# Patient Record
Sex: FEMALE | Race: WHITE | Employment: UNEMPLOYED | URBAN - METROPOLITAN AREA
[De-identification: names, ages, dates, MRNs, and addresses within clinical notes are randomized per-mention and may not be internally consistent; named-entity substitution may affect disease eponyms.]

---

## 2017-01-12 ENCOUNTER — TELEPHONE (OUTPATIENT)
Dept: SURGERY | Facility: CLINIC | Age: 52
End: 2017-01-12

## 2017-01-13 RX ORDER — FLUTICASONE PROPIONATE 50 MCG
SPRAY, SUSPENSION (ML) NASAL
Qty: 1 INHALER | Refills: 5 | Status: SHIPPED | OUTPATIENT
Start: 2017-01-13 | End: 2018-07-13

## 2017-01-13 NOTE — TELEPHONE ENCOUNTER
Contacted patient. Offered 01/16/2017 at [phone] for banding per Dr. Darlen Prader. Patient accepted. [facility] 2nd floor, yellow parking. Patient verbalized understanding, denied further complaints or concerns. All questions answered.

## 2017-01-18 ENCOUNTER — OFFICE VISIT (OUTPATIENT)
Dept: SURGERY | Facility: CLINIC | Age: 52
End: 2017-01-18

## 2017-01-18 DIAGNOSIS — K64.8 INTERNAL HEMORRHOIDS WITH COMPLICATION: Primary | ICD-10-CM

## 2017-01-18 PROCEDURE — 46221 LIGATION OF HEMORRHOID(S): CPT | Performed by: SURGERY

## 2017-01-20 NOTE — PATIENT INSTRUCTIONS
Instructed once more on the Post operative care of the [facility] 40. Return in 2 weeks. Call us as needed for any concerns rgards the recent procedure or if excessive bleeding occurs.

## 2017-01-20 NOTE — PROGRESS NOTES
Follow Up Visit Note       Active Problems   No diagnosis found. Chief Complaint: Patient presents with:  Hemorrhoids: Pt is here for 3rd hemorrhoid banding. Pt denies pain, denies bleeding. Hemorrhoids do protrude with BM's. Consent signed.        History POTENCY/VITAMIN D) 600-200 MG-UNIT Oral Tab Take  by mouth. Disp:  Rfl:       Allergies: Matthieu Candida is allergic to prednisone; soybean oil; vicodin; acetaminophen; apple; ciprofloxacin; hydrocodone; peach; peanut oil; and pear.        Review of Systems   Const

## 2017-01-20 NOTE — PROCEDURES
Consent was signed. Patient on Prone Jackknife position, the rectum and perineum were prep and draped in the usual surgical fashion. Lidocaine 5% ointment was used for topical anesthesia. Rectal Digital exam confirm no other pathology.  The anoscope was

## 2017-03-15 RX ORDER — CITALOPRAM 10 MG/1
TABLET ORAL
Qty: 90 TABLET | Refills: 1 | Status: SHIPPED | OUTPATIENT
Start: 2017-03-15 | End: 2017-09-23

## 2017-03-15 RX ORDER — CLONAZEPAM 0.5 MG/1
TABLET ORAL
Qty: 90 TABLET | Refills: 1 | OUTPATIENT
Start: 2017-03-15 | End: 2017-03-16

## 2017-03-17 RX ORDER — CLONAZEPAM 0.5 MG/1
TABLET ORAL
Qty: 90 TABLET | Refills: 0 | Status: SHIPPED | OUTPATIENT
Start: 2017-03-17 | End: 2017-07-18

## 2017-03-22 NOTE — TELEPHONE ENCOUNTER
Pharmacy was called, spoke w/pharmacist Afia Chaudhary, who stated pt already dropped off printed Rx for Clonazepam yesterday

## 2017-04-04 RX ORDER — MONTELUKAST SODIUM 10 MG/1
10 TABLET ORAL NIGHTLY
Qty: 90 TABLET | Refills: 0 | Status: SHIPPED | OUTPATIENT
Start: 2017-04-04 | End: 2017-09-29

## 2017-05-10 RX ORDER — LEVOTHYROXINE SODIUM 0.12 MG/1
TABLET ORAL
Qty: 90 TABLET | Refills: 2 | Status: SHIPPED | OUTPATIENT
Start: 2017-05-10 | End: 2018-02-02

## 2017-06-14 RX ORDER — METAXALONE 800 MG/1
TABLET ORAL
Qty: 90 TABLET | Refills: 1 | Status: SHIPPED | OUTPATIENT
Start: 2017-06-14 | End: 2017-09-29

## 2017-07-21 RX ORDER — CLONAZEPAM 0.5 MG/1
TABLET ORAL
Qty: 90 TABLET | Refills: 0 | OUTPATIENT
Start: 2017-07-21 | End: 2017-10-30

## 2017-08-14 ENCOUNTER — TELEPHONE (OUTPATIENT)
Dept: FAMILY MEDICINE CLINIC | Facility: CLINIC | Age: 52
End: 2017-08-14

## 2017-08-14 DIAGNOSIS — E78.2 MIXED HYPERLIPIDEMIA: ICD-10-CM

## 2017-08-17 ENCOUNTER — NURSE TRIAGE (OUTPATIENT)
Dept: OTHER | Age: 52
End: 2017-08-17

## 2017-08-17 RX ORDER — MONTELUKAST SODIUM 10 MG/1
10 TABLET ORAL NIGHTLY
Qty: 28 TABLET | Refills: 0 | Status: SHIPPED | OUTPATIENT
Start: 2017-08-17 | End: 2017-09-29

## 2017-08-17 RX ORDER — SIMVASTATIN 20 MG
TABLET ORAL
Qty: 90 TABLET | Refills: 0 | Status: SHIPPED | OUTPATIENT
Start: 2017-08-17 | End: 2017-11-08

## 2017-08-17 NOTE — TELEPHONE ENCOUNTER
Zyrtec–10 mg 1 tablet once daily, for 4 weeks. Flonase 1 puff each nostril 2 times daily for 4 weeks.   Singulair 10 mg 1 tablet once daily for 4 weeks

## 2017-08-17 NOTE — TELEPHONE ENCOUNTER
Cholesterol Medications  Protocol Criteria:  · Appointment scheduled in the past 12 months or in the next 3 months  · ALT & LDL on file in the past 12 months  · ALT result < 80  · LDL result <130   Recent Outpatient Visits            7 months ago Internal

## 2017-08-17 NOTE — TELEPHONE ENCOUNTER
Pt  spoke with DEDE last week about her URI symptoms and about being seen at an UC last week (unable to locate said telephone encounter documentation).  States DEDE advised she take the Z-Kev and cough medicine that was prescribed at that outside UC fac

## 2017-08-26 DIAGNOSIS — I10 BENIGN ESSENTIAL HTN: ICD-10-CM

## 2017-08-27 RX ORDER — METOPROLOL SUCCINATE 25 MG/1
25 TABLET, EXTENDED RELEASE ORAL
Qty: 90 TABLET | Refills: 1 | Status: SHIPPED | OUTPATIENT
Start: 2017-08-27 | End: 2018-02-24

## 2017-09-23 RX ORDER — CITALOPRAM 10 MG/1
TABLET ORAL
Qty: 90 TABLET | Refills: 1 | Status: SHIPPED | OUTPATIENT
Start: 2017-09-23 | End: 2018-03-27

## 2017-09-23 NOTE — TELEPHONE ENCOUNTER
Refill Protocol Appointment Criteria  · Appointment scheduled in the past 6 months or in the next 3 months  Recent Outpatient Visits            8 months ago Internal hemorrhoids with complication    [facility] Domingo Quinteros

## 2017-09-28 ENCOUNTER — NURSE TRIAGE (OUTPATIENT)
Dept: OTHER | Age: 52
End: 2017-09-28

## 2017-09-29 ENCOUNTER — OFFICE VISIT (OUTPATIENT)
Dept: INTERNAL MEDICINE CLINIC | Facility: CLINIC | Age: 52
End: 2017-09-29

## 2017-09-29 VITALS
DIASTOLIC BLOOD PRESSURE: 74 MMHG | HEART RATE: 76 BPM | WEIGHT: 186 LBS | HEIGHT: 64 IN | BODY MASS INDEX: 31.76 KG/M2 | RESPIRATION RATE: 16 BRPM | SYSTOLIC BLOOD PRESSURE: 125 MMHG

## 2017-09-29 DIAGNOSIS — L24.9 IRRITANT CONTACT DERMATITIS, UNSPECIFIED TRIGGER: Primary | ICD-10-CM

## 2017-09-29 RX ORDER — MELOXICAM 15 MG/1
15 TABLET ORAL DAILY
Qty: 30 TABLET | Refills: 3 | Status: SHIPPED | OUTPATIENT
Start: 2017-09-29 | End: 2018-01-02

## 2017-09-29 NOTE — TELEPHONE ENCOUNTER
Action Requested: Summary for Provider     []  Critical Lab, Recommendations Needed  [x] Need Additional Advice  []   FYI    []   Need Orders  [] Need Medications Sent to Pharmacy  []  Other     SUMMARY: Pt seeking Appt tomorrow before 9 AM have Dental aftab

## 2017-09-30 NOTE — ASSESSMENT & PLAN NOTE
Local application of cortisone 10 over-the-counter mixed with a moisturizing lotion and small quantities. Do not use internally. Call if any problems that are persistent. Avoid using the current tampons.

## 2017-09-30 NOTE — PROGRESS NOTES
HPI:    Patient ID: Maria Luisa Weinstein is a 47year old female. Rash   This is a new problem. The current episode started in the past 7 days. The problem has been waxing and waning (local irritation from poise tampons -no rash. no open lesions. no hx of Acetaminophen               Comment:Other reaction(s): Fainting  Apple                   Anaphylaxis  Ciprofloxacin           Swelling  Hydrocodone                 Comment:Other reaction(s): Fainting  Peach                   Anaphylaxis  Peanut Oil symptoms worsen or fail to improve. PT UNDERSTANDS AND AGREES TO FOLLOW DIRECTIONS AND ADVICE    No orders of the defined types were placed in this encounter.       Meds This Visit:  Signed Prescriptions Disp Refills    Meloxicam 15 MG Oral Tab 30 tablet

## 2017-09-30 NOTE — PATIENT INSTRUCTIONS
Problem List Items Addressed This Visit        Unprioritized    Irritant contact dermatitis - Primary     Local application of cortisone 10 over-the-counter mixed with a moisturizing lotion and small quantities. Do not use internally.   Call if any problem

## 2017-10-31 RX ORDER — CLONAZEPAM 0.5 MG/1
TABLET ORAL
Qty: 90 TABLET | Refills: 0 | Status: SHIPPED | OUTPATIENT
Start: 2017-10-31 | End: 2018-02-04

## 2017-11-08 DIAGNOSIS — E78.2 MIXED HYPERLIPIDEMIA: ICD-10-CM

## 2017-11-10 RX ORDER — SIMVASTATIN 20 MG
TABLET ORAL
Qty: 90 TABLET | Refills: 0 | Status: SHIPPED | OUTPATIENT
Start: 2017-11-10 | End: 2018-03-05

## 2017-11-10 NOTE — TELEPHONE ENCOUNTER
Cholesterol Medications  Protocol Criteria:  · Appointment scheduled in the past 12 months or in the next 3 months  · ALT & LDL on file in the past 12 months  · ALT result < 80  · LDL result <130   Recent Outpatient Visits            1 month ago Irritant c

## 2017-11-30 RX ORDER — DICLOFENAC SODIUM 75 MG/1
TABLET, DELAYED RELEASE ORAL
Qty: 90 TABLET | Refills: 0 | Status: SHIPPED | OUTPATIENT
Start: 2017-11-30 | End: 2018-01-02

## 2018-01-02 ENCOUNTER — NURSE TRIAGE (OUTPATIENT)
Dept: OTHER | Age: 53
End: 2018-01-02

## 2018-01-02 RX ORDER — AZITHROMYCIN 250 MG/1
TABLET, FILM COATED ORAL
Qty: 1 PACKAGE | Refills: 0 | Status: SHIPPED | OUTPATIENT
Start: 2018-01-02 | End: 2018-03-05

## 2018-01-02 RX ORDER — DICLOFENAC SODIUM 75 MG/1
75 TABLET, DELAYED RELEASE ORAL
Qty: 30 TABLET | Refills: 2 | Status: SHIPPED | OUTPATIENT
Start: 2018-01-02 | End: 2019-04-05 | Stop reason: ALTCHOICE

## 2018-01-02 NOTE — TELEPHONE ENCOUNTER
Spoke with patient and relayed DEDE message. Verbalized understanding and agreement. States had Diclofenac ordered previously but was not able to fill the Rx due to pharmacy stating can not take at the same time with Meloxicam.      Rx sent as directed.  Med

## 2018-01-02 NOTE — TELEPHONE ENCOUNTER
Action Requested: Summary for Provider     []  Critical Lab, Recommendations Needed  [] Need Additional Advice  []   FYI    []   Need Orders  [x] Need Medications Sent to Pharmacy  []  Other     SUMMARY: pt requesting zpack to pharmacy for sinus pressure d

## 2018-01-02 NOTE — TELEPHONE ENCOUNTER
Patient was asking for a replacement for Meloxicam - she feels it is making her throat very dry. Once starting the Meloxicam two months she noticed her mouth always being very dry and also says that she has been snoring more since starting.      Please advi

## 2018-01-12 ENCOUNTER — NURSE TRIAGE (OUTPATIENT)
Dept: OTHER | Age: 53
End: 2018-01-12

## 2018-01-12 ENCOUNTER — HOSPITAL ENCOUNTER (EMERGENCY)
Facility: HOSPITAL | Age: 53
Discharge: HOME | End: 2018-01-12
Attending: EMERGENCY MEDICINE
Payer: COMMERCIAL

## 2018-01-12 VITALS
TEMPERATURE: 98 F | RESPIRATION RATE: 20 BRPM | SYSTOLIC BLOOD PRESSURE: 122 MMHG | WEIGHT: 170 LBS | HEIGHT: 64 IN | DIASTOLIC BLOOD PRESSURE: 69 MMHG | HEART RATE: 95 BPM | BODY MASS INDEX: 29.02 KG/M2 | OXYGEN SATURATION: 98 %

## 2018-01-12 DIAGNOSIS — R19.7 DIARRHEA, UNSPECIFIED TYPE: Primary | ICD-10-CM

## 2018-01-12 LAB
ADENOVIRUS F 40/41 PCR: NEGATIVE
ALBUMIN SERPL BCP-MCNC: 4.3 G/DL (ref 3.5–4.8)
ALP SERPL-CCNC: 65 U/L (ref 32–100)
ALT SERPL-CCNC: 15 U/L (ref 14–54)
ANION GAP SERPL CALC-SCNC: 12 MMOL/L (ref 0–18)
AST SERPL-CCNC: 25 U/L (ref 15–41)
ASTROVIRUS PCR: NEGATIVE
B-HCG UR QL: NEGATIVE
BASOPHILS # BLD: 0.1 K/UL (ref 0–0.2)
BASOPHILS NFR BLD: 1 %
BILIRUB DIRECT SERPL-MCNC: 0.1 MG/DL (ref 0–0.2)
BILIRUB SERPL-MCNC: 0.8 MG/DL (ref 0.3–1.2)
BILIRUB UR QL: NEGATIVE
BUN SERPL-MCNC: 7 MG/DL (ref 8–20)
BUN/CREAT SERPL: 7.7 (ref 10–20)
C CAYETANENSIS DNA SPEC QL NAA+PROBE: NEGATIVE
C. DIFFICILE TOXIN A/B PCR: NEGATIVE
CALCIUM SERPL-MCNC: 9 MG/DL (ref 8.5–10.5)
CAMPY SP DNA.DIARRHEA STL QL NAA+PROBE: NEGATIVE
CHLORIDE SERPL-SCNC: 105 MMOL/L (ref 95–110)
CLARITY UR: CLEAR
CO2 SERPL-SCNC: 20 MMOL/L (ref 22–32)
COLOR UR: YELLOW
CREAT SERPL-MCNC: 0.91 MG/DL (ref 0.5–1.5)
CRYPTOSP DNA SPEC QL NAA+PROBE: NEGATIVE
EAEC PAA PLAS AGGR+AATA ST NAA+NON-PRB: NEGATIVE
EC STX1+STX2 + H7 FLIC SPEC NAA+PROBE: NEGATIVE
ENTAMOEBA HISTOLYTICA PCR: NEGATIVE
EOSINOPHIL # BLD: 0.2 K/UL (ref 0–0.7)
EOSINOPHIL NFR BLD: 1 %
EPEC EAE GENE STL QL NAA+NON-PROBE: NEGATIVE
ERYTHROCYTE [DISTWIDTH] IN BLOOD BY AUTOMATED COUNT: 16.3 % (ref 11–15)
ETEC LTA+ST1A+ST1B TOX ST NAA+NON-PROBE: NEGATIVE
GIARDIA LAMBLIA PCR: NEGATIVE
GLUCOSE SERPL-MCNC: 117 MG/DL (ref 70–99)
GLUCOSE UR-MCNC: NEGATIVE MG/DL
HCT VFR BLD AUTO: 36.8 % (ref 35–48)
HGB BLD-MCNC: 11.7 G/DL (ref 12–16)
HGB UR QL STRIP.AUTO: NEGATIVE
KETONES UR-MCNC: 20 MG/DL
LEUKOCYTE ESTERASE UR QL STRIP.AUTO: NEGATIVE
LIPASE SERPL-CCNC: 22 U/L (ref 22–51)
LYMPHOCYTES # BLD: 1.9 K/UL (ref 1–4)
LYMPHOCYTES NFR BLD: 14 %
MCH RBC QN AUTO: 22.8 PG (ref 27–32)
MCHC RBC AUTO-ENTMCNC: 31.7 G/DL (ref 32–37)
MCV RBC AUTO: 71.9 FL (ref 80–100)
MONOCYTES # BLD: 0.6 K/UL (ref 0–1)
MONOCYTES NFR BLD: 4 %
NEUTROPHILS # BLD AUTO: 11.4 K/UL (ref 1.8–7.7)
NEUTROPHILS NFR BLD: 80 %
NITRITE UR QL STRIP.AUTO: NEGATIVE
NOROVIRUS GI/GII PCR: NEGATIVE
OSMOLALITY UR CALC.SUM OF ELEC: 283 MOSM/KG (ref 275–295)
P SHIGELLOIDES DNA STL QL NAA+PROBE: NEGATIVE
PH UR: 5 [PH] (ref 5–8)
PLATELET # BLD AUTO: 390 K/UL (ref 140–400)
PMV BLD AUTO: 8.7 FL (ref 7.4–10.3)
POTASSIUM SERPL-SCNC: 3.9 MMOL/L (ref 3.3–5.1)
PROT SERPL-MCNC: 7.4 G/DL (ref 5.9–8.4)
PROT UR-MCNC: NEGATIVE MG/DL
RBC # BLD AUTO: 5.11 M/UL (ref 3.7–5.4)
ROTAVIRUS A PCR: NEGATIVE
SALMONELLA DNA SPEC QL NAA+PROBE: NEGATIVE
SAPOVIRUS PCR: NEGATIVE
SHIGELLA SP+EIEC IPAH ST NAA+NON-PROBE: NEGATIVE
SODIUM SERPL-SCNC: 137 MMOL/L (ref 136–144)
SP GR UR STRIP: 1.02 (ref 1–1.03)
UROBILINOGEN UR STRIP-ACNC: <2
V CHOLERAE DNA SPEC QL NAA+PROBE: NEGATIVE
VIBRIO DNA SPEC NAA+PROBE: NEGATIVE
VIT C UR-MCNC: NEGATIVE MG/DL
WBC # BLD AUTO: 14.2 K/UL (ref 4–11)
YERSINIA DNA SPEC NAA+PROBE: NEGATIVE

## 2018-01-12 PROCEDURE — 83690 ASSAY OF LIPASE: CPT | Performed by: EMERGENCY MEDICINE

## 2018-01-12 PROCEDURE — 87507 IADNA-DNA/RNA PROBE TQ 12-25: CPT | Performed by: EMERGENCY MEDICINE

## 2018-01-12 PROCEDURE — 99284 EMERGENCY DEPT VISIT MOD MDM: CPT

## 2018-01-12 PROCEDURE — 80076 HEPATIC FUNCTION PANEL: CPT | Performed by: EMERGENCY MEDICINE

## 2018-01-12 PROCEDURE — 80076 HEPATIC FUNCTION PANEL: CPT

## 2018-01-12 PROCEDURE — 96361 HYDRATE IV INFUSION ADD-ON: CPT

## 2018-01-12 PROCEDURE — 80048 BASIC METABOLIC PNL TOTAL CA: CPT

## 2018-01-12 PROCEDURE — 81025 URINE PREGNANCY TEST: CPT

## 2018-01-12 PROCEDURE — 85025 COMPLETE CBC W/AUTO DIFF WBC: CPT

## 2018-01-12 PROCEDURE — 80048 BASIC METABOLIC PNL TOTAL CA: CPT | Performed by: EMERGENCY MEDICINE

## 2018-01-12 PROCEDURE — 85025 COMPLETE CBC W/AUTO DIFF WBC: CPT | Performed by: EMERGENCY MEDICINE

## 2018-01-12 PROCEDURE — 81003 URINALYSIS AUTO W/O SCOPE: CPT | Performed by: EMERGENCY MEDICINE

## 2018-01-12 PROCEDURE — 96374 THER/PROPH/DIAG INJ IV PUSH: CPT

## 2018-01-12 RX ORDER — ONDANSETRON 2 MG/ML
4 INJECTION INTRAMUSCULAR; INTRAVENOUS ONCE
Status: COMPLETED | OUTPATIENT
Start: 2018-01-12 | End: 2018-01-12

## 2018-01-12 RX ORDER — ONDANSETRON 2 MG/ML
INJECTION INTRAMUSCULAR; INTRAVENOUS
Status: COMPLETED
Start: 2018-01-12 | End: 2018-01-12

## 2018-01-12 NOTE — TELEPHONE ENCOUNTER
Action Requested: Summary for Provider     []  Critical Lab, Recommendations Needed  [x] Need Additional Advice  []   FYI    []   Need Orders  [] Need Medications Sent to Pharmacy  []  Other     SUMMARY: advised pt to go to IC for frequent diarrhea, pt sta

## 2018-01-13 NOTE — ED PROVIDER NOTES
Patient Seen in: [facility] AND [facility] Emergency Department     History   Patient presents with:  Nausea/Vomiting/Diarrhea (gastrointestinal)    Stated Complaint: n/v/d    HPI    47year old female complains of profuse watery diarrhea for the past day.   No blo NIGHTLY. CLONAZEPAM 0.5 MG Oral Tab,  TAKE 1 TABLET BY MOUTH ONCE DAILY   CITALOPRAM HYDROBROMIDE 10 MG Oral Tab,  TAKE 1 TABLET BY MOUTH DAILY. METOPROLOL SUCCINATE ER 25 MG Oral Tablet 24 Hr,  TAKE 1 TABLET (25 MG TOTAL) BY MOUTH ONCE DAILY.    Silvestre Lowe except as noted above. PSFH elements reviewed from today and agreed except as otherwise stated in HPI.     Physical Exam   ED Triage Vitals [01/12/18 1720]  BP: 147/95  Pulse: 114  Resp: 20  Temp: 98.2 °F (36.8 °C)  Temp src: Temporal  SpO2: 100 %  O2 Earmon Mildred reviewed  Labs Reviewed   URINALYSIS WITH CULTURE REFLEX - Abnormal; Notable for the following:        Result Value    Ketones Urine 20  (*)     All other components within normal limits   BASIC METABOLIC PANEL (8) - Abnormal; Notable for the following: vitals. MDM     Limitations of history:   able to obtain history from patient  Factors limiting our ability to obtain a history:  None    Complicating Factors:  The patient already has has Bicipital tenosynovitis; Osteoarthrosis, unspecified whether gene pending, follow-up with primary care. Further Outpatient evaluation and treatment will be required.  I personally discussed the results of the above ED workup and a number of associated acute management issues with the patient, and I explained the need for

## 2018-01-13 NOTE — ED NOTES
Pt here for diarrhea since this morning and vomiting. No recent travel, no fevers. Pt states she did have z-pack two weeks ago and does have history of c.diff. Pt states she had URI which was why she was prescribed z-pack.  Pt states BMs are non-stop, no bl

## 2018-01-31 RX ORDER — MONTELUKAST SODIUM 10 MG/1
10 TABLET ORAL NIGHTLY
Qty: 28 TABLET | Refills: 0 | OUTPATIENT
Start: 2018-01-31

## 2018-02-03 RX ORDER — LEVOTHYROXINE SODIUM 0.12 MG/1
TABLET ORAL
Qty: 90 TABLET | Refills: 1 | Status: SHIPPED | OUTPATIENT
Start: 2018-02-03 | End: 2018-07-03

## 2018-02-04 ENCOUNTER — TELEPHONE (OUTPATIENT)
Dept: INTERNAL MEDICINE CLINIC | Facility: CLINIC | Age: 53
End: 2018-02-04

## 2018-02-06 RX ORDER — CLONAZEPAM 0.5 MG/1
TABLET ORAL
Qty: 90 TABLET | Refills: 0 | OUTPATIENT
Start: 2018-02-06 | End: 2018-05-25

## 2018-02-06 NOTE — TELEPHONE ENCOUNTER
LOV: 9-29-17 Last Rx: 10-31-17    Please advise in regards to refill request. Thank You    Please respond to pool: [facility] LPN/CMA

## 2018-02-24 DIAGNOSIS — I10 BENIGN ESSENTIAL HTN: ICD-10-CM

## 2018-02-24 RX ORDER — METOPROLOL SUCCINATE 25 MG/1
25 TABLET, EXTENDED RELEASE ORAL
Qty: 90 TABLET | Refills: 0 | Status: SHIPPED | OUTPATIENT
Start: 2018-02-24 | End: 2018-05-26

## 2018-02-28 DIAGNOSIS — E78.2 MIXED HYPERLIPIDEMIA: ICD-10-CM

## 2018-03-01 RX ORDER — SIMVASTATIN 20 MG
TABLET ORAL
Qty: 90 TABLET | Refills: 0 | OUTPATIENT
Start: 2018-03-01

## 2018-03-02 ENCOUNTER — TELEPHONE (OUTPATIENT)
Dept: OTHER | Age: 53
End: 2018-03-02

## 2018-03-02 NOTE — TELEPHONE ENCOUNTER
Please advise on Appt for Monday after 5 , Pt stated  Just a few minutes ago after showering she felt a lump(gumball size) below her right armpit but toward the front,as she stands in front of the mirror she can see it,also tender to touch,stated  you were

## 2018-03-03 NOTE — TELEPHONE ENCOUNTER
Yes I am aware of her history of thyroid cancer.   Okay to double at the 7:30 PM appointment on Monday next week

## 2018-03-05 ENCOUNTER — OFFICE VISIT (OUTPATIENT)
Dept: INTERNAL MEDICINE CLINIC | Facility: CLINIC | Age: 53
End: 2018-03-05

## 2018-03-05 VITALS
RESPIRATION RATE: 16 BRPM | SYSTOLIC BLOOD PRESSURE: 107 MMHG | HEIGHT: 64 IN | BODY MASS INDEX: 31.76 KG/M2 | HEART RATE: 74 BPM | WEIGHT: 186 LBS | DIASTOLIC BLOOD PRESSURE: 73 MMHG

## 2018-03-05 DIAGNOSIS — E78.2 MIXED HYPERLIPIDEMIA: ICD-10-CM

## 2018-03-05 DIAGNOSIS — L73.2 RIGHT AXILLARY HIDRADENITIS: Primary | ICD-10-CM

## 2018-03-05 PROCEDURE — 99212 OFFICE O/P EST SF 10 MIN: CPT | Performed by: INTERNAL MEDICINE

## 2018-03-05 PROCEDURE — 99213 OFFICE O/P EST LOW 20 MIN: CPT | Performed by: INTERNAL MEDICINE

## 2018-03-05 RX ORDER — METRONIDAZOLE 500 MG/1
TABLET ORAL
Qty: 14 TABLET | Refills: 0 | Status: SHIPPED | OUTPATIENT
Start: 2018-03-05 | End: 2018-05-03

## 2018-03-05 RX ORDER — AMOXICILLIN AND CLAVULANATE POTASSIUM 875; 125 MG/1; MG/1
1 TABLET, FILM COATED ORAL 2 TIMES DAILY
Qty: 14 TABLET | Refills: 0 | Status: SHIPPED | OUTPATIENT
Start: 2018-03-05 | End: 2018-03-15

## 2018-03-05 RX ORDER — SIMVASTATIN 20 MG
TABLET ORAL
Qty: 90 TABLET | Refills: 2 | Status: SHIPPED | OUTPATIENT
Start: 2018-03-05 | End: 2018-08-27

## 2018-03-06 NOTE — PATIENT INSTRUCTIONS
Problem List Items Addressed This Visit        Unprioritized    Hyperlipidemia    Relevant Medications    simvastatin 20 MG Oral Tab    Right axillary hidradenitis - Primary     Painful tender swelling in the right axilla–noted since the past 3-4 days.   No

## 2018-03-06 NOTE — PROGRESS NOTES
HPI:    Patient ID: Reyes Cade is a 46year old female. Mass   This is a new problem. The current episode started in the past 7 days (painful lump in the right armpit since last friday,has been shaving the area,no hx of this in the past.).  The 600-200 MG-UNIT Oral Tab Take  by mouth.  Disp:  Rfl:      Allergies:  Prednisone                Soybean Oil             Swelling    Comment:Mouth and tongue  Vicodin [Hydrocodon*      Acetaminophen               Comment:Other reaction(s): Fainting  Apple C. difficile diarrhea next line advised to take an over-the-counter probiotic at least once daily. Return in about 2 weeks (around 3/19/2018), or if symptoms worsen or fail to improve.     PT UNDERSTANDS AND AGREES TO FOLLOW DIRECTIONS AND ADV

## 2018-03-06 NOTE — ASSESSMENT & PLAN NOTE
Painful tender swelling in the right axilla–noted since the past 3-4 days. No fevers or chills. There is induration to the swelling.   Start on Augmentin 875 mg 1 tablet 2 times daily after meal.  Eat some yogurt to keep risks for diarrhea and abdominal d

## 2018-03-21 ENCOUNTER — OFFICE VISIT (OUTPATIENT)
Dept: DERMATOLOGY CLINIC | Facility: CLINIC | Age: 53
End: 2018-03-21

## 2018-03-21 ENCOUNTER — TELEPHONE (OUTPATIENT)
Dept: DERMATOLOGY CLINIC | Facility: CLINIC | Age: 53
End: 2018-03-21

## 2018-03-21 DIAGNOSIS — D23.70 BENIGN NEOPLASM OF SKIN OF LOWER LIMB, INCLUDING HIP, UNSPECIFIED LATERALITY: ICD-10-CM

## 2018-03-21 DIAGNOSIS — D23.5 BENIGN NEOPLASM OF SKIN OF TRUNK, EXCEPT SCROTUM: ICD-10-CM

## 2018-03-21 DIAGNOSIS — D23.60 BENIGN NEOPLASM OF SKIN OF UPPER LIMB, INCLUDING SHOULDER, UNSPECIFIED LATERALITY: ICD-10-CM

## 2018-03-21 DIAGNOSIS — D23.30 BENIGN NEOPLASM OF SKIN OF FACE: ICD-10-CM

## 2018-03-21 DIAGNOSIS — L72.0 EPIDERMAL CYST: Primary | ICD-10-CM

## 2018-03-21 DIAGNOSIS — D23.4 BENIGN NEOPLASM OF SCALP AND SKIN OF NECK: ICD-10-CM

## 2018-03-21 DIAGNOSIS — D22.9 MULTIPLE NEVI: ICD-10-CM

## 2018-03-21 PROCEDURE — 99212 OFFICE O/P EST SF 10 MIN: CPT | Performed by: DERMATOLOGY

## 2018-03-21 PROCEDURE — 99214 OFFICE O/P EST MOD 30 MIN: CPT | Performed by: DERMATOLOGY

## 2018-03-21 RX ORDER — TOBRAMYCIN AND DEXAMETHASONE 3; 1 MG/ML; MG/ML
SUSPENSION/ DROPS OPHTHALMIC
Qty: 15 ML | Refills: 0 | Status: ON HOLD | OUTPATIENT
Start: 2018-03-21 | End: 2018-05-10

## 2018-03-28 RX ORDER — CITALOPRAM 10 MG/1
TABLET ORAL
Qty: 90 TABLET | Refills: 1 | Status: SHIPPED | OUTPATIENT
Start: 2018-03-28 | End: 2018-09-20

## 2018-04-01 NOTE — PROGRESS NOTES
Ofe Graves is a 46year old female. HPI:     CC:  Patient presents with:  Moles: LOV 7/22/2015. Pt presenting with mole to L buttock. c/o peeling Pt denies personal and family hx of skin cancer.   Derm Problem: Pt concerned with hyperpigmentation • Cancer Brother      liver   • Lipids Other      family h/o hyperlipidemia    • Cancer Maternal Uncle      leukemia   • Cancer Paternal Aunt 46     ovarian      Smoking status: Never Smoker                                                              Sm Acetaminophen               Comment:Other reaction(s): Fainting  Apple                   Anaphylaxis  Ciprofloxacin           Swelling  Hydrocodone                 Comment:Other reaction(s): Fainting  Peach                   Anaphylaxis  Peanut Oil CVA   • Hypertension Father    • Diabetes Mother    • Neurological Disorder Paternal Grandfather      Alzheimer's   • Cancer Brother      liver   • Lipids Other      family h/o hyperlipidemia    • Cancer Maternal Uncle      leukemia   • Cancer Paternal Aun map.  See details of examination  See Assessment /Plan for additional history and physical exam also:    Assessment / plan:    No orders of the defined types were placed in this encounter.       Meds & Refills for this Visit:  Signed Prescriptions Disp Refi with patient. Therapeutic options reviewed. See  Medications in grid. Instructions reviewed at length. Benign nevi, seborrheic  keratoses, cherry angiomas:  Reassurance regarding other benign skin lesions. Signs and symptoms of skin cancer, ABCDE's of

## 2018-04-04 ENCOUNTER — TELEPHONE (OUTPATIENT)
Dept: DERMATOLOGY CLINIC | Facility: CLINIC | Age: 53
End: 2018-04-04

## 2018-04-04 ENCOUNTER — OFFICE VISIT (OUTPATIENT)
Dept: SURGERY | Facility: CLINIC | Age: 53
End: 2018-04-04

## 2018-04-04 VITALS — WEIGHT: 180 LBS | HEIGHT: 64 IN | BODY MASS INDEX: 30.73 KG/M2

## 2018-04-04 DIAGNOSIS — L72.0 CYST OF SKIN AND SUBCUTANEOUS TISSUE: ICD-10-CM

## 2018-04-04 DIAGNOSIS — R22.31 MASS OF RIGHT AXILLA: Primary | ICD-10-CM

## 2018-04-04 PROCEDURE — 99212 OFFICE O/P EST SF 10 MIN: CPT | Performed by: SURGERY

## 2018-04-04 PROCEDURE — 99214 OFFICE O/P EST MOD 30 MIN: CPT | Performed by: SURGERY

## 2018-04-04 NOTE — TELEPHONE ENCOUNTER
Pt had a mole frozen a couple of weeks ago and the area is not looking good to her, she is not sure if she should be seen.   She also has some styes near her eyes and wanted some recommendations pls advise call and patient

## 2018-04-04 NOTE — PROGRESS NOTES
Visit Note    Active Problems   Mass of right axilla  (primary encounter diagnosis)  Cyst of skin and subcutaneous tissue, left thigh   Chief Complaint: Patient presents with:  Mass: Pt was referred by Dr. Toan Bell for left hip cyst, she has had this for abo Inhaler Rfl: 5   AZELASTINE HCL 0.1 % Nasal Solution SPRAY 2 SPRAYS INTO BOTH NOSTRILS TWICE A DAY Disp: 1 Bottle Rfl: 3   Omeprazole 40 MG Oral Capsule Delayed Release Take 1 capsule (40 mg total) by mouth once daily.  Disp: 90 capsule Rfl: 2   Biotin FernandaPA Maternal Uncle      leukemia   • Cancer Paternal Aunt 46     ovarian   • Diabetes Sister      Social History    Social History  Social History   Marital status:   Spouse name: N/A    Years of education: N/A  Number of children: N/A     Occupational normal. Right breast exhibits no inverted nipple, no nipple discharge, no skin change and no tenderness. Left breast exhibits no inverted nipple, no nipple discharge, no skin change and no tenderness. Abdominal: Soft.  Bowel sounds are normal.   Muscu

## 2018-04-04 NOTE — TELEPHONE ENCOUNTER
LOV 3/21/18. Pt had a lesion frozen to intragluteal cleft. States it became darker and bigger. Informed that's the normal reaction. She will monitor. She also states she has styes to cira. Upper eyelids and to left outer canthus.  Do you think she should see

## 2018-04-05 NOTE — TELEPHONE ENCOUNTER
Opthalmologist- or oculoplastics can take care of these -Dr. Ailyn Viveros, Dr Yoli Mcwilliams or Dr. Emanuel Chase

## 2018-04-05 NOTE — TELEPHONE ENCOUNTER
Pt informed of [address]. Provided pt doctors' info over the phone and sent Oculoplastics list to pt in the mail today.

## 2018-04-06 ENCOUNTER — HOSPITAL ENCOUNTER (OUTPATIENT)
Dept: ULTRASOUND IMAGING | Facility: HOSPITAL | Age: 53
Discharge: HOME | End: 2018-04-06
Attending: SURGERY
Payer: COMMERCIAL

## 2018-04-06 ENCOUNTER — HOSPITAL ENCOUNTER (OUTPATIENT)
Dept: MAMMOGRAPHY | Facility: HOSPITAL | Age: 53
Discharge: HOME | End: 2018-04-06
Attending: SURGERY
Payer: COMMERCIAL

## 2018-04-06 DIAGNOSIS — R22.31 MASS OF RIGHT AXILLA: ICD-10-CM

## 2018-04-06 PROCEDURE — 76642 ULTRASOUND BREAST LIMITED: CPT | Performed by: SURGERY

## 2018-04-06 PROCEDURE — 77066 DX MAMMO INCL CAD BI: CPT | Performed by: SURGERY

## 2018-04-06 NOTE — PATIENT INSTRUCTIONS
Assessment   Mass of right axilla  (primary encounter diagnosis)  Cyst of skin and subcutaneous tissue, Left thigh     Plan            Patient will get Mammogram/US of the Right Axillary Mass.  Further recommendations regards the Axillary Mass pending repor

## 2018-04-13 ENCOUNTER — TELEPHONE (OUTPATIENT)
Dept: SURGERY | Facility: CLINIC | Age: 53
End: 2018-04-13

## 2018-04-13 DIAGNOSIS — L72.9 SKIN CYST: Primary | ICD-10-CM

## 2018-04-13 RX ORDER — DICLOFENAC SODIUM 75 MG/1
TABLET, DELAYED RELEASE ORAL
Qty: 90 TABLET | Refills: 1 | Status: SHIPPED | OUTPATIENT
Start: 2018-04-13 | End: 2018-07-13

## 2018-04-13 NOTE — TELEPHONE ENCOUNTER
Pt calling with questions regarding what are the next steps to having cyst removed. Please advise. Thank you.

## 2018-04-16 NOTE — TELEPHONE ENCOUNTER
Mailbox is full. Will attempt call back later. If patient calls back:    Per Dr. Dee Tuttle: Patient's swelling, mass of right axilla is likely a reactive lymph node, benign. Recommend follow up US in 3 months of right axilla. Patient may schedule excision of subcutaneous skin cyst of left thigh at hip area, 45 mins, local, [facility].

## 2018-04-16 NOTE — TELEPHONE ENCOUNTER
Dr. Sharron Barker will review results of imaging today 04/16/2018 and we will call patient with further recommendations.

## 2018-04-17 NOTE — TELEPHONE ENCOUNTER
Contacted patient. Informed her of Dr. Vidhya Hamlin message below. She was area of the 3 month US recommendation. Scheduled Excision of subcutaneous skin cyst of left thigh at hip area at [address] [place] [address] on 05/10/2018. Local anesthesia. No ASA/NSAIDs for 7 days prior to surgery. Patient may drive herself. Patient will receive a call with time and details on 05/09/2018. Confirmed patient's phone number. Written instructions mailed to patient's home address. Patient verbalized understanding. All questions answered. Consent form updated, faxed to [address] [place] [address]. Documents placed in green folder above RN station.

## 2018-05-10 ENCOUNTER — HOSPITAL ENCOUNTER (OUTPATIENT)
Facility: HOSPITAL | Age: 53
Setting detail: HOSPITAL OUTPATIENT SURGERY
Discharge: HOME | End: 2018-05-10
Attending: SURGERY | Admitting: SURGERY
Payer: COMMERCIAL

## 2018-05-10 ENCOUNTER — SURGERY (OUTPATIENT)
Age: 53
End: 2018-05-10

## 2018-05-10 VITALS
TEMPERATURE: 98 F | WEIGHT: 175 LBS | DIASTOLIC BLOOD PRESSURE: 54 MMHG | HEIGHT: 64 IN | OXYGEN SATURATION: 99 % | SYSTOLIC BLOOD PRESSURE: 104 MMHG | RESPIRATION RATE: 16 BRPM | HEART RATE: 74 BPM | BODY MASS INDEX: 29.88 KG/M2

## 2018-05-10 PROCEDURE — 88304 TISSUE EXAM BY PATHOLOGIST: CPT | Performed by: SURGERY

## 2018-05-10 PROCEDURE — 88305 TISSUE EXAM BY PATHOLOGIST: CPT | Performed by: SURGERY

## 2018-05-10 PROCEDURE — 0JQM0ZZ REPAIR LEFT UPPER LEG SUBCUTANEOUS TISSUE AND FASCIA, OPEN APPROACH: ICD-10-PCS | Performed by: SURGERY

## 2018-05-10 PROCEDURE — 0JBM0ZZ EXCISION OF LEFT UPPER LEG SUBCUTANEOUS TISSUE AND FASCIA, OPEN APPROACH: ICD-10-PCS | Performed by: SURGERY

## 2018-05-10 NOTE — OPERATIVE REPORT
[facility]    PATIENT'S NAME: Abdifatah Saldaña   ATTENDING PHYSICIAN: Jose De Jesus Pena MD   OPERATING PHYSICIAN: Jose De Jesus Pena MD   PATIENT ACCOUNT#:   [id number]    LOCATION:  [address]  MEDICAL RECORD #:   [id number] were applied. The patient was discharged from the operating room in satisfactory condition. Estimated blood loss was less than 1 mL.     Dictated By Tanya Melissa MD  d: 05/10/2018 08:09:58  t: 05/10/2018 08:24:45  Job [id number]  VSY/

## 2018-05-10 NOTE — INTERVAL H&P NOTE
Pre-op Diagnosis: Subcutaneous skin cyst left thigh     The above referenced H&P was reviewed by Marvin West MD on 5/10/2018, the patient was examined and no significant changes have occurred in the patient's condition since the H&P was performed.   I

## 2018-05-10 NOTE — H&P
Active Problems   Mass of right axilla  (primary encounter diagnosis)  Cyst of skin and subcutaneous tissue, left thigh   Chief Complaint: Patient presents with:  Mass: Pt was referred by Dr. Librado Nicolas for left hip cyst, she has had this for about 7 years, ha FLUTICASONE PROPIONATE 50 MCG/ACT Nasal Suspension 1 PUFF EACH NOSTRIL 2 TIMES DAILY FOR 4 WEEKS Disp: 1 Inhaler Rfl: 5   AZELASTINE HCL 0.1 % Nasal Solution SPRAY 2 SPRAYS INTO BOTH NOSTRILS TWICE A DAY Disp: 1 Bottle Rfl: 3   Omeprazole 40 MG Oral Capsul • Neurological Disorder Paternal Grandfather         Alzheimer's   • Cancer Brother         liver   • Lipids Other         family h/o hyperlipidemia    • Cancer Maternal Uncle         leukemia   • Cancer Paternal Aunt 46       ovarian   • Diabetes Sister Eyes: Conjunctivae and EOM are normal. Pupils are equal, round, and reactive to light. Neck: Normal range of motion. Neck supple. Cardiovascular: Normal rate, regular rhythm, normal heart sounds and intact distal pulses.     Pulmonary/Chest: Effort norm

## 2018-05-14 ENCOUNTER — TELEPHONE (OUTPATIENT)
Dept: SURGERY | Facility: CLINIC | Age: 53
End: 2018-05-14

## 2018-05-14 ENCOUNTER — OFFICE VISIT (OUTPATIENT)
Dept: SURGERY | Facility: CLINIC | Age: 53
End: 2018-05-14

## 2018-05-14 VITALS — BODY MASS INDEX: 30 KG/M2 | WEIGHT: 175 LBS

## 2018-05-14 DIAGNOSIS — Z09 POSTOPERATIVE FOLLOW-UP: ICD-10-CM

## 2018-05-14 DIAGNOSIS — L72.9 SKIN CYST: Primary | ICD-10-CM

## 2018-05-14 PROCEDURE — 99212 OFFICE O/P EST SF 10 MIN: CPT | Performed by: SURGERY

## 2018-05-14 PROCEDURE — 99024 POSTOP FOLLOW-UP VISIT: CPT | Performed by: SURGERY

## 2018-05-14 RX ORDER — CEFADROXIL 500 MG/1
500 CAPSULE ORAL 2 TIMES DAILY
Qty: 14 CAPSULE | Refills: 0 | Status: SHIPPED | OUTPATIENT
Start: 2018-05-14 | End: 2018-05-21

## 2018-05-14 NOTE — TELEPHONE ENCOUNTER
Surgery 5-10-18. Pt is having a reaction to the waterproof tape. Burning and itchy where the tape touches the skin.   Per rn will call pt back to advise

## 2018-05-14 NOTE — TELEPHONE ENCOUNTER
Pt. States when she removed outer dressing gauze was blood soaked and steri strips came off. Pt. States wound is burning, itching and pink. Pt. Denies drainage, fever. Pt. Given appt. At 5:00pm today.

## 2018-05-14 NOTE — TELEPHONE ENCOUNTER
pt called. (Please refer to earlier TE). pt states the steri strips have completely come off . The incision is all pink  hurting badly. Please advise.

## 2018-05-14 NOTE — TELEPHONE ENCOUNTER
Pt. With itching and burning around edges of plastic dressing. Instructed pt. To remove plastic dressing and underlying gauze. Keep steri strips in place. Keep area clean and dry.

## 2018-05-15 ENCOUNTER — TELEPHONE (OUTPATIENT)
Dept: SURGERY | Facility: CLINIC | Age: 53
End: 2018-05-15

## 2018-05-15 NOTE — TELEPHONE ENCOUNTER
Drainage is expected & this is why she was started on antibiotics. per Dr. Axel Sheets. Pt verbalized understanding and will call back if further concerns.

## 2018-05-15 NOTE — TELEPHONE ENCOUNTER
Pt states her wound is leaking a yellow liquid, requesting to speak to RN, pt is concerned. Pls advise thank you.

## 2018-05-15 NOTE — PROGRESS NOTES
Follow Up Visit Note       Active Problems   Skin cyst  (primary encounter diagnosis)  Postoperative follow-up  Chief Complaint: Patient presents with:  Post-Op: Excision of skin subcutaneous cyst of left thigh at hip area 5/10/2018.   Dressings came off at Release Take 1 capsule (40 mg total) by mouth once daily. Disp: 90 capsule Rfl: 2   Biotin (PA BIOTIN) 5 MG Oral Cap Take  by mouth. Disp:  Rfl:    Calcium Carbonate-Vitamin D (CALCIUM HIGH POTENCY/VITAMIN D) 600-200 MG-UNIT Oral Tab Take  by mouth.  Disp:

## 2018-05-17 ENCOUNTER — TELEPHONE (OUTPATIENT)
Dept: SURGERY | Facility: CLINIC | Age: 53
End: 2018-05-17

## 2018-05-17 NOTE — TELEPHONE ENCOUNTER
Spoke with patient, who stated the drainage is subsiding now, advised her that it will lessen as time goes on and to apply cold to the site to ease the discomfort. Patient stated she would. Advised her to call if any further problems.

## 2018-05-17 NOTE — TELEPHONE ENCOUNTER
Spoke with patient (name and  verified), reviewed information, patient verbalized understanding and agrees with plan.   Patient of Dr Lise Waters, who had cyst removed from L Hip 5/10/18, called to report large amounts of yellow to orange fluid soaking throu

## 2018-05-21 ENCOUNTER — OFFICE VISIT (OUTPATIENT)
Dept: SURGERY | Facility: CLINIC | Age: 53
End: 2018-05-21

## 2018-05-21 DIAGNOSIS — Z09 POSTOPERATIVE FOLLOW-UP: Primary | ICD-10-CM

## 2018-05-21 PROCEDURE — 99212 OFFICE O/P EST SF 10 MIN: CPT | Performed by: SURGERY

## 2018-05-21 PROCEDURE — 99024 POSTOP FOLLOW-UP VISIT: CPT | Performed by: SURGERY

## 2018-05-21 NOTE — PROGRESS NOTES
Follow Up Visit Note       Active Problems   Postoperative follow-up  (primary encounter diagnosis)  Chief Complaint: Patient presents with:  Post-Op: Pt here for check up s/p exc. of skin subcutaneous cyst of left thigh at hip area 5/10/18.   Pt states the Rfl:       Allergies: Matthieu Tirado is allergic to adhesive tape; apple; aspartame; peach; prednisone; soybean oil; vicodin [hydrocodone-acetaminophen]; ciprofloxacin; peanut oil; and pear. Review of Systems   Constitutional: Negative.     Respiratory: Neg

## 2018-05-24 ENCOUNTER — HOSPITAL ENCOUNTER (EMERGENCY)
Facility: HOSPITAL | Age: 53
Discharge: HOME | End: 2018-05-24
Attending: EMERGENCY MEDICINE
Payer: COMMERCIAL

## 2018-05-24 ENCOUNTER — TELEPHONE (OUTPATIENT)
Dept: SURGERY | Facility: CLINIC | Age: 53
End: 2018-05-24

## 2018-05-24 ENCOUNTER — TELEPHONE (OUTPATIENT)
Dept: OTHER | Age: 53
End: 2018-05-24

## 2018-05-24 VITALS
OXYGEN SATURATION: 98 % | SYSTOLIC BLOOD PRESSURE: 124 MMHG | BODY MASS INDEX: 30 KG/M2 | RESPIRATION RATE: 18 BRPM | DIASTOLIC BLOOD PRESSURE: 97 MMHG | WEIGHT: 175.06 LBS | TEMPERATURE: 98 F | HEART RATE: 84 BPM

## 2018-05-24 DIAGNOSIS — T50.905A ADVERSE EFFECT OF DRUG, INITIAL ENCOUNTER: Primary | ICD-10-CM

## 2018-05-24 DIAGNOSIS — L55.9 BURN FROM THE SUN: ICD-10-CM

## 2018-05-24 PROCEDURE — 99282 EMERGENCY DEPT VISIT SF MDM: CPT

## 2018-05-24 RX ORDER — FAMOTIDINE 20 MG/1
20 TABLET ORAL 2 TIMES DAILY PRN
Qty: 30 TABLET | Refills: 0 | Status: SHIPPED | OUTPATIENT
Start: 2018-05-24 | End: 2018-05-26

## 2018-05-24 RX ORDER — FAMOTIDINE 20 MG/1
20 TABLET ORAL ONCE
Status: COMPLETED | OUTPATIENT
Start: 2018-05-24 | End: 2018-05-24

## 2018-05-24 RX ORDER — DIPHENHYDRAMINE HCL 25 MG
25 CAPSULE ORAL EVERY 6 HOURS PRN
Qty: 20 CAPSULE | Refills: 0 | Status: SHIPPED | OUTPATIENT
Start: 2018-05-24 | End: 2018-05-26

## 2018-05-24 NOTE — TELEPHONE ENCOUNTER
Patient states her symptoms are resolving. Complained of hives on neck, left eye swollen and running, and upper lip swollen, beginning Tuesday night after finishing her Diclofenac. Also states she has been sneezing.   States the swelling has subsided, aft

## 2018-05-24 NOTE — TELEPHONE ENCOUNTER
Pt called stating pt had surgery with dr Brittany Eevrett 2 weeks ago. Pt had a reaction to the tape. appt 5-21-18 and last day pt took the rx. durucef.  5-22-18 pt noticed bumps on the neck,  swollen lip, swollen left eye and eye tearing.    Pt took benadryl 5-23

## 2018-05-24 NOTE — TELEPHONE ENCOUNTER
Pt called in with questions regarding allergic reaction to meds ordered by Dr Noy Chaves and abx given.   Advised to call Dr Janki Gee for advise and different medication since she is fresh post op under his care still  Pt verbalized understanding and compliance

## 2018-05-25 DIAGNOSIS — I10 BENIGN ESSENTIAL HTN: ICD-10-CM

## 2018-05-25 NOTE — ED INITIAL ASSESSMENT (HPI)
Pt told by her surgeon and primary to come in for concerns of possible reaction to Duracef. S/p cyst removal. Pt states had hives earlier, face burning and facial swelling.  Pt last took one dose of benadryl OTC at 2:30 today
none

## 2018-05-25 NOTE — ED NOTES
Pt having allergic reaction to duracef. Pt now complaining of itchiness to face and hives extending down arms. No airway compromise. No conversational dyspnea. Pt took OTC benadryl 25mg at 1430 today.

## 2018-05-25 NOTE — ED PROVIDER NOTES
Patient Seen in: [facility] AND [facility] Emergency Department    History   Patient presents with:   Allergic Rxn Allergies (immune)    Stated Complaint: Allergice Reaction    HPI    79-year-old female with history of GERD, hyperlipidemia, thyroid cancer, here w THYROIDECTOMY  1974: TONSILLECTOMY        Smoking status: Never Smoker                                                              Smokeless tobacco: Never Used                      Alcohol use: Yes           0.0 oz/week      Review of Systems   Constitut Pulmonary/Chest: Effort normal and breath sounds normal. No stridor. No respiratory distress. She has no wheezes. She has no rales. No conversational dyspnea, no accessory muscle usage   Abdominal: Soft. She exhibits no distension.  There is no tenderne [facility]); Unilateral partial paralysis of vocal cords or larynx; Paresthesia; Trochanteric bursitis; Arthralgia; Anxiety; Diarrhea, infectious, adult; Benign essential HTN; C. difficile colitis; Otitis media, acute; Dry mouth;  Internal hemorrhoids with compli

## 2018-05-26 ENCOUNTER — TELEPHONE (OUTPATIENT)
Dept: OTHER | Age: 53
End: 2018-05-26

## 2018-05-26 DIAGNOSIS — I10 BENIGN ESSENTIAL HTN: ICD-10-CM

## 2018-05-26 RX ORDER — METOPROLOL SUCCINATE 25 MG/1
25 TABLET, EXTENDED RELEASE ORAL
Qty: 90 TABLET | Refills: 0 | Status: SHIPPED | OUTPATIENT
Start: 2018-05-26 | End: 2019-02-22

## 2018-05-26 RX ORDER — METOPROLOL SUCCINATE 25 MG/1
25 TABLET, EXTENDED RELEASE ORAL
Qty: 90 TABLET | Refills: 1 | Status: SHIPPED | OUTPATIENT
Start: 2018-05-26 | End: 2018-07-13

## 2018-05-26 RX ORDER — CLONAZEPAM 0.5 MG/1
TABLET ORAL
Qty: 90 TABLET | Refills: 0 | OUTPATIENT
Start: 2018-05-26 | End: 2018-08-27

## 2018-05-26 RX ORDER — PREDNISONE 1 MG/1
5 TABLET ORAL 2 TIMES DAILY
Qty: 15 TABLET | Refills: 0 | Status: SHIPPED | OUTPATIENT
Start: 2018-05-26 | End: 2018-05-26

## 2018-05-26 RX ORDER — HYDROXYZINE HYDROCHLORIDE 25 MG/1
25 TABLET, FILM COATED ORAL 3 TIMES DAILY PRN
Qty: 21 TABLET | Refills: 0 | Status: SHIPPED | OUTPATIENT
Start: 2018-05-26 | End: 2018-07-13

## 2018-05-26 NOTE — TELEPHONE ENCOUNTER
patient calling and states  That she went to ER last 5/24 due to allergic rxn from [facility], was rx with pepcid and benadry from ER, before her upper lip is the one that is swollen, now both upper and lower lips are swollen and mild numbness, no sob, no cp

## 2018-05-26 NOTE — TELEPHONE ENCOUNTER
Dr Mario Alberto Fry called back and phone order for the following:    Stop benadryl,pepcid and prednisone. Atarax 25 mg TID x 7 days  Zyrtec 10 mg OTC at HS PRN  Phone consent given for approval of the medication to esent to the pharmacy.       Called patient  and

## 2018-05-26 NOTE — TELEPHONE ENCOUNTER
Patient calling and FU the medications, states out of the medication for 2 days now for CLONAZEPAM and only 4 tabs left for the metoprolol,, asking for the refill ASAP, advised that will send the message to Dr FOREMAN

## 2018-06-19 NOTE — TELEPHONE ENCOUNTER
Patient calling and reports that she needs her clonazepam to order to [facility], states that [place] [facility] is out of stock for over a month now, advised that will call both pharmacy if they can just transfer the script and patient reque

## 2018-07-03 RX ORDER — LEVOTHYROXINE SODIUM 0.12 MG/1
TABLET ORAL
Qty: 90 TABLET | Refills: 1 | Status: SHIPPED | OUTPATIENT
Start: 2018-07-03 | End: 2019-01-19

## 2018-07-13 ENCOUNTER — OFFICE VISIT (OUTPATIENT)
Dept: INTERNAL MEDICINE CLINIC | Facility: CLINIC | Age: 53
End: 2018-07-13

## 2018-07-13 VITALS
WEIGHT: 190 LBS | SYSTOLIC BLOOD PRESSURE: 109 MMHG | BODY MASS INDEX: 32.44 KG/M2 | HEART RATE: 78 BPM | DIASTOLIC BLOOD PRESSURE: 71 MMHG | HEIGHT: 64 IN | RESPIRATION RATE: 16 BRPM

## 2018-07-13 DIAGNOSIS — L98.9 BENIGN SKIN LESION OF NOSE: Primary | ICD-10-CM

## 2018-07-13 DIAGNOSIS — R63.5 WEIGHT GAIN: ICD-10-CM

## 2018-07-13 DIAGNOSIS — E78.2 MIXED HYPERLIPIDEMIA: ICD-10-CM

## 2018-07-13 DIAGNOSIS — M79.621 AXILLARY PAIN, RIGHT: ICD-10-CM

## 2018-07-13 DIAGNOSIS — E03.9 HYPOTHYROIDISM, UNSPECIFIED TYPE: ICD-10-CM

## 2018-07-13 PROCEDURE — 99212 OFFICE O/P EST SF 10 MIN: CPT | Performed by: INTERNAL MEDICINE

## 2018-07-13 PROCEDURE — 99214 OFFICE O/P EST MOD 30 MIN: CPT | Performed by: INTERNAL MEDICINE

## 2018-07-13 NOTE — ASSESSMENT & PLAN NOTE
Wt Readings from Last 6 Encounters:  07/13/18 : 190 lb (86.2 kg)  05/24/18 : 175 lb 0.7 oz (79.4 kg)  05/14/18 : 175 lb (79.4 kg)  05/10/18 : 175 lb (79.4 kg)  04/04/18 : 180 lb (81.6 kg)  03/05/18 : 186 lb (84.4 kg)  Patient has been gaining weight [place]

## 2018-07-13 NOTE — PROGRESS NOTES
HPI:    Patient ID: Deneen De is a 46year old female.    =====  CONCLUSION:    The probable reactive lymph corresponding to the patient's palpable and tender area of concern likely is related to recent infection that resolved with antibiotics is mouth once daily. Disp: 90 tablet Rfl: 0   CITALOPRAM HYDROBROMIDE 10 MG Oral Tab TAKE 1 TABLET BY MOUTH DAILY. Disp: 90 tablet Rfl: 1   simvastatin 20 MG Oral Tab TAKE 1 TABLET BY MOUTH NIGHTLY.  Disp: 90 tablet Rfl: 2   Diclofenac Sodium 75 MG Oral Tab EC eye exhibits no discharge. Neck: Normal range of motion. Neck supple. No JVD present. No thyromegaly present. Cardiovascular: Normal rate, regular rhythm and normal heart sounds.     Pulmonary/Chest: Effort normal and breath sounds normal. She has no wh years now patient has been referred to Dr. Tomi Vieira for an evaluation. Relevant Orders    US BREAST RIGHT COMPLETE W AXILLA(CPT=76641)    OP REFERRAL TO SURGICAL ONCOLOGY    Weight gain     Wt Readings from Last 6 Encounters:  07/13/18 : 190 lb (86.

## 2018-07-13 NOTE — PATIENT INSTRUCTIONS
Problem List Items Addressed This Visit        Unprioritized    Axillary pain, right     History of hidradenitis which was completed a few months back.   Since then she has had persistent axillary pain, was seen by Dr. Catia Louis, had ultrasound breast and a

## 2018-07-13 NOTE — ASSESSMENT & PLAN NOTE
Patient has been status post total thyroidectomy for thyroid cancer. She has been on thyroid supplements–levothyroxine 125 mcg daily. She is overdue for recheck labs which have been ordered today.

## 2018-07-13 NOTE — ASSESSMENT & PLAN NOTE
History of hidradenitis which was completed a few months back. Since then she has had persistent axillary pain, was seen by Dr. Severino Charles, had ultrasound breast and axilla completed. She did have a reactive appearing lymph node at that time.   Recheck has

## 2018-07-13 NOTE — ASSESSMENT & PLAN NOTE
Patient has been on simvastatin which she is tolerated well but will be due for labs which has been ordered today.

## 2018-08-18 ENCOUNTER — LAB ENCOUNTER (OUTPATIENT)
Dept: LAB | Facility: HOSPITAL | Age: 53
End: 2018-08-18
Attending: INTERNAL MEDICINE

## 2018-08-18 DIAGNOSIS — E78.2 MIXED HYPERLIPIDEMIA: ICD-10-CM

## 2018-08-18 DIAGNOSIS — E03.9 HYPOTHYROIDISM, UNSPECIFIED TYPE: ICD-10-CM

## 2018-08-18 LAB
ALBUMIN SERPL BCP-MCNC: 3.8 G/DL (ref 3.5–4.8)
ALBUMIN/GLOB SERPL: 1.3 {RATIO} (ref 1–2)
ALP SERPL-CCNC: 55 U/L (ref 32–100)
ALT SERPL-CCNC: 15 U/L (ref 14–54)
ANION GAP SERPL CALC-SCNC: 4 MMOL/L (ref 0–18)
AST SERPL-CCNC: 22 U/L (ref 15–41)
BASOPHILS # BLD: 0.1 K/UL (ref 0–0.2)
BASOPHILS NFR BLD: 1 %
BILIRUB SERPL-MCNC: 0.5 MG/DL (ref 0.3–1.2)
BUN SERPL-MCNC: 4 MG/DL (ref 8–20)
BUN/CREAT SERPL: 5.2 (ref 10–20)
CALCIUM SERPL-MCNC: 8.7 MG/DL (ref 8.5–10.5)
CHLORIDE SERPL-SCNC: 107 MMOL/L (ref 95–110)
CHOLEST SERPL-MCNC: 195 MG/DL (ref 110–200)
CO2 SERPL-SCNC: 26 MMOL/L (ref 22–32)
CREAT SERPL-MCNC: 0.77 MG/DL (ref 0.5–1.5)
EOSINOPHIL # BLD: 0.2 K/UL (ref 0–0.7)
EOSINOPHIL NFR BLD: 4 %
ERYTHROCYTE [DISTWIDTH] IN BLOOD BY AUTOMATED COUNT: 16.4 % (ref 11–15)
GLOBULIN PLAS-MCNC: 3 G/DL (ref 2.5–3.7)
GLUCOSE SERPL-MCNC: 107 MG/DL (ref 70–99)
HCT VFR BLD AUTO: 32.2 % (ref 35–48)
HDLC SERPL-MCNC: 48 MG/DL
HGB BLD-MCNC: 10.1 G/DL (ref 12–16)
LDLC SERPL CALC-MCNC: 119 MG/DL (ref 0–99)
LYMPHOCYTES # BLD: 1.7 K/UL (ref 1–4)
LYMPHOCYTES NFR BLD: 29 %
MCH RBC QN AUTO: 22.2 PG (ref 27–32)
MCHC RBC AUTO-ENTMCNC: 31.4 G/DL (ref 32–37)
MCV RBC AUTO: 70.8 FL (ref 80–100)
MONOCYTES # BLD: 0.5 K/UL (ref 0–1)
MONOCYTES NFR BLD: 8 %
NEUTROPHILS # BLD AUTO: 3.3 K/UL (ref 1.8–7.7)
NEUTROPHILS NFR BLD: 57 %
NONHDLC SERPL-MCNC: 147 MG/DL
OSMOLALITY UR CALC.SUM OF ELEC: 281 MOSM/KG (ref 275–295)
PATIENT FASTING: YES
PLATELET # BLD AUTO: 276 K/UL (ref 140–400)
PMV BLD AUTO: 8.7 FL (ref 7.4–10.3)
POTASSIUM SERPL-SCNC: 3.9 MMOL/L (ref 3.3–5.1)
PROT SERPL-MCNC: 6.8 G/DL (ref 5.9–8.4)
RBC # BLD AUTO: 4.55 M/UL (ref 3.7–5.4)
SODIUM SERPL-SCNC: 137 MMOL/L (ref 136–144)
T3FREE SERPL-MCNC: 3.6 PG/ML (ref 2.53–4.29)
T4 FREE SERPL-MCNC: 1.37 NG/DL (ref 0.58–1.64)
TRIGL SERPL-MCNC: 141 MG/DL (ref 1–149)
TSH SERPL-ACNC: 0.01 UIU/ML (ref 0.45–5.33)
WBC # BLD AUTO: 5.7 K/UL (ref 4–11)

## 2018-08-18 PROCEDURE — 84443 ASSAY THYROID STIM HORMONE: CPT

## 2018-08-18 PROCEDURE — 85025 COMPLETE CBC W/AUTO DIFF WBC: CPT

## 2018-08-18 PROCEDURE — 80061 LIPID PANEL: CPT

## 2018-08-18 PROCEDURE — 84481 FREE ASSAY (FT-3): CPT

## 2018-08-18 PROCEDURE — 36415 COLL VENOUS BLD VENIPUNCTURE: CPT

## 2018-08-18 PROCEDURE — 80053 COMPREHEN METABOLIC PANEL: CPT

## 2018-08-18 PROCEDURE — 84439 ASSAY OF FREE THYROXINE: CPT

## 2018-08-21 RX ORDER — CLONAZEPAM 0.5 MG/1
TABLET ORAL
Qty: 90 TABLET | Refills: 0 | Status: CANCELLED
Start: 2018-08-21

## 2018-08-27 ENCOUNTER — OFFICE VISIT (OUTPATIENT)
Dept: INTERNAL MEDICINE CLINIC | Facility: CLINIC | Age: 53
End: 2018-08-27
Payer: COMMERCIAL

## 2018-08-27 VITALS
SYSTOLIC BLOOD PRESSURE: 114 MMHG | WEIGHT: 186 LBS | RESPIRATION RATE: 16 BRPM | HEIGHT: 64 IN | DIASTOLIC BLOOD PRESSURE: 66 MMHG | HEART RATE: 72 BPM | BODY MASS INDEX: 31.76 KG/M2

## 2018-08-27 DIAGNOSIS — E78.2 MIXED HYPERLIPIDEMIA: ICD-10-CM

## 2018-08-27 DIAGNOSIS — D64.9 ANEMIA, UNSPECIFIED TYPE: ICD-10-CM

## 2018-08-27 DIAGNOSIS — R22.31 AXILLARY MASS, RIGHT: Primary | ICD-10-CM

## 2018-08-27 PROCEDURE — 99214 OFFICE O/P EST MOD 30 MIN: CPT | Performed by: INTERNAL MEDICINE

## 2018-08-27 PROCEDURE — 99212 OFFICE O/P EST SF 10 MIN: CPT | Performed by: INTERNAL MEDICINE

## 2018-08-27 RX ORDER — CLONAZEPAM 0.5 MG/1
0.5 TABLET ORAL
Qty: 90 TABLET | Refills: 2 | Status: SHIPPED | OUTPATIENT
Start: 2018-08-27 | End: 2019-02-01

## 2018-08-27 RX ORDER — SIMVASTATIN 20 MG
TABLET ORAL
Qty: 90 TABLET | Refills: 2 | Status: SHIPPED | OUTPATIENT
Start: 2018-08-27 | End: 2019-10-05

## 2018-08-27 RX ORDER — AMOXICILLIN AND CLAVULANATE POTASSIUM 875; 125 MG/1; MG/1
1 TABLET, FILM COATED ORAL 2 TIMES DAILY
Qty: 20 TABLET | Refills: 0 | Status: SHIPPED | OUTPATIENT
Start: 2018-08-27 | End: 2018-09-06

## 2018-08-27 NOTE — ASSESSMENT & PLAN NOTE
Lipid panel and liver function tests have been stable and simvastatin. She is tolerated medications well and hence we will continue on the same dose of medication .

## 2018-08-27 NOTE — PATIENT INSTRUCTIONS
Problem List Items Addressed This Visit        Unprioritized    Anemia     Patient has had on and off anemia usually related to heavy menstrual bleeding from cycles. Last ultrasound and GYN evaluation was completed in 2016.   Her last period was about 6 mo amoxicillin. Will follow-up if she develops a rash. Relevant Orders    OP REFERRAL TO SURGICAL ONCOLOGY    Hyperlipidemia     Lipid panel and liver function tests have been stable and simvastatin.   She is tolerated medications well and hence we wi

## 2018-08-27 NOTE — ASSESSMENT & PLAN NOTE
Patient has had on and off anemia usually related to heavy menstrual bleeding from cycles. Last ultrasound and GYN evaluation was completed in 2016. Her last period was about 6 months back. Her hemoglobin has dropped quite a bit at this time.     Patient

## 2018-08-27 NOTE — PROGRESS NOTES
HPI:    Patient ID: Elfego Mayfield is a 46year old female. All labs look better-except anemia  Hb is at 10.1    No menstrual cycles since jan 1 st.  No hx of heavy cycles in the past          Mass   This is a recurrent problem.  The current episode Take 1 tablet (0.5 mg total) by mouth once daily. Disp: 90 tablet Rfl: 2   Amoxicillin-Pot Clavulanate 875-125 MG Oral Tab Take 1 tablet by mouth 2 (two) times daily.  Disp: 20 tablet Rfl: 0   LEVOTHYROXINE SODIUM 125 MCG Oral Tab TAKE 1 TABLET (125 MCG TOT are equal, round, and reactive to light. Right eye exhibits no discharge. Left eye exhibits no discharge. Neck: Normal range of motion. Neck supple. No JVD present. No thyromegaly present.    Cardiovascular: Normal rate, regular rhythm, normal heart sound tests have been stable and simvastatin. She is tolerated medications well and hence we will continue on the same dose of medication .          Relevant Medications    simvastatin 20 MG Oral Tab    Axillary mass, right - Primary     Distant right axillary t alcohol. Recheck labs in about 4 weeks and follow-up at that time. She does have a history of thickened endometrium and ovarian cysts and she is advised to follow-up with gynecology for completion of workup as she has not been seen recently.

## 2018-08-27 NOTE — ASSESSMENT & PLAN NOTE
Distant right axillary tenderness and mild swelling. She has been seen by Dr. Isra Sesay after being treated with antibiotics. Pain had resolved at that time but swelling was present.   Mammogram with ultrasound of the right side completed–no significant a

## 2018-09-06 NOTE — TELEPHONE ENCOUNTER
Please advise on refill request.    Refill Protocol Appointment Criteria  · Appointment scheduled in the past 6 months or in the next 3 months  Recent Outpatient Visits            1 week ago Axillary mass, right    [facility]

## 2018-09-06 NOTE — TELEPHONE ENCOUNTER
Pharmacy is requesting a refill for OMPERAZOLE DR 40 MG CAPSULE for a quantity of 90. Please advise.

## 2018-09-13 ENCOUNTER — TELEPHONE (OUTPATIENT)
Dept: OTHER | Age: 53
End: 2018-09-13

## 2018-09-13 ENCOUNTER — TELEPHONE (OUTPATIENT)
Dept: INTERNAL MEDICINE CLINIC | Facility: CLINIC | Age: 53
End: 2018-09-13

## 2018-09-13 NOTE — TELEPHONE ENCOUNTER
Patients profile shows that she has [employer]. Dr. Dwayne Coffey does accept patient's insurance as doctor is an [place] provider. This health plan does not  Require referrals.        Thank you, Joselyn Livingston Small-Referral Specialist.

## 2018-09-13 NOTE — TELEPHONE ENCOUNTER
Pt requesting lab orders to be done prior to 10/9 appt with Dr James Cantu is to have same labs as done in Aug.

## 2018-09-13 NOTE — TELEPHONE ENCOUNTER
Spoke with patient who reports she has new insurance now and Dr. Kin Pisano, surgical oncology, does not accept her new insurance. Call was transferred to the phone room so that her insurance information will be updated.  Message routed to [facility] to soni

## 2018-09-14 ENCOUNTER — OFFICE VISIT (OUTPATIENT)
Dept: OBGYN CLINIC | Facility: CLINIC | Age: 53
End: 2018-09-14

## 2018-09-14 VITALS
SYSTOLIC BLOOD PRESSURE: 115 MMHG | HEIGHT: 64 IN | WEIGHT: 182.38 LBS | BODY MASS INDEX: 31.14 KG/M2 | DIASTOLIC BLOOD PRESSURE: 66 MMHG | HEART RATE: 73 BPM

## 2018-09-14 DIAGNOSIS — D64.9 ANEMIA, UNSPECIFIED TYPE: ICD-10-CM

## 2018-09-14 DIAGNOSIS — N92.6 IRREGULAR PERIODS: Primary | ICD-10-CM

## 2018-09-14 DIAGNOSIS — Z98.890 HISTORY OF ENDOMETRIAL ABLATION: ICD-10-CM

## 2018-09-14 PROCEDURE — 99213 OFFICE O/P EST LOW 20 MIN: CPT | Performed by: OBSTETRICS & GYNECOLOGY

## 2018-09-14 RX ORDER — OMEPRAZOLE 40 MG/1
40 CAPSULE, DELAYED RELEASE ORAL
Qty: 90 CAPSULE | Refills: 2 | Status: SHIPPED | OUTPATIENT
Start: 2018-09-14 | End: 2018-09-20

## 2018-09-14 NOTE — TELEPHONE ENCOUNTER
DEDE - please advise if you would like pt to have any labs done prior to f/u appt on 10/09.    LOV - 08/27/18  Last labs - 08/18/18

## 2018-09-15 NOTE — PROGRESS NOTES
Alexandrea Cristobal is a 46year old female [id number] No LMP recorded. Patient is not currently having periods (Reason: Menopause). Patient presents with:  Consult: on and off anemia -sent by Dr. Karen Day to [facility] due to anemia.  Prior period 9 months ago -- las THYROIDECTOMY  1974: TONSILLECTOMY  Obstetric History     T4    L4    SAB0  TAB0  Ectopic0  Multiple0  Live Births4        SOCIAL HISTORY:  Social History    Socioeconomic History      Marital status:       Spouse name: Not on file TAKE 1 TABLET BY MOUTH NIGHTLY., Disp: 90 tablet, Rfl: 2  •  ClonazePAM 0.5 MG Oral Tab, Take 1 tablet (0.5 mg total) by mouth once daily. , Disp: 90 tablet, Rfl: 2  •  LEVOTHYROXINE SODIUM 125 MCG Oral Tab, TAKE 1 TABLET (125 MCG TOTAL) BY MOUTH ONCE DAILY nourished  Head/Face: normocephalic  Psychiatric:   oriented to time, place, person and situation. Appropriate mood and affect    Assessment & Plan:    Manjinder Dupree was seen today for consult and other.     Diagnoses and all orders for this visit:    Irregular p

## 2018-09-17 ENCOUNTER — HOSPITAL ENCOUNTER (OUTPATIENT)
Dept: ULTRASOUND IMAGING | Facility: HOSPITAL | Age: 53
Discharge: HOME | End: 2018-09-17
Attending: INTERNAL MEDICINE
Payer: COMMERCIAL

## 2018-09-17 ENCOUNTER — TELEPHONE (OUTPATIENT)
Dept: MAMMOGRAPHY | Facility: HOSPITAL | Age: 53
End: 2018-09-17

## 2018-09-17 DIAGNOSIS — M79.621 AXILLARY PAIN, RIGHT: ICD-10-CM

## 2018-09-17 PROCEDURE — 76642 ULTRASOUND BREAST LIMITED: CPT | Performed by: INTERNAL MEDICINE

## 2018-09-17 NOTE — TELEPHONE ENCOUNTER
Called Ms  [address] hx taken. Hx as follows :   [address]   with palpable area right axilla. She was treated with course of antibiotics had repeat axilla scan now biopsy recommended by Dr Sagrario Johnson . Pt denies fevers or nightsweats .  Pt imaging. The Radiologist will then inject a local numbing medication into the area and then use a needle to collect cells from the site. A marker, or clip, will then be placed in the biopsied area.   This marker is placed so this biopsy site is able to be recommendation by Dr. Shi Doll at 21  hr on 9/17/2018. BI-RADS CATEGORY:     DIAGNOSTIC CATEGORY 4 -SUSPICIOUS. RECOMMENDATIONS:   ULTRASOUND-GUIDED BIOPSY: RIGHT AXILLA                     Pt history discussed as below:  Pt history of biopsy:  Marbella Valencia

## 2018-09-17 NOTE — TELEPHONE ENCOUNTER
Gave pt. Message and while on phone she asked for a Omeprazole 40mg. RX from last visit and needs to go to [facility] in [place].  Also wanted you to know that Dr. Biswas Cerrato put her on iron pills because she has anemia. She started these already.

## 2018-09-18 ENCOUNTER — TELEPHONE (OUTPATIENT)
Dept: HEMATOLOGY/ONCOLOGY | Facility: HOSPITAL | Age: 53
End: 2018-09-18

## 2018-09-18 NOTE — TELEPHONE ENCOUNTER
Phoned patient and introduced myself as Breast RN Navigator. Patient presented for imaging of her right axilla for a palpable mass. Patient was recommended for a right axillary US biopsy by Dr. Andrea Ballard.     Call received from Dr. Antonio Mak office that pat

## 2018-09-20 ENCOUNTER — TELEPHONE (OUTPATIENT)
Dept: INTERNAL MEDICINE CLINIC | Facility: CLINIC | Age: 53
End: 2018-09-20

## 2018-09-20 RX ORDER — OMEPRAZOLE 40 MG/1
40 CAPSULE, DELAYED RELEASE ORAL
Qty: 30 CAPSULE | Refills: 0 | Status: SHIPPED | OUTPATIENT
Start: 2018-09-20 | End: 2020-01-09

## 2018-09-20 NOTE — TELEPHONE ENCOUNTER
Patient called to give Dr AGUAYO a follow up message, She is getting insurance coverage back as of 10/1/18. She has rescheduled her biopsy for 10/4 and has a follow up scheduled for 10/9/18. Any questions please call her.        Patient also wanted to confirm

## 2018-09-21 NOTE — TELEPHONE ENCOUNTER
Refill Protocol Appointment Criteria  · Appointment scheduled in the past 6 months or in the next 3 months  Recent Outpatient Visits            6 days ago Irregular periods    [facility], [address], Megan Feliciano MD    Off

## 2018-09-22 RX ORDER — CITALOPRAM 10 MG/1
TABLET ORAL
Qty: 90 TABLET | Refills: 1 | Status: SHIPPED | OUTPATIENT
Start: 2018-09-22 | End: 2019-03-26

## 2018-10-04 ENCOUNTER — HOSPITAL ENCOUNTER (OUTPATIENT)
Dept: ULTRASOUND IMAGING | Facility: HOSPITAL | Age: 53
Discharge: HOME | End: 2018-10-04
Attending: INTERNAL MEDICINE
Payer: COMMERCIAL

## 2018-10-04 ENCOUNTER — HOSPITAL ENCOUNTER (OUTPATIENT)
Dept: MAMMOGRAPHY | Facility: HOSPITAL | Age: 53
Discharge: HOME | End: 2018-10-04
Attending: INTERNAL MEDICINE
Payer: COMMERCIAL

## 2018-10-04 VITALS
DIASTOLIC BLOOD PRESSURE: 63 MMHG | BODY MASS INDEX: 30.73 KG/M2 | SYSTOLIC BLOOD PRESSURE: 107 MMHG | RESPIRATION RATE: 16 BRPM | HEIGHT: 64 IN | HEART RATE: 71 BPM | WEIGHT: 180 LBS | OXYGEN SATURATION: 99 %

## 2018-10-04 DIAGNOSIS — R59.0 AXILLARY ADENOPATHY: ICD-10-CM

## 2018-10-04 PROCEDURE — 38505 NEEDLE BIOPSY LYMPH NODES: CPT | Performed by: INTERNAL MEDICINE

## 2018-10-04 PROCEDURE — 88173 CYTOPATH EVAL FNA REPORT: CPT | Performed by: INTERNAL MEDICINE

## 2018-10-04 PROCEDURE — 88172 CYTP DX EVAL FNA 1ST EA SITE: CPT | Performed by: INTERNAL MEDICINE

## 2018-10-04 PROCEDURE — 76942 ECHO GUIDE FOR BIOPSY: CPT | Performed by: INTERNAL MEDICINE

## 2018-10-04 PROCEDURE — 88177 CYTP FNA EVAL EA ADDL: CPT | Performed by: INTERNAL MEDICINE

## 2018-10-04 NOTE — PROCEDURES
[facility]  Procedure Note    [address] Patient Status:  Outpatient    1965 MRN [id number]   Location [facility] Attending Geeta Carr MD   Hosp Day # 0 PCP Haresh Durant MD     Procedure: Tracy Gonzalez

## 2018-10-04 NOTE — IMAGING NOTE
Pt arrived to room #4 .    scans taken by CECILIA LARIOS  ultrasound technologist     Hx taken and is as follows: ENLARGE RIGHT AXILLARY  LYMPH NODE- PALABLE/TENDER, DR [address]    2762 Consent verified and obtained     12N  Imaging Completed by

## 2018-10-08 ENCOUNTER — TELEPHONE (OUTPATIENT)
Dept: OTHER | Age: 53
End: 2018-10-08

## 2018-10-08 NOTE — TELEPHONE ENCOUNTER
Dr. Aguayo Keep, patient also requesting appt with you for next week after 5:30pm? None available. Do you want to add her to schedule? Please advise.

## 2018-10-08 NOTE — TELEPHONE ENCOUNTER
Spoke with patient and relayed DEDE message below--patient verbalizes understanding and agreement-was told at time of biopsy. Patient asking DEDE to order CT, as she is still having pain.  Tomorrow's appt with LR cancelled per patient request.    Crissy Owens Pager   Michelle Palacio 10/05/2018 09:50 [phone]    Result Notes for US BIOPSY LYMPH NODE PERCUTANEOUS EM (CPT=76942/33934     Notes recorded by Ximena Munoz RN on 10/8/2018 at 4:07 PM CDT  Stas transfer to triage  ------    Notes recorded by [facility]

## 2018-10-08 NOTE — TELEPHONE ENCOUNTER
Janessa Rocha will place an order for the ct but please adv to set up an appt -this will need a prior authorization and so will need documentation.

## 2018-10-10 ENCOUNTER — TELEPHONE (OUTPATIENT)
Dept: INTERNAL MEDICINE CLINIC | Facility: CLINIC | Age: 53
End: 2018-10-10

## 2018-10-10 ENCOUNTER — OFFICE VISIT (OUTPATIENT)
Dept: OBGYN CLINIC | Facility: CLINIC | Age: 53
End: 2018-10-10
Payer: COMMERCIAL

## 2018-10-10 VITALS
DIASTOLIC BLOOD PRESSURE: 65 MMHG | WEIGHT: 179 LBS | HEART RATE: 74 BPM | BODY MASS INDEX: 31 KG/M2 | SYSTOLIC BLOOD PRESSURE: 101 MMHG

## 2018-10-10 DIAGNOSIS — Z01.419 ENCOUNTER FOR GYNECOLOGICAL EXAMINATION WITHOUT ABNORMAL FINDING: Primary | ICD-10-CM

## 2018-10-10 DIAGNOSIS — Z80.41 FAMILY HISTORY OF OVARIAN CANCER: Primary | ICD-10-CM

## 2018-10-10 PROCEDURE — 99396 PREV VISIT EST AGE 40-64: CPT | Performed by: OBSTETRICS & GYNECOLOGY

## 2018-10-10 RX ORDER — DICLOFENAC SODIUM 75 MG/1
TABLET, DELAYED RELEASE ORAL
Qty: 90 TABLET | Refills: 0 | Status: SHIPPED | OUTPATIENT
Start: 2018-10-10 | End: 2019-09-27

## 2018-10-10 NOTE — TELEPHONE ENCOUNTER
Patricio Joe,     Oncology Nurse Navigator called stating patient needs a referral for Genetic Testing with Dr. Carlos Solano, please sign referral for processing.      Thank you,  Adilson Ewing

## 2018-10-10 NOTE — TELEPHONE ENCOUNTER
RN please call pt and discuss allergy. Allergies to diclofenac was entered 5/2018.   Reaction lip and eye swelling    Refill Protocol Appointment Criteria  · Appointment scheduled in the past 6 months or in the next 3 months  Recent Outpatient Visits

## 2018-10-10 NOTE — PROGRESS NOTES
Mary Wilson is a 46year old female [id number] No LMP recorded. Patient is not currently having periods (Reason: Menopause). Patient presents with:  Gyn Exam: ANNUAL. Had LN bx done -- benign. Axillary lump still bothersome.  Last period only spotting THYROIDECTOMY  2007   • TONSILLECTOMY  1974     Obstetric History     T4    L4    SAB0  TAB0  Ectopic0  Multiple0  Live Births4        SOCIAL HISTORY:  Social History    Socioeconomic History      Marital status:       Spouse name: Not Alzheimer's   • Cancer Brother         liver   • Lipids Other         family h/o hyperlipidemia    • Cancer Maternal Uncle         leukemia   • Cancer Paternal Aunt 46        ovarian   • Diabetes Sister        MEDICATIONS:    Current Outpatient Medications vision  Cardiovascular:  denies chest pain or palpitations  Respiratory:    denies shortness of breath  Gastrointestinal:  denies heartburn, abdominal pain, diarrhea or constipation  Genitourinary:    denies dysuria, incontinence, abnormal vaginal discharg April.  See general surgery re: axillary lump.   Call if abn VB

## 2018-10-10 NOTE — TELEPHONE ENCOUNTER
Reviewed Diclofenac request with pt. Pt states she is not allergic to Diclofenac but Dr. Seb Georges told her to stop taking the med at her 8/27/18 OV, wasn't sure why doctor advised her to stop med. Please advise.

## 2018-10-15 NOTE — H&P
[facility] [address] Gastroenterology                                                                                                  Clinic History and Physical     Pa appetite are stable. No significant chest pain or shortness of breath. Long-standing history of GERD which is well managed with omeprazole 40 mg daily.          Pertinent Surgical Hx:  Appendectomy  Hemorrhoidectomy  - See additional surgical hx below CVA   • Hypertension Father    • Diabetes Mother    • Neurological Disorder Paternal Grandfather         Alzheimer's   • Cancer Brother 46        asbestos ,asphalt lung cancer   • Lipids Other         family h/o hyperlipidemia    • Cancer Maternal Uncle 25 HIVES, SWELLING    Comment:Patient states swelling of lip, and left eye.   Peach                   ANAPHYLAXIS    Comment:RAW PEACH  Peanut Oil              TONGUE SWELLING  Prednisone                Soybean Oil             SWELLING    Comment:Mouth year-old female pt of Dr. Lluvia Bird with history of C Diff, hemorrhoids, GERD, hypothyroid, malignant neoplasm of thyroid gland, hyperlipidemia, hypertension, OA, anxiety, who presents for evaluation of anemia.       1. Anemia/history of colon polyps?:  Physic and perforation all leading to prolonged hospitalization or surgical intervention. I also specifically mentioned the miss rate of colonoscopy of 5-10% in the best of all circumstances. All questions were answered to the patient’s satisfaction.  The patient

## 2018-10-22 ENCOUNTER — OFFICE VISIT (OUTPATIENT)
Dept: GASTROENTEROLOGY | Facility: CLINIC | Age: 53
End: 2018-10-22
Payer: COMMERCIAL

## 2018-10-22 ENCOUNTER — TELEPHONE (OUTPATIENT)
Dept: GASTROENTEROLOGY | Facility: CLINIC | Age: 53
End: 2018-10-22

## 2018-10-22 ENCOUNTER — LAB ENCOUNTER (OUTPATIENT)
Dept: LAB | Facility: HOSPITAL | Age: 53
End: 2018-10-22
Attending: NURSE PRACTITIONER
Payer: COMMERCIAL

## 2018-10-22 VITALS
WEIGHT: 179.81 LBS | HEIGHT: 64 IN | SYSTOLIC BLOOD PRESSURE: 95 MMHG | DIASTOLIC BLOOD PRESSURE: 61 MMHG | BODY MASS INDEX: 30.7 KG/M2 | HEART RATE: 76 BPM

## 2018-10-22 DIAGNOSIS — D50.9 IRON DEFICIENCY ANEMIA, UNSPECIFIED IRON DEFICIENCY ANEMIA TYPE: Primary | ICD-10-CM

## 2018-10-22 DIAGNOSIS — D64.9 ANEMIA, UNSPECIFIED TYPE: Primary | ICD-10-CM

## 2018-10-22 DIAGNOSIS — D64.9 ANEMIA, UNSPECIFIED TYPE: ICD-10-CM

## 2018-10-22 PROCEDURE — 99212 OFFICE O/P EST SF 10 MIN: CPT | Performed by: NURSE PRACTITIONER

## 2018-10-22 PROCEDURE — 82728 ASSAY OF FERRITIN: CPT

## 2018-10-22 PROCEDURE — 83540 ASSAY OF IRON: CPT

## 2018-10-22 PROCEDURE — 85025 COMPLETE CBC W/AUTO DIFF WBC: CPT

## 2018-10-22 PROCEDURE — 36415 COLL VENOUS BLD VENIPUNCTURE: CPT

## 2018-10-22 PROCEDURE — 84466 ASSAY OF TRANSFERRIN: CPT

## 2018-10-22 PROCEDURE — 99244 OFF/OP CNSLTJ NEW/EST MOD 40: CPT | Performed by: NURSE PRACTITIONER

## 2018-10-22 NOTE — PATIENT INSTRUCTIONS
1. Schedule colonoscopy/EGD with Dr. Claudell Public w/ [facility]    2.  bowel prep from pharmacy - split dose Colyte    3. Continue all medications for procedure except hold iron 5 days prior to procedure     4.  Read all bowel prep instructions carefully

## 2018-10-22 NOTE — TELEPHONE ENCOUNTER
Scheduled for:  Colonoscopy 32587  Provider Name:   Date:  12/17/18   Location:  [facility]  Sedation: Mac    Time: 0800 / Arrival 0700   Prep: Split dose Colyte and EGD- NPO  Meds/Allergies Reconciled?:  Physician reviewed  Diagnosis with codes: Anemia unsp

## 2018-11-14 ENCOUNTER — OFFICE VISIT (OUTPATIENT)
Dept: SURGERY | Facility: CLINIC | Age: 53
End: 2018-11-14
Payer: COMMERCIAL

## 2018-11-14 ENCOUNTER — GENETICS ENCOUNTER (OUTPATIENT)
Dept: GENETICS | Facility: HOSPITAL | Age: 53
End: 2018-11-14
Attending: GENETIC COUNSELOR, MS
Payer: COMMERCIAL

## 2018-11-14 ENCOUNTER — APPOINTMENT (OUTPATIENT)
Dept: HEMATOLOGY/ONCOLOGY | Facility: HOSPITAL | Age: 53
End: 2018-11-14
Attending: GENETIC COUNSELOR, MS
Payer: COMMERCIAL

## 2018-11-14 VITALS
DIASTOLIC BLOOD PRESSURE: 60 MMHG | HEIGHT: 64 IN | OXYGEN SATURATION: 98 % | SYSTOLIC BLOOD PRESSURE: 121 MMHG | RESPIRATION RATE: 18 BRPM | HEART RATE: 88 BPM | BODY MASS INDEX: 30.73 KG/M2 | WEIGHT: 180 LBS

## 2018-11-14 DIAGNOSIS — Z80.41 FAMILY HISTORY OF MALIGNANT NEOPLASM OF OVARY: Primary | ICD-10-CM

## 2018-11-14 DIAGNOSIS — R59.0 AXILLARY ADENOPATHY: Primary | ICD-10-CM

## 2018-11-14 DIAGNOSIS — R92.8 ABNORMAL MAMMOGRAM OF RIGHT BREAST: ICD-10-CM

## 2018-11-14 DIAGNOSIS — C73 MALIGNANT NEOPLASM OF THYROID GLAND (HCC): ICD-10-CM

## 2018-11-14 PROCEDURE — 96040 MEDICAL GENETICS COUNSELING EA 30 MIN: CPT | Performed by: GENETIC COUNSELOR, MS

## 2018-11-14 PROCEDURE — 36415 COLL VENOUS BLD VENIPUNCTURE: CPT

## 2018-11-14 PROCEDURE — 99244 OFF/OP CNSLTJ NEW/EST MOD 40: CPT | Performed by: SURGERY

## 2018-11-14 NOTE — PROGRESS NOTES
Reason for visit: Ms. Calderon Carmichael is a 50-year-old woman who was referred to high risk breast clinic due to her family history of ovarian cancer.   She was seen today for genetic counseling and to discuss the option of genetic testing given her family history that she has been getting annual screening mammography regularly and has a history of one biopsy, which yielded benign results. Her most recent mammogram was in September of this year. She was 12 at menarche and was 21 at the birth of her first child.  Ms gene mutation testing were reviewed with Ms. Eddy Grant.   Genetic test results can have significant impact on medical management, planning, screening, surgical decision-making, cancer risk assessment for the patient and relatives, and psychological/emotiona identified in an affected relative. Unfortunately, as her paternal aunt has passed away, this is not an option for her. Another option is testing Ms. Berger rather than an affected relative, and we reviewed the limitations of this testing, including c

## 2018-11-15 NOTE — PROGRESS NOTES
High Risk Breast Cancer Screening and Prevention Clinic    History of Present Illness:   This is the first visit for this 46year old woman, referred by Dr. Sandra Reardon, who presents for breast cancer risk evaluation related to her family history, which is detai • BIOPSY OF UTERUS LINING  2009    neg endometrial biopsy   • CYST/MASS EXCISION Left 5/10/2018    Performed by Herminio Mueller MD at [facility] OR   • D & C  2001   • FOOT SURGERY  2007    R foot neuroma excision   • HAND/FINGER SURGERY UNLISTED  2004 Tab Take  by mouth.  Disp:  Rfl:        Allergies:      Adhesive Tape           HIVES    Comment:Weeping hives and pain - SILK TAPE  Apple                   ANAPHYLAXIS    Comment:RAW APPLE  Aspartame               ANAPHYLAXIS  Ciprofloxacin           SWELL difficulty in breathing with exertion, emphysema, chronic bronchitis, shortness of breath or abnormal sound when breathing. Cardiovascular:   There is no history of chest pain, chest pressure/discomfort, palpitations, irregular heartbeat, fainting or ne anaphylaxis.     /60 (BP Location: Left arm, Patient Position: Sitting, Cuff Size: adult)   Pulse 88   Resp 18   Ht 1.626 m (5' 4\")   Wt 81.6 kg (180 lb)   SpO2 98%   BMI 30.90 kg/m²     Physical Exam:  The patient is an alert, oriented, well-nourish on family history of ovarian cancer as well as recent right axillary infection and adenopathy which now is resolved with benign-appearing lymph node tissue with recent biopsy.      Discussion and Plan:  The patient has no clinical or radiographic evidence o breast MRI, as Jasmin Benoit does not have a lifetime risk of breast cancer >20% as per ACS recommendations.   She was, however, informed that if she does indeed test positive for a mutation, that we would recommend annual MRIs to be staggered six month

## 2018-11-27 ENCOUNTER — TELEPHONE (OUTPATIENT)
Dept: GENETICS | Facility: HOSPITAL | Age: 53
End: 2018-11-27

## 2018-11-27 NOTE — TELEPHONE ENCOUNTER
Attempted to reach [place] but mailbox full. I will send her a copy of her genetic testing results, which are negative for all 83 genes tested for ([facility] Multi-Cancer Panel, [facility].)  I will also release these to her electronically.   She should still be

## 2018-12-13 ENCOUNTER — TELEPHONE (OUTPATIENT)
Dept: GASTROENTEROLOGY | Facility: CLINIC | Age: 53
End: 2018-12-13

## 2018-12-13 NOTE — TELEPHONE ENCOUNTER
Contacted patient she wanted to know what was her arrival time I notified her, that her arrival time is 12/17/18 was 7 AM at [facility]. Patient fully understood all that was stated and had no further questions/concerns.    Patient also stated she had instruct

## 2018-12-13 NOTE — TELEPHONE ENCOUNTER
The colonoscopy and EGD scheduled for 12/17/2018 require authorization through [facility] when they are performed anywhere but an ambulatory surgical center. [facility] can be reached at [phone]. Please let me know if you have any questions.      Thank

## 2018-12-14 NOTE — TELEPHONE ENCOUNTER
Called [facility] to find out status on PA for 12/17 procedure spoke to supervisor Marcial she states they have 72 hours to complete PA.  I stated to her that pt does have procedure this Monday and we need a decision as soon as possible, she states she will

## 2018-12-14 NOTE — TELEPHONE ENCOUNTER
Jed Perez again and was told by Li Paz that PA was approvedSherin Shine # [id number]    Patient notified that PA was approved for 12/17/18 I also gave patient auth # and [place]'s name.

## 2018-12-17 ENCOUNTER — HOSPITAL ENCOUNTER (OUTPATIENT)
Facility: HOSPITAL | Age: 53
Setting detail: HOSPITAL OUTPATIENT SURGERY
Discharge: HOME | End: 2018-12-17
Attending: INTERNAL MEDICINE | Admitting: INTERNAL MEDICINE
Payer: COMMERCIAL

## 2018-12-17 ENCOUNTER — ANESTHESIA (OUTPATIENT)
Dept: ENDOSCOPY | Facility: HOSPITAL | Age: 53
End: 2018-12-17
Payer: COMMERCIAL

## 2018-12-17 ENCOUNTER — ANESTHESIA EVENT (OUTPATIENT)
Dept: ENDOSCOPY | Facility: HOSPITAL | Age: 53
End: 2018-12-17
Payer: COMMERCIAL

## 2018-12-17 DIAGNOSIS — D64.9 ANEMIA, UNSPECIFIED TYPE: ICD-10-CM

## 2018-12-17 PROCEDURE — 0DB98ZX EXCISION OF DUODENUM, VIA NATURAL OR ARTIFICIAL OPENING ENDOSCOPIC, DIAGNOSTIC: ICD-10-PCS | Performed by: INTERNAL MEDICINE

## 2018-12-17 PROCEDURE — 0DB68ZX EXCISION OF STOMACH, VIA NATURAL OR ARTIFICIAL OPENING ENDOSCOPIC, DIAGNOSTIC: ICD-10-PCS | Performed by: INTERNAL MEDICINE

## 2018-12-17 PROCEDURE — 0DBN8ZX EXCISION OF SIGMOID COLON, VIA NATURAL OR ARTIFICIAL OPENING ENDOSCOPIC, DIAGNOSTIC: ICD-10-PCS | Performed by: INTERNAL MEDICINE

## 2018-12-17 RX ORDER — NALOXONE HYDROCHLORIDE 0.4 MG/ML
80 INJECTION, SOLUTION INTRAMUSCULAR; INTRAVENOUS; SUBCUTANEOUS AS NEEDED
Status: DISCONTINUED | OUTPATIENT
Start: 2018-12-17 | End: 2018-12-17

## 2018-12-17 RX ORDER — SODIUM CHLORIDE, SODIUM LACTATE, POTASSIUM CHLORIDE, CALCIUM CHLORIDE 600; 310; 30; 20 MG/100ML; MG/100ML; MG/100ML; MG/100ML
INJECTION, SOLUTION INTRAVENOUS CONTINUOUS
Status: DISCONTINUED | OUTPATIENT
Start: 2018-12-17 | End: 2018-12-17

## 2018-12-17 RX ADMIN — SODIUM CHLORIDE, SODIUM LACTATE, POTASSIUM CHLORIDE, CALCIUM CHLORIDE: 600; 310; 30; 20 INJECTION, SOLUTION INTRAVENOUS at 09:03:00

## 2018-12-17 RX ADMIN — SODIUM CHLORIDE, SODIUM LACTATE, POTASSIUM CHLORIDE, CALCIUM CHLORIDE: 600; 310; 30; 20 INJECTION, SOLUTION INTRAVENOUS at 08:10:00

## 2018-12-17 NOTE — OPERATIVE REPORT
[facility] Endoscopy Report      Date of Procedure:  12/17/18      Preoperative Diagnosis:  1. Transient iron deficiency anemia  2. Colorectal cancer screening  3. Chronic gastroesophageal reflux      Postoperative Diagnosis:  1.   Small preparation of the colon was good. The terminal ileum was examined for 5 cm and visually normal.  The ileocecal valve was well preserved. The visualized colonic mucosa from the cecum to the anal verge was normal with an intact vascular pattern.   There wer

## 2018-12-17 NOTE — ANESTHESIA PREPROCEDURE EVALUATION
Anesthesia PreOp Note    HPI:     Ava Best is a 46year old female who presents for preoperative consultation requested by: Wilbur Goldberg, MD    Date of Surgery: 12/17/2018    Procedure(s):  COLONOSCOPY  ESOPHAGOGASTRODUODENOSCOPY (EGD) paralysis of vocal cords or larynx         Date Noted: 11/20/2012      Hyperlipidemia         Date Noted: 01/26/2011      Hypothyroidism         Date Noted: 07/19/2007      Malignant neoplasm of thyroid gland [facility])         Date Noted: 07/18/2007        Pas TAKE 1 TABLET BY MOUTH NIGHTLY. Disp: 90 tablet Rfl: 2 Taking   ClonazePAM 0.5 MG Oral Tab Take 1 tablet (0.5 mg total) by mouth once daily.  Disp: 90 tablet Rfl: 2 Taking   LEVOTHYROXINE SODIUM 125 MCG Oral Tab TAKE 1 TABLET (125 MCG TOTAL) BY MOUTH ONCE D Female         tumor in neck; d.32; substance abuse     Social History    Socioeconomic History      Marital status:       Spouse name: Not on file      Number of children: Not on file      Years of education: Not on file      Highest education lev 10/22/2018             Vital Signs: Body mass index is 30.04 kg/m². height is 5' 4\" and weight is 175 lb. Her blood pressure is 116/77 and her pulse is 81. Her respiration is 12 and oxygen saturation is 97%.     12/17/18  0738   BP: 116/77   BP Location

## 2018-12-17 NOTE — ANESTHESIA POSTPROCEDURE EVALUATION
Patient: Alex Villatoro    Procedure Summary     Date:  12/17/18 Room / Location:  [facility] ENDOSCOPY 01 / [facility] ENDOSCOPY    Anesthesia Start:  2352 Anesthesia Stop:  2980    Procedures:       COLONOSCOPY (N/A )      ESOPHAGOGASTRODUODENOSCOPY (EGD) (N/A )

## 2018-12-17 NOTE — H&P
History & Physical Examination    Patient Name: Keith Arzola  MRN: [id number]  [facility]: [id number]  YOB: 1965    Diagnosis: Self-limited iron deficiency anemia, colorectal cancer screening, chronic gastroesophageal reflux        Medication ANAPHYLAXIS  Ciprofloxacin           SWELLING  Diclofenac              HIVES, SWELLING    Comment:Patient states swelling of lip, and left eye.   Peach                   ANAPHYLAXIS    Comment:RAW PEACH  Peanut Oil              TONGUE SWELLING Aunt 46        d.51   • Diabetes Sister    • Cancer Paternal Cousin Female         tumor in neck; d.32; substance abuse     Social History    Tobacco Use      Smoking status: Never Smoker      Smokeless tobacco: Never Used    Alcohol use:  Yes      Alcohol/

## 2018-12-18 VITALS
DIASTOLIC BLOOD PRESSURE: 77 MMHG | RESPIRATION RATE: 15 BRPM | BODY MASS INDEX: 29.88 KG/M2 | HEART RATE: 68 BPM | SYSTOLIC BLOOD PRESSURE: 111 MMHG | WEIGHT: 175 LBS | HEIGHT: 64 IN | OXYGEN SATURATION: 99 %

## 2018-12-19 ENCOUNTER — TELEPHONE (OUTPATIENT)
Dept: GASTROENTEROLOGY | Facility: CLINIC | Age: 53
End: 2018-12-19

## 2018-12-19 NOTE — TELEPHONE ENCOUNTER
----- Message from Amos Sales MD sent at 12/18/2018  7:51 PM CST -----  I spoke to the patient. She is feeling well. Her colonic polyps were hyperplastic and of no consequence. Uncomplicated sigmoid colon diverticulosis was present.   The gastr

## 2018-12-21 ENCOUNTER — OFFICE VISIT (OUTPATIENT)
Dept: DERMATOLOGY CLINIC | Facility: CLINIC | Age: 53
End: 2018-12-21
Payer: COMMERCIAL

## 2018-12-21 DIAGNOSIS — D23.70 BENIGN NEOPLASM OF SKIN OF LOWER LIMB, INCLUDING HIP, UNSPECIFIED LATERALITY: ICD-10-CM

## 2018-12-21 DIAGNOSIS — D23.30 BENIGN NEOPLASM OF SKIN OF FACE: ICD-10-CM

## 2018-12-21 DIAGNOSIS — L81.4 LENTIGO: ICD-10-CM

## 2018-12-21 DIAGNOSIS — L82.1 SEBORRHEIC KERATOSES: Primary | ICD-10-CM

## 2018-12-21 DIAGNOSIS — D22.9 MULTIPLE NEVI: ICD-10-CM

## 2018-12-21 DIAGNOSIS — L30.9 DERMATITIS: ICD-10-CM

## 2018-12-21 DIAGNOSIS — D23.60 BENIGN NEOPLASM OF SKIN OF UPPER LIMB, INCLUDING SHOULDER, UNSPECIFIED LATERALITY: ICD-10-CM

## 2018-12-21 DIAGNOSIS — D23.5 BENIGN NEOPLASM OF SKIN OF TRUNK, EXCEPT SCROTUM: ICD-10-CM

## 2018-12-21 PROCEDURE — 99212 OFFICE O/P EST SF 10 MIN: CPT | Performed by: DERMATOLOGY

## 2018-12-21 PROCEDURE — 99213 OFFICE O/P EST LOW 20 MIN: CPT | Performed by: DERMATOLOGY

## 2018-12-21 RX ORDER — TRIAMCINOLONE ACETONIDE 5 MG/G
CREAM TOPICAL
Qty: 15 G | Refills: 1 | Status: SHIPPED | OUTPATIENT
Start: 2018-12-21

## 2018-12-31 NOTE — PROGRESS NOTES
Rufus Burgos is a 46year old female. HPI:     CC:  Patient presents with:  Derm Problem: LOV 3-21-18. Pt c/o lesion L hip that came back after cryo.   She also c/o dry rough skin under R eye and itchy skin at upper body, lower legs \"with change i Neurological Disorder Paternal Grandfather         Alzheimer's   • Cancer Brother 46        asbestos ,asphalt lung cancer   • Lipids Other         family h/o hyperlipidemia    • Cancer Maternal Uncle 18        spinal cancer   • Ovarian Cancer Paternal Aunt D) 600-200 MG-UNIT Oral Tab Take  by mouth.  Disp:  Rfl:      Allergies:     Adhesive Tape           HIVES    Comment:Weeping hives and pain - SILK TAPE  Apple                   ANAPHYLAXIS    Comment:RAW APPLE  Aspartame               ANAPHYLAXIS  Ciproflo THYROIDECTOMY  2007   • TONSILLECTOMY  1974     Social History    Socioeconomic History      Marital status:       Spouse name: Not on file      Number of children: Not on file      Years of education: Not on file      Highest education level: Not Maternal Uncle 18        spinal cancer   • Ovarian Cancer Paternal Aunt 46        d.51   • Diabetes Sister    • Cancer Paternal Cousin Female         tumor in neck; d.32; substance abuse       There were no vitals filed for this visit.     HPI:    Patient p 0. 3-0.1 % Ophthalmic Ointment 3.5 g 3     Sig: Use to corner of eye prn eczema         Seborrheic keratoses  (primary encounter diagnosis)  Lentigo  Dermatitis  Benign neoplasm of skin of face  Benign neoplasm of skin of lower limb, including hip, unspecif Followup as noted RTC routine checkup 6 mos - one year or p.r.n.

## 2019-01-02 ENCOUNTER — TELEPHONE (OUTPATIENT)
Dept: GASTROENTEROLOGY | Facility: CLINIC | Age: 54
End: 2019-01-02

## 2019-01-19 NOTE — TELEPHONE ENCOUNTER
Please review; protocol failed.      Hypothyroid Medications  Protocol Criteria:  Appointment scheduled in the past 12 months or the next 3 months  TSH resulted in the past 12 months that is normal  Recent Outpatient Visits            4 weeks ago Seborrheic

## 2019-01-21 RX ORDER — LEVOTHYROXINE SODIUM 0.12 MG/1
TABLET ORAL
Qty: 90 TABLET | Refills: 1 | Status: SHIPPED | OUTPATIENT
Start: 2019-01-21 | End: 2019-07-16

## 2019-02-01 ENCOUNTER — NURSE TRIAGE (OUTPATIENT)
Dept: OTHER | Age: 54
End: 2019-02-01

## 2019-02-01 RX ORDER — CLONAZEPAM 0.5 MG/1
0.5 TABLET ORAL
Qty: 90 TABLET | Refills: 0 | OUTPATIENT
Start: 2019-02-01 | End: 2019-08-06

## 2019-02-01 RX ORDER — AZITHROMYCIN 250 MG/1
TABLET, FILM COATED ORAL
Qty: 1 PACKAGE | Refills: 0 | Status: SHIPPED | OUTPATIENT
Start: 2019-02-01 | End: 2019-04-05 | Stop reason: ALTCHOICE

## 2019-02-21 ENCOUNTER — TELEPHONE (OUTPATIENT)
Dept: OTHER | Age: 54
End: 2019-02-21

## 2019-02-21 NOTE — TELEPHONE ENCOUNTER
Dr. Michelle Toure: patient is asking for early refill. (she has one order at [facility])     Patient states she missed 14 pills from her last order on clonazepam.     Last filled on 11/26/18  Takes one nightly #90. Not due for next fill until Feb 23rd.      Patient state

## 2019-02-22 DIAGNOSIS — I10 BENIGN ESSENTIAL HTN: ICD-10-CM

## 2019-02-23 RX ORDER — METOPROLOL SUCCINATE 25 MG/1
TABLET, EXTENDED RELEASE ORAL
Qty: 90 TABLET | Refills: 1 | Status: SHIPPED | OUTPATIENT
Start: 2019-02-23 | End: 2019-09-10

## 2019-02-23 NOTE — TELEPHONE ENCOUNTER
Review pended refill request as it does not fall under a protocol.     Last Rx: 5-26-18  LOV: 8-27-18

## 2019-03-27 NOTE — TELEPHONE ENCOUNTER
Please review; protocol failed.     Refill Protocol Appointment Criteria  · Appointment scheduled in the past 6 months or in the next 3 months  Recent Outpatient Visits            3 months ago Seborrheic keratoses    [address] [place]

## 2019-03-28 RX ORDER — CITALOPRAM 10 MG/1
TABLET ORAL
Qty: 90 TABLET | Refills: 1 | Status: SHIPPED | OUTPATIENT
Start: 2019-03-28 | End: 2019-09-26

## 2019-04-05 ENCOUNTER — OFFICE VISIT (OUTPATIENT)
Dept: DERMATOLOGY CLINIC | Facility: CLINIC | Age: 54
End: 2019-04-05
Payer: COMMERCIAL

## 2019-04-05 DIAGNOSIS — L72.0 EPIDERMAL CYST: Primary | ICD-10-CM

## 2019-04-05 DIAGNOSIS — L82.1 SEBORRHEIC KERATOSES: ICD-10-CM

## 2019-04-05 DIAGNOSIS — L72.0 MILIA: ICD-10-CM

## 2019-04-05 DIAGNOSIS — L30.9 DERMATITIS: ICD-10-CM

## 2019-04-05 DIAGNOSIS — D23.30 BENIGN NEOPLASM OF SKIN OF FACE: ICD-10-CM

## 2019-04-05 PROCEDURE — 99212 OFFICE O/P EST SF 10 MIN: CPT | Performed by: DERMATOLOGY

## 2019-04-05 PROCEDURE — 99213 OFFICE O/P EST LOW 20 MIN: CPT | Performed by: DERMATOLOGY

## 2019-04-15 NOTE — PROGRESS NOTES
Maria Luisa Weinstein is a 53year old female. HPI:     CC:  Patient presents with:  Milia: LOV 12/21/2018. Pt presenting with milia to bilateral upper and lower eye lids. c/o dryness. Pt currently using TobraDex ointment.          Allergies:  Adhesive Tape; Alzheimer's   • Cancer Brother 46        asbestos ,asphalt lung cancer   • Lipids Other         family h/o hyperlipidemia    • Cancer Maternal Uncle 18        spinal cancer   • Ovarian Cancer Paternal Aunt 46        d.51   • Diabetes Sister    • Cancer Harshad Jostin ANAPHYLAXIS    Comment:RAW APPLE  Aspartame               ANAPHYLAXIS  Ciprofloxacin           SWELLING  Diclofenac              HIVES, SWELLING    Comment:Patient states swelling of lip, and left eye.   Peach                   ANAPHYLAXIS    Comment:RAW PE file      Years of education: Not on file      Highest education level: Not on file    Occupational History      Not on file    Social Needs      Financial resource strain: Not on file      Food insecurity:        Worry: Not on file        Inability: Not o defibrillator: Not Asked        Breast feeding: Not Asked        Reaction to local anesthetic: Yes          Novacaine ok to use    Social History Narrative      Not on file    Family History   Problem Relation Age of Onset   • Stroke Father         CVA   • remarkable for lesions as noted on map. See details of examination  See Assessment /Plan for additional history and physical exam also:    Assessment / plan:    No orders of the defined types were placed in this encounter.       Meds & Refills for this [facility] reviewed. See  Medications in grid. Instructions reviewed at length. Benign nevi, seborrheic  keratoses, cherry angiomas:  Reassurance regarding other benign skin lesions. Signs and symptoms of skin cancer, ABCDE's of melanoma discussed with patient.  Jerson Chappell

## 2019-06-03 NOTE — TELEPHONE ENCOUNTER
Spoke with pt in regards to refill request.  Pt states \"Dr Lincoln Muñoz told me I have a skin condition, I think she said it was eczema. \"  Pt states she uses   Hydroxyzine as it helps her itching. Please advise.       Recent Visits  Date Type Provider Dept   0

## 2019-06-04 RX ORDER — HYDROXYZINE HYDROCHLORIDE 25 MG/1
25 TABLET, FILM COATED ORAL 3 TIMES DAILY PRN
Qty: 21 TABLET | Refills: 0 | Status: SHIPPED | OUTPATIENT
Start: 2019-06-04 | End: 2019-07-23

## 2019-07-16 RX ORDER — LEVOTHYROXINE SODIUM 0.12 MG/1
TABLET ORAL
Qty: 90 TABLET | Refills: 1 | Status: SHIPPED | OUTPATIENT
Start: 2019-07-16 | End: 2020-01-13

## 2019-07-16 RX ORDER — OMEPRAZOLE 40 MG/1
CAPSULE, DELAYED RELEASE ORAL
Qty: 90 CAPSULE | Refills: 1 | Status: SHIPPED | OUTPATIENT
Start: 2019-07-16 | End: 2019-07-23

## 2019-07-16 NOTE — TELEPHONE ENCOUNTER
Refill passed per [facility], [facility] protocol.           Refill Protocol Appointment Criteria  · Appointment scheduled in the past 12 months or in the next 3 months  Recent Outpatient Visits            3 months ago Epidermal cyst    [facility]

## 2019-07-16 NOTE — TELEPHONE ENCOUNTER
Hypothyroid Medications  Protocol Criteria:  Appointment scheduled in the past 12 months or the next 3 months  TSH resulted in the past 12 months that is normal  Recent Outpatient Visits            3 months ago Epidermal cyst    [facility]

## 2019-07-23 ENCOUNTER — OFFICE VISIT (OUTPATIENT)
Dept: INTERNAL MEDICINE CLINIC | Facility: CLINIC | Age: 54
End: 2019-07-23
Payer: COMMERCIAL

## 2019-07-23 ENCOUNTER — LAB ENCOUNTER (OUTPATIENT)
Dept: LAB | Facility: HOSPITAL | Age: 54
End: 2019-07-23
Attending: INTERNAL MEDICINE
Payer: COMMERCIAL

## 2019-07-23 VITALS
SYSTOLIC BLOOD PRESSURE: 105 MMHG | RESPIRATION RATE: 16 BRPM | DIASTOLIC BLOOD PRESSURE: 67 MMHG | HEART RATE: 65 BPM | BODY MASS INDEX: 30.56 KG/M2 | WEIGHT: 179 LBS | HEIGHT: 64 IN

## 2019-07-23 DIAGNOSIS — L29.9 PRURITUS: ICD-10-CM

## 2019-07-23 DIAGNOSIS — E03.9 HYPOTHYROIDISM, UNSPECIFIED TYPE: ICD-10-CM

## 2019-07-23 DIAGNOSIS — E53.8 VITAMIN B 12 DEFICIENCY: ICD-10-CM

## 2019-07-23 DIAGNOSIS — I10 BENIGN ESSENTIAL HTN: ICD-10-CM

## 2019-07-23 DIAGNOSIS — E03.9 HYPOTHYROIDISM, UNSPECIFIED TYPE: Primary | ICD-10-CM

## 2019-07-23 DIAGNOSIS — E78.2 MIXED HYPERLIPIDEMIA: ICD-10-CM

## 2019-07-23 DIAGNOSIS — N92.6 IRREGULAR PERIODS/MENSTRUAL CYCLES: ICD-10-CM

## 2019-07-23 LAB
ALBUMIN SERPL-MCNC: 3.8 G/DL (ref 3.4–5)
ALBUMIN/GLOB SERPL: 1.2 {RATIO} (ref 1–2)
ALP LIVER SERPL-CCNC: 68 U/L (ref 41–108)
ALT SERPL-CCNC: 22 U/L (ref 13–56)
ANION GAP SERPL CALC-SCNC: 5 MMOL/L (ref 0–18)
AST SERPL-CCNC: 15 U/L (ref 15–37)
BASOPHILS # BLD AUTO: 0.05 X10(3) UL (ref 0–0.2)
BASOPHILS NFR BLD AUTO: 1 %
BILIRUB SERPL-MCNC: 0.4 MG/DL (ref 0.1–2)
BILIRUB UR QL: NEGATIVE
BUN BLD-MCNC: 11 MG/DL (ref 7–18)
BUN/CREAT SERPL: 12.6 (ref 10–20)
CALCIUM BLD-MCNC: 9.1 MG/DL (ref 8.5–10.1)
CHLORIDE SERPL-SCNC: 108 MMOL/L (ref 98–112)
CHOLEST SMN-MCNC: 198 MG/DL (ref ?–200)
CLARITY UR: CLEAR
CO2 SERPL-SCNC: 30 MMOL/L (ref 21–32)
COLOR UR: YELLOW
CREAT BLD-MCNC: 0.87 MG/DL (ref 0.55–1.02)
DEPRECATED RDW RBC AUTO: 39.1 FL (ref 35.1–46.3)
EOSINOPHIL # BLD AUTO: 0.21 X10(3) UL (ref 0–0.7)
EOSINOPHIL NFR BLD AUTO: 4.1 %
ERYTHROCYTE [DISTWIDTH] IN BLOOD BY AUTOMATED COUNT: 12.2 % (ref 11–15)
ERYTHROCYTE [SEDIMENTATION RATE] IN BLOOD: 6 MM/HR (ref 0–30)
GLOBULIN PLAS-MCNC: 3.2 G/DL (ref 2.8–4.4)
GLUCOSE BLD-MCNC: 101 MG/DL (ref 70–99)
GLUCOSE UR-MCNC: NEGATIVE MG/DL
HCT VFR BLD AUTO: 43 % (ref 35–48)
HDLC SERPL-MCNC: 58 MG/DL (ref 40–59)
HGB BLD-MCNC: 14.2 G/DL (ref 12–16)
HGB UR QL STRIP.AUTO: NEGATIVE
IMM GRANULOCYTES # BLD AUTO: 0.02 X10(3) UL (ref 0–1)
IMM GRANULOCYTES NFR BLD: 0.4 %
KETONES UR-MCNC: NEGATIVE MG/DL
LDLC SERPL CALC-MCNC: 108 MG/DL (ref ?–100)
LEUKOCYTE ESTERASE UR QL STRIP.AUTO: NEGATIVE
LYMPHOCYTES # BLD AUTO: 1.8 X10(3) UL (ref 1–4)
LYMPHOCYTES NFR BLD AUTO: 34.9 %
M PROTEIN MFR SERPL ELPH: 7 G/DL (ref 6.4–8.2)
MCH RBC QN AUTO: 28.9 PG (ref 26–34)
MCHC RBC AUTO-ENTMCNC: 33 G/DL (ref 31–37)
MCV RBC AUTO: 87.4 FL (ref 80–100)
MONOCYTES # BLD AUTO: 0.47 X10(3) UL (ref 0.1–1)
MONOCYTES NFR BLD AUTO: 9.1 %
NEUTROPHILS # BLD AUTO: 2.61 X10 (3) UL (ref 1.5–7.7)
NEUTROPHILS # BLD AUTO: 2.61 X10(3) UL (ref 1.5–7.7)
NEUTROPHILS NFR BLD AUTO: 50.5 %
NITRITE UR QL STRIP.AUTO: NEGATIVE
NONHDLC SERPL-MCNC: 140 MG/DL (ref ?–130)
OSMOLALITY SERPL CALC.SUM OF ELEC: 296 MOSM/KG (ref 275–295)
PATIENT FASTING: YES
PATIENT FASTING: YES
PH UR: 6 [PH] (ref 5–8)
PLATELET # BLD AUTO: 227 10(3)UL (ref 150–450)
POTASSIUM SERPL-SCNC: 4.5 MMOL/L (ref 3.5–5.1)
PROT UR-MCNC: NEGATIVE MG/DL
RBC # BLD AUTO: 4.92 X10(6)UL (ref 3.8–5.3)
SODIUM SERPL-SCNC: 143 MMOL/L (ref 136–145)
SP GR UR STRIP: 1.01 (ref 1–1.03)
T3FREE SERPL-MCNC: 2.95 PG/ML (ref 2.4–4.2)
T4 FREE SERPL-MCNC: 1.2 NG/DL (ref 0.8–1.7)
TRIGL SERPL-MCNC: 159 MG/DL (ref 30–149)
TSI SER-ACNC: 0.01 MIU/ML (ref 0.36–3.74)
UROBILINOGEN UR STRIP-ACNC: <2
VIT B12 SERPL-MCNC: 757 PG/ML (ref 193–986)
VIT C UR-MCNC: NEGATIVE MG/DL
VLDLC SERPL CALC-MCNC: 32 MG/DL (ref 0–30)
WBC # BLD AUTO: 5.2 X10(3) UL (ref 4–11)

## 2019-07-23 PROCEDURE — 81003 URINALYSIS AUTO W/O SCOPE: CPT

## 2019-07-23 PROCEDURE — 36415 COLL VENOUS BLD VENIPUNCTURE: CPT

## 2019-07-23 PROCEDURE — 86800 THYROGLOBULIN ANTIBODY: CPT | Performed by: INTERNAL MEDICINE

## 2019-07-23 PROCEDURE — 84443 ASSAY THYROID STIM HORMONE: CPT

## 2019-07-23 PROCEDURE — 99214 OFFICE O/P EST MOD 30 MIN: CPT | Performed by: INTERNAL MEDICINE

## 2019-07-23 PROCEDURE — 86038 ANTINUCLEAR ANTIBODIES: CPT

## 2019-07-23 PROCEDURE — 84432 ASSAY OF THYROGLOBULIN: CPT | Performed by: INTERNAL MEDICINE

## 2019-07-23 PROCEDURE — 85652 RBC SED RATE AUTOMATED: CPT

## 2019-07-23 PROCEDURE — 84481 FREE ASSAY (FT-3): CPT

## 2019-07-23 PROCEDURE — 82306 VITAMIN D 25 HYDROXY: CPT

## 2019-07-23 PROCEDURE — 85025 COMPLETE CBC W/AUTO DIFF WBC: CPT

## 2019-07-23 PROCEDURE — 82607 VITAMIN B-12: CPT

## 2019-07-23 PROCEDURE — 80053 COMPREHEN METABOLIC PANEL: CPT

## 2019-07-23 PROCEDURE — 84439 ASSAY OF FREE THYROXINE: CPT

## 2019-07-23 PROCEDURE — 80061 LIPID PANEL: CPT

## 2019-07-23 RX ORDER — HYDROXYZINE HYDROCHLORIDE 25 MG/1
TABLET, FILM COATED ORAL
Qty: 30 TABLET | Refills: 5 | Status: SHIPPED | OUTPATIENT
Start: 2019-07-23 | End: 2020-01-09

## 2019-07-23 NOTE — PROGRESS NOTES
HPI:    Patient ID: Uvaldo Connor is a 53year old female. Date of Procedure:  12/17/18        Preoperative Diagnosis:  1. Transient iron deficiency anemia  2. Colorectal cancer screening  3.   Chronic gastroesophageal reflux        Postoperative Systems   Constitutional: Positive for fatigue. HENT: Negative. Eyes: Negative. Respiratory: Negative. Cardiovascular: Negative. Gastrointestinal: Negative. Endocrine: Negative. Genitourinary: Negative. Musculoskeletal: Negative. eye.  Peach                   ANAPHYLAXIS    Comment:RAW PEACH  Peanut Oil              TONGUE SWELLING  Prednisone                Soybean Oil             SWELLING    Comment:Mouth and tongue  Vicodin [Hydrocodon*      Pear                    UNKNOWN has tolerated well. Continue same dose of medication         Hypothyroidism - Primary     Patient status post total thyroidectomy for thyroid cancer. She has been on thyroid supplements–levothyroxine at 125 mcg daily which she has tolerated well.   Rechec Relevant Orders    SED RATE, ENEREN (AUTOMATED) (Completed)    HUNTER, DIRECT SCREEN (Completed)    Irregular periods/menstrual cycles     Intermittent spotting and bleeding about every 6 to 9 months.   Most likely perimenopausal.  Last ultrasound of the u

## 2019-07-23 NOTE — ASSESSMENT & PLAN NOTE
Blood pressure 105/67, pulse 65, resp. rate 16, height 5' 4\" (1.626 m), weight 179 lb (81.2 kg), not currently breastfeeding. Stable blood pressure, well controlled at this time on metoprolol at 25 mg daily. Heart rate is stable at this point.   Continue

## 2019-07-23 NOTE — ASSESSMENT & PLAN NOTE
Intermittent spotting and bleeding about every 6 to 9 months. Most likely perimenopausal.  Last ultrasound of the uterus completed per gynecology showed resolution of abnormality noted earlier on any ER visit.   She has been seen by gynecology recently how

## 2019-07-23 NOTE — PATIENT INSTRUCTIONS
Problem List Items Addressed This Visit        Unprioritized    Benign essential HTN     Blood pressure 105/67, pulse 65, resp. rate 16, height 5' 4\" (1.626 m), weight 179 lb (81.2 kg), not currently breastfeeding.   Stable blood pressure, well controlled Patient is advised to watch for any aggravation of symptoms with alcohol.   Refills on hydroxyzine 25 mg 1 tablet at bedtime provided  She was given iron supplementation per gastroenterology for mild iron deficiency, she has been advised to discontinue mary

## 2019-07-23 NOTE — ASSESSMENT & PLAN NOTE
Generalized itching–random episodes without a rash or hives. This seems to improve significantly with hydroxyzine even taken as a single dose per day. No specific changes in detergents, clothing.   No specific association with alcohol use though alcohol i

## 2019-07-23 NOTE — ASSESSMENT & PLAN NOTE
Lipid panel and liver function test have been stable on simvastatin at 20 mg daily which she has tolerated well.   Continue same dose of medication

## 2019-07-23 NOTE — ASSESSMENT & PLAN NOTE
Patient status post total thyroidectomy for thyroid cancer. She has been on thyroid supplements–levothyroxine at 125 mcg daily which she has tolerated well. Recheck labs currently due have been ordered.

## 2019-07-24 LAB
25(OH)D3 SERPL-MCNC: 44.9 NG/ML (ref 30–100)
THYROGLOBULIN ANTIBODY: <1.8 IU/ML
THYROGLOBULIN TUMOR MARKER: <0.1 NG/ML

## 2019-07-25 LAB — NUCLEAR IGG TITR SER IF: NEGATIVE {TITER}

## 2019-08-06 NOTE — TELEPHONE ENCOUNTER
Controlled medication pending for review. If approved needs to be called in or faxed by on-site staff.   Requested Prescriptions     Pending Prescriptions Disp Refills   • CLONAZEPAM 0.5 MG Oral Tab [Pharmacy Med Name: CLONAZEPAM 0.5 MG TABLET] 90 tablet

## 2019-08-07 RX ORDER — CLONAZEPAM 0.5 MG/1
TABLET ORAL
Qty: 90 TABLET | Refills: 0 | Status: SHIPPED
Start: 2019-08-07 | End: 2019-12-04

## 2019-09-10 ENCOUNTER — OFFICE VISIT (OUTPATIENT)
Dept: INTERNAL MEDICINE CLINIC | Facility: CLINIC | Age: 54
End: 2019-09-10
Payer: COMMERCIAL

## 2019-09-10 VITALS
SYSTOLIC BLOOD PRESSURE: 107 MMHG | HEART RATE: 90 BPM | WEIGHT: 175 LBS | HEIGHT: 64 IN | RESPIRATION RATE: 16 BRPM | BODY MASS INDEX: 29.88 KG/M2 | DIASTOLIC BLOOD PRESSURE: 68 MMHG

## 2019-09-10 DIAGNOSIS — R20.2 PARESTHESIA: Primary | ICD-10-CM

## 2019-09-10 DIAGNOSIS — L29.9 PRURITUS: ICD-10-CM

## 2019-09-10 DIAGNOSIS — Z78.0 MENOPAUSE: ICD-10-CM

## 2019-09-10 DIAGNOSIS — E03.9 HYPOTHYROIDISM, UNSPECIFIED TYPE: ICD-10-CM

## 2019-09-10 DIAGNOSIS — E78.2 MIXED HYPERLIPIDEMIA: ICD-10-CM

## 2019-09-10 DIAGNOSIS — Z00.00 ROUTINE PHYSICAL EXAMINATION: ICD-10-CM

## 2019-09-10 DIAGNOSIS — E55.9 VITAMIN D DEFICIENCY: ICD-10-CM

## 2019-09-10 DIAGNOSIS — I10 BENIGN ESSENTIAL HTN: ICD-10-CM

## 2019-09-10 PROCEDURE — 99214 OFFICE O/P EST MOD 30 MIN: CPT | Performed by: INTERNAL MEDICINE

## 2019-09-10 NOTE — ASSESSMENT & PLAN NOTE
History of carpal tunnel syndrome and is status post surgery in the right hand. She has noticed some numbness and tingling as well as lack of sensation in her fingers usually in the morning.   Advised to restart on a carpal tunnel brace especially at night

## 2019-09-10 NOTE — ASSESSMENT & PLAN NOTE
Lipid panel and liver function test have been stable on simvastatin at 20 mill grams daily. She has tolerated her medications well.   Recheck labs with the next blood draw

## 2019-09-10 NOTE — PATIENT INSTRUCTIONS
Problem List Items Addressed This Visit        Unprioritized    Hyperlipidemia     Lipid panel and liver function test have been stable on simvastatin at 20 mill grams daily. She has tolerated her medications well.   Recheck labs with the next blood draw

## 2019-09-10 NOTE — ASSESSMENT & PLAN NOTE
Patient is status post total thyroidectomy for thyroid cancer. She has been adequately suppressed on levothyroxine at 125 mcg daily. Continue the same dose of medication, recheck labs have been ordered.

## 2019-09-10 NOTE — ASSESSMENT & PLAN NOTE
Itching has completely resolved after starting on hydroxyzine. No recurrent episodes at this time. Labs looked normal.  She does not drink on any significant amounts. No palpable lymphadenopathy at this time.   Continue on hydroxyzine and will Monitor fo

## 2019-09-10 NOTE — PROGRESS NOTES
HPI:    Patient ID: Erica Silva is a 53year old female. Notes recorded by Sergo Humphries MD on 7/24/2019 at 9:25 PM CDT  Blood counts look normal-no anemia.   The kidney functions, liver functions, electrolytes and sugars look normal.  The stone Negative. Genitourinary: Negative. Musculoskeletal: Negative. Negative for arthralgias. Skin: Positive for itching. Negative for rash. Allergic/Immunologic: Negative. Neurological: Positive for tingling.  Negative for weakness, numbness and he SWELLING  Prednisone                Soybean Oil             SWELLING    Comment:Mouth and tongue  Vicodin [Hydrocodon*      Pear                    UNKNOWN      09/10/19  1104   BP: 107/68   Pulse: 90   Resp: 16     Body mass index is 30.04 kg/m².     PHYSI adequately suppressed on levothyroxine at 125 mcg daily. Continue the same dose of medication, recheck labs have been ordered. Paresthesia - Primary     History of carpal tunnel syndrome and is status post surgery in the right hand.   She has not

## 2019-09-11 NOTE — TELEPHONE ENCOUNTER
Review pended refill request as it does not fall under a protocol.   Requested Prescriptions     Pending Prescriptions Disp Refills   • METOPROLOL SUCCINATE ER 25 MG Oral Tablet 24 Hr [Pharmacy Med Name: METOPROLOL SUCC ER 25 MG TAB] 90 tablet 1     Sig: TA

## 2019-09-12 RX ORDER — METOPROLOL SUCCINATE 25 MG/1
TABLET, EXTENDED RELEASE ORAL
Qty: 90 TABLET | Refills: 1 | Status: SHIPPED | OUTPATIENT
Start: 2019-09-12 | End: 2020-03-11

## 2019-09-27 RX ORDER — CITALOPRAM 10 MG/1
TABLET ORAL
Qty: 90 TABLET | Refills: 1 | Status: SHIPPED | OUTPATIENT
Start: 2019-09-27 | End: 2020-04-06

## 2019-09-27 NOTE — TELEPHONE ENCOUNTER
Medication passed protocol , has high interaction, cannot proceed from here        Refill passed per [facility], [facility] protocol.   Refill Protocol Appointment Criteria  · Appointment scheduled in the past 6 months or in the next 3 months  Recent Outpatient Vi

## 2019-09-27 NOTE — TELEPHONE ENCOUNTER
Refill passed per [facility], [facility] protocol.   Refill Protocol Appointment Criteria  · Appointment scheduled in the past 6 months or in the next 3 months  Recent Outpatient Visits            2 weeks ago Juan Jose Pinto, [facility]

## 2019-10-05 DIAGNOSIS — E78.2 MIXED HYPERLIPIDEMIA: ICD-10-CM

## 2019-10-07 RX ORDER — SIMVASTATIN 20 MG
TABLET ORAL
Qty: 90 TABLET | Refills: 1 | Status: SHIPPED | OUTPATIENT
Start: 2019-10-07 | End: 2020-04-02

## 2019-10-08 NOTE — TELEPHONE ENCOUNTER
Refill passed per [facility], [facility] protocol.   Cholesterol Medications  Protocol Criteria:  · Appointment scheduled in the past 12 months or in the next 3 months  · ALT & LDL on file in the past 12 months  · ALT result < 80  · LDL result <130   Recent Outpat

## 2019-10-12 ENCOUNTER — TELEPHONE (OUTPATIENT)
Dept: INTERNAL MEDICINE CLINIC | Facility: CLINIC | Age: 54
End: 2019-10-12

## 2019-10-12 RX ORDER — AMOXICILLIN 875 MG/1
875 TABLET, COATED ORAL 2 TIMES DAILY
Qty: 14 TABLET | Refills: 0 | Status: SHIPPED | OUTPATIENT
Start: 2019-10-12 | End: 2019-10-19

## 2019-10-12 NOTE — TELEPHONE ENCOUNTER
Pt called with tooth pain and likely infection. She has had these in the past. She is trying to work through her insurance to see adentist. We will rx with amoxicillin for now.  Pt is confident that she is not allergic to penicillin

## 2019-10-25 ENCOUNTER — OFFICE VISIT (OUTPATIENT)
Dept: OBGYN CLINIC | Facility: CLINIC | Age: 54
End: 2019-10-25
Payer: COMMERCIAL

## 2019-10-25 VITALS
DIASTOLIC BLOOD PRESSURE: 64 MMHG | SYSTOLIC BLOOD PRESSURE: 101 MMHG | HEART RATE: 67 BPM | WEIGHT: 177.38 LBS | BODY MASS INDEX: 30 KG/M2

## 2019-10-25 DIAGNOSIS — R92.8 ABNORMAL MAMMOGRAM: ICD-10-CM

## 2019-10-25 DIAGNOSIS — Z01.419 ENCOUNTER FOR GYNECOLOGICAL EXAMINATION WITHOUT ABNORMAL FINDING: Primary | ICD-10-CM

## 2019-10-25 DIAGNOSIS — Z12.4 SCREENING FOR MALIGNANT NEOPLASM OF CERVIX: ICD-10-CM

## 2019-10-25 PROCEDURE — 99396 PREV VISIT EST AGE 40-64: CPT | Performed by: OBSTETRICS & GYNECOLOGY
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
## 2019-10-29 ENCOUNTER — HOSPITAL ENCOUNTER (OUTPATIENT)
Dept: MAMMOGRAPHY | Facility: HOSPITAL | Age: 54
Discharge: HOME | End: 2019-10-29
Attending: OBSTETRICS & GYNECOLOGY
Payer: COMMERCIAL

## 2019-10-29 DIAGNOSIS — R92.8 ABNORMAL MAMMOGRAM: ICD-10-CM

## 2019-10-29 PROCEDURE — 77062 BREAST TOMOSYNTHESIS BI: CPT | Performed by: OBSTETRICS & GYNECOLOGY

## 2019-10-29 PROCEDURE — 77066 DX MAMMO INCL CAD BI: CPT | Performed by: OBSTETRICS & GYNECOLOGY

## 2019-10-30 ENCOUNTER — TELEPHONE (OUTPATIENT)
Dept: SURGERY | Facility: CLINIC | Age: 54
End: 2019-10-30

## 2019-10-30 NOTE — TELEPHONE ENCOUNTER
LVM for patient to let her know Dr Riya Knight reviewed her mammogram looks good. She can resume her annual screening with her PCP or GYN unless she has any new symptoms. Asked her to call back with any questions.

## 2019-11-09 ENCOUNTER — TELEPHONE (OUTPATIENT)
Dept: OBGYN CLINIC | Facility: CLINIC | Age: 54
End: 2019-11-09

## 2019-11-09 NOTE — TELEPHONE ENCOUNTER
PT NOTIFIED OF RESULTS AND RECS. HAD 1 ABNORMAL PAP WHEN SHE WAS 20. APPT SCHEDULED FOR 12-3-19 BUT PT IS VERY NERVOUS. SHE HAD THYROID CANCER 5 YEARS AGO AND WOULD LIKE TO BE SEEN SOONER. WOULD YOU SEE PT ON FRI, 11-22 AT 8AM (NPN SLOT)?

## 2019-11-09 NOTE — TELEPHONE ENCOUNTER
----- Message from Loulou Bishop MD sent at 11/8/2019 12:52 PM CST -----  Based on pap (see result), needs colpo.  HPV 16 (+)

## 2019-11-13 NOTE — LETTER
No referring provider defined for this encounter. 12/15/22        Patient: Dipak Wesley   YOB: 1965   Date of Visit: 12/14/2022       Dear  JULIA Upton,      Thank you for referring Dipak Wesley to my practice. Please find my assessment and plan below. Anal skin tag  (primary encounter diagnosis)    61year old female with chronic perianal disease over many years. Her concerns today seem to involve mildly inflamed perianal skin tags. Discussed in detail with pt and options reviewed - literature provided. For short term treatment of pain and swelling, I recommend sitz baths and topical hydrocortisone cream and tucks pads. Intermittent stool softener therapy and laxatives may be of benefit. Avoid NSAIDs and blood thinners as able. The patient was also instructed to limit time sitting on the commode. We discussed the long term benefits of a high fiber diet or supplements with additional fluid intake. We briefly discussed more invasive therapies for hemorrhoid disease, in the unlikely scenario of failed medical management. They will continue regular LGI surveillance/colonscopy as instructed. She will focus on skin care. Consider pelvic floor tx if urgency worsens, regular medical and GI f/u.              Sincerely,   Glenys Mcnally MD     Document electronically generated by:  Glenys Mcnally MD                  Sincerely,   Glenys Mcnally MD   [facility] [address]  [address] [facility] [address]    Document electronically generated by:  Glenys Mcnally MD
<--- Click to Launch ICDx for PreOp, PostOp and Procedure

## 2019-11-22 ENCOUNTER — OFFICE VISIT (OUTPATIENT)
Dept: OBGYN CLINIC | Facility: CLINIC | Age: 54
End: 2019-11-22
Payer: COMMERCIAL

## 2019-11-22 VITALS
HEART RATE: 88 BPM | SYSTOLIC BLOOD PRESSURE: 101 MMHG | DIASTOLIC BLOOD PRESSURE: 67 MMHG | BODY MASS INDEX: 29 KG/M2 | WEIGHT: 168.38 LBS

## 2019-11-22 DIAGNOSIS — R87.810 CERVICAL HIGH RISK HUMAN PAPILLOMAVIRUS (HPV) DNA TEST POSITIVE: Primary | ICD-10-CM

## 2019-11-22 PROCEDURE — 57454 BX/CURETT OF CERVIX W/SCOPE: CPT | Performed by: OBSTETRICS & GYNECOLOGY

## 2019-11-22 NOTE — PROCEDURES
Colpo w/Cx Biopsy and ECC      Birth control method(s) used:     Consent signed. Procedure discussed with patient in detail including indication, risk, benefits, alternatives and complications.     Indication: HPV 16 (+)    Procedure:  Under satisfact

## 2019-11-25 ENCOUNTER — TELEPHONE (OUTPATIENT)
Dept: OBGYN CLINIC | Facility: CLINIC | Age: 54
End: 2019-11-25

## 2019-11-25 NOTE — TELEPHONE ENCOUNTER
Pt requesting colpo results. Informed pt colpo results are still in process. Pt verbalized understanding.

## 2019-11-25 NOTE — TELEPHONE ENCOUNTER
COLPO RESULT SHOWS MILD DYSPLASIA ON CX BIOPSIES AND NEG ECC.  LIKELY PT WILL NEED TO RETURN IN 1 YEAR FOR PAP BUT NEED TO WAIT FOR [facility] TO REVIEW AND GIVE RECS. PT THANKFUL THERE IS NOTHING REALLY BAD SO SHE CAN ENJOY HER HOLIDAY.

## 2019-11-27 NOTE — TELEPHONE ENCOUNTER
Pt had multiple questions regarding HPV. All questions answered. Pt very appreciative and verbalized understanding.

## 2019-12-02 NOTE — PROGRESS NOTES
My Chart message sent to patient re: colpo LEV 1. Please follow -up & make sure pt saw their letter within next week. Enter patient info into Pap log.

## 2019-12-04 NOTE — TELEPHONE ENCOUNTER
Pt requesting refill for Clonazepam, states has 2 pills left. Controlled medication pending for review. Please change to phone in, fax, or print script if not being sent electronically.     Last Rx: 8/7/19  LOV: 9/10/19

## 2019-12-05 RX ORDER — CLONAZEPAM 0.5 MG/1
0.5 TABLET ORAL
Qty: 90 TABLET | Refills: 0 | Status: SHIPPED | OUTPATIENT
Start: 2019-12-05 | End: 2020-03-21

## 2019-12-18 ENCOUNTER — TELEPHONE (OUTPATIENT)
Dept: OTOLARYNGOLOGY | Facility: CLINIC | Age: 54
End: 2019-12-18

## 2019-12-18 NOTE — TELEPHONE ENCOUNTER
Per pt wanting to know if can see Dr. Melvi Irvin tomorrow 12/18/19, tried offering next available or different doctor. Prefers to see Dr. Melvi Irvin. States left ear is swollen and is experiencing some pain. Feels like it's echoing.  Hurt to touch, it is the ear th

## 2019-12-19 ENCOUNTER — OFFICE VISIT (OUTPATIENT)
Dept: OTOLARYNGOLOGY | Facility: CLINIC | Age: 54
End: 2019-12-19
Payer: COMMERCIAL

## 2019-12-19 ENCOUNTER — TELEPHONE (OUTPATIENT)
Dept: OTOLARYNGOLOGY | Facility: CLINIC | Age: 54
End: 2019-12-19

## 2019-12-19 VITALS
HEIGHT: 64 IN | TEMPERATURE: 97 F | SYSTOLIC BLOOD PRESSURE: 92 MMHG | DIASTOLIC BLOOD PRESSURE: 61 MMHG | WEIGHT: 168.44 LBS | BODY MASS INDEX: 28.76 KG/M2

## 2019-12-19 DIAGNOSIS — H92.02 LEFT EAR PAIN: Primary | ICD-10-CM

## 2019-12-19 PROCEDURE — 99213 OFFICE O/P EST LOW 20 MIN: CPT | Performed by: OTOLARYNGOLOGY

## 2019-12-19 RX ORDER — OFLOXACIN 3 MG/ML
3 SOLUTION AURICULAR (OTIC) DAILY
Qty: 1 BOTTLE | Refills: 0 | Status: SHIPPED | OUTPATIENT
Start: 2019-12-19 | End: 2020-07-16 | Stop reason: ALTCHOICE

## 2019-12-19 RX ORDER — BETAMETHASONE DIPROPIONATE 0.5 MG/G
OINTMENT TOPICAL
Qty: 1 TUBE | Refills: 0 | Status: SHIPPED | OUTPATIENT
Start: 2019-12-19 | End: 2021-04-02

## 2019-12-19 RX ORDER — TRIAMCINOLONE ACETONIDE 5 MG/G
CREAM TOPICAL
Qty: 15 G | Refills: 1 | OUTPATIENT
Start: 2019-12-19

## 2019-12-19 RX ORDER — AMOXICILLIN 875 MG/1
875 TABLET, COATED ORAL 2 TIMES DAILY
Qty: 14 TABLET | Refills: 0 | Status: SHIPPED | OUTPATIENT
Start: 2019-12-19 | End: 2020-01-07

## 2019-12-19 NOTE — TELEPHONE ENCOUNTER
Rn spoke to patient and verified allergies ,pt stated with prednisone pt had palpitations and for ciprofloxacin pt stated it blocks her salivary glands,feeling off skin inside her mouth, Dr Carol Michelle made aware and advised pt should be fine to apply the Betam

## 2019-12-19 NOTE — TELEPHONE ENCOUNTER
Pharmacy contacted. Disregard message stating that triamcinolone is too expensive. Pt requesting refill of triamcinolone. Please advise. LOV 4/2019.

## 2019-12-19 NOTE — TELEPHONE ENCOUNTER
Per pharmacist pt is allergic to ofloxacin drops, asking for alternative medication. Please call thank you.

## 2019-12-23 NOTE — PROGRESS NOTES
Ofe Graves is a 53year old female. Patient presents with:  Ear Problem: c/o left ear pain for 2 days      HISTORY OF PRESENT ILLNESS  She presents with complaints of decreased hearing as well as chronic nasal congestion and drainage.  She underwe drinks        Comment: socially      Drug use: No      Sexual activity: Yes        Birth control/protection: Vasectomy    Other Topics      Concerns:        Caffeine Concern: Yes          coffee, 1 cup        Pt has a pacemaker: No        Reaction to local HEMORRHOID(S)  11/28/2016    Rubberband Ligation of Hemorrhoid   • NASAL SURG PROC UNLISTED  2007    septoplasty   • NEEDLE BIOPSY RIGHT      benign   • OTHER SURGICAL HISTORY  2006    sebaceous cyst on face excised   • THYROIDECTOMY  2007   • TONSILLECTOM Submental. Submandibular. Anterior cervical. Posterior cervical. Supraclavicular.         Nose/Mouth/Throat Normal External nose - Normal. Lips/teeth/gums - Normal. Tonsils - Normal. Oropharynx - Normal.   Nose/Mouth/Throat Normal Nares - Right: Normal Left Cream, Apply topically 2 (two) times daily. , Disp: 30 g, Rfl: 1  ASSESSMENT AND PLAN    1. Left ear pain  I suspect a very mild otitis externa with some swelling of the ear canal.  Start amoxicillin and ofloxacin eardrops. Strict water precautions.   Retur

## 2020-01-07 ENCOUNTER — TELEPHONE (OUTPATIENT)
Dept: OPHTHALMOLOGY | Facility: CLINIC | Age: 55
End: 2020-01-07

## 2020-01-07 ENCOUNTER — OFFICE VISIT (OUTPATIENT)
Dept: INTERNAL MEDICINE CLINIC | Facility: CLINIC | Age: 55
End: 2020-01-07
Payer: COMMERCIAL

## 2020-01-07 VITALS
SYSTOLIC BLOOD PRESSURE: 113 MMHG | HEIGHT: 64 IN | HEART RATE: 72 BPM | WEIGHT: 172 LBS | DIASTOLIC BLOOD PRESSURE: 65 MMHG | BODY MASS INDEX: 29.37 KG/M2 | RESPIRATION RATE: 16 BRPM

## 2020-01-07 DIAGNOSIS — H04.129 DRY EYE: ICD-10-CM

## 2020-01-07 DIAGNOSIS — D22.9 ATYPICAL NEVI: ICD-10-CM

## 2020-01-07 DIAGNOSIS — C73 MALIGNANT NEOPLASM OF THYROID GLAND (HCC): Primary | ICD-10-CM

## 2020-01-07 DIAGNOSIS — F41.9 ANXIETY: ICD-10-CM

## 2020-01-07 DIAGNOSIS — Z00.00 ROUTINE PHYSICAL EXAMINATION: ICD-10-CM

## 2020-01-07 DIAGNOSIS — E03.9 HYPOTHYROIDISM, UNSPECIFIED TYPE: ICD-10-CM

## 2020-01-07 DIAGNOSIS — E78.2 MIXED HYPERLIPIDEMIA: ICD-10-CM

## 2020-01-07 PROBLEM — M79.621 AXILLARY PAIN, RIGHT: Status: RESOLVED | Noted: 2018-07-13 | Resolved: 2020-01-07

## 2020-01-07 PROBLEM — L73.2 RIGHT AXILLARY HIDRADENITIS: Status: RESOLVED | Noted: 2018-03-05 | Resolved: 2020-01-07

## 2020-01-07 PROBLEM — R22.31 AXILLARY MASS, RIGHT: Status: RESOLVED | Noted: 2018-08-27 | Resolved: 2020-01-07

## 2020-01-07 PROBLEM — L24.9 IRRITANT CONTACT DERMATITIS: Status: RESOLVED | Noted: 2017-09-29 | Resolved: 2020-01-07

## 2020-01-07 PROBLEM — L98.9 BENIGN SKIN LESION OF NOSE: Status: RESOLVED | Noted: 2018-07-13 | Resolved: 2020-01-07

## 2020-01-07 PROCEDURE — 99396 PREV VISIT EST AGE 40-64: CPT | Performed by: INTERNAL MEDICINE

## 2020-01-07 NOTE — ASSESSMENT & PLAN NOTE
History of dry crusty eyes usually painful on the medial canthus with development of the crusting. She is advised to follow-up with Dr. Charisse Boyer for an initial evaluation. Drink plenty of fluids to keep hydrated.   May use over-the-counter Systane to keep t

## 2020-01-07 NOTE — TELEPHONE ENCOUNTER
Pt called stating pt was referred to Dr. Kylee De. corner of both eyes is hard dry skin. On and off. Pt has used ointment in the past.  Name of rx. In the chart. Pt requesting an appointment.    Please call pt to advise

## 2020-01-07 NOTE — ASSESSMENT & PLAN NOTE
Hypothyroidism status post total thyroidectomy for thyroid cancer. Well suppressed at this time on levothyroxine 125 mcg daily. Recheck labs have been ordered.

## 2020-01-07 NOTE — TELEPHONE ENCOUNTER
Spoke with patient and she states that she has had dry skin in the inner corners of her eyes on and off for the past 5 years.  Pt states that for the past 6 weeks she has had constant dry, hard and irritated skin in inner corners OU for the past 6 weeks and

## 2020-01-07 NOTE — ASSESSMENT & PLAN NOTE
Multiple scattered atypical nevi. Yearly follow-up with dermatology requested.   Referral generated–Dr. Cummings Resides, [phone]

## 2020-01-07 NOTE — ASSESSMENT & PLAN NOTE
Lipid panel liver function tests have been stable on simvastatin at 20 mg daily. She has tolerated her medications well. Continue the same dose of medication, recheck labs are overdue, encouraged to have this completed ASAP. Labs in the system already.

## 2020-01-07 NOTE — PATIENT INSTRUCTIONS
Problem List Items Addressed This Visit        Unprioritized    Anxiety     Chronic anxiety disorder with palpitations. Has been on citalopram, clonazepam and metoprolol.   She has been doing much better with coping at this time         Shahbaz CALRK nevi.  Breast exam completed–no palpable abnormalities, discharge from the nipples or axillary adenopathy. Mammogram completed in October 2019, normal.  Bone density scan due–orders in the system, advised to contact [phone] for an appointment.   No cerv

## 2020-01-07 NOTE — PROGRESS NOTES
REASON FOR VISIT:    Greta Corona is a 53year old female who presents for an [address]. There is no immunization history on file for this patient.   OB History     T4    L4    SAB0  TAB0  Ectopic0  Multiple0  Live [address] 10 years Colonoscopy due on 12/17/2028    Flex Sigmoidoscopy Screen  Every 5 years No results found for this or any previous visit.     Fecal Occult Blood  Annually No results found for: FOB, OCCULTSTOOL    Obesity Screening Screen all adults annually Body flowsheet data found. Dilated Eye exam  Annually No flowsheet data found. No flowsheet data found. Asthma  (Annually between Nov. 1 & Dec. 31)    Date of last AAP/ACT and counseling given on importance of controller meds.                  ALLERGIES: week. (Patient not taking: Reported on 1/7/2020 ) 1 Tube 0   • triamcinolone acetonide 0.1 % External Cream Apply topically 2 (two) times daily.  30 g 1   • triamcinolone acetonide 0.5 % External Cream Use bid as directed 15 g 1   • tobramycin-dexamethasone Paternal Grandfather         Alzheimer's   • Cancer Brother 46        asbestos ,asphalt lung cancer   • Lipids Other         family h/o hyperlipidemia    • Cancer Maternal Uncle 18        spinal cancer   • Ovarian Cancer Paternal Aunt 46        d.51   • Sergo Martinez normal.  Perineum is unremarkable.  No perianal abnormalities.    Urethra is normal in appearance, without erythema.  Urethra meatus is normal.    Internal Gyn:     Vaginal mucosa appears normal. Cervix normal to inspection and palpation.  Uterus normal in suppressed at this time on levothyroxine 125 mcg daily. Recheck labs have been ordered.          Relevant Orders    ENDOCRINOLOGY - INTERNAL    Malignant neoplasm of thyroid gland ([facility] [address] - Primary     History of papillary carcinoma of the thyroid, status po INTERNAL    Dry eye  -     OPHTHALMOLOGY - INTERNAL       The patient indicates understanding of these issues and agrees to the plan. Return in about 6 months (around 7/7/2020).     Diet counseling perfomed  Exercise counseling perfomed    SUGGESTED VACCIN

## 2020-01-07 NOTE — ASSESSMENT & PLAN NOTE
Normal exam.  Labs as ordered. Skin check normal.  No significant abnormal nevi. Breast exam completed–no palpable abnormalities, discharge from the nipples or axillary adenopathy.   Mammogram completed in October 2019, normal.  Bone density scan due–orde

## 2020-01-07 NOTE — ASSESSMENT & PLAN NOTE
Chronic anxiety disorder with palpitations. Has been on citalopram, clonazepam and metoprolol.   She has been doing much better with coping at this time

## 2020-01-09 DIAGNOSIS — L29.9 PRURITUS: ICD-10-CM

## 2020-01-09 RX ORDER — OMEPRAZOLE 40 MG/1
CAPSULE, DELAYED RELEASE ORAL
Qty: 90 CAPSULE | Refills: 1 | Status: SHIPPED | OUTPATIENT
Start: 2020-01-09 | End: 2020-07-14

## 2020-01-09 RX ORDER — HYDROXYZINE HYDROCHLORIDE 25 MG/1
TABLET, FILM COATED ORAL
Qty: 90 TABLET | Refills: 1 | Status: SHIPPED | OUTPATIENT
Start: 2020-01-09 | End: 2020-08-03

## 2020-01-09 NOTE — TELEPHONE ENCOUNTER
Review pended refill request as it does not fall under a protocol.     Requested Prescriptions     Pending Prescriptions Disp Refills   • hydrOXYzine HCl 25 MG Oral Tab [Pharmacy Med Name: HYDROXYZINE HCL 25 MG TABLET] 90 tablet 1     Sig: TAKE 1 TABLET BY

## 2020-01-09 NOTE — TELEPHONE ENCOUNTER
Refill passed per [facility] protocol.     Refill Protocol Appointment Criteria  · Appointment scheduled in the past 12 months or in the next 3 months  Recent Outpatient Visits            2 days ago [facility]    Luisa

## 2020-01-13 RX ORDER — LEVOTHYROXINE SODIUM 0.12 MG/1
TABLET ORAL
Qty: 90 TABLET | Refills: 1 | Status: SHIPPED | OUTPATIENT
Start: 2020-01-13 | End: 2020-10-16

## 2020-01-20 ENCOUNTER — HOSPITAL ENCOUNTER (OUTPATIENT)
Dept: BONE DENSITY | Facility: HOSPITAL | Age: 55
Discharge: HOME | End: 2020-01-20
Attending: INTERNAL MEDICINE
Payer: COMMERCIAL

## 2020-01-20 DIAGNOSIS — Z78.0 MENOPAUSE: ICD-10-CM

## 2020-01-20 PROCEDURE — 77080 DXA BONE DENSITY AXIAL: CPT | Performed by: INTERNAL MEDICINE

## 2020-01-23 ENCOUNTER — OFFICE VISIT (OUTPATIENT)
Dept: OPHTHALMOLOGY | Facility: CLINIC | Age: 55
End: 2020-01-23
Payer: COMMERCIAL

## 2020-01-23 ENCOUNTER — HOSPITAL ENCOUNTER (EMERGENCY)
Facility: HOSPITAL | Age: 55
Discharge: HOME | End: 2020-01-23
Attending: PHYSICIAN ASSISTANT
Payer: COMMERCIAL

## 2020-01-23 ENCOUNTER — TELEPHONE (OUTPATIENT)
Dept: OPHTHALMOLOGY | Facility: CLINIC | Age: 55
End: 2020-01-23

## 2020-01-23 ENCOUNTER — NURSE TRIAGE (OUTPATIENT)
Dept: INTERNAL MEDICINE CLINIC | Facility: CLINIC | Age: 55
End: 2020-01-23

## 2020-01-23 VITALS
DIASTOLIC BLOOD PRESSURE: 72 MMHG | RESPIRATION RATE: 11 BRPM | TEMPERATURE: 98 F | SYSTOLIC BLOOD PRESSURE: 129 MMHG | HEART RATE: 72 BPM | BODY MASS INDEX: 29 KG/M2 | WEIGHT: 168 LBS | OXYGEN SATURATION: 97 %

## 2020-01-23 DIAGNOSIS — T78.40XA ALLERGIC REACTION, INITIAL ENCOUNTER: Primary | ICD-10-CM

## 2020-01-23 DIAGNOSIS — L25.9 CONTACT DERMATITIS, UNSPECIFIED CONTACT DERMATITIS TYPE, UNSPECIFIED TRIGGER: Primary | ICD-10-CM

## 2020-01-23 PROCEDURE — 99283 EMERGENCY DEPT VISIT LOW MDM: CPT

## 2020-01-23 PROCEDURE — 93010 ELECTROCARDIOGRAM REPORT: CPT | Performed by: PHYSICIAN ASSISTANT

## 2020-01-23 PROCEDURE — 99242 OFF/OP CONSLTJ NEW/EST SF 20: CPT | Performed by: OPHTHALMOLOGY

## 2020-01-23 PROCEDURE — 93005 ELECTROCARDIOGRAM TRACING: CPT

## 2020-01-23 RX ORDER — DIPHENHYDRAMINE HCL 25 MG
25 CAPSULE ORAL ONCE
Status: COMPLETED | OUTPATIENT
Start: 2020-01-23 | End: 2020-01-23

## 2020-01-23 RX ORDER — DIPHENHYDRAMINE HCL 25 MG
CAPSULE ORAL
Qty: 40 CAPSULE | Refills: 0 | Status: SHIPPED | OUTPATIENT
Start: 2020-01-23 | End: 2020-01-28

## 2020-01-23 RX ORDER — FAMOTIDINE 20 MG/1
20 TABLET ORAL ONCE
Status: COMPLETED | OUTPATIENT
Start: 2020-01-23 | End: 2020-01-23

## 2020-01-23 RX ORDER — FAMOTIDINE 20 MG/1
20 TABLET ORAL 2 TIMES DAILY
Qty: 10 TABLET | Refills: 0 | Status: SHIPPED | OUTPATIENT
Start: 2020-01-23 | End: 2020-01-28

## 2020-01-23 NOTE — PATIENT INSTRUCTIONS
Contact dermatitis  Discussed diagnosis in detail with patient. Stop Vaseline and Neosporin. Use 1% Hydrocortisone cream or ointment  (over the counter) 1-2 times per day on affected area for 7-10 days, being careful not to get in eye.   Patient should c

## 2020-01-23 NOTE — TELEPHONE ENCOUNTER
Action Requested: Summary for Provider     []  Critical Lab, Recommendations Needed  [] Need Additional Advice  []   FYI    []   Need Orders  [] Need Medications Sent to Pharmacy  []  Other     SUMMARY: Please note, patient is on her way to ER.      Reason

## 2020-01-23 NOTE — PROGRESS NOTES
Dione Boggs is a 53year old female. HPI:     HPI     Consult      Additional comments: Per Dr. Hager Bones              Comments     Patient states that she has had dry skin in the inner corners of her eyes on and off for the past 5 years.  Pt states colonoscopy (N/A, 12/17/2018) (Procedure: COLONOSCOPY;  Surgeon: Tracy Jolly MD;  Location: [facility] ENDOSCOPY); and needle biopsy right (benign).     Family History   Problem Relation Age of Onset   • Stroke Father         CVA   • Hypertension Father TAKE 1 TABLET BY MOUTH EVERY DAY 90 tablet 1   • triamcinolone acetonide 0.5 % External Cream Use bid as directed 15 g 1   • tobramycin-dexamethasone 0.3-0.1 % Ophthalmic Ointment Use to corner of eye prn eczema 3.5 g 3   • Biotin (PA BIOTIN) 5 MG Oral Cap Type:  OTC reading only                 ASSESSMENT/PLAN:     Diagnoses and Plan:     Contact dermatitis  Discussed diagnosis in detail with patient. Stop Vaseline and Neosporin.    Use 1% Hydrocortisone cream or ointment  (over the counter) 1-2 times per d

## 2020-01-23 NOTE — ED INITIAL ASSESSMENT (HPI)
Patient aox3 to ed by self patient states applied hydrocortisone cream to face x 1200 today states started feeling pressure, patient then took shower and now states feels sob. Airway patent, no drooling noted. No rash noted.  Patient speaking in clear full

## 2020-01-23 NOTE — TELEPHONE ENCOUNTER
Pt is on her way to the ER because she is having a hard breathing. Per GEORGE she will just use cool compresses and STOP the Hydrocortisone cream over the counter.

## 2020-01-23 NOTE — ASSESSMENT & PLAN NOTE
Discussed diagnosis in detail with patient. Stop Vaseline and Neosporin. Use 1% Hydrocortisone cream or ointment  (over the counter) 1-2 times per day on affected area for 7-10 days, being careful not to get in eye.   Patient should call office and make

## 2020-01-24 ENCOUNTER — LAB ENCOUNTER (OUTPATIENT)
Dept: LAB | Facility: HOSPITAL | Age: 55
End: 2020-01-24
Attending: INTERNAL MEDICINE
Payer: COMMERCIAL

## 2020-01-24 ENCOUNTER — HOSPITAL ENCOUNTER (OUTPATIENT)
Dept: CV DIAGNOSTICS | Facility: HOSPITAL | Age: 55
Discharge: HOME | End: 2020-01-24
Attending: INTERNAL MEDICINE
Payer: COMMERCIAL

## 2020-01-24 DIAGNOSIS — Z00.00 ROUTINE PHYSICAL EXAMINATION: ICD-10-CM

## 2020-01-24 DIAGNOSIS — E55.9 VITAMIN D DEFICIENCY: ICD-10-CM

## 2020-01-24 DIAGNOSIS — E78.2 MIXED HYPERLIPIDEMIA: ICD-10-CM

## 2020-01-24 LAB
ALBUMIN SERPL-MCNC: 3.7 G/DL (ref 3.4–5)
ALBUMIN/GLOB SERPL: 1.1 {RATIO} (ref 1–2)
ALP LIVER SERPL-CCNC: 66 U/L (ref 41–108)
ALT SERPL-CCNC: 21 U/L (ref 13–56)
ANION GAP SERPL CALC-SCNC: 7 MMOL/L (ref 0–18)
AST SERPL-CCNC: 18 U/L (ref 15–37)
BASOPHILS # BLD AUTO: 0.07 X10(3) UL (ref 0–0.2)
BASOPHILS NFR BLD AUTO: 1.2 %
BILIRUB SERPL-MCNC: 0.4 MG/DL (ref 0.1–2)
BILIRUB UR QL: NEGATIVE
BUN BLD-MCNC: 15 MG/DL (ref 7–18)
BUN/CREAT SERPL: 15.6 (ref 10–20)
CALCIUM BLD-MCNC: 9.2 MG/DL (ref 8.5–10.1)
CHLORIDE SERPL-SCNC: 106 MMOL/L (ref 98–112)
CHOLEST SMN-MCNC: 231 MG/DL (ref ?–200)
CLARITY UR: CLEAR
CO2 SERPL-SCNC: 26 MMOL/L (ref 21–32)
COLOR UR: YELLOW
CREAT BLD-MCNC: 0.96 MG/DL (ref 0.55–1.02)
DEPRECATED RDW RBC AUTO: 38.8 FL (ref 35.1–46.3)
EOSINOPHIL # BLD AUTO: 0.25 X10(3) UL (ref 0–0.7)
EOSINOPHIL NFR BLD AUTO: 4.3 %
ERYTHROCYTE [DISTWIDTH] IN BLOOD BY AUTOMATED COUNT: 12.2 % (ref 11–15)
GLOBULIN PLAS-MCNC: 3.5 G/DL (ref 2.8–4.4)
GLUCOSE BLD-MCNC: 106 MG/DL (ref 70–99)
GLUCOSE UR-MCNC: NEGATIVE MG/DL
HCT VFR BLD AUTO: 42.6 % (ref 35–48)
HDLC SERPL-MCNC: 57 MG/DL (ref 40–59)
HGB BLD-MCNC: 14.2 G/DL (ref 12–16)
HGB UR QL STRIP.AUTO: NEGATIVE
IMM GRANULOCYTES # BLD AUTO: 0.01 X10(3) UL (ref 0–1)
IMM GRANULOCYTES NFR BLD: 0.2 %
KETONES UR-MCNC: NEGATIVE MG/DL
LDLC SERPL CALC-MCNC: 146 MG/DL (ref ?–100)
LEUKOCYTE ESTERASE UR QL STRIP.AUTO: NEGATIVE
LYMPHOCYTES # BLD AUTO: 1.84 X10(3) UL (ref 1–4)
LYMPHOCYTES NFR BLD AUTO: 31.7 %
M PROTEIN MFR SERPL ELPH: 7.2 G/DL (ref 6.4–8.2)
MCH RBC QN AUTO: 28.7 PG (ref 26–34)
MCHC RBC AUTO-ENTMCNC: 33.3 G/DL (ref 31–37)
MCV RBC AUTO: 86.2 FL (ref 80–100)
MONOCYTES # BLD AUTO: 0.44 X10(3) UL (ref 0.1–1)
MONOCYTES NFR BLD AUTO: 7.6 %
NEUTROPHILS # BLD AUTO: 3.19 X10 (3) UL (ref 1.5–7.7)
NEUTROPHILS # BLD AUTO: 3.19 X10(3) UL (ref 1.5–7.7)
NEUTROPHILS NFR BLD AUTO: 55 %
NITRITE UR QL STRIP.AUTO: NEGATIVE
NONHDLC SERPL-MCNC: 174 MG/DL (ref ?–130)
OSMOLALITY SERPL CALC.SUM OF ELEC: 289 MOSM/KG (ref 275–295)
PATIENT FASTING Y/N/NP: YES
PATIENT FASTING Y/N/NP: YES
PH UR: 7 [PH] (ref 5–8)
PLATELET # BLD AUTO: 245 10(3)UL (ref 150–450)
POTASSIUM SERPL-SCNC: 4.4 MMOL/L (ref 3.5–5.1)
PROT UR-MCNC: NEGATIVE MG/DL
RBC # BLD AUTO: 4.94 X10(6)UL (ref 3.8–5.3)
SODIUM SERPL-SCNC: 139 MMOL/L (ref 136–145)
SP GR UR STRIP: 1.02 (ref 1–1.03)
TRIGL SERPL-MCNC: 139 MG/DL (ref 30–149)
TSI SER-ACNC: 0.01 MIU/ML (ref 0.36–3.74)
UROBILINOGEN UR STRIP-ACNC: <2
VIT B12 SERPL-MCNC: 705 PG/ML (ref 193–986)
VLDLC SERPL CALC-MCNC: 28 MG/DL (ref 0–30)
WBC # BLD AUTO: 5.8 X10(3) UL (ref 4–11)

## 2020-01-24 PROCEDURE — 93018 CV STRESS TEST I&R ONLY: CPT | Performed by: INTERNAL MEDICINE

## 2020-01-24 PROCEDURE — 93016 CV STRESS TEST SUPVJ ONLY: CPT | Performed by: INTERNAL MEDICINE

## 2020-01-24 PROCEDURE — 36415 COLL VENOUS BLD VENIPUNCTURE: CPT

## 2020-01-24 PROCEDURE — 84443 ASSAY THYROID STIM HORMONE: CPT

## 2020-01-24 PROCEDURE — 85025 COMPLETE CBC W/AUTO DIFF WBC: CPT

## 2020-01-24 PROCEDURE — 82306 VITAMIN D 25 HYDROXY: CPT

## 2020-01-24 PROCEDURE — 82607 VITAMIN B-12: CPT

## 2020-01-24 PROCEDURE — 81003 URINALYSIS AUTO W/O SCOPE: CPT

## 2020-01-24 PROCEDURE — 80053 COMPREHEN METABOLIC PANEL: CPT

## 2020-01-24 PROCEDURE — 80061 LIPID PANEL: CPT

## 2020-01-24 PROCEDURE — 93017 CV STRESS TEST TRACING ONLY: CPT | Performed by: INTERNAL MEDICINE

## 2020-01-24 NOTE — TELEPHONE ENCOUNTER
Disposition and Plan      Clinical Impression:  Allergic reaction, initial encounter  (primary encounter diagnosis)     Disposition:  Discharge     Follow-up:  Mabel Azul MD  [address] [place] [id number] [phone]     Schedule an appointm

## 2020-01-24 NOTE — ED PROVIDER NOTES
Patient Seen in: [facility] AND [facility] Emergency Department    History   Patient presents with: Allergic Rxn Allergies    Stated Complaint:     HPI    Deneen De is a 53year old female who presents with chief complaint of allergic reaction.    Onse UNLISTED  2004    hand surgery   • HEMORRHOIDECTOMY  1991   • LIGATION OF HEMORRHOID(S)  11/28/2016    Rubberband Ligation of Hemorrhoid   • NASAL SURG PROC UNLISTED  2007    septoplasty   • NEEDLE BIOPSY RIGHT      benign   • OTHER SURGICAL HISTORY  2006 Hypertension Father    • Diabetes Mother    • Neurological Disorder Paternal Grandfather         Alzheimer's   • Cancer Brother 46        asbestos ,asphalt lung cancer   • Lipids Other         family h/o hyperlipidemia    • Cancer Maternal Uncle 25 rate and rhythm, no murmurs, gallops, rubs. Capillary refill is brisk. No extremity edema. Gastrointestinal: Abdomen soft, nontender, nondistended. There is no rebound tenderness or guarding. No organomegaly is noted. No guarding or peritoneal signs. capsules (50 mg total) by mouth every 6 (six) hours for 3 days, THEN 1-2 capsules (25-50 mg total) every 6 (six) hours as needed.   Qty: 40 capsule Refills: 0    famoTIDine (PEPCID) 20 MG Oral Tab  Take 1 tablet (20 mg total) by mouth 2 (two) times daily fo

## 2020-01-24 NOTE — TELEPHONE ENCOUNTER
Pt states has [facility] insurance as well as [employer].  [facility] should run through [facility] through [address] [place] [address]. Please call     [facility] id# [id number]  Pa# [phone] or [phone]

## 2020-01-25 ENCOUNTER — TELEPHONE (OUTPATIENT)
Dept: OTHER | Age: 55
End: 2020-01-25

## 2020-01-25 NOTE — TELEPHONE ENCOUNTER
Patient saw her Dexa Scan results on my chart but didn't understand it. She called to ask about the results. Advised no osteoporosis noted with normal results.  No further questions/concerns

## 2020-01-28 ENCOUNTER — TELEPHONE (OUTPATIENT)
Dept: OTHER | Age: 55
End: 2020-01-28

## 2020-01-28 NOTE — TELEPHONE ENCOUNTER
Patient called to cancel 10 am appointment with Yogesh Bray. Patient states she ate bad food last night and couldn't make appointment.

## 2020-01-28 NOTE — TELEPHONE ENCOUNTER
Patient requesting refill for hydroxyzine HCL 25 MG oral tablets. Patient advised refills are available at her pharmacy.   Patient verbalized understanding

## 2020-01-28 NOTE — TELEPHONE ENCOUNTER
Patient called to cancel 10 am appointment with Yogesh Cartagena. Patient states she ate bad food last night and couldn't make appointment. Offered new appointment, patient refused.   States she was in the ER  1/23/20 with allergic reaction from [facility]

## 2020-01-28 NOTE — TELEPHONE ENCOUNTER
Patient called asking about lab results for blood work and [employer]. Patient advised of notes below and verbalized understanding. Patient transferred to phone agent to make follow up appointment for 6-8 weeks with Dr. Latosha Wynne.           Result Notes for COM

## 2020-01-29 LAB — 25(OH)D3 SERPL-MCNC: 29.6 NG/ML (ref 30–100)

## 2020-03-06 ENCOUNTER — OFFICE VISIT (OUTPATIENT)
Dept: DERMATOLOGY CLINIC | Facility: CLINIC | Age: 55
End: 2020-03-06
Payer: COMMERCIAL

## 2020-03-06 DIAGNOSIS — D23.30 BENIGN NEOPLASM OF SKIN OF FACE: ICD-10-CM

## 2020-03-06 DIAGNOSIS — D23.70 BENIGN NEOPLASM OF SKIN OF LOWER LIMB, INCLUDING HIP, UNSPECIFIED LATERALITY: ICD-10-CM

## 2020-03-06 DIAGNOSIS — D23.5 BENIGN NEOPLASM OF SKIN OF TRUNK, EXCEPT SCROTUM: ICD-10-CM

## 2020-03-06 DIAGNOSIS — D23.4 BENIGN NEOPLASM OF SCALP AND SKIN OF NECK: ICD-10-CM

## 2020-03-06 DIAGNOSIS — L72.0 MILIA: ICD-10-CM

## 2020-03-06 DIAGNOSIS — D23.60 BENIGN NEOPLASM OF SKIN OF UPPER LIMB, INCLUDING SHOULDER, UNSPECIFIED LATERALITY: ICD-10-CM

## 2020-03-06 DIAGNOSIS — D22.9 MULTIPLE NEVI: Primary | ICD-10-CM

## 2020-03-06 DIAGNOSIS — L82.1 SEBORRHEIC KERATOSES: ICD-10-CM

## 2020-03-06 PROCEDURE — 99213 OFFICE O/P EST LOW 20 MIN: CPT | Performed by: DERMATOLOGY

## 2020-03-10 DIAGNOSIS — I10 BENIGN ESSENTIAL HTN: ICD-10-CM

## 2020-03-11 RX ORDER — METOPROLOL SUCCINATE 25 MG/1
TABLET, EXTENDED RELEASE ORAL
Qty: 90 TABLET | Refills: 1 | Status: SHIPPED | OUTPATIENT
Start: 2020-03-11 | End: 2020-09-12

## 2020-03-16 NOTE — PROGRESS NOTES
Greta Corona is a 63year old female.   HPI:     CC:  Patient presents with:  Moles: Pt states PCP referred for evaluation of mole - pt unsure of mole location or reason for referral.  PCP note states \"Multiple scattered atypical nevi\"  Derm Proble History   Problem Relation Age of Onset   • Stroke Father         CVA   • Hypertension Father    • Diabetes Mother    • Neurological Disorder Paternal Grandfather         Alzheimer's   • Cancer Brother 46        asbestos ,asphalt lung cancer   • Lipids Oth drops into the left ear daily. (Patient not taking: Reported on 3/6/2020 ) 1 Bottle 0   • triamcinolone acetonide 0.1 % External Cream Apply topically 2 (two) times daily.  (Patient not taking: Reported on 3/6/2020 ) 30 g 1   • triamcinolone acetonide 0.5 % 2007    R foot neuroma excision   • HAND/FINGER SURGERY UNLISTED  2004    hand surgery   • HEMORRHOIDECTOMY  1991   • LIGATION OF HEMORRHOID(S)  11/28/2016    Rubberband Ligation of Hemorrhoid   • NASAL SURG PROC UNLISTED  2007    septoplasty   • NEEDLE BI Concern: Yes          coffee, 1 cup        Occupational Exposure: Not Asked        Hobby Hazards: Not Asked        Sleep Concern: Not Asked        Stress Concern: Not Asked        Weight Concern: Not Asked        Special Diet: Not Asked        Back Care: N History, medications, allergies reviewed as noted. Physical Examination:     Well-developed well-nourished patient alert oriented in no acute distress.   Exam total-body performed, including scalp, head, neck, face,nails, hair, external eyes, includin may cause peeling irritation. Consider prescription Retin-A if worsening. Waxy tan papule at left mid brow right lateral brow trial of cryo  Please refer to map for specific lesions. See additional diagnoses.   Pros cons of various therapies, risks rylan

## 2020-03-21 RX ORDER — CLONAZEPAM 0.5 MG/1
0.5 TABLET ORAL
Qty: 30 TABLET | Refills: 2 | Status: SHIPPED | OUTPATIENT
Start: 2020-03-21 | End: 2020-06-19

## 2020-04-02 ENCOUNTER — TELEPHONE (OUTPATIENT)
Dept: INTERNAL MEDICINE CLINIC | Facility: CLINIC | Age: 55
End: 2020-04-02

## 2020-04-02 DIAGNOSIS — E78.2 MIXED HYPERLIPIDEMIA: ICD-10-CM

## 2020-04-02 DIAGNOSIS — E55.9 VITAMIN D DEFICIENCY: ICD-10-CM

## 2020-04-02 DIAGNOSIS — H01.111 ALLERGIC DERMATITIS OF UPPER AND LOWER LIDS OF BOTH EYES: ICD-10-CM

## 2020-04-02 DIAGNOSIS — H01.114 ALLERGIC DERMATITIS OF UPPER AND LOWER LIDS OF BOTH EYES: ICD-10-CM

## 2020-04-02 DIAGNOSIS — C73 MALIGNANT NEOPLASM OF THYROID GLAND (HCC): ICD-10-CM

## 2020-04-02 DIAGNOSIS — H01.115 ALLERGIC DERMATITIS OF UPPER AND LOWER LIDS OF BOTH EYES: ICD-10-CM

## 2020-04-02 DIAGNOSIS — H01.112 ALLERGIC DERMATITIS OF UPPER AND LOWER LIDS OF BOTH EYES: ICD-10-CM

## 2020-04-02 DIAGNOSIS — E03.9 HYPOTHYROIDISM, UNSPECIFIED TYPE: ICD-10-CM

## 2020-04-02 DIAGNOSIS — R20.2 PARESTHESIA: Primary | ICD-10-CM

## 2020-04-02 DIAGNOSIS — M62.40 CONTRACTURE OF TENDON SHEATH: ICD-10-CM

## 2020-04-02 PROCEDURE — 99214 OFFICE O/P EST MOD 30 MIN: CPT | Performed by: INTERNAL MEDICINE

## 2020-04-02 RX ORDER — ATORVASTATIN CALCIUM 20 MG/1
20 TABLET, FILM COATED ORAL NIGHTLY
Qty: 90 TABLET | Refills: 1 | Status: SHIPPED | OUTPATIENT
Start: 2020-04-02 | End: 2020-09-24

## 2020-04-02 NOTE — ASSESSMENT & PLAN NOTE
History of hypothyroidism status post total thyroidectomy for thyroid cancer. She is currently on levothyroxine at 125 mcg daily and the TSH levels have been adequately suppressed. She does not have any signs of hyper thyroidism at this time.   Explained

## 2020-04-02 NOTE — ASSESSMENT & PLAN NOTE
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 She received radioactive iodine ablation for the thyroid remnant in May 2007. She was followed up per Dr. Kevin Crum on a regular basis. But last visit was a while back.

## 2020-04-02 NOTE — ASSESSMENT & PLAN NOTE
Eyelid dermatitis flares intermittently continue TobraDex as needed. Usually seen and treated per Dr. Xavier Herrmann.   Recently evaluated per ophthalmology but did not like the treatment alternative with hydrocortisone as she developed a severe allergic reactio

## 2020-04-02 NOTE — TELEPHONE ENCOUNTER
Virtual/Telephone Check-In    [address] verbally consents to a Virtual/Telephone Check-In service on 04/02/20. Patient understands and accepts financial responsibility for any deductible, co-insurance and/or co-pays associated with this service. Dr. Smith Cargo on a regular basis. Recheck lab order has been provided. She was under the impression that she may need higher doses of levothyroxine for management of a low TSH–explained lab tests.         Problem List Items Addressed This Visit        Cricket Barbosa significantly elevated. She is advised to discontinue the simvastatin, changed to atorvastatin at 20 mg daily. New prescription has been provided. Will recheck labs in about 8 to 12 weeks after starting on medication .          Relevant Medications    at to increase her vitamin D supplements to 2000 units daily. Will recheck labs with the next blood draw. Relevant Orders    VITAMIN D, 25-HYDROXY    VITAMIN B12           Patient reassured. Continue with current treatment plan.   Patient to call if

## 2020-04-02 NOTE — ASSESSMENT & PLAN NOTE
Patient has been advised to increase her vitamin D supplements to 2000 units daily. Will recheck labs with the next blood draw.

## 2020-04-02 NOTE — ASSESSMENT & PLAN NOTE
Chronic paresthesia in bilateral hands. She has had carpal tunnel surgery on the right hand. She continues to have some numbness and tingling.   Referral to orthopedics has been provided, will follow-up after evaluation is completed

## 2020-04-02 NOTE — ASSESSMENT & PLAN NOTE
History of trigger fingers in the past.  Recently has noticed worsening trigger finger of the right middle finger with clawing as well as thickening of the tendon of the palm. Some pain with palpation. Increasing difficulty with gripping.   Works as a bar

## 2020-04-02 NOTE — ASSESSMENT & PLAN NOTE
Lipid panel and liver function tests discussed. Patient has been on simvastatin at 20 mg daily. LDL levels remain significantly elevated. She is advised to discontinue the simvastatin, changed to atorvastatin at 20 mg daily.   New prescription has been p

## 2020-04-06 RX ORDER — CITALOPRAM 10 MG/1
TABLET ORAL
Qty: 90 TABLET | Refills: 1 | Status: SHIPPED | OUTPATIENT
Start: 2020-04-06 | End: 2020-09-27

## 2020-04-09 ENCOUNTER — NURSE TRIAGE (OUTPATIENT)
Dept: OTHER | Age: 55
End: 2020-04-09

## 2020-04-09 RX ORDER — MONTELUKAST SODIUM 10 MG/1
10 TABLET ORAL DAILY
Qty: 30 TABLET | Refills: 3 | Status: SHIPPED | OUTPATIENT
Start: 2020-04-09 | End: 2020-04-15

## 2020-04-09 NOTE — TELEPHONE ENCOUNTER
Spoke with patient ( verified) and relayed Dr. Wylie Pump message below--patient verbalizes understanding and agreement. No further questions/concerns at this time.

## 2020-04-09 NOTE — TELEPHONE ENCOUNTER
Zyrtec 10 mgs po q daily x 4 weeks-otc  singulair 10 mgs 1 tab po q d x 4 weeks. script sent in    Tylenol 500-650 mg 3 times daily for 5 days. Advised quarantine for suspicious symptoms– COVID-19 related. May not go to work or go shopping.   Will need t

## 2020-04-09 NOTE — TELEPHONE ENCOUNTER
Action Requested: Summary for Provider     []  Critical Lab, Recommendations Needed  [] Need Additional Advice  []   FYI    []   Need Orders  [] Need Medications Sent to Pharmacy  []  Other     SUMMARY:Pt stated Hx of Allergies, s/s 1 1/2 week, runny nos

## 2020-04-15 NOTE — TELEPHONE ENCOUNTER
Patient calling and states that she has allergic reaction from Montelukast-heart palpitations. States that she took it for 3 consecutive days and developed heart palpitation, stopped taking it Monday and started yesterday no more episode of palpitations. In

## 2020-04-22 ENCOUNTER — NURSE TRIAGE (OUTPATIENT)
Dept: OTHER | Age: 55
End: 2020-04-22

## 2020-04-22 RX ORDER — AMOXICILLIN AND CLAVULANATE POTASSIUM 875; 125 MG/1; MG/1
1 TABLET, FILM COATED ORAL 2 TIMES DAILY
Qty: 20 TABLET | Refills: 0 | Status: SHIPPED | OUTPATIENT
Start: 2020-04-22 | End: 2020-05-02

## 2020-04-22 NOTE — TELEPHONE ENCOUNTER
Augmentin 875 mg p.o. twice daily for 10 days. Z-Kev does not work and plain amoxicillin does not work. Augmentin is amoxicillin with clavulanic acid.

## 2020-04-22 NOTE — TELEPHONE ENCOUNTER
Patient informed of providers response below and pt states she is \"almost sure\" she was placed on Augmenting for a toe infection 13-15 years ago and states \"it made me feel like I was pregnant;\" states vaginal area was very swollen.      Asking if somet

## 2020-04-27 NOTE — TELEPHONE ENCOUNTER
Spoke with pt and she states the pharmacy keeps sending refill requests even though she has asked them to stop. Pt states she has plenty and does not need a refill. Pt states the dry skin behind her ear is doing better.

## 2020-04-28 ENCOUNTER — NURSE TRIAGE (OUTPATIENT)
Dept: INTERNAL MEDICINE CLINIC | Facility: CLINIC | Age: 55
End: 2020-04-28

## 2020-04-28 NOTE — TELEPHONE ENCOUNTER
Action Requested: Summary for Provider     []  Critical Lab, Recommendations Needed  [x] Need Additional Advice  []   FYI    []   Need Orders  [] Need Medications Sent to Pharmacy  []  Other     SUMMARY: Patient reports having dizziness and nausea.

## 2020-04-29 NOTE — TELEPHONE ENCOUNTER
Pt states that she cannot take zyrtec as it gives her heart palpitations. She is taking OTC benadryl. Knows to take antivert if dizziness returns.

## 2020-04-29 NOTE — TELEPHONE ENCOUNTER
She was never allergic to Augmentin. She just had stomach discomfort and diarrhea with it. That is why she was given amoxicillin after extensive discussion with her about the medication and the suspected side effects she has had. .   I had called her myse

## 2020-05-05 ENCOUNTER — TELEPHONE (OUTPATIENT)
Dept: OTOLARYNGOLOGY | Facility: CLINIC | Age: 55
End: 2020-05-05

## 2020-05-05 NOTE — TELEPHONE ENCOUNTER
Per pt is experiencing sore throat, runny nose and also states has vertigo. Pt has had symptoms for weeks now.  Please advise

## 2020-05-07 NOTE — TELEPHONE ENCOUNTER
Patient c/o runny nose, post nasal drip, sore throat, sneezing. Ongoing for several weeks. Open to virtual appt is suggested. Post nasal drip, sore throat (reports chronic condition), sneezing. She is anxious due to ongoing symptoms.    Dr. Raissa Yoo

## 2020-05-07 NOTE — TELEPHONE ENCOUNTER
Called patient and booked telephone visit. Declined video visit. Understands insurance will be billed for a doctors visit.

## 2020-05-11 ENCOUNTER — TELEPHONE (OUTPATIENT)
Dept: OTOLARYNGOLOGY | Facility: CLINIC | Age: 55
End: 2020-05-11

## 2020-05-11 DIAGNOSIS — R09.81 NASAL CONGESTION: ICD-10-CM

## 2020-05-11 DIAGNOSIS — H93.13 TINNITUS OF BOTH EARS: Primary | ICD-10-CM

## 2020-05-11 PROCEDURE — 99443 PHONE E/M BY PHYS 21-30 MIN: CPT | Performed by: OTOLARYNGOLOGY

## 2020-05-11 RX ORDER — AZELASTINE 1 MG/ML
2 SPRAY, METERED NASAL 2 TIMES DAILY
Qty: 1 BOTTLE | Refills: 3 | Status: SHIPPED | OUTPATIENT
Start: 2020-05-11 | End: 2020-09-30

## 2020-05-11 NOTE — TELEPHONE ENCOUNTER
Virtual Telephone Check-In    [address] verbally consents to a Virtual/Telephone Check-In visit on 05/11/20. Patient understands and accepts financial responsibility for any deductible, co-insurance and/or co-pays associated with this service. has had in the past.  She has responded well to Z-Paks but this time was started on generic version of Augmentin which often causes her vertigo and dizziness.   Unfortunately after a few doses she did develop vertigo stopped antibiotic and states that the v socially      Drug use: No      Sexual activity: Yes        Birth control/protection: Vasectomy    Other Topics      Concerns:        Caffeine Concern: Yes          coffee, 1 cup        Pt has a pacemaker: No        Reaction to local anesthetic: Yes • HEMORRHOIDECTOMY   1991   • LIGATION OF HEMORRHOID(S)   11/28/2016     Rubberband Ligation of Hemorrhoid   • NASAL SURG PROC UNLISTED   2007     septoplasty   • NEEDLE BIOPSY RIGHT         benign   • OTHER SURGICAL HISTORY   2006     sebaceous cyst on Disp: 90 tablet, Rfl: 1  •  hydrOXYzine HCl 25 MG Oral Tab, 1 tablet p.o. daily for itching, Disp: 30 tablet, Rfl: 5  •  LEVOTHYROXINE SODIUM 125 MCG Oral Tab, TAKE 1 TABLET (125 MCG TOTAL) BY MOUTH ONCE DAILY. , Disp: 90 tablet, Rfl: 1  •  triamcinolone ac with patient. Moderate complexity.          Arsalan Tao [place], MD Araceli Arora.  Dinh [place], MD

## 2020-06-01 ENCOUNTER — NURSE TRIAGE (OUTPATIENT)
Dept: INTERNAL MEDICINE CLINIC | Facility: CLINIC | Age: 55
End: 2020-06-01

## 2020-06-01 ENCOUNTER — VIRTUAL PHONE E/M (OUTPATIENT)
Dept: INTERNAL MEDICINE CLINIC | Facility: CLINIC | Age: 55
End: 2020-06-01

## 2020-06-01 DIAGNOSIS — R68.2 DRY MOUTH: Primary | ICD-10-CM

## 2020-06-01 DIAGNOSIS — K08.89 TOOTH ACHE: ICD-10-CM

## 2020-06-01 PROCEDURE — 99442 PHONE E/M BY PHYS 11-20 MIN: CPT | Performed by: NURSE PRACTITIONER

## 2020-06-01 RX ORDER — AMOXICILLIN 500 MG/1
500 CAPSULE ORAL 2 TIMES DAILY
Qty: 20 CAPSULE | Refills: 0 | Status: SHIPPED | OUTPATIENT
Start: 2020-06-01 | End: 2020-06-11

## 2020-06-01 NOTE — ASSESSMENT & PLAN NOTE
A/P-63year-old female with a pain to take in her left jaw area. She also has a cracked tooth on the right side. At the present time she does not have the money to pay the dental premium due to the COVID crisis.   Until she can get into see a dentist she

## 2020-06-01 NOTE — TELEPHONE ENCOUNTER
Action Requested: Summary for Provider     []  Critical Lab, Recommendations Needed  [] Need Additional Advice  [x]   FYI    []   Need Orders  [] Need Medications Sent to Pharmacy  []  Other     SUMMARY: Patient calling with complaint of tooth pain on left

## 2020-06-01 NOTE — PROGRESS NOTES
HPI:    Patient ID: Amanuel Taveras is a 63year old female. HPI Tooth pain is on the left, lower jaw. She states her  jaw is swollen and in painful. The pain is intermittent and yesterday morning it has increased.   Pain level- 8/10  Patient states tablet (20 mg total) by mouth nightly. 90 tablet 1   • CLONAZEPAM 0.5 MG Oral Tab TAKE 1 TABLET (0.5 MG TOTAL) BY MOUTH ONCE DAILY.  30 tablet 2   • METOPROLOL SUCCINATE ER 25 MG Oral Tablet 24 Hr TAKE 1 TABLET BY MOUTH EVERY DAY 90 tablet 1   • tobramycin- sounds normal.   Neurological: She is alert and oriented to person, place, and time. Psychiatric: She has a normal mood and affect. Her behavior is normal. Judgment and thought content normal.     There were no vitals taken for this visit.   Wt Readings f

## 2020-06-02 ENCOUNTER — OFFICE VISIT (OUTPATIENT)
Dept: ENDOCRINOLOGY CLINIC | Facility: CLINIC | Age: 55
End: 2020-06-02
Payer: COMMERCIAL

## 2020-06-02 VITALS
BODY MASS INDEX: 30 KG/M2 | WEIGHT: 175.63 LBS | DIASTOLIC BLOOD PRESSURE: 68 MMHG | HEART RATE: 71 BPM | SYSTOLIC BLOOD PRESSURE: 101 MMHG

## 2020-06-02 DIAGNOSIS — C73 THYROID CANCER (HCC): Primary | ICD-10-CM

## 2020-06-02 DIAGNOSIS — E03.9 HYPOTHYROIDISM, UNSPECIFIED TYPE: ICD-10-CM

## 2020-06-02 PROCEDURE — 99243 OFF/OP CNSLTJ NEW/EST LOW 30: CPT | Performed by: INTERNAL MEDICINE

## 2020-06-02 RX ORDER — LEVOTHYROXINE SODIUM 112 UG/1
112 TABLET ORAL EVERY OTHER DAY
Qty: 45 TABLET | Refills: 0 | Status: SHIPPED | OUTPATIENT
Start: 2020-06-02 | End: 2020-08-20

## 2020-06-02 NOTE — H&P
New Patient Evaluation - History and Physical    CONSULT - Reason for Visit:  Thyroid cancer, Post surgical Hypothyroidism   Requesting Physician: Dr. Thelma Merritt:  Patient presents with:  Consult: PCP advised pt to f/u with Endocrinologist d HEMORRHOID(S)  11/28/2016    Rubberband Ligation of Hemorrhoid   • NASAL SURG PROC UNLISTED  2007    septoplasty   • NEEDLE BIOPSY RIGHT      benign   • OTHER SURGICAL HISTORY  2006    sebaceous cyst on face excised   • THYROIDECTOMY  2007   • TONSILLECTOM Tape           HIVES    Comment:Weeping hives and pain - SILK TAPE  Apple                   ANAPHYLAXIS    Comment:RAW APPLE  Aspartame               ANAPHYLAXIS  Ciprofloxacin           SWELLING  Diclofenac              HIVES, SWELLING    Comment:Patient REVIEW OF SYSTEMS:  Constitutional: Negative for: weight change, fever, fatigue, + heat intolerance  Eyes: Negative for:  Visual changes, proptosis, blurring  ENT: Negative for:  dysphagia, neck swelling, dysphonia  Respiratory: Negative for:  dyspnea, intolerance  Patient is hesitant to go down too much given that she has been on this dose for a long time  She is agreeable to a slow taper.   Will decrease LT 4 to 112 /125 alterante day dosing   She will repeat TSH and Free T4 in 6 weeks after starting me

## 2020-06-19 RX ORDER — CLONAZEPAM 0.5 MG/1
0.5 TABLET ORAL
Qty: 30 TABLET | Refills: 0 | Status: SHIPPED | OUTPATIENT
Start: 2020-06-19 | End: 2020-07-28

## 2020-06-28 ENCOUNTER — APPOINTMENT (OUTPATIENT)
Dept: GENERAL RADIOLOGY | Facility: HOSPITAL | Age: 55
End: 2020-06-28
Attending: EMERGENCY MEDICINE
Payer: COMMERCIAL

## 2020-06-28 ENCOUNTER — HOSPITAL ENCOUNTER (EMERGENCY)
Facility: HOSPITAL | Age: 55
Discharge: HOME | End: 2020-06-28
Attending: EMERGENCY MEDICINE
Payer: COMMERCIAL

## 2020-06-28 VITALS
HEART RATE: 82 BPM | TEMPERATURE: 98 F | RESPIRATION RATE: 18 BRPM | BODY MASS INDEX: 29.02 KG/M2 | DIASTOLIC BLOOD PRESSURE: 74 MMHG | SYSTOLIC BLOOD PRESSURE: 124 MMHG | WEIGHT: 170 LBS | OXYGEN SATURATION: 97 % | HEIGHT: 64 IN

## 2020-06-28 DIAGNOSIS — S93.602A FOOT SPRAIN, LEFT, INITIAL ENCOUNTER: ICD-10-CM

## 2020-06-28 DIAGNOSIS — S90.32XA CONTUSION OF LEFT FOOT, INITIAL ENCOUNTER: Primary | ICD-10-CM

## 2020-06-28 PROCEDURE — 99283 EMERGENCY DEPT VISIT LOW MDM: CPT

## 2020-06-28 PROCEDURE — 73630 X-RAY EXAM OF FOOT: CPT | Performed by: EMERGENCY MEDICINE

## 2020-06-29 NOTE — ED PROVIDER NOTES
Patient Seen in: [facility] AND [facility] Emergency Department      History   No chief complaint on file.     Stated Complaint: L foot pain    HPI    Relative healthy 59-year-old states about a month ago someone stepped on the dorsum of her left foot wearing ten Social History    Tobacco Use      Smoking status: Never Smoker      Smokeless tobacco: Never Used    Alcohol use: Yes      Alcohol/week: 0.0 standard drinks      Comment: socially    Drug use:  No             Review of Systems    Positive for sta results were faxed/finalized only at 11:37 PM ET. If you would like to discuss this case directly please call [phone] (extension [phone]). If you can't reach me at this number, do not leave a voicemail.   Please call [phone] ext [phone] and ask for the ne

## 2020-07-02 ENCOUNTER — TELEPHONE (OUTPATIENT)
Dept: INTERNAL MEDICINE CLINIC | Facility: CLINIC | Age: 55
End: 2020-07-02

## 2020-07-02 ENCOUNTER — OFFICE VISIT (OUTPATIENT)
Dept: PODIATRY CLINIC | Facility: CLINIC | Age: 55
End: 2020-07-02
Payer: COMMERCIAL

## 2020-07-02 DIAGNOSIS — M84.362A STRESS FRACTURE OF LEFT TIBIA, INITIAL ENCOUNTER: ICD-10-CM

## 2020-07-02 DIAGNOSIS — M84.375A STRESS FRACTURE OF METATARSAL BONE OF LEFT FOOT, INITIAL ENCOUNTER: Primary | ICD-10-CM

## 2020-07-02 PROCEDURE — 99203 OFFICE O/P NEW LOW 30 MIN: CPT | Performed by: PODIATRIST

## 2020-07-02 NOTE — TELEPHONE ENCOUNTER
Per patient she needs to go to an Orthopedic doctor due to she was in [place] [address] for her left foot/ankle. Dr Jose Alberto Huff is not in her insurance.

## 2020-07-02 NOTE — PROGRESS NOTES
Jeanmarie Rodriguez is a 63year old female. Patient presents with:   Foot Pain: per patient for the past month has been having left foot pain        HPI:   Patient was seen at today's visit complaining of a painful swollen left foot after she went to the  MG-UNIT Oral Tab Take  by mouth. • Azelastine HCl 0.1 % Nasal Solution 2 sprays by Nasal route 2 (two) times daily.  (Patient not taking: Reported on 7/2/2020 ) 1 Bottle 3   • TRIAMCINOLONE ACETONIDE 0.1 % External Cream APPLY TO AFFECTED AREA TWICE A D 11/28/2016    Rubberband Ligation of Hemorrhoid   • NASAL SURG PROC UNLISTED  2007    septoplasty   • NEEDLE BIOPSY RIGHT      benign   • OTHER SURGICAL HISTORY  2006    sebaceous cyst on face excised   • THYROIDECTOMY  2007   • TONSILLECTOMY  1974      Fa Integument: The skin on the left foot was examined it is warm and dry turgor seems to be relatively normal.  There is some mild swelling noted over the left forefoot   2. Vascular: Patient has palpable pulses both dorsalis pedis and posterior tibial   3.  Epimenio Fran

## 2020-07-02 NOTE — TELEPHONE ENCOUNTER
MANAGED CARE - please advise if Dr Venkatesh Ram or Zion Baig are in network with pt's [facility] plan

## 2020-07-02 NOTE — TELEPHONE ENCOUNTER
Per patient she was in [place] [address] for her left foot and she needs an MRI of her left foot/ankle. Patient went and saw Dr Abdulkadir Zavaleta but , he was not in her insurance so doctor can not issue MRI order. Please advise.

## 2020-07-02 NOTE — TELEPHONE ENCOUNTER
Patient was informed of MRI already in the system but will need prior authorization. Manage care's number provided.

## 2020-07-14 RX ORDER — OMEPRAZOLE 40 MG/1
CAPSULE, DELAYED RELEASE ORAL
Qty: 90 CAPSULE | Refills: 1 | Status: SHIPPED | OUTPATIENT
Start: 2020-07-14 | End: 2020-07-19

## 2020-07-15 ENCOUNTER — TELEPHONE (OUTPATIENT)
Dept: PODIATRY CLINIC | Facility: CLINIC | Age: 55
End: 2020-07-15

## 2020-07-15 NOTE — TELEPHONE ENCOUNTER
Re PA for L ankle and L foot MRIs - offering a peer to peer - pls call [phone] option 3     Tracking # [id number]

## 2020-07-16 ENCOUNTER — HOSPITAL ENCOUNTER (OUTPATIENT)
Dept: GENERAL RADIOLOGY | Facility: HOSPITAL | Age: 55
Discharge: HOME | End: 2020-07-16
Attending: PODIATRIST
Payer: MEDICAID

## 2020-07-16 ENCOUNTER — TELEPHONE (OUTPATIENT)
Dept: INTERNAL MEDICINE CLINIC | Facility: CLINIC | Age: 55
End: 2020-07-16

## 2020-07-16 ENCOUNTER — OFFICE VISIT (OUTPATIENT)
Dept: PODIATRY CLINIC | Facility: CLINIC | Age: 55
End: 2020-07-16
Payer: COMMERCIAL

## 2020-07-16 VITALS — BODY MASS INDEX: 29.02 KG/M2 | WEIGHT: 170 LBS | HEIGHT: 64 IN

## 2020-07-16 DIAGNOSIS — M25.572 ACUTE LEFT ANKLE PAIN: ICD-10-CM

## 2020-07-16 DIAGNOSIS — M79.672 FOOT PAIN, LEFT: ICD-10-CM

## 2020-07-16 DIAGNOSIS — S93.402D SPRAIN OF LEFT ANKLE, UNSPECIFIED LIGAMENT, SUBSEQUENT ENCOUNTER: ICD-10-CM

## 2020-07-16 DIAGNOSIS — S93.602D SPRAIN OF LEFT FOOT, SUBSEQUENT ENCOUNTER: Primary | ICD-10-CM

## 2020-07-16 PROCEDURE — 73630 X-RAY EXAM OF FOOT: CPT | Performed by: PODIATRIST

## 2020-07-16 PROCEDURE — 3008F BODY MASS INDEX DOCD: CPT | Performed by: PODIATRIST

## 2020-07-16 PROCEDURE — 99213 OFFICE O/P EST LOW 20 MIN: CPT | Performed by: PODIATRIST

## 2020-07-16 PROCEDURE — 73610 X-RAY EXAM OF ANKLE: CPT | Performed by: PODIATRIST

## 2020-07-16 NOTE — PROGRESS NOTES
Adriana De Jesus is a 63year old female. Patient presents with: Follow - Up: left foot X ,patient states her pain is much less ,however pain is traveling up to calf . HPI:   Patient returns to the clinic she is doing much better.   She has been Biotin (PA BIOTIN) 5 MG Oral Cap Take  by mouth. • Calcium Carbonate-Vitamin D (CALCIUM HIGH POTENCY/VITAMIN D) 600-200 MG-UNIT Oral Tab Take  by mouth.      • TRIAMCINOLONE ACETONIDE 0.1 % External Cream APPLY TO AFFECTED AREA TWICE A DAY (Patient not Neurological Disorder Paternal Grandfather         Alzheimer's   • Cancer Brother 46        asbestos ,asphalt lung cancer   • Lipids Other         family h/o hyperlipidemia    • Cancer Maternal Uncle 18        spinal cancer   • Ovarian Cancer Paternal Aunt the ankle as well as on the dorsal forefoot.   X-rays were repeated 3 views of the ankle no evidence of stress fracture or crack now 2 weeks status post initial x-ray exam.  The same is true of the foot x-rays that were taken 3 views no evidence of stress f

## 2020-07-19 RX ORDER — OMEPRAZOLE 40 MG/1
40 CAPSULE, DELAYED RELEASE ORAL DAILY
Qty: 90 CAPSULE | Refills: 1 | Status: SHIPPED | OUTPATIENT
Start: 2020-07-19 | End: 2020-08-05

## 2020-07-27 NOTE — TELEPHONE ENCOUNTER
Patient states she is out of medication Clonazepam and is requesting refill. Medication pended. Please advise.

## 2020-07-28 RX ORDER — CLONAZEPAM 0.5 MG/1
TABLET ORAL
Qty: 30 TABLET | Refills: 2 | Status: SHIPPED | OUTPATIENT
Start: 2020-07-28 | End: 2020-10-30

## 2020-08-03 DIAGNOSIS — L29.9 PRURITUS: ICD-10-CM

## 2020-08-03 RX ORDER — HYDROXYZINE HYDROCHLORIDE 25 MG/1
TABLET, FILM COATED ORAL
Qty: 90 TABLET | Refills: 1 | Status: SHIPPED | OUTPATIENT
Start: 2020-08-03 | End: 2021-02-03

## 2020-08-03 NOTE — TELEPHONE ENCOUNTER
Patient requesting refill, states she is out of medication.  no refills left at pharmacy     hydrOXYzine HCl 25 MG Oral Tab

## 2020-08-05 ENCOUNTER — TELEPHONE (OUTPATIENT)
Dept: INTERNAL MEDICINE CLINIC | Facility: CLINIC | Age: 55
End: 2020-08-05

## 2020-08-05 RX ORDER — OMEPRAZOLE 40 MG/1
40 CAPSULE, DELAYED RELEASE ORAL DAILY
Qty: 90 CAPSULE | Refills: 1 | Status: SHIPPED | OUTPATIENT
Start: 2020-08-05 | End: 2021-03-24

## 2020-08-05 NOTE — TELEPHONE ENCOUNTER
Patient is requesting omeprazole to be transferred to [facility] pharmacy. Patient is out of medication. Please advise.      Omeprazole 40 MG Oral Capsule Delayed Release 90 capsule

## 2020-08-10 RX ORDER — MONTELUKAST SODIUM 10 MG/1
TABLET ORAL
Qty: 90 TABLET | Refills: 1 | Status: SHIPPED | OUTPATIENT
Start: 2020-08-10 | End: 2021-02-09

## 2020-08-20 RX ORDER — LEVOTHYROXINE SODIUM 112 UG/1
TABLET ORAL
Qty: 45 TABLET | Refills: 0 | Status: SHIPPED | OUTPATIENT
Start: 2020-08-20 | End: 2020-08-24

## 2020-08-24 RX ORDER — LEVOTHYROXINE SODIUM 112 UG/1
TABLET ORAL
Qty: 45 TABLET | Refills: 0 | Status: SHIPPED | OUTPATIENT
Start: 2020-08-24 | End: 2020-09-30

## 2020-09-12 DIAGNOSIS — I10 BENIGN ESSENTIAL HTN: ICD-10-CM

## 2020-09-12 RX ORDER — METOPROLOL SUCCINATE 25 MG/1
TABLET, EXTENDED RELEASE ORAL
Qty: 90 TABLET | Refills: 1 | Status: SHIPPED | OUTPATIENT
Start: 2020-09-12 | End: 2021-04-06

## 2020-09-23 DIAGNOSIS — E78.2 MIXED HYPERLIPIDEMIA: ICD-10-CM

## 2020-09-24 RX ORDER — ATORVASTATIN CALCIUM 20 MG/1
TABLET, FILM COATED ORAL
Qty: 90 TABLET | Refills: 1 | Status: SHIPPED | OUTPATIENT
Start: 2020-09-24 | End: 2021-03-23

## 2020-09-27 RX ORDER — CITALOPRAM 10 MG/1
TABLET ORAL
Qty: 90 TABLET | Refills: 1 | Status: SHIPPED | OUTPATIENT
Start: 2020-09-27 | End: 2021-03-23

## 2020-09-30 ENCOUNTER — PATIENT MESSAGE (OUTPATIENT)
Dept: ENDOCRINOLOGY CLINIC | Facility: CLINIC | Age: 55
End: 2020-09-30

## 2020-09-30 RX ORDER — AZELASTINE 1 MG/ML
SPRAY, METERED NASAL
Qty: 1 BOTTLE | Refills: 1 | Status: SHIPPED | OUTPATIENT
Start: 2020-09-30 | End: 2021-02-24

## 2020-09-30 RX ORDER — LEVOTHYROXINE SODIUM 112 UG/1
TABLET ORAL
Qty: 45 TABLET | Refills: 0 | Status: SHIPPED | OUTPATIENT
Start: 2020-09-30 | End: 2021-01-29

## 2020-10-16 NOTE — TELEPHONE ENCOUNTER
Patient states due to her not having any insurance for two months, she was unable to schedule an appointment with Dr. Bisi Butcher. States she has one week left of Levothyroxine 125 mcg and requesting Dr. Latosha Wynne to refill.   States she does now have an a

## 2020-10-17 RX ORDER — LEVOTHYROXINE SODIUM 0.12 MG/1
125 TABLET ORAL DAILY
Qty: 90 TABLET | Refills: 1 | Status: SHIPPED | OUTPATIENT
Start: 2020-10-17 | End: 2021-03-25

## 2020-10-26 ENCOUNTER — TELEPHONE (OUTPATIENT)
Dept: INTERNAL MEDICINE CLINIC | Facility: CLINIC | Age: 55
End: 2020-10-26

## 2020-10-26 NOTE — TELEPHONE ENCOUNTER
Prior authorization for omeprazole has been initiated through [employer] using keycode: [id number] It takes about 1-5 business days for a decision to come back.

## 2020-10-27 NOTE — TELEPHONE ENCOUNTER
Prior authorization for omeprazole has been denied. For patients over 21years of age, our guideline named PPI, which includes omperazole 40 mg capsules, requires that you meet one of the following requirements for approval.     A. You are under the care of a GI  B. You have one of the following conditions:  1. Gastrointestinal bleed  2. Erosive esophagitis  3. Pathological hypersecretory syndrome  4. unhealed gastric, duodenal or peptic ulcer  5.bishop's esophagus  6.  Hartley Sprinkles- vasquez syndrome

## 2020-10-30 RX ORDER — CLONAZEPAM 0.5 MG/1
TABLET ORAL
Qty: 30 TABLET | Refills: 2 | Status: SHIPPED | OUTPATIENT
Start: 2020-10-30 | End: 2021-02-02

## 2021-01-06 ENCOUNTER — LAB ENCOUNTER (OUTPATIENT)
Dept: LAB | Facility: HOSPITAL | Age: 56
End: 2021-01-06
Attending: INTERNAL MEDICINE
Payer: MEDICAID

## 2021-01-06 ENCOUNTER — NURSE TRIAGE (OUTPATIENT)
Dept: INTERNAL MEDICINE CLINIC | Facility: CLINIC | Age: 56
End: 2021-01-06

## 2021-01-06 ENCOUNTER — TELEMEDICINE (OUTPATIENT)
Dept: INTERNAL MEDICINE CLINIC | Facility: CLINIC | Age: 56
End: 2021-01-06
Payer: MEDICAID

## 2021-01-06 DIAGNOSIS — J02.9 SORE THROAT: ICD-10-CM

## 2021-01-06 DIAGNOSIS — R51.9 ACUTE NONINTRACTABLE HEADACHE, UNSPECIFIED HEADACHE TYPE: ICD-10-CM

## 2021-01-06 DIAGNOSIS — R51.9 ACUTE NONINTRACTABLE HEADACHE, UNSPECIFIED HEADACHE TYPE: Primary | ICD-10-CM

## 2021-01-06 PROCEDURE — 99213 OFFICE O/P EST LOW 20 MIN: CPT | Performed by: INTERNAL MEDICINE

## 2021-01-06 NOTE — PROGRESS NOTES
HPI:    Patient ID: Mehul Castillo is a 63year old female. HPI  Called today with complaint of sore throat headache fatigue is been going on for a day or so no fever no chills she does not think she is been exposed to anybody with Covid.   But conc Dipropionate Aug 0.05 % External Ointment Apply by topical route every day to the affected area(s); do not exceed 45 grams per week.  1 Tube 0   • triamcinolone acetonide 0.5 % External Cream Use bid as directed (Patient not taking: Reported on 7/2/2020 ) 1

## 2021-01-08 ENCOUNTER — TELEPHONE (OUTPATIENT)
Dept: INTERNAL MEDICINE CLINIC | Facility: CLINIC | Age: 56
End: 2021-01-08

## 2021-01-08 LAB — SARS-COV-2 RNA RESP QL NAA+PROBE: DETECTED

## 2021-01-08 NOTE — TELEPHONE ENCOUNTER
----- Message from Vicki De Leon MD sent at 1/8/2021 12:48 PM CST -----  Put her  On monitoring program pls , and call her with covid instruction, take vitamin =c/zn, monitor o2 sat , monitor temp    Micha Davila, [address]   1/8/2021 10:08 AM      Patient

## 2021-01-11 NOTE — TELEPHONE ENCOUNTER
What was your temp today? - 99.4    How did you take your temp?     with an oral thermometer    Are you feeling short of breath today?    Yes, after doing activity    Is the shortness of breath better, the same, or worse than yesterday?   about the same

## 2021-01-14 ENCOUNTER — TELEPHONE (OUTPATIENT)
Dept: INTERNAL MEDICINE CLINIC | Facility: CLINIC | Age: 56
End: 2021-01-14

## 2021-01-14 NOTE — TELEPHONE ENCOUNTER
pt. states that she is supposed to be seen sooner then 1st avail. virtually for f/up for covid19. First avail. , appt. Is 2/10/2021.

## 2021-01-15 ENCOUNTER — TELEMEDICINE (OUTPATIENT)
Dept: INTERNAL MEDICINE CLINIC | Facility: CLINIC | Age: 56
End: 2021-01-15
Payer: MEDICAID

## 2021-01-15 DIAGNOSIS — U07.1 COVID-19: Primary | ICD-10-CM

## 2021-01-15 DIAGNOSIS — J32.9 SINUSITIS, UNSPECIFIED CHRONICITY, UNSPECIFIED LOCATION: ICD-10-CM

## 2021-01-15 PROCEDURE — 99213 OFFICE O/P EST LOW 20 MIN: CPT | Performed by: PHYSICIAN ASSISTANT

## 2021-01-15 NOTE — PROGRESS NOTES
This is a telemedicine visit with live, interactive video and audio. Patient understands and accepts financial responsibility for any deductible, co-insurance and/or co-pays associated with this service.     SUBJECTIVE  Patient presents for a follow up benign   • OTHER SURGICAL HISTORY  2006    sebaceous cyst on face excised   • THYROIDECTOMY  2007   • TONSILLECTOMY  1974      Family History   Problem Relation Age of Onset   • Stroke Father         CVA   • Hypertension Father    • Diabetes Mother    • Ne OTHER DAY 45 tablet 0   • CITALOPRAM HYDROBROMIDE 10 MG Oral Tab TAKE 1 TABLET BY MOUTH EVERY DAY 90 tablet 1   • ATORVASTATIN 20 MG Oral Tab TAKE 1 TABLET BY MOUTH EVERY DAY AT NIGHT 90 tablet 1   • METOPROLOL SUCCINATE ER 25 MG Oral Tablet 24 Hr TAKE 1 T

## 2021-01-29 ENCOUNTER — OFFICE VISIT (OUTPATIENT)
Dept: ENDOCRINOLOGY CLINIC | Facility: CLINIC | Age: 56
End: 2021-01-29
Payer: MEDICAID

## 2021-01-29 VITALS
BODY MASS INDEX: 30 KG/M2 | DIASTOLIC BLOOD PRESSURE: 76 MMHG | SYSTOLIC BLOOD PRESSURE: 117 MMHG | HEART RATE: 80 BPM | WEIGHT: 172 LBS

## 2021-01-29 DIAGNOSIS — E03.9 HYPOTHYROIDISM, UNSPECIFIED TYPE: Primary | ICD-10-CM

## 2021-01-29 DIAGNOSIS — C73 THYROID CANCER (HCC): ICD-10-CM

## 2021-01-29 PROCEDURE — 3074F SYST BP LT 130 MM HG: CPT | Performed by: INTERNAL MEDICINE

## 2021-01-29 PROCEDURE — 3078F DIAST BP <80 MM HG: CPT | Performed by: INTERNAL MEDICINE

## 2021-01-29 PROCEDURE — 99213 OFFICE O/P EST LOW 20 MIN: CPT | Performed by: INTERNAL MEDICINE

## 2021-01-29 RX ORDER — LEVOTHYROXINE SODIUM 112 UG/1
112 TABLET ORAL EVERY OTHER DAY
Qty: 45 TABLET | Refills: 0 | Status: SHIPPED | OUTPATIENT
Start: 2021-01-29 | End: 2021-04-07

## 2021-01-29 NOTE — PROGRESS NOTES
Return Office Visit     CHIEF COMPLAINT:    Thyroid cancer  Hypothyroidism     HISTORY OF PRESENT ILLNESS:  Rufus Burgos is a 61year old female who presents for follow up for for h/o thyroid cancer and hypothyroidism.      She is s/p total thyroide topical route every day to the affected area(s); do not exceed 45 grams per week. 1 Tube 0   • triamcinolone acetonide 0.5 % External Cream Use bid as directed 15 g 1   • Biotin (PA BIOTIN) 5 MG Oral Cap Take  by mouth.      • Calcium Carbonate-Vitamin D (C Negative for: rash, blister, cellulitis,       PHYSICAL EXAM:   Vitals reviewed    General Appearance:  alert, well developed, in no acute distress  Nutritional:  no extreme weight gain or loss  Head: Atraumatic  Eyes:  normal conjunctivae, sclera. , normal Thyroid Stim Hormone      Free T4, (Free Thyroxine)      Thyroglobulin AB & Tumor Marker        1/29/2021  Mitchell Estrada MD                        .

## 2021-02-01 DIAGNOSIS — L29.9 PRURITUS: ICD-10-CM

## 2021-02-01 NOTE — TELEPHONE ENCOUNTER
•  hydrOXYzine HCl 25 MG Oral Tab, TAKE 1 TABLET BY MOUTH EVERY DAY FOR ITCHING, Disp: 90 tablet, Rfl: 1

## 2021-02-02 ENCOUNTER — HOSPITAL ENCOUNTER (OUTPATIENT)
Dept: ULTRASOUND IMAGING | Facility: HOSPITAL | Age: 56
Discharge: HOME | End: 2021-02-02
Attending: INTERNAL MEDICINE
Payer: MEDICAID

## 2021-02-02 ENCOUNTER — HOSPITAL ENCOUNTER (OUTPATIENT)
Age: 56
Discharge: STILL ADMITTED OR ADMINISTRATIVE | End: 2021-02-02
Payer: MEDICAID

## 2021-02-02 DIAGNOSIS — C73 THYROID CANCER (HCC): ICD-10-CM

## 2021-02-02 PROCEDURE — 76536 US EXAM OF HEAD AND NECK: CPT | Performed by: INTERNAL MEDICINE

## 2021-02-02 NOTE — TELEPHONE ENCOUNTER
Dr Lisa Horn: patient called for refill on Clonazepam to [facility]. Requested Prescriptions     Pending Prescriptions Disp Refills   • clonazePAM 0.5 MG Oral Tab 30 tablet 2     Sig: Take 1 tablet (0.5 mg total) by mouth daily.          Recent Visits  Date

## 2021-02-03 ENCOUNTER — APPOINTMENT (OUTPATIENT)
Dept: GENERAL RADIOLOGY | Age: 56
End: 2021-02-03
Attending: EMERGENCY MEDICINE
Payer: MEDICAID

## 2021-02-03 ENCOUNTER — HOSPITAL ENCOUNTER (OUTPATIENT)
Age: 56
Discharge: HOME | End: 2021-02-03
Attending: EMERGENCY MEDICINE
Payer: MEDICAID

## 2021-02-03 ENCOUNTER — TELEPHONE (OUTPATIENT)
Dept: ENDOCRINOLOGY CLINIC | Facility: CLINIC | Age: 56
End: 2021-02-03

## 2021-02-03 ENCOUNTER — APPOINTMENT (OUTPATIENT)
Dept: CT IMAGING | Age: 56
End: 2021-02-03
Attending: EMERGENCY MEDICINE
Payer: MEDICAID

## 2021-02-03 ENCOUNTER — LAB ENCOUNTER (OUTPATIENT)
Dept: LAB | Facility: HOSPITAL | Age: 56
End: 2021-02-03
Attending: INTERNAL MEDICINE
Payer: MEDICAID

## 2021-02-03 VITALS
HEART RATE: 78 BPM | SYSTOLIC BLOOD PRESSURE: 112 MMHG | OXYGEN SATURATION: 100 % | TEMPERATURE: 98 F | RESPIRATION RATE: 18 BRPM | DIASTOLIC BLOOD PRESSURE: 73 MMHG

## 2021-02-03 DIAGNOSIS — E78.2 MIXED HYPERLIPIDEMIA: ICD-10-CM

## 2021-02-03 DIAGNOSIS — R20.2 PARESTHESIA: ICD-10-CM

## 2021-02-03 DIAGNOSIS — R42 VERTIGO: Primary | ICD-10-CM

## 2021-02-03 DIAGNOSIS — E55.9 VITAMIN D DEFICIENCY: ICD-10-CM

## 2021-02-03 DIAGNOSIS — S63.637A SPRAIN OF INTERPHALANGEAL JOINT OF LEFT LITTLE FINGER, INITIAL ENCOUNTER: ICD-10-CM

## 2021-02-03 DIAGNOSIS — C73 THYROID CANCER (HCC): ICD-10-CM

## 2021-02-03 DIAGNOSIS — E03.9 HYPOTHYROIDISM, UNSPECIFIED TYPE: ICD-10-CM

## 2021-02-03 LAB
ALBUMIN SERPL-MCNC: 3.7 G/DL (ref 3.4–5)
ALBUMIN/GLOB SERPL: 1 {RATIO} (ref 1–2)
ALP LIVER SERPL-CCNC: 83 U/L
ALT SERPL-CCNC: 26 U/L
ANION GAP SERPL CALC-SCNC: 4 MMOL/L (ref 0–18)
AST SERPL-CCNC: 16 U/L (ref 15–37)
BASOPHILS # BLD AUTO: 0.04 X10(3) UL (ref 0–0.2)
BASOPHILS NFR BLD AUTO: 0.8 %
BILIRUB SERPL-MCNC: 0.5 MG/DL (ref 0.1–2)
BUN BLD-MCNC: 13 MG/DL (ref 7–18)
BUN/CREAT SERPL: 14.8 (ref 10–20)
CALCIUM BLD-MCNC: 9.1 MG/DL (ref 8.5–10.1)
CHLORIDE SERPL-SCNC: 109 MMOL/L (ref 98–112)
CHOLEST SMN-MCNC: 173 MG/DL (ref ?–200)
CO2 SERPL-SCNC: 28 MMOL/L (ref 21–32)
CREAT BLD-MCNC: 0.88 MG/DL
DEPRECATED RDW RBC AUTO: 39.2 FL (ref 35.1–46.3)
EOSINOPHIL # BLD AUTO: 0.15 X10(3) UL (ref 0–0.7)
EOSINOPHIL NFR BLD AUTO: 2.9 %
ERYTHROCYTE [DISTWIDTH] IN BLOOD BY AUTOMATED COUNT: 12.7 % (ref 11–15)
GLOBULIN PLAS-MCNC: 3.6 G/DL (ref 2.8–4.4)
GLUCOSE BLD-MCNC: 102 MG/DL (ref 70–99)
HCT VFR BLD AUTO: 42.3 %
HDLC SERPL-MCNC: 53 MG/DL (ref 40–59)
HGB BLD-MCNC: 13.8 G/DL
IMM GRANULOCYTES # BLD AUTO: 0.01 X10(3) UL (ref 0–1)
IMM GRANULOCYTES NFR BLD: 0.2 %
LDLC SERPL CALC-MCNC: 92 MG/DL (ref ?–100)
LYMPHOCYTES # BLD AUTO: 1.87 X10(3) UL (ref 1–4)
LYMPHOCYTES NFR BLD AUTO: 36.5 %
M PROTEIN MFR SERPL ELPH: 7.3 G/DL (ref 6.4–8.2)
MCH RBC QN AUTO: 28.1 PG (ref 26–34)
MCHC RBC AUTO-ENTMCNC: 32.6 G/DL (ref 31–37)
MCV RBC AUTO: 86.2 FL
MONOCYTES # BLD AUTO: 0.44 X10(3) UL (ref 0.1–1)
MONOCYTES NFR BLD AUTO: 8.6 %
NEUTROPHILS # BLD AUTO: 2.61 X10 (3) UL (ref 1.5–7.7)
NEUTROPHILS # BLD AUTO: 2.61 X10(3) UL (ref 1.5–7.7)
NEUTROPHILS NFR BLD AUTO: 51 %
NONHDLC SERPL-MCNC: 120 MG/DL (ref ?–130)
OSMOLALITY SERPL CALC.SUM OF ELEC: 292 MOSM/KG (ref 275–295)
PATIENT FASTING Y/N/NP: YES
PATIENT FASTING Y/N/NP: YES
PLATELET # BLD AUTO: 228 10(3)UL (ref 150–450)
POTASSIUM SERPL-SCNC: 4.1 MMOL/L (ref 3.5–5.1)
RBC # BLD AUTO: 4.91 X10(6)UL
SODIUM SERPL-SCNC: 141 MMOL/L (ref 136–145)
T3FREE SERPL-MCNC: 3.53 PG/ML (ref 2.4–4.2)
T4 FREE SERPL-MCNC: 1.4 NG/DL (ref 0.8–1.7)
TRIGL SERPL-MCNC: 140 MG/DL (ref 30–149)
TSI SER-ACNC: <0.005 MIU/ML (ref 0.36–3.74)
VIT B12 SERPL-MCNC: 558 PG/ML (ref 193–986)
VLDLC SERPL CALC-MCNC: 28 MG/DL (ref 0–30)
WBC # BLD AUTO: 5.1 X10(3) UL (ref 4–11)

## 2021-02-03 PROCEDURE — 36415 COLL VENOUS BLD VENIPUNCTURE: CPT

## 2021-02-03 PROCEDURE — 84481 FREE ASSAY (FT-3): CPT

## 2021-02-03 PROCEDURE — 82607 VITAMIN B-12: CPT

## 2021-02-03 PROCEDURE — 80053 COMPREHEN METABOLIC PANEL: CPT

## 2021-02-03 PROCEDURE — 82306 VITAMIN D 25 HYDROXY: CPT

## 2021-02-03 PROCEDURE — 99214 OFFICE O/P EST MOD 30 MIN: CPT

## 2021-02-03 PROCEDURE — 73130 X-RAY EXAM OF HAND: CPT | Performed by: EMERGENCY MEDICINE

## 2021-02-03 PROCEDURE — 85025 COMPLETE CBC W/AUTO DIFF WBC: CPT

## 2021-02-03 PROCEDURE — 84432 ASSAY OF THYROGLOBULIN: CPT

## 2021-02-03 PROCEDURE — 80061 LIPID PANEL: CPT

## 2021-02-03 PROCEDURE — 70450 CT HEAD/BRAIN W/O DYE: CPT | Performed by: EMERGENCY MEDICINE

## 2021-02-03 PROCEDURE — 84443 ASSAY THYROID STIM HORMONE: CPT

## 2021-02-03 PROCEDURE — 86800 THYROGLOBULIN ANTIBODY: CPT

## 2021-02-03 PROCEDURE — 84439 ASSAY OF FREE THYROXINE: CPT

## 2021-02-03 RX ORDER — HYDROXYZINE HYDROCHLORIDE 25 MG/1
TABLET, FILM COATED ORAL
Qty: 90 TABLET | Refills: 1 | Status: SHIPPED | OUTPATIENT
Start: 2021-02-03 | End: 2021-07-23

## 2021-02-03 RX ORDER — CLONAZEPAM 0.5 MG/1
0.5 TABLET ORAL DAILY
Qty: 30 TABLET | Refills: 2 | Status: SHIPPED | OUTPATIENT
Start: 2021-02-03 | End: 2021-05-07

## 2021-02-03 NOTE — TELEPHONE ENCOUNTER
TSH is very low , not readable, this indicates need for dose decrease   She is on LT 4 125/112 mcg alternate day dosing  Suggest going down to 100 mcg daily  If she is hesitant to go down all the way to 100 , we can do 100/112 mcg alternate day dosing  Eit

## 2021-02-03 NOTE — TELEPHONE ENCOUNTER
I sent her a message but [address] looks good, no residual thyroid tissue  Pain is not typical, can take PTC tylenol / motrin  Call if gets worse welch snot improve in a few days  Thanks

## 2021-02-03 NOTE — ED INITIAL ASSESSMENT (HPI)
Left hand pain s/p slipped while getting out of her car last Monday, denies loc, no neck or back pain, cms intact

## 2021-02-03 NOTE — ED PROVIDER NOTES
Patient Seen in: [facility]      History   Patient presents with:  Arm or Hand Injury    Stated Complaint: felt left hand pain and vertigo    HPI/Subjective:   HPI    The patient is a 58-year-old female with a past history of thyroid cancer, SURGERY  2007    R foot neuroma excision   • HAND/FINGER SURGERY UNLISTED  2004    hand surgery   • HEMORRHOIDECTOMY  1991   • LIGATION OF HEMORRHOID(S)  11/28/2016    Rubberband Ligation of Hemorrhoid   • NASAL SURG PROC UNLISTED  2007    septoplasty   • Course   Labs Reviewed - No data to display       Pulse ox is 100% on room air, normal.  Vital signs are stable. The patient has a history of previous vertigo.   However, she states his vertigo is more pronounced than typical and is concerned about havin

## 2021-02-03 NOTE — TELEPHONE ENCOUNTER
Patient called back gave her message by dr Jamar Guerrier . Patient agreed with 112/100 mcg dose alternating. rx sent to [facility]   Labs ordered  Patient also inquiring about US done results are in chart. Also co terrible pain while getting us done yesterday.

## 2021-02-04 LAB
THYROGLOBULIN ANTIBODY: <1.8 IU/ML
THYROGLOBULIN TUMOR MARKER: <0.1 NG/ML

## 2021-02-05 LAB — 25(OH)D3 SERPL-MCNC: 62.3 NG/ML (ref 30–100)

## 2021-02-09 RX ORDER — MONTELUKAST SODIUM 10 MG/1
TABLET ORAL
Qty: 90 TABLET | Refills: 1 | Status: SHIPPED | OUTPATIENT
Start: 2021-02-09 | End: 2021-02-24

## 2021-02-12 NOTE — TELEPHONE ENCOUNTER
Abx rx sent, pharmacy advised to override drug to drug interaction per  message   rx clonazepam phoned in to Pharmacist Ellen -please sign off Carly Rodriguez encounter
Action Requested: Summary for Provider     []  Critical Lab, Recommendations Needed  [] Need Additional Advice  []   FYI    []   Need Orders  [x] Need Medications Sent to Pharmacy  []  Other     SUMMARY: Dr. Julio César Weaver, patient requesting prescription for Sadaf Eddie
As discussed with nursing
Okay to call in a refill on the clonazepam for 3 months. Okay to send in a prescription for Z-Kev as directed.
Please review possible drug to drug interaction. OK to proceed with prescriptions? Drug-Drug: Citalopram Hydrobromide and azithromycin   Additive QT interval prolongation may occur during coadministration of Citalopram and QT Prolonging Agents 2.  Coadmi
No

## 2021-02-24 ENCOUNTER — OFFICE VISIT (OUTPATIENT)
Dept: INTERNAL MEDICINE CLINIC | Facility: CLINIC | Age: 56
End: 2021-02-24
Payer: MEDICAID

## 2021-02-24 VITALS
HEART RATE: 77 BPM | HEIGHT: 64 IN | SYSTOLIC BLOOD PRESSURE: 95 MMHG | TEMPERATURE: 99 F | WEIGHT: 174 LBS | DIASTOLIC BLOOD PRESSURE: 65 MMHG | BODY MASS INDEX: 29.71 KG/M2 | RESPIRATION RATE: 18 BRPM

## 2021-02-24 DIAGNOSIS — Z12.31 VISIT FOR SCREENING MAMMOGRAM: ICD-10-CM

## 2021-02-24 DIAGNOSIS — E03.9 HYPOTHYROIDISM, UNSPECIFIED TYPE: ICD-10-CM

## 2021-02-24 DIAGNOSIS — C73 MALIGNANT NEOPLASM OF THYROID GLAND (HCC): ICD-10-CM

## 2021-02-24 DIAGNOSIS — E55.9 VITAMIN D DEFICIENCY: ICD-10-CM

## 2021-02-24 DIAGNOSIS — M72.0 DUPUYTREN'S CONTRACTURE OF RIGHT HAND: ICD-10-CM

## 2021-02-24 DIAGNOSIS — Z00.00 WELLNESS EXAMINATION: ICD-10-CM

## 2021-02-24 DIAGNOSIS — E78.2 MIXED HYPERLIPIDEMIA: ICD-10-CM

## 2021-02-24 DIAGNOSIS — Z00.00 ROUTINE PHYSICAL EXAMINATION: Primary | ICD-10-CM

## 2021-02-24 DIAGNOSIS — R20.2 PARESTHESIA: ICD-10-CM

## 2021-02-24 PROCEDURE — 99396 PREV VISIT EST AGE 40-64: CPT | Performed by: INTERNAL MEDICINE

## 2021-02-24 PROCEDURE — 3008F BODY MASS INDEX DOCD: CPT | Performed by: INTERNAL MEDICINE

## 2021-02-24 PROCEDURE — 3074F SYST BP LT 130 MM HG: CPT | Performed by: INTERNAL MEDICINE

## 2021-02-24 PROCEDURE — 3078F DIAST BP <80 MM HG: CPT | Performed by: INTERNAL MEDICINE

## 2021-02-24 NOTE — ASSESSMENT & PLAN NOTE
Dupuytren's contracture of the right hand–middle finger.   Follow-up with orthopedics–Dr. Elise Urias as directed

## 2021-02-24 NOTE — ASSESSMENT & PLAN NOTE
Patient is status post thyroidectomy for papillary carcinoma of the thyroid in 2007. She has been treated with radioactive iodine ablation for her thyroid remnant in May 2007. She has been monitored per Dr. Johnna Rose.   She has also been seen by endocrinolog

## 2021-02-24 NOTE — ASSESSMENT & PLAN NOTE
Hypothyroidism status post total thyroidectomy. Currently on levothyroxine 125 and 112 mcg on alternate days. Being monitored per endocrinology. Follow-up as recommended.

## 2021-02-24 NOTE — PROGRESS NOTES
HPI:   Alexandrea Cristobal is a 61year old female who presents for an Annual Health Visit.      Allergies:     Adhesive Tape           HIVES    Comment:Weeping hives and pain - SILK TAPE  Apple                   ANAPHYLAXIS    Comment:RAW APPLE  Aspartam route every day to the affected area(s); do not exceed 45 grams per week. 1 Tube 0   • Biotin (PA BIOTIN) 5 MG Oral Cap Take  by mouth. • Calcium Carbonate-Vitamin D (CALCIUM HIGH POTENCY/VITAMIN D) 600-200 MG-UNIT Oral Tab Take  by mouth.      • [facility] Diabetes Mother    • Neurological Disorder Paternal Grandfather         Alzheimer's   • Cancer Brother 46        asbestos ,asphalt lung cancer   • Lipids Other         family h/o hyperlipidemia    • Cancer Maternal Uncle 18        spinal cancer   • Ovarian normal. Right eye exhibits no discharge. Left eye exhibits no discharge. Neck: Normal range of motion. Neck supple. No JVD present. No thyromegaly present. Cardiovascular: Normal rate, regular rhythm, normal heart sounds and intact distal pulses.    No and oriented to person, place, and time. She has normal reflexes. No cranial nerve deficit. She exhibits normal muscle tone. Coordination normal.   Skin: No rash noted. No erythema. Psychiatric: She has a normal mood and affect.  Her behavior is normal. T numbness left hand–middle fingers. Advised to follow-up with orthopedics–Dr. Meredith Diehl for evaluation. Relevant Orders    ORTHOPEDIC - INTERNAL    Routine physical examination - Primary     Normal exam.  Labs as ordered.   Skin check normal.  No s lids of both eyes     Vitamin D deficiency     Dupuytren's contracture of right hand      Julita Seymour MD  2/24/2021  11:21 AM

## 2021-02-24 NOTE — ASSESSMENT & PLAN NOTE
Normal exam.  Labs as ordered. Skin check normal.  No significant abnormal nevi. Breast exam completed–no palpable abnormalities, discharge from the nipples or axillary adenopathy. Mammogram is currently overdue–orders provided.   DEXA scan due next year

## 2021-02-24 NOTE — ASSESSMENT & PLAN NOTE
Tingling and numbness left hand–middle fingers. Advised to follow-up with orthopedics–Dr. Ken Owen for evaluation.

## 2021-02-24 NOTE — ASSESSMENT & PLAN NOTE
Lipid panel and liver function tests have been stable on atorvastatin 20 mg daily . She has tolerated her medications well  Continue on the same dose of medication.

## 2021-02-24 NOTE — PATIENT INSTRUCTIONS
Problem List Items Addressed This Visit        Unprioritized    Dupuytren's contracture of right hand     Dupuytren's contracture of the right hand–middle finger.   Follow-up with orthopedics–Dr. Francie Morton as directed         Relevant Orders    ORTHOPEDIC - shot.  Declines all vaccines as multiple allergies.            Vitamin D deficiency      Other Visit Diagnoses     Wellness examination        Visit for screening mammogram        Relevant Orders    [facility] LORELEI 2D+3D SCREENING BILAT (CPT=77067/64750)

## 2021-03-04 ENCOUNTER — OFFICE VISIT (OUTPATIENT)
Dept: ORTHOPEDICS CLINIC | Facility: CLINIC | Age: 56
End: 2021-03-04
Payer: MEDICAID

## 2021-03-04 ENCOUNTER — HOSPITAL ENCOUNTER (OUTPATIENT)
Dept: GENERAL RADIOLOGY | Facility: HOSPITAL | Age: 56
Discharge: HOME | End: 2021-03-04
Attending: ORTHOPAEDIC SURGERY
Payer: MEDICAID

## 2021-03-04 VITALS — BODY MASS INDEX: 29.37 KG/M2 | HEIGHT: 64 IN | WEIGHT: 172 LBS

## 2021-03-04 DIAGNOSIS — M65.331 TRIGGER MIDDLE FINGER OF RIGHT HAND: Primary | ICD-10-CM

## 2021-03-04 DIAGNOSIS — M79.641 RIGHT HAND PAIN: ICD-10-CM

## 2021-03-04 DIAGNOSIS — G56.02 CARPAL TUNNEL SYNDROME OF LEFT WRIST: ICD-10-CM

## 2021-03-04 PROCEDURE — 99244 OFF/OP CNSLTJ NEW/EST MOD 40: CPT | Performed by: ORTHOPAEDIC SURGERY

## 2021-03-04 PROCEDURE — 3008F BODY MASS INDEX DOCD: CPT | Performed by: ORTHOPAEDIC SURGERY

## 2021-03-04 PROCEDURE — L3908 WHO COCK-UP NONMOLDE PRE OTS: HCPCS | Performed by: ORTHOPAEDIC SURGERY

## 2021-03-04 PROCEDURE — 73130 X-RAY EXAM OF HAND: CPT | Performed by: ORTHOPAEDIC SURGERY

## 2021-03-04 NOTE — PROGRESS NOTES
NURSING INTAKE COMMENTS: Patient presents with:  Consult: right middle finger locking and left ring, middle and thumb have numbness -- Hx of right CT release about 15 years ago. States she has been dropping objects.  Rates pain 0/10 but right hand is bother ESOPHAGOGASTRODUODENOSCOPY (EGD) N/A 12/17/2018    Performed by Rosalee Valenzuela MD at [facility] ENDOSCOPY   • FOOT SURGERY  2007    R foot neuroma excision   • HAND/FINGER SURGERY UNLISTED  2004    hand surgery   • HEMORRHOIDECTOMY  1991   • LIGATION OF HE MG Oral Cap Take  by mouth. • Calcium Carbonate-Vitamin D (CALCIUM HIGH POTENCY/VITAMIN D) 600-200 MG-UNIT Oral Tab Take  by mouth.          Adhesive Tape           HIVES    Comment:Weeping hives and pain - SILK TAPE  Apple                   ANAPHYLAXIS throat, headaches  RESPIRATORY: denies new shortness of breath, cough, asthma, wheezing  CARDIOVASCULAR: denies chest pain, leg cramps with exertion, palpitations, leg swelling  GI: denies abdominal pain, nausea, vomiting, diarrhea, constipation, hematoche BONES: No acute fracture dislocation. Mild radiocarpal degeneration. Mild degeneration 1st, 2nd and 3rd MCP joints. SOFT TISSUES: Negative. No visible soft tissue swelling. EFFUSION: None visible. OTHER: Negative. CONCLUSION:  1.  No acute fractur Labs:  Lab Results   Component Value Date    WBC 5.1 02/03/2021    HGB 13.8 02/03/2021    .0 02/03/2021      Lab Results   Component Value Date     (H) 02/03/2021    BUN 13 02/03/2021    CREATSERUM 0.88 02/03/2021    GFRNAA 73 02/0

## 2021-03-10 ENCOUNTER — TELEPHONE (OUTPATIENT)
Dept: ORTHOPEDICS CLINIC | Facility: CLINIC | Age: 56
End: 2021-03-10

## 2021-03-10 NOTE — TELEPHONE ENCOUNTER
Per pt wanting to know if it would be appropriate to change appt on 3/18/21 to virtual phone visit.  Please advise

## 2021-03-10 NOTE — TELEPHONE ENCOUNTER
Called pt up and states her pain in her hand is a 10/10 and is requesting surgery.   Patient wants to know if she can do a virtual visits for her prop visit or does she need to come in?

## 2021-03-10 NOTE — TELEPHONE ENCOUNTER
Pt was just evaluated in person last week. If no new injury or symptoms, ok to schedule a phone visit. Pt needs electrodiagnostic testing of both hands. Will order.  Now.

## 2021-03-11 NOTE — TELEPHONE ENCOUNTER
Called patient back she states she does feel that her right hand is worse now. She will keep her appointment for 03/18 in person. She was given phone number to central scheduling to have EMG done. Pt. Verbalized understanding.

## 2021-03-18 ENCOUNTER — TELEPHONE (OUTPATIENT)
Dept: INTERNAL MEDICINE CLINIC | Facility: CLINIC | Age: 56
End: 2021-03-18

## 2021-03-18 ENCOUNTER — OFFICE VISIT (OUTPATIENT)
Dept: ORTHOPEDICS CLINIC | Facility: CLINIC | Age: 56
End: 2021-03-18
Payer: MEDICAID

## 2021-03-18 VITALS — HEIGHT: 64 IN | WEIGHT: 174 LBS | BODY MASS INDEX: 29.71 KG/M2

## 2021-03-18 DIAGNOSIS — M65.331 TRIGGER MIDDLE FINGER OF RIGHT HAND: Primary | ICD-10-CM

## 2021-03-18 PROCEDURE — 99212 OFFICE O/P EST SF 10 MIN: CPT | Performed by: ORTHOPAEDIC SURGERY

## 2021-03-18 PROCEDURE — 3008F BODY MASS INDEX DOCD: CPT | Performed by: ORTHOPAEDIC SURGERY

## 2021-03-18 NOTE — TELEPHONE ENCOUNTER
Patient has surgery with Dr. Karlos Pacheco on 3/24, patient states she was told to stop all supplements, including Vitamin D. Patient states \"I rely on this medication and end up in the ER if I don't take it. \" She wants to know if she should stop the vitamin a day before surgery. Please advise.

## 2021-03-19 ENCOUNTER — TELEPHONE (OUTPATIENT)
Dept: ORTHOPEDICS CLINIC | Facility: CLINIC | Age: 56
End: 2021-03-19

## 2021-03-19 DIAGNOSIS — M65.331 TRIGGER MIDDLE FINGER OF RIGHT HAND: Primary | ICD-10-CM

## 2021-03-19 NOTE — PROGRESS NOTES
NURSING INTAKE COMMENTS: Patient presents with:  Pre-Op: Right middle finger trigger. Constant stinging pain radiates to elbow. Constant tingling.  Pt is scheduled for an EMG on 3/24/21      HPI: This 61year old female presents today with complaints of rig Refill   • hydrOXYzine HCl 25 MG Oral Tab TAKE 1 TABLET BY MOUTH EVERY DAY FOR ITCHING 90 tablet 1   • clonazePAM 0.5 MG Oral Tab Take 1 tablet (0.5 mg total) by mouth daily.  30 tablet 2   • Levothyroxine Sodium 112 MCG Oral Tab Take 1 tablet (112 mcg tota PEACH  Peanut Oil              TONGUE SWELLING  Prednisone                Soybean Oil             SWELLING    Comment:Mouth and tongue  Vicodin [Hydrocodon*      Augmentin [Amoxicil*    DIZZINESS  Pear                    UNKNOWN  Family History   Problem R dizziness, memory loss  PSYCHIATRIC: denies Hx of depression, anxiety, other psychiatric disorders  HEMATOLOGIC: denies blood clots, anemia, blood clotting disorders, blood transfusion  ENDOCRINE: denies autoimmune disease, thyroid issues, or diabetes  ALL 02/03/2021    GFRNAA 73 02/03/2021    GFRAA 84 02/03/2021        Assessment and Plan:  Diagnoses and all orders for this visit:    Trigger middle finger of right hand        Assessment: Right hand middle finger trigger finger    Plan: I again discussed her

## 2021-03-19 NOTE — TELEPHONE ENCOUNTER
Type of surgery:  Right middle finger, trigger release  Date:  3/24/21  Location: [facility]  Medical Clearance:  No     *Medical:      *Dental:      *Other:  Prior Authorization Status: Pending - STAT  Workers Comp:  Medacta/John:  Checklist:  Other:

## 2021-03-22 ENCOUNTER — TELEPHONE (OUTPATIENT)
Dept: ORTHOPEDICS CLINIC | Facility: CLINIC | Age: 56
End: 2021-03-22

## 2021-03-22 DIAGNOSIS — M65.331 TRIGGER MIDDLE FINGER OF RIGHT HAND: Primary | ICD-10-CM

## 2021-03-22 NOTE — TELEPHONE ENCOUNTER
Type of surgery:  Trigger finger release, right middle finger  Date: 4/5/21   Location: [facility]  Medical Clearance:      *Medical: No      *Dental:      *Other:  Prior Authorization Status: Pending - STAT  Workers Comp:  Medjoe/John:  Checklist:  Other:

## 2021-03-22 NOTE — TELEPHONE ENCOUNTER
Patient contacted. Advised to stop Citracal D. May take over-the-counter Tums for a few days. May take vitamin D 2000 units daily.

## 2021-03-23 ENCOUNTER — TELEPHONE (OUTPATIENT)
Dept: INTERNAL MEDICINE CLINIC | Facility: CLINIC | Age: 56
End: 2021-03-23

## 2021-03-23 DIAGNOSIS — E78.2 MIXED HYPERLIPIDEMIA: ICD-10-CM

## 2021-03-23 NOTE — TELEPHONE ENCOUNTER
Patient had Covid 1-8-2021  Patient is having surgery on her hand 4-5-2021  She will be tested for Covid before surgery. Will return home from vacation 4-2-2021.     Patient advised to contact surgeons office to check with time frame of being tested for co

## 2021-03-23 NOTE — TELEPHONE ENCOUNTER
Current Outpatient Medications   Medication Sig Dispense Refill   • CITALOPRAM HYDROBROMIDE 10 MG Oral Tab TAKE 1 TABLET BY MOUTH EVERY DAY 90 tablet 1   • ATORVASTATIN 20 MG Oral Tab TAKE 1 TABLET BY MOUTH EVERY DAY AT NIGHT 90 tablet 1   • Omeprazole 40

## 2021-03-24 ENCOUNTER — PROCEDURE VISIT (OUTPATIENT)
Dept: NEUROLOGY | Facility: CLINIC | Age: 56
End: 2021-03-24
Payer: MEDICAID

## 2021-03-24 VITALS — BODY MASS INDEX: 28.66 KG/M2 | WEIGHT: 172 LBS | HEIGHT: 65 IN

## 2021-03-24 DIAGNOSIS — M79.641 RIGHT HAND PAIN: Primary | ICD-10-CM

## 2021-03-24 PROCEDURE — 95908 NRV CNDJ TST 3-4 STUDIES: CPT | Performed by: PHYSICAL MEDICINE & REHABILITATION

## 2021-03-24 PROCEDURE — 95885 MUSC TST DONE W/NERV TST LIM: CPT | Performed by: PHYSICAL MEDICINE & REHABILITATION

## 2021-03-24 PROCEDURE — 3008F BODY MASS INDEX DOCD: CPT | Performed by: PHYSICAL MEDICINE & REHABILITATION

## 2021-03-24 RX ORDER — CITALOPRAM 10 MG/1
10 TABLET ORAL DAILY
Qty: 90 TABLET | Refills: 1 | Status: SHIPPED | OUTPATIENT
Start: 2021-03-24 | End: 2021-11-04

## 2021-03-24 RX ORDER — OMEPRAZOLE 40 MG/1
40 CAPSULE, DELAYED RELEASE ORAL DAILY
Qty: 90 CAPSULE | Refills: 1 | Status: SHIPPED | OUTPATIENT
Start: 2021-03-24 | End: 2021-06-09

## 2021-03-24 RX ORDER — ATORVASTATIN CALCIUM 20 MG/1
20 TABLET, FILM COATED ORAL NIGHTLY
Qty: 90 TABLET | Refills: 1 | Status: SHIPPED | OUTPATIENT
Start: 2021-03-24 | End: 2021-10-08

## 2021-03-24 NOTE — PROCEDURES
[address]  [place]  Phone: [phone]  Fax: [phone]    ELECTRODIAGNOSTIC REPORT          Patient: THE [facility] Hand Dominance:  right   Patient ID: [id number] Referring Dr:   ________________________________    Motor NCS      Nerve / Sites Muscle Latency Amplitude Amp % Duration Segments Distance Lat Diff Velocity     ms mV % ms  cm ms m/s   R Median - APB      Wrist APB 3.02 16.7 100 7.97 Wrist - APB 8        Elbow APB 7.34 15

## 2021-03-25 ENCOUNTER — OFFICE VISIT (OUTPATIENT)
Dept: DERMATOLOGY CLINIC | Facility: CLINIC | Age: 56
End: 2021-03-25
Payer: MEDICAID

## 2021-03-25 DIAGNOSIS — D23.9 BENIGN NEOPLASM OF SKIN, UNSPECIFIED LOCATION: ICD-10-CM

## 2021-03-25 DIAGNOSIS — D22.9 MULTIPLE NEVI: ICD-10-CM

## 2021-03-25 DIAGNOSIS — D48.5 NEOPLASM OF UNCERTAIN BEHAVIOR OF SKIN: Primary | ICD-10-CM

## 2021-03-25 DIAGNOSIS — L82.1 SEBORRHEIC KERATOSES: ICD-10-CM

## 2021-03-25 PROCEDURE — 11102 TANGNTL BX SKIN SINGLE LES: CPT | Performed by: DERMATOLOGY

## 2021-03-25 PROCEDURE — 99213 OFFICE O/P EST LOW 20 MIN: CPT | Performed by: DERMATOLOGY

## 2021-03-25 PROCEDURE — 88305 TISSUE EXAM BY PATHOLOGIST: CPT | Performed by: DERMATOLOGY

## 2021-03-25 RX ORDER — LEVOTHYROXINE SODIUM 0.12 MG/1
125 TABLET ORAL DAILY
Qty: 90 TABLET | Refills: 1 | Status: SHIPPED | OUTPATIENT
Start: 2021-03-25 | End: 2021-08-23

## 2021-03-25 NOTE — TELEPHONE ENCOUNTER
Current Outpatient Medications   Medication Sig Dispense Refill   • Levothyroxine Sodium 125 MCG Oral Tab Take 1 tablet (125 mcg total) by mouth daily. (Patient taking differently: Take 125 mcg by mouth daily.  Patient takes 3 days a week, alternates with L

## 2021-03-29 NOTE — PROGRESS NOTES
Elfego Mayfield is a 61year old female.   HPI:     CC:  Patient presents with:  Derm Problem: LOV 3/6/20 patient presents for a small growth upper back left arm-, light brown in color , not itchy or painful, has had for the past 10 years, also has poss RIGHT      benign   • OTHER SURGICAL HISTORY  2006    sebaceous cyst on face excised   • THYROIDECTOMY  2007   • TONSILLECTOMY  1974      Family History   Problem Relation Age of Onset   • Stroke Father         CVA   • Hypertension Father    • Diabetes Mot TRIAMCINOLONE ACETONIDE 0.1 % External Cream APPLY TO AFFECTED AREA TWICE A DAY (Patient not taking: Reported on 3/25/2021) 30 g 1   • tobramycin-dexamethasone (TOBRADEX) 0.3-0.1 % Ophthalmic Ointment USE TO CORNER OF EYE AS NEEDED ECZEMA (Patient not taki RELEASE Right     Right hand   • COLONOSCOPY N/A 12/17/2018    Performed by Lakshmi Villafana MD at [address] [place] [address] ENDOSCOPY   • CYST/MASS EXCISION Left 5/10/2018    Performed by Micah Pimentel MD at [address] [place] [address] MAIN OR   • D & C  2001   • ESOPHAGOGASTRODUODENOSCOPY Asked        Pt has a pacemaker: No        Pt has a defibrillator: Not Asked        Breast feeding: Not Asked        Reaction to local anesthetic: No          Novacaine ok to use    Social History Narrative      Not on file    Social Determinants of Health lids- has had for the past year, personal history of SK, denies family history of skin cancer, no problems with local anesthetics, no pacemaker    Patient concerned with bump on left posterior arm irritated increasingly bothersome.   Skin tags on eyelids, n for:  enlarging lesion irritated  Spec 1 Description >>>>>: left posterior upper arm  Spec 1 Comment: 7mm pink papule exophytic  Shave/ tangential biopsy performed, operative note and consent in chart further plans pending pathology    Small tags on eyelid

## 2021-03-29 NOTE — PROGRESS NOTES
Operative Report                     Shave/  Tangential biopsy     Clinical diagnosis:    Size of lesion:  enlarging lesion irritated  Spec 1 Description >>>>>: left posterior upper arm  Spec 1 Comment: 7mm pink papule exophytic    Location:    [facility]

## 2021-03-30 NOTE — PROGRESS NOTES
The pathology report from last visit showed left posterior upper arm  Neurofibroma . Please log in test results, send biopsy results letter. Pt to rtc 6 mos or prn.

## 2021-04-03 NOTE — H&P
ORTHO SURGERY H&P  Amanuel Taveras is a 61year old female. MRN is [id number]. CC: Right middle finger pain and locking     HPI: 54-year-old female complains of persistent right middle finger pain and locking.   Pain over the palmar aspect base of t sebaceous cyst on face excised   • THYROIDECTOMY  2007   • TONSILLECTOMY  1974       Social History: Social History    Socioeconomic History      Marital status:       Spouse name: Not on file      Number of children: Not on file      Years of e Feeling of Stress :   Social Connections:       Frequency of Communication with Friends and Family:       Frequency of Social Gatherings with Friends and Family:       Attends [employer] Services:       Active Member of Clubs or Organizations:       Attends tablet 1   • Citalopram Hydrobromide 10 MG Oral Tab Take 1 tablet (10 mg total) by mouth daily. 90 tablet 1   • atorvastatin 20 MG Oral Tab Take 1 tablet (20 mg total) by mouth nightly.  90 tablet 1   • Omeprazole 40 MG Oral Capsule Delayed Release Take 1 c RBC 4.91 02/03/2021    HGB 13.8 02/03/2021    HCT 42.3 02/03/2021    MCV 86.2 02/03/2021    MCH 28.1 02/03/2021    MCHC 32.6 02/03/2021    RDW 12.7 02/03/2021    .0 02/03/2021    MPV 8.6 10/22/2018      Lab Results   Component Value Date     examination. Dictated by (CST): Vadim Livingston MD on 3/04/2021 at 3:33 PM     Finalized by (CST): Vadim Livingston MD on 3/04/2021 at 3:34 PM               Assessment/Plan: Right third trigger finger    The patient was seen and examined by Dr. Livier Bautista.

## 2021-04-05 ENCOUNTER — HOSPITAL ENCOUNTER (OUTPATIENT)
Facility: HOSPITAL | Age: 56
Setting detail: HOSPITAL OUTPATIENT SURGERY
Discharge: HOME | End: 2021-04-05
Attending: ORTHOPAEDIC SURGERY | Admitting: ORTHOPAEDIC SURGERY
Payer: MEDICAID

## 2021-04-05 ENCOUNTER — ANESTHESIA (OUTPATIENT)
Dept: SURGERY | Facility: HOSPITAL | Age: 56
End: 2021-04-05
Payer: MEDICAID

## 2021-04-05 ENCOUNTER — ANESTHESIA EVENT (OUTPATIENT)
Dept: SURGERY | Facility: HOSPITAL | Age: 56
End: 2021-04-05
Payer: MEDICAID

## 2021-04-05 VITALS
HEART RATE: 68 BPM | TEMPERATURE: 98 F | SYSTOLIC BLOOD PRESSURE: 110 MMHG | HEIGHT: 64 IN | BODY MASS INDEX: 29.88 KG/M2 | WEIGHT: 175 LBS | OXYGEN SATURATION: 97 % | RESPIRATION RATE: 18 BRPM | DIASTOLIC BLOOD PRESSURE: 67 MMHG

## 2021-04-05 DIAGNOSIS — M65.331 TRIGGER MIDDLE FINGER OF RIGHT HAND: ICD-10-CM

## 2021-04-05 PROCEDURE — 0LN70ZZ RELEASE RIGHT HAND TENDON, OPEN APPROACH: ICD-10-PCS | Performed by: ORTHOPAEDIC SURGERY

## 2021-04-05 RX ORDER — MORPHINE SULFATE 4 MG/ML
2 INJECTION, SOLUTION INTRAMUSCULAR; INTRAVENOUS EVERY 10 MIN PRN
Status: DISCONTINUED | OUTPATIENT
Start: 2021-04-05 | End: 2021-04-05

## 2021-04-05 RX ORDER — CLINDAMYCIN PHOSPHATE 900 MG/50ML
900 INJECTION INTRAVENOUS ONCE
Status: DISCONTINUED | OUTPATIENT
Start: 2021-04-05 | End: 2021-04-05 | Stop reason: HOSPADM

## 2021-04-05 RX ORDER — MIDAZOLAM HYDROCHLORIDE 1 MG/ML
INJECTION INTRAMUSCULAR; INTRAVENOUS AS NEEDED
Status: DISCONTINUED | OUTPATIENT
Start: 2021-04-05 | End: 2021-04-05 | Stop reason: SURG

## 2021-04-05 RX ORDER — NALOXONE HYDROCHLORIDE 0.4 MG/ML
80 INJECTION, SOLUTION INTRAMUSCULAR; INTRAVENOUS; SUBCUTANEOUS AS NEEDED
Status: DISCONTINUED | OUTPATIENT
Start: 2021-04-05 | End: 2021-04-05

## 2021-04-05 RX ORDER — HYDROCODONE BITARTRATE AND ACETAMINOPHEN 5; 325 MG/1; MG/1
2 TABLET ORAL AS NEEDED
Status: DISCONTINUED | OUTPATIENT
Start: 2021-04-05 | End: 2021-04-05

## 2021-04-05 RX ORDER — LIDOCAINE HYDROCHLORIDE 20 MG/ML
INJECTION, SOLUTION EPIDURAL; INFILTRATION; INTRACAUDAL; PERINEURAL AS NEEDED
Status: DISCONTINUED | OUTPATIENT
Start: 2021-04-05 | End: 2021-04-05 | Stop reason: HOSPADM

## 2021-04-05 RX ORDER — HALOPERIDOL 5 MG/ML
0.25 INJECTION INTRAMUSCULAR ONCE AS NEEDED
Status: DISCONTINUED | OUTPATIENT
Start: 2021-04-05 | End: 2021-04-05

## 2021-04-05 RX ORDER — ONDANSETRON 2 MG/ML
4 INJECTION INTRAMUSCULAR; INTRAVENOUS ONCE AS NEEDED
Status: DISCONTINUED | OUTPATIENT
Start: 2021-04-05 | End: 2021-04-05

## 2021-04-05 RX ORDER — SODIUM CHLORIDE, SODIUM LACTATE, POTASSIUM CHLORIDE, CALCIUM CHLORIDE 600; 310; 30; 20 MG/100ML; MG/100ML; MG/100ML; MG/100ML
INJECTION, SOLUTION INTRAVENOUS CONTINUOUS
Status: DISCONTINUED | OUTPATIENT
Start: 2021-04-05 | End: 2021-04-05

## 2021-04-05 RX ORDER — DEXAMETHASONE SODIUM PHOSPHATE 4 MG/ML
VIAL (ML) INJECTION AS NEEDED
Status: DISCONTINUED | OUTPATIENT
Start: 2021-04-05 | End: 2021-04-05 | Stop reason: SURG

## 2021-04-05 RX ORDER — CALCIUM CARBONATE 200(500)MG
1 TABLET,CHEWABLE ORAL DAILY
COMMUNITY
End: 2021-05-14 | Stop reason: ALTCHOICE

## 2021-04-05 RX ORDER — ONDANSETRON 2 MG/ML
INJECTION INTRAMUSCULAR; INTRAVENOUS AS NEEDED
Status: DISCONTINUED | OUTPATIENT
Start: 2021-04-05 | End: 2021-04-05 | Stop reason: SURG

## 2021-04-05 RX ORDER — CLINDAMYCIN PHOSPHATE 150 MG/ML
INJECTION, SOLUTION, CONCENTRATE INTRAVENOUS AS NEEDED
Status: DISCONTINUED | OUTPATIENT
Start: 2021-04-05 | End: 2021-04-05 | Stop reason: SURG

## 2021-04-05 RX ORDER — HYDROCODONE BITARTRATE AND ACETAMINOPHEN 5; 325 MG/1; MG/1
1 TABLET ORAL AS NEEDED
Status: DISCONTINUED | OUTPATIENT
Start: 2021-04-05 | End: 2021-04-05

## 2021-04-05 RX ORDER — METOPROLOL TARTRATE 5 MG/5ML
2.5 INJECTION INTRAVENOUS ONCE
Status: DISCONTINUED | OUTPATIENT
Start: 2021-04-05 | End: 2021-04-05

## 2021-04-05 RX ORDER — MORPHINE SULFATE 10 MG/ML
6 INJECTION, SOLUTION INTRAMUSCULAR; INTRAVENOUS EVERY 10 MIN PRN
Status: DISCONTINUED | OUTPATIENT
Start: 2021-04-05 | End: 2021-04-05

## 2021-04-05 RX ORDER — MORPHINE SULFATE 4 MG/ML
4 INJECTION, SOLUTION INTRAMUSCULAR; INTRAVENOUS EVERY 10 MIN PRN
Status: DISCONTINUED | OUTPATIENT
Start: 2021-04-05 | End: 2021-04-05

## 2021-04-05 RX ORDER — HYDROMORPHONE HYDROCHLORIDE 1 MG/ML
0.6 INJECTION, SOLUTION INTRAMUSCULAR; INTRAVENOUS; SUBCUTANEOUS EVERY 5 MIN PRN
Status: DISCONTINUED | OUTPATIENT
Start: 2021-04-05 | End: 2021-04-05

## 2021-04-05 RX ORDER — HYDROMORPHONE HYDROCHLORIDE 1 MG/ML
0.4 INJECTION, SOLUTION INTRAMUSCULAR; INTRAVENOUS; SUBCUTANEOUS EVERY 5 MIN PRN
Status: DISCONTINUED | OUTPATIENT
Start: 2021-04-05 | End: 2021-04-05

## 2021-04-05 RX ORDER — ACETAMINOPHEN 500 MG
1000 TABLET ORAL ONCE
Status: COMPLETED | OUTPATIENT
Start: 2021-04-05 | End: 2021-04-05

## 2021-04-05 RX ORDER — LIDOCAINE HYDROCHLORIDE 10 MG/ML
INJECTION, SOLUTION EPIDURAL; INFILTRATION; INTRACAUDAL; PERINEURAL AS NEEDED
Status: DISCONTINUED | OUTPATIENT
Start: 2021-04-05 | End: 2021-04-05 | Stop reason: SURG

## 2021-04-05 RX ORDER — PROCHLORPERAZINE EDISYLATE 5 MG/ML
5 INJECTION INTRAMUSCULAR; INTRAVENOUS ONCE AS NEEDED
Status: DISCONTINUED | OUTPATIENT
Start: 2021-04-05 | End: 2021-04-05

## 2021-04-05 RX ORDER — HYDROMORPHONE HYDROCHLORIDE 1 MG/ML
0.2 INJECTION, SOLUTION INTRAMUSCULAR; INTRAVENOUS; SUBCUTANEOUS EVERY 5 MIN PRN
Status: DISCONTINUED | OUTPATIENT
Start: 2021-04-05 | End: 2021-04-05

## 2021-04-05 RX ADMIN — SODIUM CHLORIDE, SODIUM LACTATE, POTASSIUM CHLORIDE, CALCIUM CHLORIDE: 600; 310; 30; 20 INJECTION, SOLUTION INTRAVENOUS at 07:35:00

## 2021-04-05 RX ADMIN — MIDAZOLAM HYDROCHLORIDE 2 MG: 1 INJECTION INTRAMUSCULAR; INTRAVENOUS at 07:30:00

## 2021-04-05 RX ADMIN — DEXAMETHASONE SODIUM PHOSPHATE 4 MG: 4 MG/ML VIAL (ML) INJECTION at 07:35:00

## 2021-04-05 RX ADMIN — CLINDAMYCIN PHOSPHATE 900 MG: 150 INJECTION, SOLUTION, CONCENTRATE INTRAVENOUS at 07:41:00

## 2021-04-05 RX ADMIN — SODIUM CHLORIDE, SODIUM LACTATE, POTASSIUM CHLORIDE, CALCIUM CHLORIDE: 600; 310; 30; 20 INJECTION, SOLUTION INTRAVENOUS at 08:12:00

## 2021-04-05 RX ADMIN — LIDOCAINE HYDROCHLORIDE 50 MG: 10 INJECTION, SOLUTION EPIDURAL; INFILTRATION; INTRACAUDAL; PERINEURAL at 07:35:00

## 2021-04-05 RX ADMIN — ONDANSETRON 4 MG: 2 INJECTION INTRAMUSCULAR; INTRAVENOUS at 07:58:00

## 2021-04-05 NOTE — OPERATIVE REPORT
Operative Note    Patient Name: Abimbola Smith    Preoperative Diagnosis: Trigger middle finger of right hand [M65.331]    Postoperative Diagnosis: Trigger middle finger of right hand [M65.331]    Primary Surgeon: Elli Burgess MD     Assistant: Emily Gibosn

## 2021-04-05 NOTE — ANESTHESIA PROCEDURE NOTES
Airway  Urgency: elective      General Information and Staff    Patient location during procedure: OR  Anesthesiologist: Tanya Suarez MD  Resident/CRNA: [place] Anaya CRNA  Performed: CRNA     Indications and Patient Condition  Indications fo

## 2021-04-05 NOTE — ANESTHESIA POSTPROCEDURE EVALUATION
Patient: Dione Boggs    Procedure Summary     Date: 04/05/21 Room / Location: [address] [place] [address] MAIN OR 06 / [facility] [place] [facility] [place]    Anesthesia Start: 0730 Anesthesia Stop: 4113    Procedure: trigger finger release right middle finger (Right Finger) Diagnosis:       Trig

## 2021-04-05 NOTE — OPERATIVE REPORT
[facility]    PATIENT'S NAME: PIERRE, [address] [place] [address]   ATTENDING PHYSICIAN: Buddy Gamino MD   OPERATING PHYSICIAN: Buddy Gamino MD   PATIENT ACCOUNT#:   [id number]    LOCATION:  [facility] [address] PACU 2 [place] [address]  MEDICAL RECORD #:   [id number] Blunt dissection was carried down through subcutaneous tissue and palmar fascia to the flexor tendon sheath. The A1 pulley was identified and transected.   There was some fluid noted just distal to the A1 pulley consistent with a tendon sheath ganglion cys

## 2021-04-05 NOTE — ANESTHESIA PREPROCEDURE EVALUATION
Anesthesia PreOp Note    HPI:     Naomi Councilman is a 61year old female who presents for preoperative consultation requested by: Mike Salinas MD    Date of Surgery: 4/5/2021    Procedure(s):  trigger finger release right middle finger  Indicati cords or larynx         Date Noted: 11/20/2012      Hyperlipidemia         Date Noted: 01/26/2011      Hypothyroidism         Date Noted: 07/19/2007      Malignant neoplasm of thyroid gland [facility])         Date Noted: 07/18/2007        Past Medical History: Chew Tab, Chew 1 tablet by mouth daily. , Disp: , Rfl: , 4/4/2021 at 2300  Levothyroxine Sodium 125 MCG Oral Tab, Take 1 tablet (125 mcg total) by mouth daily. (Patient taking differently: Take 125 mcg by mouth daily.  Sun through Domitila Ha ), Disp: 90 tablet, Rf medications on file.         Adhesive Tape           HIVES    Comment:Weeping hives and pain - SILK TAPE  Apple                   ANAPHYLAXIS    Comment:RAW APPLE  Aspartame               ANAPHYLAXIS  Ciprofloxacin           SWELLING  Diclofenac Concern: Yes          coffee, 1 cup        Occupational Exposure: Not Asked        Hobby Hazards: Not Asked        Sleep Concern: Not Asked        Stress Concern: Not Asked        Weight Concern: Not Asked        Special Diet: Not Asked        Back Care: N 02/03/2021     02/03/2021    CO2 28.0 02/03/2021    BUN 13 02/03/2021    CREATSERUM 0.88 02/03/2021     (H) 02/03/2021    CA 9.1 02/03/2021          Vital Signs: Body mass index is 30.04 kg/m².    height is 1.626 m (5' 4\") and weight is 79.4

## 2021-04-06 ENCOUNTER — TELEPHONE (OUTPATIENT)
Dept: INTERNAL MEDICINE CLINIC | Facility: CLINIC | Age: 56
End: 2021-04-06

## 2021-04-06 ENCOUNTER — TELEPHONE (OUTPATIENT)
Dept: ORTHOPEDICS CLINIC | Facility: CLINIC | Age: 56
End: 2021-04-06

## 2021-04-06 DIAGNOSIS — L29.9 PRURITUS: ICD-10-CM

## 2021-04-06 DIAGNOSIS — I10 BENIGN ESSENTIAL HTN: ICD-10-CM

## 2021-04-06 RX ORDER — METOPROLOL SUCCINATE 25 MG/1
25 TABLET, EXTENDED RELEASE ORAL DAILY
Qty: 90 TABLET | Refills: 1 | Status: CANCELLED | OUTPATIENT
Start: 2021-04-06

## 2021-04-06 NOTE — TELEPHONE ENCOUNTER
Per pt has surgery 4/5 and is scheduled for 1 wk post op 4/15.  Per pt was instructed to f/u on Monday 4/12 for stitch removal. Please advise

## 2021-04-06 NOTE — TELEPHONE ENCOUNTER
She is asking if she still needs to take? The patient calling stating she is on Metoprolol and has not taken for 2 weeks. She called to ask what the medication is for and why is she taking.     I looked at the chart and on 11/13/12  Palpitations (7

## 2021-04-06 NOTE — TELEPHONE ENCOUNTER
Pharmacy refill request for;    •  Levothyroxine Sodium 112 MCG Oral Tab, Take 1 tablet (112 mcg total) by mouth every other day. (Patient taking differently: Take 112 mcg by mouth every other day.  Wed through Sat ), Disp: 45 tablet, Rfl: 0    Please follo

## 2021-04-07 RX ORDER — LEVOTHYROXINE SODIUM 112 UG/1
112 TABLET ORAL EVERY OTHER DAY
Qty: 45 TABLET | Refills: 0 | Status: SHIPPED | OUTPATIENT
Start: 2021-04-07 | End: 2021-06-08

## 2021-04-07 NOTE — TELEPHONE ENCOUNTER
Spoke with pt today. This med was started around 2012 due to palpitations. She had off and on high blood pressures with anxiety and palpitations which caused this to continue.   She then developed menopausal symptoms with hot flashes and palpitations enoch

## 2021-04-14 ENCOUNTER — TELEPHONE (OUTPATIENT)
Dept: INTERNAL MEDICINE CLINIC | Facility: CLINIC | Age: 56
End: 2021-04-14

## 2021-04-14 NOTE — TELEPHONE ENCOUNTER
Pt stated she had Covid in January , but concern about receiving the  vaccine since she has so many Allergies , state she's very scared stated if you agree with her she will need a letter to present for traveling

## 2021-04-15 ENCOUNTER — OFFICE VISIT (OUTPATIENT)
Dept: ORTHOPEDICS CLINIC | Facility: CLINIC | Age: 56
End: 2021-04-15
Payer: MEDICAID

## 2021-04-15 VITALS — BODY MASS INDEX: 29.01 KG/M2 | HEIGHT: 64.5 IN | WEIGHT: 172 LBS

## 2021-04-15 DIAGNOSIS — M65.331 TRIGGER MIDDLE FINGER OF RIGHT HAND: Primary | ICD-10-CM

## 2021-04-15 DIAGNOSIS — Z47.89 ORTHOPEDIC AFTERCARE: ICD-10-CM

## 2021-04-15 PROCEDURE — 99024 POSTOP FOLLOW-UP VISIT: CPT | Performed by: ORTHOPAEDIC SURGERY

## 2021-04-15 PROCEDURE — 3008F BODY MASS INDEX DOCD: CPT | Performed by: ORTHOPAEDIC SURGERY

## 2021-04-15 NOTE — TELEPHONE ENCOUNTER
Spoke with patient (identified name and ) regarding note for travel. Advised Dr. Madan Vasquez cannot sign this note, recommends appointment with allergist to obtain this. Patient requesting allergist number be put in her my chart account. My Chart sent.

## 2021-04-15 NOTE — PROGRESS NOTES
NURSING INTAKE COMMENTS: Patient presents with:  Post-Op: s/p trigger finger release right middle finger -- Sx on 04/05/21. Rates pain 3/10 today. Denies any fever, numbness, or tingling. Right handed.        HPI: This 61year old female presents today with septoplasty   • NEEDLE BIOPSY RIGHT      benign   • OTHER SURGICAL HISTORY  2006    sebaceous cyst on face excised   • THYROIDECTOMY  2007   • TONSILLECTOMY  1974   • trigger finger release right middle finger Right 4/5/2021    Performed by Rubi Dior SWELLING  Montelukast             PALPITATIONS  Peach                   ANAPHYLAXIS    Comment:RAW PEACH  Peanut Oil              TONGUE SWELLING  Prednisone                Soybean Oil             SWELLING    Comment:Mouth and tongue  Vicodin [Hydrocodo musculoskeletal complaints other than in HPI  NEURO: denies numbness, tingling, weakness, balance issues, dizziness, memory loss  PSYCHIATRIC: denies Hx of depression, anxiety, other psychiatric disorders  HEMATOLOGIC: denies blood clots, anemia, blood rashmi

## 2021-04-23 ENCOUNTER — OFFICE VISIT (OUTPATIENT)
Dept: ORTHOPEDICS CLINIC | Facility: CLINIC | Age: 56
End: 2021-04-23
Payer: MEDICAID

## 2021-04-23 DIAGNOSIS — M65.331 TRIGGER MIDDLE FINGER OF RIGHT HAND: Primary | ICD-10-CM

## 2021-04-23 DIAGNOSIS — Z47.89 ORTHOPEDIC AFTERCARE: ICD-10-CM

## 2021-04-23 PROCEDURE — 99024 POSTOP FOLLOW-UP VISIT: CPT | Performed by: ORTHOPAEDIC SURGERY

## 2021-04-23 NOTE — PROGRESS NOTES
NURSING INTAKE COMMENTS: Patient presents with:  Post-Op: s/p R middle finger trigger finger f/u - had sx on 4/5/21 - states she is ok - has some pain and stifness in the morning - rates pain as 3/10 with making a fist       HPI: This 61year old female pr 112 MCG Oral Tab Take 1 tablet (112 mcg total) by mouth every other day. 45 tablet 0   • Metoprolol Succinate ER 25 MG Oral Tablet 24 Hr Take 1 tablet (25 mg total) by mouth daily.  90 tablet 1   • Cholecalciferol 125 MCG (5000 UT) Oral Tab Take 1 tablet by Problem Relation Age of Onset   • Stroke Father         CVA   • Hypertension Father    • Diabetes Mother    • Neurological Disorder Paternal Grandfather         Alzheimer's   • Cancer Brother 46        asbestos ,asphalt lung cancer   • Lipids Other denies asthma, seasonal allergies    Physical Examination:    There were no vitals taken for this visit. Constitutional: appears well hydrated, alert and responsive, no acute distress noted  Extremities: Skin is intact. Incision healing nicely.   No drain

## 2021-05-07 RX ORDER — CLONAZEPAM 0.5 MG/1
0.5 TABLET ORAL DAILY
Qty: 30 TABLET | Refills: 0 | Status: SHIPPED | OUTPATIENT
Start: 2021-05-07 | End: 2021-06-08

## 2021-05-11 ENCOUNTER — OFFICE VISIT (OUTPATIENT)
Dept: ALLERGY | Facility: CLINIC | Age: 56
End: 2021-05-11
Payer: MEDICAID

## 2021-05-11 ENCOUNTER — LAB ENCOUNTER (OUTPATIENT)
Dept: LAB | Age: 56
End: 2021-05-11
Attending: ALLERGY & IMMUNOLOGY
Payer: MEDICAID

## 2021-05-11 VITALS
DIASTOLIC BLOOD PRESSURE: 79 MMHG | WEIGHT: 172 LBS | HEART RATE: 82 BPM | SYSTOLIC BLOOD PRESSURE: 129 MMHG | OXYGEN SATURATION: 97 % | BODY MASS INDEX: 29.01 KG/M2 | HEIGHT: 64.5 IN

## 2021-05-11 DIAGNOSIS — Z71.85 VACCINE COUNSELING: Primary | ICD-10-CM

## 2021-05-11 DIAGNOSIS — Z01.84 COVID-19 VIRUS IGG ANTIBODY TEST RESULT UNKNOWN: ICD-10-CM

## 2021-05-11 DIAGNOSIS — T50.905A ADVERSE EFFECT OF DRUG, INITIAL ENCOUNTER: ICD-10-CM

## 2021-05-11 DIAGNOSIS — T78.1XXA POLLEN-FOOD ALLERGY, INITIAL ENCOUNTER: ICD-10-CM

## 2021-05-11 DIAGNOSIS — J30.1 SEASONAL ALLERGIC RHINITIS DUE TO POLLEN: ICD-10-CM

## 2021-05-11 PROCEDURE — 99204 OFFICE O/P NEW MOD 45 MIN: CPT | Performed by: ALLERGY & IMMUNOLOGY

## 2021-05-11 PROCEDURE — 3078F DIAST BP <80 MM HG: CPT | Performed by: ALLERGY & IMMUNOLOGY

## 2021-05-11 PROCEDURE — 86769 SARS-COV-2 COVID-19 ANTIBODY: CPT

## 2021-05-11 PROCEDURE — 3074F SYST BP LT 130 MM HG: CPT | Performed by: ALLERGY & IMMUNOLOGY

## 2021-05-11 PROCEDURE — 36415 COLL VENOUS BLD VENIPUNCTURE: CPT

## 2021-05-11 PROCEDURE — 3008F BODY MASS INDEX DOCD: CPT | Performed by: ALLERGY & IMMUNOLOGY

## 2021-05-11 RX ORDER — EPINEPHRINE 0.3 MG/.3ML
INJECTION SUBCUTANEOUS
Qty: 1 EACH | Refills: 0 | Status: SHIPPED | OUTPATIENT
Start: 2021-05-11

## 2021-05-11 NOTE — PATIENT INSTRUCTIONS
1. Vaccine counseling  Unless patient has a known history of reactions to polyethylene glycol or  a prior adverse reaction to a prior Covid vaccine, the current national recommendations is that the benefits of Covid vaccination outweighs the potential risk antihistamine if having runny nose sneezing itchy watery eyes  Consider Flonase or Nasacort 2 sprays per nostril once a day as a intranasal steroid spray or potentially Astelin as an antihistamine nasal spray

## 2021-05-11 NOTE — PROGRESS NOTES
Keith Arzola is a 61year old female. HPI:   Patient presents with:   Allergies: patient presents for questions regarding allergies and the COVID vaccine    Patient is a 78-year-old female who presents for allergy evaluation  Referred by per pcp d Past Surgical History:   Procedure Laterality Date   • ABLATION  2007    per NG: l131 ablation   • APPENDECTOMY  1988   • BIOPSY OF UTERUS LINING  2009    neg endometrial biopsy   • CARPAL TUNNEL RELEASE Right     Right hand   • COLONOSCOPY N/A 12/17/201 Take 1 tablet (112 mcg total) by mouth every other day. 45 tablet 0   • Metoprolol Succinate ER 25 MG Oral Tablet 24 Hr Take 1 tablet (25 mg total) by mouth daily. 90 tablet 1   • Cholecalciferol 125 MCG (5000 UT) Oral Tab Take 1 tablet by mouth daily. irregular heartbeat/palpitations, chest pain, edema  Constitutional:  Negative night sweats,weight loss, irritability and lethargy  Endocrine:  Negative for cold intolerance, polydipsia and polyphagia  ENMT:  Negative for ear drainage, hearing loss and falguni polyethylene glycol or  a prior adverse reaction to a prior Covid vaccine, the current national recommendations is that the benefits of Covid vaccination outweighs the potential risk.  Therefore in my clinical opinion I would recommend Covid vaccination chana Flonase or Nasacort 2 sprays per nostril once a day as a intranasal steroid spray or potentially Astelin as an antihistamine nasal spray       Orders This Visit:  Orders Placed This Encounter      SARS-CoV-2 Total Antibody      Meds This Visit:  Requested

## 2021-05-12 ENCOUNTER — TELEPHONE (OUTPATIENT)
Dept: ALLERGY | Facility: CLINIC | Age: 56
End: 2021-05-12

## 2021-05-14 ENCOUNTER — OFFICE VISIT (OUTPATIENT)
Dept: ORTHOPEDICS CLINIC | Facility: CLINIC | Age: 56
End: 2021-05-14
Payer: MEDICAID

## 2021-05-14 DIAGNOSIS — M65.331 TRIGGER MIDDLE FINGER OF RIGHT HAND: Primary | ICD-10-CM

## 2021-05-14 DIAGNOSIS — Z47.89 ORTHOPEDIC AFTERCARE: ICD-10-CM

## 2021-05-14 PROCEDURE — 99024 POSTOP FOLLOW-UP VISIT: CPT | Performed by: ORTHOPAEDIC SURGERY

## 2021-05-14 NOTE — PROGRESS NOTES
NURSING INTAKE COMMENTS: Patient presents with:  Post-Op: S/p R middle finger trigger finger f/u - had sx on 4/5/21. Per pt still has pain and can't fully close hand. Some stiffness on hand. Pain scale 2/10 with the activity or with stretching.        HPI: TONSILLECTOMY  1974     Current Outpatient Medications   Medication Sig Dispense Refill   • clonazePAM 0.5 MG Oral Tab Take 1 tablet (0.5 mg total) by mouth daily.  30 tablet 0   • Levothyroxine Sodium 112 MCG Oral Tab Take 1 tablet (112 mcg total) by mouth Soybean Oil             SWELLING    Comment:Mouth and tongue  Vicodin [Hydrocodon*      Augmentin [Amoxicil*    DIZZINESS  Pear                    UNKNOWN    Comment:raw  Family History   Problem Relation Age of Onset   • Stroke Father         CVA other psychiatric disorders  HEMATOLOGIC: denies blood clots, anemia, blood clotting disorders, blood transfusion  ENDOCRINE: denies autoimmune disease, thyroid issues, or diabetes  ALLERGY: denies asthma, seasonal allergies    Physical Examination:     The

## 2021-06-08 RX ORDER — CLONAZEPAM 0.5 MG/1
0.5 TABLET ORAL DAILY
Qty: 30 TABLET | Refills: 2 | Status: SHIPPED | OUTPATIENT
Start: 2021-06-08 | End: 2021-09-08

## 2021-06-08 RX ORDER — LEVOTHYROXINE SODIUM 112 UG/1
TABLET ORAL
Qty: 45 TABLET | Refills: 0 | Status: SHIPPED | OUTPATIENT
Start: 2021-06-08 | End: 2021-08-23

## 2021-06-09 ENCOUNTER — TELEPHONE (OUTPATIENT)
Dept: INTERNAL MEDICINE CLINIC | Facility: CLINIC | Age: 56
End: 2021-06-09

## 2021-06-09 ENCOUNTER — NURSE TRIAGE (OUTPATIENT)
Dept: INTERNAL MEDICINE CLINIC | Facility: CLINIC | Age: 56
End: 2021-06-09

## 2021-06-09 ENCOUNTER — HOSPITAL ENCOUNTER (OUTPATIENT)
Age: 56
Discharge: HOME | End: 2021-06-09
Payer: MEDICAID

## 2021-06-09 VITALS
SYSTOLIC BLOOD PRESSURE: 117 MMHG | DIASTOLIC BLOOD PRESSURE: 63 MMHG | TEMPERATURE: 98 F | OXYGEN SATURATION: 100 % | RESPIRATION RATE: 20 BRPM | HEART RATE: 83 BPM

## 2021-06-09 DIAGNOSIS — J02.0 STREP PHARYNGITIS: ICD-10-CM

## 2021-06-09 DIAGNOSIS — Z91.89 AT INCREASED RISK OF EXPOSURE TO COVID-19 VIRUS: Primary | ICD-10-CM

## 2021-06-09 PROCEDURE — 87880 STREP A ASSAY W/OPTIC: CPT | Performed by: PHYSICIAN ASSISTANT

## 2021-06-09 PROCEDURE — U0002 COVID-19 LAB TEST NON-CDC: HCPCS | Performed by: PHYSICIAN ASSISTANT

## 2021-06-09 PROCEDURE — 99203 OFFICE O/P NEW LOW 30 MIN: CPT | Performed by: PHYSICIAN ASSISTANT

## 2021-06-09 RX ORDER — AZITHROMYCIN 500 MG/1
500 TABLET, FILM COATED ORAL DAILY
Qty: 5 TABLET | Refills: 0 | Status: SHIPPED | OUTPATIENT
Start: 2021-06-09 | End: 2021-06-14

## 2021-06-09 RX ORDER — OMEPRAZOLE 40 MG/1
40 CAPSULE, DELAYED RELEASE ORAL DAILY
Qty: 90 CAPSULE | Refills: 0 | Status: SHIPPED | OUTPATIENT
Start: 2021-06-09 | End: 2021-09-03

## 2021-06-09 RX ORDER — OMEPRAZOLE 40 MG/1
40 CAPSULE, DELAYED RELEASE ORAL DAILY
Qty: 90 CAPSULE | Refills: 0 | Status: SHIPPED | OUTPATIENT
Start: 2021-06-09 | End: 2021-06-09

## 2021-06-09 NOTE — TELEPHONE ENCOUNTER
Pt called back , stated she was Positive for Strep, stated rx Omeprazole , has only 1 pill left , has 1 refill left from 3/24/21 90/1 -rx sent

## 2021-06-09 NOTE — TELEPHONE ENCOUNTER
Action Requested: Summary for Provider     []  Critical Lab, Recommendations Needed  [] Need Additional Advice  []   FYI    []   Need Orders  [] Need Medications Sent to Pharmacy  []  Other     SUMMARY: Per protocol advised :  Will go  to  in [facility]

## 2021-06-09 NOTE — TELEPHONE ENCOUNTER
Patient informed of PPI quantity limitation. Advised to use a Goodrx coupon. Coupon sent to patient, medication sent to [facility] as requested by patient.

## 2021-06-09 NOTE — ED PROVIDER NOTES
Patient Seen in: [facility]      History   Patient presents with:  Cough    Stated Complaint: sore throat    HPI/Subjective:   HPI    62 yo female with PMH as listed below here for evaluation of cough, sore throat.   Pt endorses post nasal dr Use      Vaping Use: Never used    Alcohol use: Not Currently      Alcohol/week: 0.0 standard drinks    Drug use: No             Review of Systems    Positive for stated complaint: sore throat  Other systems are as noted in HPI.   Constitutional and vital s since yesterday evening. Pt afebrile, well appearing. ddx  Viral vs strep pharyngitis  Allergic rhinitis  covid    Rapid strep +    Home with azithromycin.  Supportive care, pmd follow up         MDM                                   Disposition and P

## 2021-06-24 ENCOUNTER — TELEPHONE (OUTPATIENT)
Dept: ALLERGY | Facility: CLINIC | Age: 56
End: 2021-06-24

## 2021-06-25 NOTE — TELEPHONE ENCOUNTER
Pt already had a + covid antibody test in 5/2021  and does not need another one at this time in my opinion

## 2021-06-25 NOTE — TELEPHONE ENCOUNTER
Per message, pt requesting a COVID antibody test.    Pt last seen on 5/11/2021. RN called pt per her request.   Left voicemail and office hours. It appears that she has a COVID 19 history on 1/8/2021 in [address] [place] [address] Rd.

## 2021-06-28 ENCOUNTER — HOSPITAL ENCOUNTER (OUTPATIENT)
Dept: MAMMOGRAPHY | Facility: HOSPITAL | Age: 56
Discharge: HOME | End: 2021-06-28
Attending: INTERNAL MEDICINE
Payer: MEDICAID

## 2021-06-28 DIAGNOSIS — Z12.31 VISIT FOR SCREENING MAMMOGRAM: ICD-10-CM

## 2021-06-28 PROCEDURE — 77063 BREAST TOMOSYNTHESIS BI: CPT | Performed by: INTERNAL MEDICINE

## 2021-06-28 PROCEDURE — 77067 SCR MAMMO BI INCL CAD: CPT | Performed by: INTERNAL MEDICINE

## 2021-07-13 NOTE — TELEPHONE ENCOUNTER
Patient is seeing Dr. Angelic Saunders this week to follow up with a prior strep infection and possible laryngitis. Patient still has post nasal drip. Patient is still very nervous about getting the COVID vaccination due to her allergies.    PT advised if she do

## 2021-07-15 ENCOUNTER — OFFICE VISIT (OUTPATIENT)
Dept: OTOLARYNGOLOGY | Facility: CLINIC | Age: 56
End: 2021-07-15
Payer: MEDICAID

## 2021-07-15 ENCOUNTER — OFFICE VISIT (OUTPATIENT)
Dept: AUDIOLOGY | Facility: CLINIC | Age: 56
End: 2021-07-15
Payer: MEDICAID

## 2021-07-15 VITALS
HEIGHT: 64.5 IN | TEMPERATURE: 98 F | BODY MASS INDEX: 29.01 KG/M2 | WEIGHT: 172 LBS | SYSTOLIC BLOOD PRESSURE: 122 MMHG | DIASTOLIC BLOOD PRESSURE: 72 MMHG

## 2021-07-15 DIAGNOSIS — R09.81 NASAL CONGESTION: Primary | ICD-10-CM

## 2021-07-15 DIAGNOSIS — H90.3 SENSORINEURAL HEARING LOSS, BILATERAL: Primary | ICD-10-CM

## 2021-07-15 PROCEDURE — 3078F DIAST BP <80 MM HG: CPT | Performed by: OTOLARYNGOLOGY

## 2021-07-15 PROCEDURE — 92557 COMPREHENSIVE HEARING TEST: CPT | Performed by: AUDIOLOGIST

## 2021-07-15 PROCEDURE — 92567 TYMPANOMETRY: CPT | Performed by: AUDIOLOGIST

## 2021-07-15 PROCEDURE — 3074F SYST BP LT 130 MM HG: CPT | Performed by: OTOLARYNGOLOGY

## 2021-07-15 PROCEDURE — 3008F BODY MASS INDEX DOCD: CPT | Performed by: OTOLARYNGOLOGY

## 2021-07-15 PROCEDURE — 99213 OFFICE O/P EST LOW 20 MIN: CPT | Performed by: OTOLARYNGOLOGY

## 2021-07-16 NOTE — PROGRESS NOTES
AUDIOLOGY REPORT      [address] is a 61year old female     Referring Provider: Trent Paul   YOB: 1965  Medical Record: [id number]      Patient Hearing History:  Patient reported tinnitus which sounds like white noise for severa

## 2021-07-23 DIAGNOSIS — L29.9 PRURITUS: ICD-10-CM

## 2021-07-23 NOTE — TELEPHONE ENCOUNTER
Please review. Protocol failed or has no protocol.   Requested Prescriptions   Pending Prescriptions Disp Refills    hydrOXYzine HCl 25 MG Oral Tab 90 tablet 1     Sig: TAKE 1 TABLET BY MOUTH EVERY DAY FOR ITCHING        There is no refill protocol informat

## 2021-07-23 NOTE — TELEPHONE ENCOUNTER
Current Outpatient Medications   Medication Sig Dispense Refill   • hydrOXYzine HCl 25 MG Oral Tab TAKE 1 TABLET BY MOUTH EVERY DAY FOR ITCHING 90 tablet 1

## 2021-07-26 ENCOUNTER — LAB ENCOUNTER (OUTPATIENT)
Dept: LAB | Facility: HOSPITAL | Age: 56
End: 2021-07-26
Attending: INTERNAL MEDICINE
Payer: MEDICAID

## 2021-07-26 ENCOUNTER — PATIENT MESSAGE (OUTPATIENT)
Dept: ENDOCRINOLOGY CLINIC | Facility: CLINIC | Age: 56
End: 2021-07-26

## 2021-07-26 ENCOUNTER — TELEPHONE (OUTPATIENT)
Dept: ENDOCRINOLOGY CLINIC | Facility: CLINIC | Age: 56
End: 2021-07-26

## 2021-07-26 ENCOUNTER — OFFICE VISIT (OUTPATIENT)
Dept: ENDOCRINOLOGY CLINIC | Facility: CLINIC | Age: 56
End: 2021-07-26
Payer: MEDICAID

## 2021-07-26 VITALS
BODY MASS INDEX: 30 KG/M2 | HEART RATE: 72 BPM | DIASTOLIC BLOOD PRESSURE: 64 MMHG | SYSTOLIC BLOOD PRESSURE: 101 MMHG | WEIGHT: 176 LBS

## 2021-07-26 DIAGNOSIS — E03.9 HYPOTHYROIDISM, UNSPECIFIED TYPE: Primary | ICD-10-CM

## 2021-07-26 DIAGNOSIS — E03.9 HYPOTHYROIDISM, UNSPECIFIED TYPE: ICD-10-CM

## 2021-07-26 DIAGNOSIS — C73 THYROID CANCER (HCC): ICD-10-CM

## 2021-07-26 LAB
T4 FREE SERPL-MCNC: 1.3 NG/DL (ref 0.8–1.7)
TSI SER-ACNC: <0.005 MIU/ML (ref 0.36–3.74)

## 2021-07-26 PROCEDURE — 99213 OFFICE O/P EST LOW 20 MIN: CPT | Performed by: INTERNAL MEDICINE

## 2021-07-26 PROCEDURE — 3074F SYST BP LT 130 MM HG: CPT | Performed by: INTERNAL MEDICINE

## 2021-07-26 PROCEDURE — 84443 ASSAY THYROID STIM HORMONE: CPT

## 2021-07-26 PROCEDURE — 36415 COLL VENOUS BLD VENIPUNCTURE: CPT

## 2021-07-26 PROCEDURE — 84439 ASSAY OF FREE THYROXINE: CPT

## 2021-07-26 PROCEDURE — 3078F DIAST BP <80 MM HG: CPT | Performed by: INTERNAL MEDICINE

## 2021-07-26 RX ORDER — HYDROXYZINE HYDROCHLORIDE 25 MG/1
TABLET, FILM COATED ORAL
Qty: 90 TABLET | Refills: 1 | Status: SHIPPED | OUTPATIENT
Start: 2021-07-26

## 2021-07-26 NOTE — TELEPHONE ENCOUNTER
Pt received a my chart message today from  but it will not let her reply, she wants to let  know that she does not want to go to that 7 days a week she would rather mix it with the other dose. Bradley Joshi Please call  Refer to the my chart from today. Bradley Joshi

## 2021-07-26 NOTE — PROGRESS NOTES
Return Office Visit     CHIEF COMPLAINT:    Thyroid cancer  Hypothyroidism     HISTORY OF PRESENT ILLNESS:  Daniela Bustillos is a 61year old female who presents for follow up for for h/o thyroid cancer and hypothyroidism.      She is s/p total thyroide ALLERGY:    Adhesive Tape           HIVES    Comment:Weeping hives and pain - SILK TAPE  Apple                   ANAPHYLAXIS    Comment:RAW APPLE  Aspartame               ANAPHYLAXIS  Ciprofloxacin           SWELLING  Diclofenac              HIVES, SW Normal hearing, normal speech, no residual thyroid tissue palpated  Back: no kyphosis  Respiratory:  Speaking in full sentences, non-labored.  no increased work of breathing, no audible wheezing    Skin:  normal moisture and skin texture, no visible lesions

## 2021-07-27 NOTE — TELEPHONE ENCOUNTER
Since TSH is low, a lower dose os recommended  We can go lower slowly  She is on :   LT 4 125 mcg daily x three days a week and 112 mcg daily x 4 days a week    Recommend:   LT4 100 mcg daily x 3 days a week and 112 mcg daily x 4 days a week  TSH and Free

## 2021-07-27 NOTE — PROGRESS NOTES
Aleyda Jamil is a 61year old female. Patient presents with:  Ear Problem: pt presents today for both ears ringing for the past week.        HISTORY OF PRESENT ILLNESS  She presents with complaints of decreased hearing as well as chronic nasal conge sensorineural hearing loss which is symmetric. Normal tympanograms and speech discrimination scores. No other signs, symptoms or complaints. Nasal congestion relatively well controlled.     Social History    Socioeconomic History      Marital status: Div (postoperative nausea and vomiting)    • Thyroid cancer ([facility] [address]     2007   • Tonsillitis    • Walking pneumonia        Past Surgical History:   Procedure Laterality Date   • ABLATION  2007    per NG: l131 ablation   • APPENDECTOMY  1988   • BIOPSY OF UTERUS Normal. Thyroid gland - Normal.   Eyes Normal Conjunctiva - Right: Normal, Left: Normal. Pupil - Right: Normal, Left: Normal. Fundus - Right: Normal, Left: Normal.   Neurological Normal Memory - Normal. Cranial nerves - Cranial nerves II through XII grossl atorvastatin 20 MG Oral Tab, Take 1 tablet (20 mg total) by mouth nightly., Disp: 90 tablet, Rfl: 1  •  triamcinolone acetonide 0.5 % External Cream, Use bid as directed, Disp: 15 g, Rfl: 1  •  Biotin (PA BIOTIN) 5 MG Oral Cap, Take  by mouth., Disp: , Rfl

## 2021-07-27 NOTE — TELEPHONE ENCOUNTER
Dr. Brooklynn Cordero     Please advise.     You suggest to patient to take 100 mcg of Levothyroxine daily, however patient would like to continue current regimen of   LT 4 125 mcg daily x three days a week and 112 mcg daily x 4 days a week

## 2021-07-30 RX ORDER — LEVOTHYROXINE SODIUM 0.1 MG/1
TABLET ORAL
Qty: 90 TABLET | Refills: 0 | Status: SHIPPED | OUTPATIENT
Start: 2021-07-30 | End: 2022-01-19

## 2021-07-30 RX ORDER — LEVOTHYROXINE SODIUM 112 UG/1
CAPSULE ORAL
Qty: 90 CAPSULE | Refills: 0 | Status: SHIPPED | OUTPATIENT
Start: 2021-07-30 | End: 2022-01-19

## 2021-07-30 NOTE — TELEPHONE ENCOUNTER
Medication PA Requested: Levothyroxine Sodium 112 MCG Oral Cap Quantity: 90 capsules Rfls: 0                                                        CoverMyMeds Used:  Key:  Sig: Take 1 tablet (112 mcg) 4 times a week, 30 minutes before breakfast.  DX Code:

## 2021-07-30 NOTE — TELEPHONE ENCOUNTER
Spoke to patient in regard to Dr. Titi Roque result note below. Patient verbalized understanding and will start new combination dosage regimen. Prescriptions have been sent for each, and labs have been ordered to be completed in 4-6 weeks.

## 2021-08-02 NOTE — TELEPHONE ENCOUNTER
PA dept:  Please disregard PA request  - it is too soon for patient to fill RX for 112mcg levothyroxine  thanks

## 2021-08-17 ENCOUNTER — NURSE TRIAGE (OUTPATIENT)
Dept: INTERNAL MEDICINE CLINIC | Facility: CLINIC | Age: 56
End: 2021-08-17

## 2021-08-17 NOTE — TELEPHONE ENCOUNTER
Action Requested: Summary for Provider     []  Critical Lab, Recommendations Needed  [] Need Additional Advice  []   FYI    []   Need Orders  [] Need Medications Sent to Pharmacy  []  Other   SUMMARY:.  Per protocol advised :  OV OV within 3 days    Only w

## 2021-08-23 ENCOUNTER — OFFICE VISIT (OUTPATIENT)
Dept: INTERNAL MEDICINE CLINIC | Facility: CLINIC | Age: 56
End: 2021-08-23
Payer: MEDICAID

## 2021-08-23 VITALS
WEIGHT: 177 LBS | HEIGHT: 64.5 IN | RESPIRATION RATE: 16 BRPM | TEMPERATURE: 98 F | HEART RATE: 64 BPM | DIASTOLIC BLOOD PRESSURE: 63 MMHG | SYSTOLIC BLOOD PRESSURE: 102 MMHG | BODY MASS INDEX: 29.85 KG/M2

## 2021-08-23 DIAGNOSIS — M79.602 ARM PAIN, ANTERIOR, LEFT: ICD-10-CM

## 2021-08-23 DIAGNOSIS — M20.41 HAMMERTOE OF SECOND TOE OF RIGHT FOOT: Primary | ICD-10-CM

## 2021-08-23 PROCEDURE — 3008F BODY MASS INDEX DOCD: CPT | Performed by: INTERNAL MEDICINE

## 2021-08-23 PROCEDURE — 3074F SYST BP LT 130 MM HG: CPT | Performed by: INTERNAL MEDICINE

## 2021-08-23 PROCEDURE — 3078F DIAST BP <80 MM HG: CPT | Performed by: INTERNAL MEDICINE

## 2021-08-23 PROCEDURE — 99214 OFFICE O/P EST MOD 30 MIN: CPT | Performed by: INTERNAL MEDICINE

## 2021-08-23 NOTE — PATIENT INSTRUCTIONS
Problem List Items Addressed This Visit        Unprioritized    Arm pain, anterior, left     Soreness, pain and discomfort in the left arm in certain positions especially from the mid arm into the mid forearm. Symptoms have been off and on since 2012.   Sh

## 2021-08-23 NOTE — ASSESSMENT & PLAN NOTE
Worsening hammertoe deformity of the second toe of the right foot following neuroma surgery in 2011. With increasing contracture there has been development of a callus on top of the joint. She is having increasing difficulty wearing shoes that fit.   Vinod

## 2021-08-23 NOTE — PROGRESS NOTES
HPI:    Patient ID: Erica Silva is a 61year old female. Patient had a neuroma surgery on the right second toe in 2011 and since then has had gradual development of contracture and deformity in the second toe.   Has started developing a callus an ITCHING 90 tablet 1   • Omeprazole 40 MG Oral Capsule Delayed Release Take 1 capsule (40 mg total) by mouth daily. 90 capsule 0   • clonazePAM 0.5 MG Oral Tab Take 1 tablet (0.5 mg total) by mouth daily. 30 tablet 2   • EPINEPHrine (EPIPEN 2-SARAH) 0.3 MG/0. membrane normal.      Left Ear: Tympanic membrane normal.      Mouth/Throat:      Pharynx: No oropharyngeal exudate. Eyes:      General: No scleral icterus. Extraocular Movements: Extraocular movements intact.       Conjunctiva/sclera: Conjunctivae no Content:  Thought content normal.                ASSESSMENT/PLAN:     Problem List Items Addressed This Visit        Unprioritized    Hammertoe of second toe of right foot - Primary     Worsening hammertoe deformity of the second toe of the right foot soni SPINE (4VIEWS) (CPT=72050)       [id number]

## 2021-08-23 NOTE — ASSESSMENT & PLAN NOTE
Soreness, pain and discomfort in the left arm in certain positions especially from the mid arm into the mid forearm. Symptoms have been off and on since 2012. She has had physical therapy. She was diagnosed with bicipital tenosynovitis.   Currently jair

## 2021-08-24 ENCOUNTER — NURSE TRIAGE (OUTPATIENT)
Dept: INTERNAL MEDICINE CLINIC | Facility: CLINIC | Age: 56
End: 2021-08-24

## 2021-08-24 ENCOUNTER — LAB ENCOUNTER (OUTPATIENT)
Dept: LAB | Facility: HOSPITAL | Age: 56
End: 2021-08-24
Attending: INTERNAL MEDICINE
Payer: MEDICAID

## 2021-08-24 DIAGNOSIS — J06.9 VIRAL URI: Primary | ICD-10-CM

## 2021-08-24 DIAGNOSIS — J06.9 VIRAL URI: ICD-10-CM

## 2021-08-24 NOTE — TELEPHONE ENCOUNTER
Spoke with patient ( verified) and relayed Dr. Valerie Story message below and scheduling # for lab appt--to also call Dr. Obando [facility] office to reschedule this Thursday's appt until after results received and to quarantine until results received--patient verb

## 2021-08-24 NOTE — TELEPHONE ENCOUNTER
Action Requested: Summary for Provider     []  Critical Lab, Recommendations Needed  [x] Need Additional Advice  []   FYI    []   Need Orders  [] Need Medications Sent to Pharmacy  []  Other     SUMMARY: Patient requesting Dr. Talha Souza recommendations for

## 2021-08-26 ENCOUNTER — OFFICE VISIT (OUTPATIENT)
Dept: PODIATRY CLINIC | Facility: CLINIC | Age: 56
End: 2021-08-26
Payer: MEDICAID

## 2021-08-26 DIAGNOSIS — M79.671 RIGHT FOOT PAIN: ICD-10-CM

## 2021-08-26 DIAGNOSIS — S99.922A INJURY OF PLANTAR PLATE OF LEFT FOOT, INITIAL ENCOUNTER: ICD-10-CM

## 2021-08-26 DIAGNOSIS — M20.41 HAMMERTOE OF RIGHT FOOT: Primary | ICD-10-CM

## 2021-08-26 LAB — SARS-COV-2 RNA RESP QL NAA+PROBE: NOT DETECTED

## 2021-08-26 PROCEDURE — 99243 OFF/OP CNSLTJ NEW/EST LOW 30: CPT | Performed by: PODIATRIST

## 2021-08-27 NOTE — PROGRESS NOTES
Camilla Castle is a 61year old female.  Patient presents with:  Hammer Toe: right foot 2nd digit, had a surgery 1/11/2007, toe causing pain off and on, not able to wear a closed shoe        HPI:   Pleasant patient presents to the clinic with a chief c Take 1 tablet (20 mg total) by mouth nightly. 90 tablet 1   • Biotin (PA BIOTIN) 5 MG Oral Cap Take  by mouth. • Calcium Carbonate-Vitamin D (CALCIUM HIGH POTENCY/VITAMIN D) 600-200 MG-UNIT Oral Tab Take  by mouth.      • triamcinolone acetonide 0.5 % E cancer   • Lipids Other         family h/o hyperlipidemia    • Cancer Maternal Uncle 18        spinal cancer   • Ovarian Cancer Paternal Aunt 55        d.51   • Diabetes Sister    • Cancer Paternal Cousin Female         tumor in neck; d.32; substance abuse second toe that does not reduce on forefoot loading cannot be manipulated straight it is abducted towards the hallux. Lachman's test is negative for any severe instability. But my suspicion is that this is a plantar plate injury.   X-rays were taken 3 vie

## 2021-08-31 ENCOUNTER — HOSPITAL ENCOUNTER (OUTPATIENT)
Dept: GENERAL RADIOLOGY | Facility: HOSPITAL | Age: 56
Discharge: HOME | End: 2021-08-31
Attending: INTERNAL MEDICINE
Payer: MEDICAID

## 2021-08-31 DIAGNOSIS — M79.602 ARM PAIN, ANTERIOR, LEFT: ICD-10-CM

## 2021-08-31 PROCEDURE — 72050 X-RAY EXAM NECK SPINE 4/5VWS: CPT | Performed by: INTERNAL MEDICINE

## 2021-09-03 RX ORDER — OMEPRAZOLE 40 MG/1
40 CAPSULE, DELAYED RELEASE ORAL DAILY
Qty: 90 CAPSULE | Refills: 1 | Status: SHIPPED | OUTPATIENT
Start: 2021-09-03 | End: 2022-03-29

## 2021-09-03 NOTE — TELEPHONE ENCOUNTER
Refill passed per [address] [facility] protocol.     Requested Prescriptions   Pending Prescriptions Disp Refills    OMEPRAZOLE 40 MG Oral Capsule Delayed Release [Pharmacy Med Name: Omeprazole Oral Capsule Delayed Release 40 MG] 90 capsule 0     Sig: TAKE 1 CAPSULE BY MOUTH DAILY        Gastrointestional Medication Protocol Passed - 9/3/2021 11:38 AM        Passed - Appointment in past 12 or next 3 months              Future Appointments         Provider Department Appt Notes    In 3 weeks Michael Raymond 25 [address] [facility] Ultrasound pt aware at [facility]    In 5 months Kathy Hubbard, [address] [facility] Endocrinology 6 MONTH FU             Recent Outpatient Visits              1 week ago Hammertoe of right foot    [address] [facility] [place] Nadeen [place], [place]    Office Visit    1 week ago [employer] of second toe of right foot    Alexander Ferguson MD    Office Visit    1 month ago Hypothyroidism, unspecified type    [address] [facility] Endocrinology Kathy Hubbard MD    Office Visit    1 month ago Sensorineural hearing loss, bilateral    [facility] Kika Christensen    Office Visit    1 month ago Nasal congestion    [facility]    Office Visit

## 2021-09-08 ENCOUNTER — TELEPHONE (OUTPATIENT)
Dept: INTERNAL MEDICINE CLINIC | Facility: CLINIC | Age: 56
End: 2021-09-08

## 2021-09-08 RX ORDER — CLONAZEPAM 0.5 MG/1
0.5 TABLET ORAL DAILY
Qty: 30 TABLET | Refills: 2 | Status: SHIPPED | OUTPATIENT
Start: 2021-09-08 | End: 2021-12-12

## 2021-09-08 NOTE — TELEPHONE ENCOUNTER
Dr. Stephanie Troy, please advise on xr cervical spine completed 8/31/21. I sent my chart advising the following:        The results are sent to my chart immediately however, the provider needs some time to review and once reviewed will place a comment regarding th

## 2021-09-23 NOTE — TELEPHONE ENCOUNTER
Patient called    Asking for refill of ointment that she uses by her eye. Uses about 4 times a year.  Please advise

## 2021-09-23 NOTE — TELEPHONE ENCOUNTER
rx send for ointment, rx pended for suspension if the oint is not covered, this would be the option, please send if needed

## 2021-09-24 ENCOUNTER — HOSPITAL ENCOUNTER (OUTPATIENT)
Dept: ULTRASOUND IMAGING | Facility: HOSPITAL | Age: 56
Discharge: HOME | End: 2021-09-24
Attending: PODIATRIST
Payer: MEDICAID

## 2021-09-24 DIAGNOSIS — M20.41 HAMMERTOE OF RIGHT FOOT: ICD-10-CM

## 2021-09-24 DIAGNOSIS — S99.922A INJURY OF PLANTAR PLATE OF LEFT FOOT, INITIAL ENCOUNTER: ICD-10-CM

## 2021-09-24 PROCEDURE — 76882 US LMTD JT/FCL EVL NVASC XTR: CPT | Performed by: PODIATRIST

## 2021-09-24 RX ORDER — TOBRAMYCIN AND DEXAMETHASONE 3; 1 MG/ML; MG/ML
SUSPENSION/ DROPS OPHTHALMIC
Qty: 5 ML | Refills: 2 | Status: SHIPPED | OUTPATIENT
Start: 2021-09-24

## 2021-09-24 NOTE — TELEPHONE ENCOUNTER
Checked with [facility] - oint not covered, susp is. I sent susp to [facility]. Called pt - pt does not want susp. She requested the oint to be sent to [facility] instead. RX cancelled at [facility] and sent to [facility] for oint.

## 2021-09-28 ENCOUNTER — TELEPHONE (OUTPATIENT)
Dept: PODIATRY CLINIC | Facility: CLINIC | Age: 56
End: 2021-09-28

## 2021-09-29 DIAGNOSIS — I10 BENIGN ESSENTIAL HTN: ICD-10-CM

## 2021-09-29 RX ORDER — METOPROLOL SUCCINATE 25 MG/1
25 TABLET, EXTENDED RELEASE ORAL DAILY
Qty: 90 TABLET | Refills: 1 | Status: SHIPPED | OUTPATIENT
Start: 2021-09-29

## 2021-09-29 NOTE — TELEPHONE ENCOUNTER
Refill passed per [facility] protocol.   Requested Prescriptions   Pending Prescriptions Disp Refills    METOPROLOL SUCCINATE 25 MG Oral Tablet 24 Hr [Pharmacy Med Name: METOPROLOL ER SUCCINATE 25MG TABS] 90 tablet 1     Sig: TAKE 1 TABLET(25 MG) BY MO

## 2021-10-03 NOTE — TELEPHONE ENCOUNTER
Please have the patient reappoint to discuss the next treatment plan will probably be a stability taping and maybe even a cortisone injection if needed thank you

## 2021-10-04 ENCOUNTER — OFFICE VISIT (OUTPATIENT)
Dept: PODIATRY CLINIC | Facility: CLINIC | Age: 56
End: 2021-10-04
Payer: MEDICAID

## 2021-10-04 DIAGNOSIS — M77.50 CAPSULITIS OF METATARSOPHALANGEAL (MTP) JOINT: ICD-10-CM

## 2021-10-04 DIAGNOSIS — B35.1 ONYCHOMYCOSIS: Primary | ICD-10-CM

## 2021-10-04 DIAGNOSIS — M20.41 HAMMER TOE OF RIGHT FOOT: ICD-10-CM

## 2021-10-04 DIAGNOSIS — M77.41 METATARSALGIA OF RIGHT FOOT: ICD-10-CM

## 2021-10-04 PROCEDURE — 99213 OFFICE O/P EST LOW 20 MIN: CPT | Performed by: PODIATRIST

## 2021-10-06 ENCOUNTER — TELEPHONE (OUTPATIENT)
Dept: PODIATRY CLINIC | Facility: CLINIC | Age: 56
End: 2021-10-06

## 2021-10-06 DIAGNOSIS — M77.50 CAPSULITIS OF METATARSOPHALANGEAL (MTP) JOINT: ICD-10-CM

## 2021-10-06 DIAGNOSIS — M20.41 HAMMER TOE OF RIGHT FOOT: Primary | ICD-10-CM

## 2021-10-06 DIAGNOSIS — M77.41 METATARSALGIA OF RIGHT FOOT: ICD-10-CM

## 2021-10-06 NOTE — PROGRESS NOTES
Rufus Brugos is a 61year old female. Patient presents with: Follow - Up: follow up for US report and possible fungus on bilateral great toes. HPI:   This pleasant patient returns to the clinic to review ultrasound results.   At today's visit acetonide 0.5 % External Cream Use bid as directed 15 g 1   • Biotin 5 MG Oral Cap Take  by mouth. • Calcium Carbonate-Vitamin D 600-200 MG-UNIT Oral Tab Take  by mouth.         Past Medical History:   Diagnosis Date   • Appendicitis    • [facility] Paternal Aunt 46        d.51   • Diabetes Sister    • Cancer Paternal Cousin Female         tumor in neck; d.32; substance abuse   • Glaucoma Neg    • Macular degeneration Neg       Social History    Socioeconomic History      Marital status: Elder Mcnair SKIN, HAIR, NAIL CULTURE; Future    Hammer toe of right foot    Capsulitis of metatarsophalangeal (MTP) joint    Metatarsalgia of right foot        Plan: We will call her with the results of the fungal infection.   At today's visit both conservative and hernando

## 2021-10-06 NOTE — TELEPHONE ENCOUNTER
Procedure: PIP joint fusion second toe right foot with pin fixation, second metatarsal osteotomy with internal bone screw fixation, repair of plantar plate  CPT code: 08735, 20975, 24937  Length of Surgery: 1.5 hours  Any Instruments: Mini power, mini fluo

## 2021-10-07 ENCOUNTER — LAB ENCOUNTER (OUTPATIENT)
Dept: LAB | Facility: HOSPITAL | Age: 56
End: 2021-10-07
Attending: PODIATRIST
Payer: MEDICAID

## 2021-10-07 DIAGNOSIS — E03.9 HYPOTHYROIDISM, UNSPECIFIED TYPE: ICD-10-CM

## 2021-10-07 DIAGNOSIS — B35.1 ONYCHOMYCOSIS: Primary | ICD-10-CM

## 2021-10-07 PROCEDURE — 84439 ASSAY OF FREE THYROXINE: CPT

## 2021-10-07 PROCEDURE — 80076 HEPATIC FUNCTION PANEL: CPT | Performed by: PODIATRIST

## 2021-10-07 PROCEDURE — 36415 COLL VENOUS BLD VENIPUNCTURE: CPT | Performed by: PODIATRIST

## 2021-10-07 PROCEDURE — 84443 ASSAY THYROID STIM HORMONE: CPT

## 2021-10-07 NOTE — TELEPHONE ENCOUNTER
Called patient this date and she relates Dr. Nick Harrison told her she would need four weeks recovery and if she doesn't work, she doesn't get paid and so if therefore requesting surgery to be scheduled for 12/22/21 as she is usually off around that time anyway

## 2021-10-08 ENCOUNTER — TELEPHONE (OUTPATIENT)
Dept: PODIATRY CLINIC | Facility: CLINIC | Age: 56
End: 2021-10-08

## 2021-10-08 ENCOUNTER — PATIENT MESSAGE (OUTPATIENT)
Dept: OBGYN CLINIC | Facility: CLINIC | Age: 56
End: 2021-10-08

## 2021-10-08 DIAGNOSIS — E78.2 MIXED HYPERLIPIDEMIA: ICD-10-CM

## 2021-10-08 DIAGNOSIS — B35.1 ONYCHOMYCOSIS: Primary | ICD-10-CM

## 2021-10-08 RX ORDER — ATORVASTATIN CALCIUM 20 MG/1
20 TABLET, FILM COATED ORAL NIGHTLY
Qty: 90 TABLET | Refills: 1 | Status: SHIPPED | OUTPATIENT
Start: 2021-10-08

## 2021-10-08 RX ORDER — TERBINAFINE HYDROCHLORIDE 250 MG/1
250 TABLET ORAL DAILY
Qty: 45 TABLET | Refills: 0 | Status: SHIPPED | OUTPATIENT
Start: 2021-10-08

## 2021-10-08 NOTE — TELEPHONE ENCOUNTER
Refill passed per [facility] protocol.   Requested Prescriptions   Pending Prescriptions Disp Refills    ATORVASTATIN 20 MG Oral Tab [Pharmacy Med Name: ATORVASTATIN 20MG TABLETS] 90 tablet 1     Sig: TAKE 1 TABLET(20 MG) BY MOUTH EVERY NIGHT        Ch

## 2021-10-08 NOTE — TELEPHONE ENCOUNTER
Please call the patient and let her know that the pills have been prescribed and that she needs to follow-up with us again in 6 weeks after taking the first dose thank you

## 2021-10-10 ENCOUNTER — TELEPHONE (OUTPATIENT)
Dept: ENDOCRINOLOGY CLINIC | Facility: CLINIC | Age: 56
End: 2021-10-10

## 2021-10-10 DIAGNOSIS — E03.9 HYPOTHYROIDISM, UNSPECIFIED TYPE: Primary | ICD-10-CM

## 2021-10-11 NOTE — TELEPHONE ENCOUNTER
Called pt and informed her that labs were WNL and Dr Whitlock Lacks rx'd lamisil to her pharm for a 6 wk course and he wants her to f/u in 6 wks for re-eval and new labs. She verbalized understanding.

## 2021-10-11 NOTE — TELEPHONE ENCOUNTER
Dr. Mihaela Rivera, Lisette Potter)  Spoke to patient to relay message below -patient stated understanding and stated she did not stop biotin prior to labs  Patient stated she would like to cpm and repeat labs in 2-3 months (patient having surgery 12/22/21 - will not be

## 2021-10-11 NOTE — TELEPHONE ENCOUNTER
Recommend that she repeat labs 2 weeks prior to surgery since T4 and T3 should be normal before surgery  TSH  Free T4  Free T3  Thanks

## 2021-10-11 NOTE — TELEPHONE ENCOUNTER
TSH is low but better than before. We have two options  1. We can CPM and repeat labs in 2-3 months and decide further accordingly  2.  We can go down on dose  She is on :   LT 4 100 mcg daily x three days a week and 112 mcg daily x 4 days a week     Tavnir

## 2021-10-12 NOTE — TELEPHONE ENCOUNTER
Spoke to patient to relay message below - patient stated understanding to repeat labs 2 weeks prior to surgery on 12/22/21  Lab orders placed for ~12/6/21

## 2021-10-28 NOTE — TELEPHONE ENCOUNTER
Dr. Bernardo Andrea made for Monday with JULIA. Please advise:  · [facility] referral pended. · GI referral pended as well if you would like to use it. · Before appointment, Ok to use OTC Tucks pads for rectal itching? Appoimtment made with APRN; RN discussed home care advise with patient to use in the meantime.      Going to [place] 11/5/21-11/11/21    RN advised if symptoms get severely worse, patient should seek care at [facility] clinic or Urgent Care, and provided edu

## 2021-11-01 ENCOUNTER — OFFICE VISIT (OUTPATIENT)
Dept: INTERNAL MEDICINE CLINIC | Facility: CLINIC | Age: 56
End: 2021-11-01
Payer: MEDICAID

## 2021-11-01 VITALS
HEART RATE: 60 BPM | SYSTOLIC BLOOD PRESSURE: 97 MMHG | BODY MASS INDEX: 30.02 KG/M2 | WEIGHT: 178 LBS | DIASTOLIC BLOOD PRESSURE: 65 MMHG | HEIGHT: 64.5 IN

## 2021-11-01 DIAGNOSIS — K64.8 INTERNAL HEMORRHOIDS WITH COMPLICATION: ICD-10-CM

## 2021-11-01 DIAGNOSIS — M79.602 LEFT ARM PAIN: Primary | ICD-10-CM

## 2021-11-01 DIAGNOSIS — M79.602 ARM PAIN, ANTERIOR, LEFT: ICD-10-CM

## 2021-11-01 DIAGNOSIS — R53.82 CHRONIC FATIGUE: ICD-10-CM

## 2021-11-01 PROCEDURE — 3078F DIAST BP <80 MM HG: CPT | Performed by: NURSE PRACTITIONER

## 2021-11-01 PROCEDURE — 3074F SYST BP LT 130 MM HG: CPT | Performed by: NURSE PRACTITIONER

## 2021-11-01 PROCEDURE — 3008F BODY MASS INDEX DOCD: CPT | Performed by: NURSE PRACTITIONER

## 2021-11-01 PROCEDURE — 99214 OFFICE O/P EST MOD 30 MIN: CPT | Performed by: NURSE PRACTITIONER

## 2021-11-01 NOTE — PATIENT INSTRUCTIONS
Plan  Stitz baths four times a day  EMuaide Max nightly    Roll towel and place under neck at lay on your back. Eliminate sugar from your diet.   Walk 30 minutes twice a day

## 2021-11-01 NOTE — PROGRESS NOTES
HPI:    Patient ID: Rufus Burgos is a 61year old female.  and raquel at [facility] in [facility]. HPI Rectal Pain/ Hx of hemorrhoids. Fatigue  Left arm pain  NURSE NOTE REVIEWED FROM TE.   Asking for iron testing or any other testing to to instructions.               Past Medical History:   Diagnosis Date   • Appendicitis    • Cancer [facility])    • Colonic polyp     per NG: \"? colonic polyps\"; via proctoscope, 1993   • Deviated nasal septum    • Disorder of thyroid    • Esophageal reflux Novacaine ok to use         Review of Systems   Constitutional: Positive for fatigue. Negative for chills and fever.    HENT: Negative for congestion, ear discharge, ear pain, facial swelling, hearing loss, postnasal drip, sinus pressure, sore throat and t Take 1 tablet (100 mcg) three times a week, 30 minutes before breakfast. 90 tablet 0   • Levothyroxine Sodium 112 MCG Oral Cap Take 1 tablet (112 mcg) 4 times a week, 30 minutes before breakfast. 90 capsule 0   • hydrOXYzine HCl 25 MG Oral Tab TAKE 1 TABLE 86.2 02/03/2021    MCH 28.1 02/03/2021    MCHC 32.6 02/03/2021    RDW 12.7 02/03/2021    .0 02/03/2021    MPV 8.6 10/22/2018      Lab Results   Component Value Date     (H) 02/03/2021    BUN 13 02/03/2021    BUNCREA 14.8 02/03/2021    CREATSE been treated for tendinitis in the past.  Will obtain an x-ray of the C-spine to rule out possibility of this being a radicular symptom. Also follow-up with physiatry for an Marcela Mcdaniel, [phone] for an appointment.   Patient with multiple all

## 2021-11-02 PROBLEM — R53.82 CHRONIC FATIGUE: Status: ACTIVE | Noted: 2021-11-02

## 2021-11-03 ENCOUNTER — TELEPHONE (OUTPATIENT)
Dept: PODIATRY CLINIC | Facility: CLINIC | Age: 56
End: 2021-11-03

## 2021-11-03 NOTE — TELEPHONE ENCOUNTER
From: Gildardo Fritz  Sent: 11/2/2021 8:46 PM CDT  To: [facility] Ob/Gyne Clinical Staff  Subject: annual exam    I have an appointment with Dr Roya Zamarripa next month.

## 2021-11-03 NOTE — ASSESSMENT & PLAN NOTE
Patient has a hx of hemorrhoids and states Dr. Barrett Boxer would band them. Patient has been experiencing pain and itching. Rectal exam with small internal hemorrhoids. Discussed the importance of stiz baths.     Plan  1) Stiz baths four times per day  2) May w

## 2021-11-03 NOTE — TELEPHONE ENCOUNTER
S/w pt and she states she started the lamisil about 2 wks ago. She states she normally has 2-3 BM daily and after she started the meds she was having even more frequent BM and developed a hemorrhoid. She states she hasn't taken the lamisil since 11/1.  She

## 2021-11-03 NOTE — TELEPHONE ENCOUNTER
Shmuel Pretty, DPM  You 4 minutes ago (4:38 PM)         Whatever works is good but she will have to apply it for awhile have her f/ up in 2 months

## 2021-11-03 NOTE — ASSESSMENT & PLAN NOTE
Follow up on left arm soreness- Dr Aguayo Keep note reviewed below:    Soreness, pain and discomfort in the left arm in certain positions especially from the mid arm into the mid forearm. Symptoms have been off and on since 2012. She has had physical therapy.

## 2021-11-03 NOTE — ASSESSMENT & PLAN NOTE
Patient states she had been more fatigued lately. 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 Patient admits to eating more sugar and not exercising.  She is following her thyroid labs with endocrinologist.    Plan  1) Decrease sugar [place]

## 2021-11-04 ENCOUNTER — TELEPHONE (OUTPATIENT)
Dept: INTERNAL MEDICINE CLINIC | Facility: CLINIC | Age: 56
End: 2021-11-04

## 2021-11-04 RX ORDER — CITALOPRAM HYDROBROMIDE 10 MG/1
10 TABLET ORAL DAILY
Qty: 90 TABLET | Refills: 1 | Status: SHIPPED | OUTPATIENT
Start: 2021-11-04 | End: 2022-03-22

## 2021-11-04 NOTE — TELEPHONE ENCOUNTER
Refill passed per [address] [place] [address] protocol.      Requested Prescriptions   Pending Prescriptions Disp Refills    CITALOPRAM 10 MG Oral Tab [Pharmacy Med Name: CITALOPRAM 10MG TABLETS] 90 tablet 1     Sig: TAKE 1 TABLET(10 MG) BY MOUTH DAILY        Psychiatric Non-Scheduled (Anti-Anxiety) Passed - 11/4/2021 10:41 AM        Passed - Appointment in last 6 or next 3 months                Recent Outpatient Visits              3 days ago Left arm pain    [place] [employer] Visit    1 month ago Onychomycosis    [address] [facility], [place] [address], [place] Figueroa [place], [place]    Office Visit    2 months ago [employer] of right foot    [address] [facility], [place] [address], [place], [place]    Office Visit    2 months ago Hammertoe of second toe of right foot    [address] [place] [address], [address] [facility], Zafar Daniel MD    Office Visit    3 months ago Hypothyroidism, unspecified type    [address] [place] [facility] Endocrinology [place] Godoy MD    Office Visit             Future Appointments         Provider Department Appt Notes    In 1 month Galilea Ramírez MD [address] [facility] Dermatology spot on cheek    In 2 months Juana Godoy, [address] [facility] Endocrinology thyroid f/u

## 2021-11-04 NOTE — TELEPHONE ENCOUNTER
Patient is requesting refill of medication Citalopram Hydrobromide 10 MG Oral Tab. Patient states she needs script by tomorrow 11/05 due to patient leaving out of town.

## 2021-11-10 NOTE — TELEPHONE ENCOUNTER
Called patient this date and left a message confirming surgery for 12/22/21. Asked patient to call me back at [phone] to review the details and schedule a post op visit. Reminded her that she needs clearance from PCP.   Message sent this date to

## 2021-11-22 ENCOUNTER — TELEPHONE (OUTPATIENT)
Dept: INTERNAL MEDICINE CLINIC | Facility: CLINIC | Age: 56
End: 2021-11-22

## 2021-11-22 NOTE — TELEPHONE ENCOUNTER
Patient is calling to confirm which supplements she should stop taking for her upcoming foot surgery with podiatrist, Dr. Marianne Quezada for 12/22/2021.  Patient declined to schedule a pre op appointment at this time stating that Dr. Lluvia Bird knows about the

## 2021-11-22 NOTE — TELEPHONE ENCOUNTER
Patient is requesting blood work for her upcoming foot surgery that is scheduled with podiatrist, Dr. Ilda Candelaria for 12/22/2021. Patient declined to schedule a pre op visit, stating Dr. Mario Alberto Fry knows about the surgery.

## 2021-11-23 ENCOUNTER — TELEPHONE (OUTPATIENT)
Dept: PODIATRY CLINIC | Facility: CLINIC | Age: 56
End: 2021-11-23

## 2021-11-23 NOTE — TELEPHONE ENCOUNTER
Patient states she is scheduled for surgery on 12/22/21 and wanted to see if she can reschedule to 12/20/21. Also has medication question about supplements.  Please advise

## 2021-11-23 NOTE — TELEPHONE ENCOUNTER
1st attempt/left voice mail message for pt to call back. Please see message below when the patient returns the call.

## 2021-11-23 NOTE — TELEPHONE ENCOUNTER
Spoke with patient ( verified) and relayed Dr. Avila Alert message below--patient verbalizes understanding--will f/u with Dr. Ingrid Means office, as well. No further questions/concerns at this time.

## 2021-11-23 NOTE — TELEPHONE ENCOUNTER
Patient's are usually advised to stop all vitamin and herbal supplements 1 week prior to surgery, however Dr. Pedro Barney office should have advised her on this. Pre op will contact the patient the night before to discuss maintenance medications as well.   D

## 2021-11-29 ENCOUNTER — TELEPHONE (OUTPATIENT)
Dept: INTERNAL MEDICINE CLINIC | Facility: CLINIC | Age: 56
End: 2021-11-29

## 2021-11-29 ENCOUNTER — TELEPHONE (OUTPATIENT)
Dept: ENDOCRINOLOGY CLINIC | Facility: CLINIC | Age: 56
End: 2021-11-29

## 2021-11-29 NOTE — TELEPHONE ENCOUNTER
Pt calling requesting if she can have blood work labs ordered before her appt scheduled for 12/2  Please advise.

## 2021-11-29 NOTE — TELEPHONE ENCOUNTER
Dr. Sara Thao,  Patient is requesting lab orders prior to office visit scheduled for 12/2/21.  Patient as thyroid active labs,Please advise

## 2021-11-29 NOTE — TELEPHONE ENCOUNTER
Spoke to patient to advise [place] lab can draw labs even though they say [facility] - patient stated understanding and requested [facility] be removed from profile  Patient stated she will have labs drawn on 12/6/21

## 2021-11-29 NOTE — TELEPHONE ENCOUNTER
Pt states there are labs Dr ordered for her to do but they are at [email or web address]. Pt does not go to [email or web address], she does her labs thru [facility], can you please re enter them and notify pt. Tyler Esteban

## 2021-12-02 ENCOUNTER — OFFICE VISIT (OUTPATIENT)
Dept: INTERNAL MEDICINE CLINIC | Facility: CLINIC | Age: 56
End: 2021-12-02
Payer: MEDICAID

## 2021-12-02 ENCOUNTER — LAB ENCOUNTER (OUTPATIENT)
Dept: LAB | Facility: HOSPITAL | Age: 56
End: 2021-12-02
Attending: NURSE PRACTITIONER
Payer: MEDICAID

## 2021-12-02 VITALS
BODY MASS INDEX: 30.19 KG/M2 | WEIGHT: 179 LBS | SYSTOLIC BLOOD PRESSURE: 99 MMHG | DIASTOLIC BLOOD PRESSURE: 65 MMHG | HEIGHT: 64.5 IN

## 2021-12-02 DIAGNOSIS — Z01.818 PRE-OP EVALUATION: Primary | ICD-10-CM

## 2021-12-02 DIAGNOSIS — E55.9 VITAMIN D DEFICIENCY: ICD-10-CM

## 2021-12-02 DIAGNOSIS — Z01.818 PRE-OP EXAMINATION: Primary | ICD-10-CM

## 2021-12-02 PROCEDURE — 99214 OFFICE O/P EST MOD 30 MIN: CPT | Performed by: NURSE PRACTITIONER

## 2021-12-02 PROCEDURE — 3074F SYST BP LT 130 MM HG: CPT | Performed by: NURSE PRACTITIONER

## 2021-12-02 PROCEDURE — 93005 ELECTROCARDIOGRAM TRACING: CPT

## 2021-12-02 PROCEDURE — 93010 ELECTROCARDIOGRAM REPORT: CPT | Performed by: NURSE PRACTITIONER

## 2021-12-02 PROCEDURE — 3008F BODY MASS INDEX DOCD: CPT | Performed by: NURSE PRACTITIONER

## 2021-12-02 PROCEDURE — 3078F DIAST BP <80 MM HG: CPT | Performed by: NURSE PRACTITIONER

## 2021-12-02 NOTE — PROGRESS NOTES
HPI:    Patient ID: Erick Wilcox is a 61year old female.      Pre-Op Exam (surgery with Dr. Johanna Au is on 12/22/21 right second toe)      HPI PRE-OP Exam. Patient is cleared for surgery.    Pre-op ex for proximal interphalangeal joint fusion, second Smoking status: Never Smoker      Smokeless tobacco: Never Used    Vaping Use      Vaping Use: Never used    Substance and Sexual Activity      Alcohol use: Yes        Comment: occ      Drug use: No      Sexual activity: Yes        Birth control/protect Suspension Use 1 drop bid to eyelids for eczema 5 mL 2   • tobramycin-dexamethasone 0.3-0.1 % Ophthalmic Ointment Use bid to itchy areas of eyelids bid 3.5 g 2   • clonazePAM 0.5 MG Oral Tab Take 1 tablet (0.5 mg total) by mouth daily.  30 tablet 2   • Omep Tympanic membrane normal.      Left Ear: Tympanic membrane normal.      Nose: Nose normal.      Mouth/Throat:      Mouth: Mucous membranes are moist.      Pharynx: No oropharyngeal exudate or posterior oropharyngeal erythema.    Eyes:      General: 02/03/2021    HCT 42.3 02/03/2021    MCV 86.2 02/03/2021    MCH 28.1 02/03/2021    MCHC 32.6 02/03/2021    RDW 12.7 02/03/2021    .0 02/03/2021    MPV 8.6 10/22/2018      Lab Results   Component Value Date     (H) 02/03/2021    BUN 13 02/03/2 Differential With Platelet      Comp Metabolic Panel (14)      Lipid Panel      Vitamin D, 25-Hydroxy      Vitamin B12      Urinalysis, Routine      Prothrombin Time (PT) [E]      PTT, Activated [E]      Urine Culture, Routine      Meds This Visit:  Reques

## 2021-12-02 NOTE — ASSESSMENT & PLAN NOTE
Normal exam/Medically Cleared for Surgery    EKG - WNL  Labs - WNL  Urinalysis/ Urine Culture WNL    Stop all ibuprofen, aspirin, over-the-counter cough and cold medications, vitamins as well as supplements for 10 days prior to surgery.      Call if you de

## 2021-12-06 ENCOUNTER — TELEPHONE (OUTPATIENT)
Dept: ENDOCRINOLOGY CLINIC | Facility: CLINIC | Age: 56
End: 2021-12-06

## 2021-12-06 ENCOUNTER — LAB ENCOUNTER (OUTPATIENT)
Dept: LAB | Facility: HOSPITAL | Age: 56
End: 2021-12-06
Attending: NURSE PRACTITIONER
Payer: MEDICAID

## 2021-12-06 DIAGNOSIS — Z01.818 PRE-OP EVALUATION: ICD-10-CM

## 2021-12-06 DIAGNOSIS — E03.9 HYPOTHYROIDISM, UNSPECIFIED TYPE: Primary | ICD-10-CM

## 2021-12-06 DIAGNOSIS — E55.9 VITAMIN D DEFICIENCY: ICD-10-CM

## 2021-12-06 PROCEDURE — 82306 VITAMIN D 25 HYDROXY: CPT

## 2021-12-06 PROCEDURE — 82607 VITAMIN B-12: CPT

## 2021-12-06 PROCEDURE — 81003 URINALYSIS AUTO W/O SCOPE: CPT

## 2021-12-06 PROCEDURE — 85730 THROMBOPLASTIN TIME PARTIAL: CPT

## 2021-12-06 PROCEDURE — 80061 LIPID PANEL: CPT

## 2021-12-06 PROCEDURE — 84439 ASSAY OF FREE THYROXINE: CPT | Performed by: INTERNAL MEDICINE

## 2021-12-06 PROCEDURE — 84443 ASSAY THYROID STIM HORMONE: CPT | Performed by: INTERNAL MEDICINE

## 2021-12-06 PROCEDURE — 85025 COMPLETE CBC W/AUTO DIFF WBC: CPT

## 2021-12-06 PROCEDURE — 80053 COMPREHEN METABOLIC PANEL: CPT

## 2021-12-06 PROCEDURE — 84481 FREE ASSAY (FT-3): CPT | Performed by: INTERNAL MEDICINE

## 2021-12-06 PROCEDURE — 85610 PROTHROMBIN TIME: CPT

## 2021-12-06 PROCEDURE — 36415 COLL VENOUS BLD VENIPUNCTURE: CPT

## 2021-12-06 PROCEDURE — 87086 URINE CULTURE/COLONY COUNT: CPT

## 2021-12-06 NOTE — TELEPHONE ENCOUNTER
Pt states that she had labs done and she has questions about results. She states that her hair is falling out and brittle and nails are cracking due to not being on Biotin. Please call.

## 2021-12-06 NOTE — TELEPHONE ENCOUNTER
Decrease LT4 to 100 mcgdaily since TSH remains low  Can resume biotin  Please holdbiotin only for 5 days prior to repeating labs   Labs before FU in Jan 2022  TSH and Free T4  Thanks

## 2021-12-06 NOTE — TELEPHONE ENCOUNTER
Dr. Andrea Mayberry,     Please see below message and thyroid labs.       Component      Latest Ref Rng & Units 12/6/2021   TSH      0.358 - 3.740 mIU/mL 0.011 (L)   T4,Free (Direct)      0.8 - 1.7 ng/dL 1.2   T3 FREE      2.40 - 4.20 pg/mL 2.49

## 2021-12-12 RX ORDER — CLONAZEPAM 0.5 MG/1
0.5 TABLET ORAL DAILY
Qty: 30 TABLET | Refills: 2 | Status: SHIPPED | OUTPATIENT
Start: 2021-12-12

## 2021-12-15 NOTE — TELEPHONE ENCOUNTER
Dr. Gamaliel Cody,     Called patient and relayed your recommendation and message as outlined below. She would like to get your input on her result. She does not understand why her numbers are still off.   She said she saw a note on the lab that this can happ

## 2021-12-16 ENCOUNTER — TELEPHONE (OUTPATIENT)
Dept: INTERNAL MEDICINE CLINIC | Facility: CLINIC | Age: 56
End: 2021-12-16

## 2021-12-16 ENCOUNTER — TELEPHONE (OUTPATIENT)
Dept: PODIATRY CLINIC | Facility: CLINIC | Age: 56
End: 2021-12-16

## 2021-12-16 NOTE — TELEPHONE ENCOUNTER
Patient states she has surgery on 12/22/21 and has some questions she would like Dr. Israel Whitehead to answer.  Please advise

## 2021-12-16 NOTE — TELEPHONE ENCOUNTER
Called patient this date and she is wondering if she can have her ingrown nail done at the same time on the same toe as her bone procedure.   I told her Dr. Ellyn Bailey does not usually do that but she can check with him in pre-op and he could always add the pr

## 2021-12-17 ENCOUNTER — OFFICE VISIT (OUTPATIENT)
Dept: DERMATOLOGY CLINIC | Facility: CLINIC | Age: 56
End: 2021-12-17
Payer: MEDICAID

## 2021-12-17 DIAGNOSIS — D23.9 BENIGN NEOPLASM OF SKIN, UNSPECIFIED LOCATION: ICD-10-CM

## 2021-12-17 DIAGNOSIS — L30.9 DERMATITIS: Primary | ICD-10-CM

## 2021-12-17 PROCEDURE — 99213 OFFICE O/P EST LOW 20 MIN: CPT | Performed by: DERMATOLOGY

## 2021-12-17 RX ORDER — LEVOTHYROXINE SODIUM 112 UG/1
TABLET ORAL
COMMUNITY
Start: 2021-11-04 | End: 2021-12-20

## 2021-12-17 RX ORDER — CAMPHOR 0.45 %
GEL (GRAM) TOPICAL
Refills: 0 | COMMUNITY
Start: 2021-12-17

## 2021-12-17 RX ORDER — OLOPATADINE HYDROCHLORIDE 1 MG/ML
SOLUTION/ DROPS OPHTHALMIC
Qty: 5 ML | Refills: 1 | COMMUNITY
Start: 2021-12-17

## 2021-12-17 NOTE — TELEPHONE ENCOUNTER
I am sorry to hear that   I do not think biotin is affecting her results  I recommend changing the dose at this time as suggested before and we can talk in detail at her upcoming FU  Thanks

## 2021-12-17 NOTE — TELEPHONE ENCOUNTER
Rn called patient with message below by dr Nabila Tang, patient verbalized understanding.    Labs ordered , pt knows to repeat labs before aftab in january

## 2021-12-20 ENCOUNTER — LAB ENCOUNTER (OUTPATIENT)
Dept: LAB | Facility: HOSPITAL | Age: 56
End: 2021-12-20
Attending: PODIATRIST
Payer: MEDICAID

## 2021-12-20 DIAGNOSIS — M77.50 CAPSULITIS OF METATARSOPHALANGEAL (MTP) JOINT: ICD-10-CM

## 2021-12-20 DIAGNOSIS — M20.41 HAMMER TOE OF RIGHT FOOT: ICD-10-CM

## 2021-12-20 DIAGNOSIS — M77.41 METATARSALGIA OF RIGHT FOOT: ICD-10-CM

## 2021-12-22 ENCOUNTER — APPOINTMENT (OUTPATIENT)
Dept: GENERAL RADIOLOGY | Facility: HOSPITAL | Age: 56
End: 2021-12-22
Attending: PODIATRIST
Payer: MEDICAID

## 2021-12-22 ENCOUNTER — ANESTHESIA EVENT (OUTPATIENT)
Dept: SURGERY | Facility: HOSPITAL | Age: 56
End: 2021-12-22
Payer: MEDICAID

## 2021-12-22 ENCOUNTER — ANESTHESIA (OUTPATIENT)
Dept: SURGERY | Facility: HOSPITAL | Age: 56
End: 2021-12-22
Payer: MEDICAID

## 2021-12-22 ENCOUNTER — HOSPITAL ENCOUNTER (OUTPATIENT)
Facility: HOSPITAL | Age: 56
Setting detail: HOSPITAL OUTPATIENT SURGERY
Discharge: HOME | End: 2021-12-22
Attending: PODIATRIST | Admitting: PODIATRIST
Payer: MEDICAID

## 2021-12-22 VITALS
DIASTOLIC BLOOD PRESSURE: 70 MMHG | HEART RATE: 56 BPM | OXYGEN SATURATION: 98 % | RESPIRATION RATE: 16 BRPM | HEIGHT: 64 IN | SYSTOLIC BLOOD PRESSURE: 107 MMHG | TEMPERATURE: 98 F | BODY MASS INDEX: 30.05 KG/M2 | WEIGHT: 176 LBS

## 2021-12-22 DIAGNOSIS — M77.50 CAPSULITIS OF METATARSOPHALANGEAL (MTP) JOINT: ICD-10-CM

## 2021-12-22 DIAGNOSIS — M20.41 HAMMER TOE OF RIGHT FOOT: ICD-10-CM

## 2021-12-22 DIAGNOSIS — M77.41 METATARSALGIA OF RIGHT FOOT: ICD-10-CM

## 2021-12-22 PROCEDURE — 28313 REPAIR DEFORMITY OF TOE: CPT | Performed by: PODIATRIST

## 2021-12-22 PROCEDURE — 28308 INCISION OF METATARSAL: CPT | Performed by: PODIATRIST

## 2021-12-22 PROCEDURE — 0MQS0ZZ REPAIR RIGHT FOOT BURSA AND LIGAMENT, OPEN APPROACH: ICD-10-PCS | Performed by: PODIATRIST

## 2021-12-22 PROCEDURE — 0SGP04Z FUSION OF RIGHT TOE PHALANGEAL JOINT WITH INTERNAL FIXATION DEVICE, OPEN APPROACH: ICD-10-PCS | Performed by: PODIATRIST

## 2021-12-22 PROCEDURE — 28285 REPAIR OF HAMMERTOE: CPT | Performed by: PODIATRIST

## 2021-12-22 PROCEDURE — 0QSN04Z REPOSITION RIGHT METATARSAL WITH INTERNAL FIXATION DEVICE, OPEN APPROACH: ICD-10-PCS | Performed by: PODIATRIST

## 2021-12-22 RX ORDER — HYDROMORPHONE HYDROCHLORIDE 1 MG/ML
0.2 INJECTION, SOLUTION INTRAMUSCULAR; INTRAVENOUS; SUBCUTANEOUS EVERY 5 MIN PRN
Status: DISCONTINUED | OUTPATIENT
Start: 2021-12-22 | End: 2021-12-22

## 2021-12-22 RX ORDER — ACETAMINOPHEN 500 MG
1000 TABLET ORAL ONCE
Status: COMPLETED | OUTPATIENT
Start: 2021-12-22 | End: 2021-12-22

## 2021-12-22 RX ORDER — SODIUM CHLORIDE, SODIUM LACTATE, POTASSIUM CHLORIDE, CALCIUM CHLORIDE 600; 310; 30; 20 MG/100ML; MG/100ML; MG/100ML; MG/100ML
INJECTION, SOLUTION INTRAVENOUS CONTINUOUS
Status: DISCONTINUED | OUTPATIENT
Start: 2021-12-22 | End: 2021-12-22

## 2021-12-22 RX ORDER — DOXYCYCLINE HYCLATE 100 MG/1
100 CAPSULE ORAL 2 TIMES DAILY
Qty: 14 CAPSULE | Refills: 0 | Status: SHIPPED | OUTPATIENT
Start: 2021-12-22 | End: 2021-12-23

## 2021-12-22 RX ORDER — NALOXONE HYDROCHLORIDE 0.4 MG/ML
80 INJECTION, SOLUTION INTRAMUSCULAR; INTRAVENOUS; SUBCUTANEOUS AS NEEDED
Status: DISCONTINUED | OUTPATIENT
Start: 2021-12-22 | End: 2021-12-22

## 2021-12-22 RX ORDER — ONDANSETRON 2 MG/ML
4 INJECTION INTRAMUSCULAR; INTRAVENOUS ONCE AS NEEDED
Status: DISCONTINUED | OUTPATIENT
Start: 2021-12-22 | End: 2021-12-22

## 2021-12-22 RX ORDER — HYDROMORPHONE HYDROCHLORIDE 1 MG/ML
0.4 INJECTION, SOLUTION INTRAMUSCULAR; INTRAVENOUS; SUBCUTANEOUS EVERY 5 MIN PRN
Status: DISCONTINUED | OUTPATIENT
Start: 2021-12-22 | End: 2021-12-22

## 2021-12-22 RX ORDER — MORPHINE SULFATE 4 MG/ML
4 INJECTION, SOLUTION INTRAMUSCULAR; INTRAVENOUS EVERY 10 MIN PRN
Status: DISCONTINUED | OUTPATIENT
Start: 2021-12-22 | End: 2021-12-22

## 2021-12-22 RX ORDER — MORPHINE SULFATE 4 MG/ML
2 INJECTION, SOLUTION INTRAMUSCULAR; INTRAVENOUS EVERY 10 MIN PRN
Status: DISCONTINUED | OUTPATIENT
Start: 2021-12-22 | End: 2021-12-22

## 2021-12-22 RX ORDER — PROCHLORPERAZINE EDISYLATE 5 MG/ML
5 INJECTION INTRAMUSCULAR; INTRAVENOUS ONCE AS NEEDED
Status: DISCONTINUED | OUTPATIENT
Start: 2021-12-22 | End: 2021-12-22

## 2021-12-22 RX ORDER — LIDOCAINE HYDROCHLORIDE 10 MG/ML
INJECTION, SOLUTION EPIDURAL; INFILTRATION; INTRACAUDAL; PERINEURAL AS NEEDED
Status: DISCONTINUED | OUTPATIENT
Start: 2021-12-22 | End: 2021-12-22 | Stop reason: SURG

## 2021-12-22 RX ORDER — HYDROCODONE BITARTRATE AND ACETAMINOPHEN 5; 325 MG/1; MG/1
1 TABLET ORAL AS NEEDED
Status: DISCONTINUED | OUTPATIENT
Start: 2021-12-22 | End: 2021-12-22

## 2021-12-22 RX ORDER — MORPHINE SULFATE 10 MG/ML
6 INJECTION, SOLUTION INTRAMUSCULAR; INTRAVENOUS EVERY 10 MIN PRN
Status: DISCONTINUED | OUTPATIENT
Start: 2021-12-22 | End: 2021-12-22

## 2021-12-22 RX ORDER — METOPROLOL TARTRATE 5 MG/5ML
2.5 INJECTION INTRAVENOUS ONCE
Status: DISCONTINUED | OUTPATIENT
Start: 2021-12-22 | End: 2021-12-22

## 2021-12-22 RX ORDER — HYDROMORPHONE HYDROCHLORIDE 1 MG/ML
0.6 INJECTION, SOLUTION INTRAMUSCULAR; INTRAVENOUS; SUBCUTANEOUS EVERY 5 MIN PRN
Status: DISCONTINUED | OUTPATIENT
Start: 2021-12-22 | End: 2021-12-22

## 2021-12-22 RX ORDER — BUPIVACAINE HYDROCHLORIDE 5 MG/ML
INJECTION, SOLUTION EPIDURAL; INTRACAUDAL AS NEEDED
Status: DISCONTINUED | OUTPATIENT
Start: 2021-12-22 | End: 2021-12-22 | Stop reason: HOSPADM

## 2021-12-22 RX ORDER — TRAMADOL HYDROCHLORIDE 50 MG/1
50 TABLET ORAL EVERY 12 HOURS PRN
Qty: 30 TABLET | Refills: 0 | Status: SHIPPED | OUTPATIENT
Start: 2021-12-22 | End: 2021-12-23

## 2021-12-22 RX ORDER — CEFAZOLIN SODIUM/WATER 2 G/20 ML
2 SYRINGE (ML) INTRAVENOUS ONCE
Status: COMPLETED | OUTPATIENT
Start: 2021-12-22 | End: 2021-12-22

## 2021-12-22 RX ORDER — HYDROCODONE BITARTRATE AND ACETAMINOPHEN 5; 325 MG/1; MG/1
2 TABLET ORAL AS NEEDED
Status: DISCONTINUED | OUTPATIENT
Start: 2021-12-22 | End: 2021-12-22

## 2021-12-22 RX ORDER — MIDAZOLAM HYDROCHLORIDE 1 MG/ML
INJECTION INTRAMUSCULAR; INTRAVENOUS AS NEEDED
Status: DISCONTINUED | OUTPATIENT
Start: 2021-12-22 | End: 2021-12-22 | Stop reason: SURG

## 2021-12-22 RX ADMIN — SODIUM CHLORIDE, SODIUM LACTATE, POTASSIUM CHLORIDE, CALCIUM CHLORIDE: 600; 310; 30; 20 INJECTION, SOLUTION INTRAVENOUS at 09:34:00

## 2021-12-22 RX ADMIN — MIDAZOLAM HYDROCHLORIDE 2 MG: 1 INJECTION INTRAMUSCULAR; INTRAVENOUS at 08:07:00

## 2021-12-22 RX ADMIN — CEFAZOLIN SODIUM/WATER 2 G: 2 G/20 ML SYRINGE (ML) INTRAVENOUS at 08:15:00

## 2021-12-22 RX ADMIN — LIDOCAINE HYDROCHLORIDE 25 MG: 10 INJECTION, SOLUTION EPIDURAL; INFILTRATION; INTRACAUDAL; PERINEURAL at 08:10:00

## 2021-12-22 NOTE — ANESTHESIA POSTPROCEDURE EVALUATION
Patient: Elfego Mayfield    Procedure Summary     Date: 12/22/21 Room / Location: [address] [place] [address] MAIN OR 12 / [address] [place] [address] MAIN OR    Anesthesia Start: 6400 Anesthesia Stop: 3451    Procedure: proximal interphalangeal joint fusion, second toe, right foot with pin fixation

## 2021-12-22 NOTE — OPERATIVE REPORT
[address] [facility] KENNY    [facility] [id number] MRN [id number]    1965 Age 61year old   Admission Date 2021 Operation Date 2021   Attending Physician No att. providers found Operating Physician W additions deletions or concerns reported antibiotics were infused. The patient was placed under intravenous sedation and the foot was anesthetized via posterior tibial dorsal and second ray block to the right foot.   The right foot was then prepped and  Arthrex set. These were twist off screws. Fixation was found to be good the distal metatarsal shaft was remodeled using a bur.   At this time the proximal inner phalangeal joint was I addressed where the tendon of extensor digitorum longus was incised tra had been released before skin closure and all the toes were pink and warm.     Steve Delgado DPM

## 2021-12-22 NOTE — BRIEF OP NOTE
Pre-Operative Diagnosis: Hammer toe of right foot [M20.41]  Capsulitis of metatarsophalangeal (MTP) joint [M77.50]  Metatarsalgia of right foot [M77.41]     Post-Operative Diagnosis: Hammer toe of right foot [M20.41]Capsulitis of metatarsophalangeal (MTP)

## 2021-12-22 NOTE — ANESTHESIA PREPROCEDURE EVALUATION
Anesthesia PreOp Note    HPI:     Dione Boggs is a 61year old female who presents for preoperative consultation requested by: Jami Sanders DPM    Date of Surgery: 12/22/2021    Procedure(s):  proximal interphalangeal joint fusion, second to Date Noted: 01/20/2015      Trochanteric bursitis         Date Noted: 01/20/2015      Arthralgia         Date Noted: 01/20/2015      Bicipital tenosynovitis         Date Noted: 06/20/2013      Osteoarthrosis, unspecified whether generalized or localized, 2004    hand surgery   • HEMORRHOIDECTOMY  1991   • LIGATION OF HEMORRHOID(S)  11/28/2016    Rubberband Ligation of Hemorrhoid   • NASAL SURG PROC UNLISTED  2007    septoplasty   • NEEDLE BIOPSY RIGHT      benign   • OTHER SURGICAL HISTORY  2006    dale Unknown at Unknown time  tobramycin-dexamethasone 0.3-0.1 % Ophthalmic Ointment, Use bid to itchy areas of eyelids bid, Disp: 3.5 g, Rfl: 2, Unknown at Unknown time  Levothyroxine Sodium 112 MCG Oral Cap, Take 1 tablet (112 mcg) 4 times a week, 30 minutes ,asphalt lung cancer   • Lipids Other         family h/o hyperlipidemia    • Cancer Maternal Uncle 18        spinal cancer   • Ovarian Cancer Paternal Aunt 55        d.51   • Diabetes Sister    • Cancer Paternal Cousin Female         tumor in neck; d.32; s on file  Housing Stability: Not on file    Available pre-op labs reviewed.   Lab Results   Component Value Date    WBC 5.1 12/06/2021    RBC 4.79 12/06/2021    HGB 13.5 12/06/2021    HCT 41.8 12/06/2021    MCV 87.3 12/06/2021    MCH 28.2 12/06/2021    MCHC ability. The patient desires the anesthetic management as planned.   Devi Waller MD  12/22/2021 7:58 AM

## 2021-12-23 ENCOUNTER — TELEPHONE (OUTPATIENT)
Dept: PODIATRY CLINIC | Facility: CLINIC | Age: 56
End: 2021-12-23

## 2021-12-23 RX ORDER — TRAMADOL HYDROCHLORIDE 50 MG/1
50 TABLET ORAL EVERY 12 HOURS PRN
Qty: 30 TABLET | Refills: 0 | Status: SHIPPED | OUTPATIENT
Start: 2021-12-23 | End: 2022-02-01

## 2021-12-23 RX ORDER — DOXYCYCLINE HYCLATE 100 MG/1
100 CAPSULE ORAL 2 TIMES DAILY
Qty: 14 CAPSULE | Refills: 0 | Status: SHIPPED | OUTPATIENT
Start: 2021-12-23

## 2021-12-23 NOTE — TELEPHONE ENCOUNTER
Patient states she had surgery yesterday and is extreme pain. Also states she did not receive medications below.   Please advise     traMADol 50 MG Oral Tab    doxycycline 100 MG Oral Cap

## 2021-12-23 NOTE — TELEPHONE ENCOUNTER
Your Child's Health  8-9 Year-Old Visit      Cathie Garcia  November 27, 2018    Visit Vitals  BP 90/60 (BP Location: RUE, Patient Position: Sitting, Cuff Size: Small Adult)   Pulse 76   Temp 97.4 °F (36.3 °C) (Temporal)   Resp 18   Ht 4' 5\" (1.346 m)   Wt 34.5 kg   BMI 19.02 kg/m²     Weight: 76 lbs      DEVELOPMENT:  At this age a typical child:  1.  Can read for enjoyment.  2.  Can tell a joke coherently.  3.  Is very concerned about rules and fairness.  4.  Is outspoken when he or she perceives unfairness in parents.  5.  Is able to take care of his or her room and assume responsibility with fewer reminders than at [age] years of age.  6.  Is learning to tell time (this sometimes takes until age ten).    SAFETY/ACCIDENT PREVENTION:  Accidents are the most common causes of death in  this age range.  Automobile fatalities are the most numerous, followed by deaths due to fire, drowning, falls, and gun accidents.  Continued reminders about seat belts, swimming rules, playing with fire, playing with guns, and other safety measures are necessary.  The seat belt should be across the hips and not across the stomach.  Cathie should ride in the back seat.  The center seat is the safest if it has a shoulder harness.      INDIVIDUAL DIFFERENCES:  In this age range, differing abilities in physical coordination, memory, reading, and personality become apparent.  It is particularly important for parents to learn the individual strengths of each child and to praise and encourage them.  Encouragement can take the form of praise, gifts related to the child's interests, trips, memory games at the dinner table, and individual attention from you (yes, they still crave it at this age).    Academic achievement is easily recognized by both parent and school.  Be sure to support and praise the development of non-academic strengths such as artistic, mechanical, social, athletic, or verbal skills.  \"Divya is my finder; she always  Prescribed to Toyin tramadol and doxycycline thank you remembers where things are,\" is more likely to have a good effect than, \"She doesn't get good grades like her brother, but we like her anyway.\" Your hardest job may be telling the difference between low effort and genuine difficulty when Cathie encounters poor grades.    SUN EXPOSURE:  There is now evidence that sun exposure, especially sunburn before the age of ten, increases the risk of skin cancer.  Fair-skinned people always have a higher risk.  Cathie should avoid the midday sun, use sun block, and wear protective clothing and sunglasses.  Begin to educate her about tanning booths.  There is no such thing as \"safe\" tanning rays.  Set a good example; avoid burning and crash-tanning.  Follow the guidelines in our Sun Exposure handout.    DIET AND EXERCISE:  Some children may need breakfast to function well at school.  However, you should keep in mind that much of the research about the benefits of having breakfast was done by a breakfast cereal company.  In any case, the meal does not need to be big.    A multivitamin with 600 International Units vitamin D should be given to all children who drink less than 32 oz of milk per day.    Continue reminders about regular tooth brushing.    Television ads and convenience foods encourage a diet high in salt, fat, and calories as well as low in fiber.  Having a variety of alternative snack foods can promote better nutrition.  Pre-cut vegetables, dried fruits, fresh fruits, cheese, and unsalted nuts are examples of good snack foods.  Having these available will not be effective, however, if Cathie knows that the cupboards are full of chips and cupcakes.    TELEVISION/VIDEO:  1.  Limit viewing to a maximum of two hours per day.    2.  Excessive TV is associated with obesity, aggressive behavior, and negative moods.  3.  Do not allow TV shows or videos that promote bad attitudes.  Many videos consistently portray women or minorities as victims.   4.  Teach Cathie not to  eat while watching TV by not doing it yourself.  5.  Encourage other forms of learning and entertainment, such as books, story-telling, and trips to museums, farms, zoos, etc.    CHORES/ALLOWANCES:  Children at this age will handle chores with encouragement, but they can be very sensitive to fairness.  Chores should be evenly divided among siblings or rotated.  Excusing a child because of special activities, even if they are highly valued by the family (for example, music lessons in a musical family), will breed resentment in the siblings who end up picking up the chores.    An allowance can provide a useful tool for learning about Cathie's values and personality as well as providing a lever for discipline.  Make certain that the amount is not so large that Cathie does not have to make choices about what to spend it on.    MEDICATION FOR FEVER OR PAIN:   Acetaminophen liquid (e.g., Tylenol or Tempra) may be given every four hours as needed for pain or fever.  Acetaminophen liquid is less concentrated than the infant dropper bottle type. Be sure to check which product CONCENTRATION you are using.      CHILDREN’S Tylenol/Acetaminophen  (160 MG/5 mL)    Child’s Weight:  Dose:  36 - 47 pounds:    240 mg (7.5 mL (1 1/2 Teaspoons))  48 - 59 pounds:    320 mg (10.0 mL (2 Teaspoons))  60 - 71 pounds:    400 mg (12.5 mL (2 1/2 Teaspoons))  72 - 95 pounds:    480 mg (15.0 mL (3 Teaspoons))    CHILDREN’S Tylenol/Acetaminophen MELTAWAYS ( 80 MG tablets)    Child’s Weight:  Dose:  36 - 47 pounds:    240 mg (3 meltaway tablets)  48 - 59 pounds:    320 mg (4 meltaway tablets)  60 - 71 pounds:    400 mg (5 meltaway tablets)  72 - 95 pounds:    480 mg (6 meltaway tablets)    Ramirez (Jr) Tylenol/Acetaminophen MELTAWAYS (160 MG tablets)    Child’s Weight:  Dose:  36 - 47 pounds:    240 mg (1 1/2 meltaway tablets)  48 - 59 pounds:    320 mg (2 meltaway tablets)  60 - 71 pounds:    400 mg (2 1/2 meltaway tablets)  72 - 95 pounds:     480 mg (3 meltaway tablets)    CHILDREN'S Ibuprofen (e.g., Advil or Motrin) may be given every six hours as needed for pain or fever.    Child’s Weight:  Dose:  48 - 59 pounds:    200 mg (2 Teaspoons of liquid)  60 - 71 pounds:    250 mg (2 1/2Teaspoons of liquid)  72 - 95 pounds:    300 mg (3 Teaspoons of liquid)    NEXT VISIT:  IN 1 - 2 YEARS       Thank you for entrusting your care to [facility].            Patient Education     Chequeo del tres alverto: 6-10 años  Aunque valdez hijo esté alverto, siga llevándolo al médico para mame chequeos anuales. Estas visitas le permiten asegurarse de que valdez hijo esté protegido con las vacunas y los exámenes de detección que le corresponden a valdez edad. El proveedor de atención médica de valdez hijo también comprobará que el crecimiento y el desarrollo de valdez hijo deep adecuados. En esta hoja, se describen algunas de las cosas que puede esperar.     Las peleas en la escuela pueden indicar problemas con la godwin o el desarrollo de un tres. Si valdez hijo tiene problemas en la escuela, hable con el médico del tres.   Asuntos escolares y sociales  A continuación, se describen varios temas que quizás usted, valdez hijo y el proveedor de atención médica quieran comentar denia esta caitlin:  · La lectura. ¿Le gusta leer a valdez hijo? ¿Tiene un nivel de lectura adecuado para valdez edad?   · Las amistades. ¿Tiene valdez hijo amigos en la escuela? ¿Cómo se llevan? ¿Le gustan los amigos de valdez hijo? ¿Tiene alguna preocupación sobre las amistades de valdez hijo o problemas que estén ocurriendo con otros niños (himanshu la intimidación, también llamado “bullying”)?  · Las actividades. ¿Qué le gusta hacer a valdze hijo himanshu diversión? ¿Participa en actividades extraescolares himanshu deportes, exploración o clases de música?   · La interacción con la apolinar. ¿Qué ciro van las cosas en casa? ¿Se lleva emmanuel valdez hijo con los demás miembros de la apolinar? ¿Le cuenta a usted mame problemas? ¿Cómo se comporta el tres en la casa?   · El  comportamiento y la participación en la escuela. ¿Cómo se comporta valdez hijo en la escuela? ¿Sigue las rutinas de la clase y participa en las actividades de julian? ¿Qué dicen los maestros acerca del comportamiento del tres? ¿Termina mame tareas con puntualidad? ¿Lo ayudan usted u otros miembros de la apolinar con las tareas?  · Los quehaceres del hogar. ¿Ayuda valdez hijo en los quehaceres de la casa, himanshu sacar la basura o poner la grace?  Consejos de nutrición y ejercicio  Enseñarle a valdez hijo buenos hábitos de alimentación y estilo de anabel podría ayudarlo a gozar de óptima godwin denia toda valdez anabel. [place] ayuda, dé buenos ejemplos tanto en palabras himanshu en comportamiento. Recuerde: los buenos hábitos que valdez hijo adquiera ahora lo acompañarán para siempre. Siga estos consejos:  · Ayude al tres a hacer al menos entre 30 y 60 minutos de juego activo al día. El movimiento ayuda a que valdez hijo se mantenga alverto. Lleve al tres al parque, a montar en bicicleta o a recrearse con juegos activos himanshu jugar al corre que te calli o a la pelota.  · Limite el tiempo que el tres pasa frente a la pantalla a un cameron de fredeirc a dos horas al día. [place] incluye el tiempo que pasa viendo televisión, jugando videojuegos, usando la computadora y enviando mensajes de texto. Si en el cuarto del tres hay un televisor, frederic computadora o frederic consola de videojuegos, reemplace marcello aparato por un equipo de ricardo. Para muchos niños, bailar y cantar son maneras divertidas de ponerse en movimiento.  · Limite las bebidas azucaradas. Los refrescos (gaseosas), los jugos y las bebidas para deportistas causan aumentos excesivos de peso y caries dentales. Lo ideal es que valdez hijo tome agua y leche baja en grasa o sin grasa (descremada). Puede hero jugo de fruta al 100% con moderación (un vaso pequeño no más de frederic vez por día). Reserve los refrescos y otras bebidas azucaradas para las ocasiones especiales.   · Sirva alimentos nutritivos. Tenga siempre a mano frederic  diversidad de alimentos sanos para el refrigerio, himanshu frutas y vegetales frescos, sandra magras y granos enteros. Las comidas himanshu las lakshmi fritas, los caramelos y los snacks deberían servirse solo en algunas ocasiones.   · Sirva porciones adecuadas para un tres. Los niños no necesitan la misma cantidad de comida que los adultos. Sirva a valdez hijo porciones de comida que deep adecuadas para valdez edad y tamaño, y permita que el tres deje de comer cuando esté lleno. Si valdez hijo sigue teniendo hambre después de frederic comida, ofrézcale más verduras o frutas.  · Pregúntele al proveedor de atención médica cuánto debería pesar valdez hijo. Valdez hijo debería aumentar unas cuatro o jason libras (entre 2 y 2.5 kilos aprox.) al año. Si está engordando más que eso, pídale al proveedor de atención médica que le dé recomendaciones sobre hábitos alimentarios saludables y actividad física.  · Lleve al tres al dentista por lo menos dos veces al año para que le limpien los dientes y se los revisen.  Consejos para el sueño  Ahora que valdez hijo va a la escuela, es aún más importante que duerma emmanuel de noche. A esta edad, valdez hijo necesita dormir unas 10 horas todas las noches. Aquí tiene algunos consejos:  · Establezca frederic hora de acostarse y asegúrese de que el tres la cumpla todas las noches.  · La televisión, la computadora y los videojuegos pueden agitar a un tres e impedir que se tranquilice por la noche. Apague los equipos por lo menos frederic hora antes de que el tres se acueste. En valdez lugar, lean juntos un capítulo de un libro.  · Recuerde a valdez hijo que debe cepillarse los dientes y limpiarse con hilo dental antes de acostarse. Supervise directamente el cuidado personal que hace valdez hijo de mame dientes para asegurarse de que se cepille tanto los dientes de adelante himanshu los de atrás.   Consejos de seguridad  · Al montar en bicicleta, valdez hijo debe usar un blayne con la butt abrochada. Al patinar sobre len o andar en patineta o monopatín  (scooter), es conveniente que se ponga protección himanshu muñequeras, coderas y rodilleras, así himanshu un blayne.  · En el automóvil, siga usando un asiento elevador hasta que valdez hijo mida más de 4 pies 9 pulgadas (1.5 m). Cuando llegan a esta estatura, los niños pueden sentarse con el cinturón abrochado correctamente sobre la clavícula y las caderas. Si tiene preguntas sobre el momento en que valdez hijo dejará de necesitar un asiento elevador, hable con el proveedor de atención médica. Todos los niños menores de 13 años deben sentarse en el asiento trasero de los automóviles.  · Enseñe a valdez hijo que no hable con extraños ni vaya a ninguna parte con extraños.  · Enséñele a valdez hijo a nadar. Muchas comunidades ofrecen clases de natación a bajo precio. No deje que valdez hijo juegue en frederic piscina o en mame cercanías sin supervisión, aunque sepa nadar.  Vacunas  Según las recomendaciones de los Centros para el Control y la Prevención de Enfermedades (\"CDC\", por mame siglas en inglés), en esta visita valdez hijo podría recibir las siguientes vacunas:  · Difteria, tétanos y tos ferina (solo a los 6 años)  · Virus del papiloma humano (VPH) (mayores de 9 años de edad)  · Influenza (gripe) todos los años  · Sarampión, paperas (parotiditis) y rubéola  · Poliomielitis  · Varicela  ¿Se orina en la cama? No es culpa de valdez hijo  Aunque puede causarle frustración tanto a usted himanshu a valdez hijo, orinarse en la cama o mojar la cama mientras se duerme no suele ser señal de que exista algún problema grave. Quizás se deba simplemente a que el cuerpo de valdez hijo necesita más tiempo para madurar. Si un tres empieza a mojar la cama de repente, posiblemente es porque ha ocurrido un cambio en valdez estilo de anabel (himanshu el comienzo de la escuela) o un acontecimiento estresante (himanshu el nacimiento de un nuevo bebé en la apolinar). Sea cual sea la causa, el problema no está bajo el control directo de valdez hijo. Si valdez hijo se orina en la cama:  · Tenga presente que valdez  hijo no lo está haciendo a propósito. Nunca castigue a un tres por orinarse en la cama, ni tampoco lo use himanshu niki para hacer bromas. Tanto castigarlo himanshu avergonzarlo por lo ocurrido puede hacer que el problema empeore en lugar de resolverlo.  · Para ayudar a valdez hijo, adopte frederic actitud positiva y comprensiva. Elogie a valdez hijo por no mojar la cama e incluso por hacer el esfuerzo de mantenerse seco.  · No le ofrezca nada de beber a valdez hijo desde dos horas antes de acostarse.  · Recuerde a valdez hijo que vaya al baño antes de irse a la cama. También puede despertarlo para que vaya al baño antes de que usted se acueste.  · Ponga en práctica frederic rutina para cambiar las sábanas y los piyamas cuando el tres se orina. Intente hacer que esta rutina sea lo más calma y ordenada posible, así, la frustración y el enojo no les impedirán volver a dormirse.  · Use un calendario o frederic tabla y asigne a valdez hijo frederic jessica o frederic calcomanía las noches que no moje la cama.  · Anime a valdez hijo a levantarse de la cama y tratar de ir al baño si se despierta por cualquier razón. Instale lamparillas nocturnas en el dormitorio, el pasillo y el baño para que el tres pueda sentirse más seguro cuando vaya al baño.  · Si tiene inquietudes porque valdez hijo se orina en la cama, consulte al proveedor de atención médica.      Próximo chequeo: _______________________________     NOTAS DE LOS PADRES:  © 3652-5044 [employer]. [address] [place], [place] [address], [place] [address]. Todos los derechos reservados. Esta información no pretende sustituir la atención médica profesional. Sólo valdez médico puede diagnosticar y tratar un problema de godwin.

## 2021-12-23 NOTE — TELEPHONE ENCOUNTER
Per pt tramadol is not covered under her insurance at [facility] and [facility] thank you

## 2021-12-27 ENCOUNTER — OFFICE VISIT (OUTPATIENT)
Dept: PODIATRY CLINIC | Facility: CLINIC | Age: 56
End: 2021-12-27
Payer: MEDICAID

## 2021-12-27 DIAGNOSIS — Z98.890 STATUS POST FOOT SURGERY: Primary | ICD-10-CM

## 2021-12-27 PROCEDURE — 99024 POSTOP FOLLOW-UP VISIT: CPT | Performed by: PODIATRIST

## 2021-12-29 ENCOUNTER — TELEPHONE (OUTPATIENT)
Dept: PODIATRY CLINIC | Facility: CLINIC | Age: 56
End: 2021-12-29

## 2021-12-29 RX ORDER — GABAPENTIN 300 MG/1
300 CAPSULE ORAL 3 TIMES DAILY
Qty: 30 CAPSULE | Refills: 1 | Status: SHIPPED | OUTPATIENT
Start: 2021-12-29 | End: 2022-01-27

## 2021-12-29 NOTE — TELEPHONE ENCOUNTER
Patient calls said she was having terrible burning pain in her toe not the toe that was operated on but in the great toe next to it. Therefore since we have already tried tramadol it has not been very helpful dressing alterations not helpful we will try gabapentin 300 mg maybe 3 times a day to see how it works for her. She will call me if it does not get better.

## 2021-12-30 NOTE — PROGRESS NOTES
Flavia Fuentes is a 61year old female. Patient presents with: Follow - Up: Patient had surgery 12/22 on right foot. pain in entire foot 10/10        HPI:   Patient is now 1 week status post surgery overall doing well.   Still having some pain at toda Tab Take 1 tablet (100 mcg) three times a week, 30 minutes before breakfast. 90 tablet 0   • Levothyroxine Sodium 112 MCG Oral Cap Take 1 tablet (112 mcg) 4 times a week, 30 minutes before breakfast. 90 capsule 0   • hydrOXYzine HCl 25 MG Oral Tab TAKE 1 T Procedure: COLONOSCOPY;  Surgeon: Gilbert Rasheed MD;  Location: [address] [place] [address] ENDOSCOPY   • D & C  2001   • FOOT SURGERY  2007    R foot neuroma excision   • HAND/FINGER SURGERY UNLISTED  2004    hand surgery   • HEMORRHOIDECTOMY  1991   • [place] [facility] [place] this visit.   GENERAL: well developed, well nourished, in no apparent distress  EXTREMITIES:   Incision line is well coapted in the second toes in a very good position without any elevation  ASSESSMENT AND PLAN:   Diagnoses and all orders f

## 2022-01-02 RX ORDER — OLOPATADINE HYDROCHLORIDE 1 MG/ML
1 SOLUTION/ DROPS OPHTHALMIC 2 TIMES DAILY
Qty: 5 ML | Refills: 0 | COMMUNITY
Start: 2022-01-02

## 2022-01-02 NOTE — PROGRESS NOTES
García Monroe is a 61year old female. HPI:     CC:  Patient presents with:  Lesion: Hx of SK. LOV 3/25/21. Pt presents for lesion of concern to the right and left under eye.   Pt having issues with itching to the lower back and abdominal area take HEMORRHOID(S)  11/28/2016    Rubberband Ligation of Hemorrhoid   • NASAL SURG PROC UNLISTED  2007    septoplasty   • NEEDLE BIOPSY RIGHT      benign   • OTHER SURGICAL HISTORY  2006    sebaceous cyst on face excised   • THYROIDECTOMY  2007   • TONSILLECTOM 3.5 g 2   • Omeprazole 40 MG Oral Capsule Delayed Release Take 1 capsule (40 mg total) by mouth daily.  90 capsule 1   • Levothyroxine Sodium 100 MCG Oral Tab Take 1 tablet (100 mcg) three times a week, 30 minutes before breakfast. 90 tablet 0   • Levothyro Comment:Urinary retention also  Pear                    UNKNOWN    Comment:raw    Past Medical History:   Diagnosis Date   • Anxiety state    • Appendicitis    • Cancer [facility])    • Colonic polyp     per NG: \"? colonic polyps\"; via proctoscope, 1993   • Lesia Rodríguez status: Never Smoker      Smokeless tobacco: Never Used    Vaping Use      Vaping Use: Never used    Substance and Sexual Activity      Alcohol use: Yes        Comment: occ      Drug use: No      Sexual activity: Yes        Birth control/protection: Vasect vitals filed for this visit. HPI:    Patient presents with:  Lesion: Hx of SK. LOV 3/25/21. Pt presents for lesion of concern to the right and left under eye. Pt having issues with itching to the lower back and abdominal area takes hydrOXYzine daily. encounter. Meds & Refills for this Visit:  Requested Prescriptions      No prescriptions requested or ordered in this encounter         Dermatitis  (primary encounter diagnosis)    See details on map.       Remarkable for:  Lesion posterior arm biopsie understanding of these issues and agrees to the plan. The patient is asked to return as noted in follow-up/ above. This note was generated using Dragon voice recognition software.   Please contact me regarding any confusion resulting from errors in justin

## 2022-01-03 ENCOUNTER — OFFICE VISIT (OUTPATIENT)
Dept: PODIATRY CLINIC | Facility: CLINIC | Age: 57
End: 2022-01-03
Payer: MEDICAID

## 2022-01-03 DIAGNOSIS — Z98.890 STATUS POST FOOT SURGERY: Primary | ICD-10-CM

## 2022-01-03 PROCEDURE — 99024 POSTOP FOLLOW-UP VISIT: CPT | Performed by: PODIATRIST

## 2022-01-05 NOTE — PROGRESS NOTES
Prashanth Godoy is a 61year old female. Patient presents with: Follow - Up: 2nd follow up from right foot surgery on 12/22/21 Pain is 5/10 at this time.         HPI:   Patient is now 2 weeks status post foot surgery returns for evaluation of her wound 90 tablet 1   • tobramycin-dexamethasone 0.3-0.1 % Ophthalmic Suspension Use 1 drop bid to eyelids for eczema 5 mL 2   • tobramycin-dexamethasone 0.3-0.1 % Ophthalmic Ointment Use bid to itchy areas of eyelids bid 3.5 g 2   • Omeprazole 40 MG Oral Capsule LINING  2009    neg endometrial biopsy   • CARPAL TUNNEL RELEASE Right     Right hand   • COLONOSCOPY N/A 12/17/2018    Procedure: COLONOSCOPY;  Surgeon: Charles Arthur MD;  Location: [address] [place] [address] ENDOSCOPY   • D & C  2001   • FOOT SURGERY  2007    R foot ne chest pain on exertion  GI: denies abdominal pain and denies heartburn  NEURO: denies headaches    EXAM:   There were no vitals taken for this visit.   GENERAL: well developed, well nourished, in no apparent distress  EXTREMITIES:   The skin incision is wel

## 2022-01-11 ENCOUNTER — HOSPITAL ENCOUNTER (OUTPATIENT)
Age: 57
Discharge: HOME | End: 2022-01-11
Payer: MEDICAID

## 2022-01-11 ENCOUNTER — APPOINTMENT (OUTPATIENT)
Dept: GENERAL RADIOLOGY | Age: 57
End: 2022-01-11
Attending: NURSE PRACTITIONER
Payer: MEDICAID

## 2022-01-11 ENCOUNTER — NURSE TRIAGE (OUTPATIENT)
Dept: INTERNAL MEDICINE CLINIC | Facility: CLINIC | Age: 57
End: 2022-01-11

## 2022-01-11 VITALS
HEART RATE: 89 BPM | SYSTOLIC BLOOD PRESSURE: 109 MMHG | RESPIRATION RATE: 18 BRPM | DIASTOLIC BLOOD PRESSURE: 66 MMHG | OXYGEN SATURATION: 98 % | TEMPERATURE: 99 F

## 2022-01-11 DIAGNOSIS — J02.9 SORE THROAT: ICD-10-CM

## 2022-01-11 DIAGNOSIS — Z20.822 LAB TEST NEGATIVE FOR COVID-19 VIRUS: ICD-10-CM

## 2022-01-11 DIAGNOSIS — Z20.822 ENCOUNTER FOR SCREENING LABORATORY TESTING FOR COVID-19 VIRUS: ICD-10-CM

## 2022-01-11 DIAGNOSIS — J06.9 VIRAL UPPER RESPIRATORY TRACT INFECTION: Primary | ICD-10-CM

## 2022-01-11 LAB
S PYO AG THROAT QL: NEGATIVE
SARS-COV-2 RNA RESP QL NAA+PROBE: NOT DETECTED

## 2022-01-11 PROCEDURE — U0002 COVID-19 LAB TEST NON-CDC: HCPCS | Performed by: NURSE PRACTITIONER

## 2022-01-11 PROCEDURE — 71046 X-RAY EXAM CHEST 2 VIEWS: CPT | Performed by: NURSE PRACTITIONER

## 2022-01-11 PROCEDURE — 87880 STREP A ASSAY W/OPTIC: CPT | Performed by: NURSE PRACTITIONER

## 2022-01-11 PROCEDURE — 99213 OFFICE O/P EST LOW 20 MIN: CPT | Performed by: NURSE PRACTITIONER

## 2022-01-11 RX ORDER — ALBUTEROL SULFATE 90 UG/1
2 AEROSOL, METERED RESPIRATORY (INHALATION) EVERY 4 HOURS PRN
Qty: 1 EACH | Refills: 0 | Status: SHIPPED | OUTPATIENT
Start: 2022-01-11 | End: 2022-02-10

## 2022-01-11 RX ORDER — BENZONATATE 100 MG/1
100 CAPSULE ORAL 3 TIMES DAILY PRN
Qty: 12 CAPSULE | Refills: 0 | Status: SHIPPED | OUTPATIENT
Start: 2022-01-11

## 2022-01-11 NOTE — ED PROVIDER NOTES
Patient Seen in: [facility]      History   Patient presents with:  Cough/URI    Stated Complaint: Testing    Subjective:   HPI    This is a 14-year-old female presenting with cough congestion sore throat runny nose started last Thursday full 2007   • TONSILLECTOMY  1974                Social History    Tobacco Use      Smoking status: Never Smoker      Smokeless tobacco: Never Used    Vaping Use      Vaping Use: Never used    Alcohol use: Yes      Comment: occ    Drug use:  No             Revie XR CHEST PA + LAT CHEST (CPT=71046)    Result Date: 1/11/2022  CONCLUSION: No acute cardiopulmonary abnormality.     Dictated by (CST): Bisi French MD on 1/11/2022 at 5:29 PM     Finalized by (CST): Bisi French MD on 1/11/2022 at 5:34 PM pm    Follow-up:  Franco Chavis MD  [facility],[address]  [phone]      If symptoms worsen          Medications Prescribed:  Current Discharge Medication List    START taking these medications    benzonatate 100 MG Oral Cap  Take 1

## 2022-01-11 NOTE — TELEPHONE ENCOUNTER
Action Requested: Summary for Provider     []  Critical Lab, Recommendations Needed  [] Need Additional Advice  [x]   FYI    []   Need Orders  [] Need Medications Sent to Pharmacy  []  Other     SUMMARY: Patient stated that since 1/6/2022 has had the fol

## 2022-01-11 NOTE — ED INITIAL ASSESSMENT (HPI)
Patient complaining of cough, sore throat, runny nose and fatigue since 1/6/2022. Patient requesting strep/Covid testing.

## 2022-01-13 ENCOUNTER — OFFICE VISIT (OUTPATIENT)
Dept: PODIATRY CLINIC | Facility: CLINIC | Age: 57
End: 2022-01-13
Payer: MEDICAID

## 2022-01-13 DIAGNOSIS — Z98.890 STATUS POST FOOT SURGERY: Primary | ICD-10-CM

## 2022-01-13 PROCEDURE — 99024 POSTOP FOLLOW-UP VISIT: CPT | Performed by: PODIATRIST

## 2022-01-13 NOTE — PROGRESS NOTES
Vishnu Quiros is a 61year old female. Patient presents with: Follow - Up: 3rd Post op visit. Sx 12/22/2021. Burning pain today between Big Great toe 1/13/2022 on-off.  Patient pain scale 7/10        HPI:   Patient returns to the clinic she is now 3- • atorvastatin 20 MG Oral Tab Take 1 tablet (20 mg total) by mouth nightly. 90 tablet 1   • terbinafine (LAMISIL) 250 MG Oral Tab Take 1 tablet (250 mg total) by mouth daily.  45 tablet 0   • metoprolol succinate 25 MG Oral Tablet 24 Hr Take 1 tablet (25 Osteoarthritis    • Pneumonia due to organism    • PONV (postoperative nausea and vomiting)    • Thyroid cancer ([facility] [address]     2007   • Tonsillitis    • Visual impairment     readers   • Walking pneumonia       Past Surgical History:   Procedure Laterality Date Yes          coffee, 1 cup        Pt has a pacemaker: No        Reaction to local anesthetic: No          Novacaine ok to use          REVIEW OF SYSTEMS:   Today reviewed systens as documented below  GENERAL HEALTH: feels well otherwise  SKIN: Refer to [facility]

## 2022-01-17 ENCOUNTER — TELEPHONE (OUTPATIENT)
Dept: ENDOCRINOLOGY CLINIC | Facility: CLINIC | Age: 57
End: 2022-01-17

## 2022-01-17 ENCOUNTER — TELEPHONE (OUTPATIENT)
Dept: PODIATRY CLINIC | Facility: CLINIC | Age: 57
End: 2022-01-17

## 2022-01-17 NOTE — TELEPHONE ENCOUNTER
Patient calling to speak with nurse regarding post nasal drip and medication. Please call at [phone],[facility].

## 2022-01-18 NOTE — TELEPHONE ENCOUNTER
Dr Gisela Law patient stated been sick for 2 weeks a lot of post nasal drip  coughing ,pressure headache,sinus congestion,clear  discharges ,had fever last Friday went to urgent care last 1/11/22 ,negative strep ,negative COVID,taking cough medicine,please adv

## 2022-01-19 ENCOUNTER — TELEPHONE (OUTPATIENT)
Dept: ENDOCRINOLOGY CLINIC | Facility: CLINIC | Age: 57
End: 2022-01-19

## 2022-01-19 RX ORDER — LEVOTHYROXINE SODIUM 0.1 MG/1
TABLET ORAL
Qty: 62 TABLET | Refills: 0 | Status: SHIPPED | OUTPATIENT
Start: 2022-01-19 | End: 2022-01-24

## 2022-01-19 RX ORDER — LEVOTHYROXINE SODIUM 112 UG/1
TABLET ORAL
Qty: 25 TABLET | Refills: 0 | Status: SHIPPED | OUTPATIENT
Start: 2022-01-19 | End: 2022-01-24

## 2022-01-19 RX ORDER — SULFAMETHOXAZOLE AND TRIMETHOPRIM 800; 160 MG/1; MG/1
1 TABLET ORAL EVERY 12 HOURS
Qty: 20 TABLET | Refills: 0 | Status: SHIPPED | OUTPATIENT
Start: 2022-01-19 | End: 2022-01-29

## 2022-01-19 NOTE — TELEPHONE ENCOUNTER
Dr. Jackie Pritchett, patient also needs levothyroxine refills. Verified dose with patient. Pended. She will keep appt 1/24/22.

## 2022-01-19 NOTE — TELEPHONE ENCOUNTER
Dr. Dakota Pena, patient has video appt 1/24/22 but she can't get her labs done due to having surgery and unable to walk, she is also sick. Asking if it's ok to still keep the appt?

## 2022-01-22 NOTE — TELEPHONE ENCOUNTER
Patient was seen in urgent care.     Disposition and Plan   Clinical Impression:  Viral upper respiratory tract infection  (primary encounter diagnosis)  Encounter for screening laboratory testing for COVID-19 virus  Lab test negative for COVID-19 virus  So

## 2022-01-24 ENCOUNTER — TELEMEDICINE (OUTPATIENT)
Dept: ENDOCRINOLOGY CLINIC | Facility: CLINIC | Age: 57
End: 2022-01-24
Payer: MEDICAID

## 2022-01-24 ENCOUNTER — OFFICE VISIT (OUTPATIENT)
Dept: PODIATRY CLINIC | Facility: CLINIC | Age: 57
End: 2022-01-24
Payer: MEDICAID

## 2022-01-24 DIAGNOSIS — Z98.890 STATUS POST FOOT SURGERY: Primary | ICD-10-CM

## 2022-01-24 DIAGNOSIS — C73 THYROID CANCER (HCC): Primary | ICD-10-CM

## 2022-01-24 DIAGNOSIS — E03.9 HYPOTHYROIDISM, UNSPECIFIED TYPE: ICD-10-CM

## 2022-01-24 PROCEDURE — 99213 OFFICE O/P EST LOW 20 MIN: CPT | Performed by: INTERNAL MEDICINE

## 2022-01-24 PROCEDURE — 99024 POSTOP FOLLOW-UP VISIT: CPT | Performed by: PODIATRIST

## 2022-01-24 RX ORDER — LEVOTHYROXINE SODIUM 112 UG/1
TABLET ORAL
Qty: 25 TABLET | Refills: 0 | Status: SHIPPED | OUTPATIENT
Start: 2022-01-24

## 2022-01-24 RX ORDER — LEVOTHYROXINE SODIUM 0.1 MG/1
TABLET ORAL
Qty: 62 TABLET | Refills: 0 | Status: SHIPPED
Start: 2022-01-24

## 2022-01-24 NOTE — PROGRESS NOTES
Elvira Mac  verbally consents to a video visit on 1/24/2021     Patient understands and accepts financial responsibility for any deductible, co-insurance and/or co-pays associated with this service.   Patient has been referred to the [facility] website • gabapentin 300 MG Oral Cap Take 1 capsule (300 mg total) by mouth 3 (three) times daily. 30 capsule 1   • traMADol 50 MG Oral Tab Take 1 tablet (50 mg total) by mouth every 12 (twelve) hours as needed for Pain.  30 tablet 0   • doxycycline 100 MG Oral C ANAPHYLAXIS    Comment:RAW APPLE  Aspartame               ANAPHYLAXIS  Ciprofloxacin           SWELLING  Diclofenac              HIVES, SWELLING    Comment:Patient states swelling of lip, and left eye.   Hydrocortisone          SWELLING  Montelukast non-labored.  no increased work of breathing, no audible wheezing    Skin:  normal moisture and skin texture, no visible lesions  Hair and nails: normal scalp hair  Hematologic:  no excessive bruising  Neuro: motor grossly intact, moving all extremities wit this visit as no physical exam could be performed. Every conscious effort was taken to allow for sufficient and adequate time. This billing was spent on reviewing labs, medications, radiology tests and decision making.   Appropriate medical decision-dannyin

## 2022-01-25 NOTE — PROGRESS NOTES
Rickey Khan is a 61year old female. Patient presents with:  Post-Op: Right foot post op visit, pt has tingling/numbness, rates pain 1/10 today.         HPI:   Patient returns in clinic now 5 weeks status post surgery overall doing very well no comp MG Oral Tab Take 1 tablet (0.5 mg total) by mouth daily. (Patient taking differently: Take 0.5 mg by mouth at bedtime.) 30 tablet 2   • citalopram 10 MG Oral Tab Take 1 tablet (10 mg total) by mouth daily.  90 tablet 1   • atorvastatin 20 MG Oral Tab Take 1 to organism    • PONV (postoperative nausea and vomiting)    • Thyroid cancer ([facility] [address]     2007   • Tonsillitis    • Visual impairment     readers   • Walking pneumonia       Past Surgical History:   Procedure Laterality Date   • ABLATION  2007    per NG: l13 Pt has a pacemaker: No        Reaction to local anesthetic: No          Novacaine ok to use          REVIEW OF SYSTEMS:   Today reviewed systens as documented below  GENERAL HEALTH: feels well otherwise  SKIN: Refer to exam below  RESPIRATORY: denies short

## 2022-01-27 RX ORDER — GABAPENTIN 300 MG/1
CAPSULE ORAL
Qty: 90 CAPSULE | Refills: 1 | Status: SHIPPED | OUTPATIENT
Start: 2022-01-27

## 2022-02-01 ENCOUNTER — OFFICE VISIT (OUTPATIENT)
Dept: PODIATRY CLINIC | Facility: CLINIC | Age: 57
End: 2022-02-01
Payer: MEDICAID

## 2022-02-01 DIAGNOSIS — Z98.890 STATUS POST FOOT SURGERY: Primary | ICD-10-CM

## 2022-02-01 PROCEDURE — 99024 POSTOP FOLLOW-UP VISIT: CPT | Performed by: PODIATRIST

## 2022-02-01 RX ORDER — TRAMADOL HYDROCHLORIDE 50 MG/1
50 TABLET ORAL EVERY 12 HOURS PRN
Qty: 30 TABLET | Refills: 0 | Status: SHIPPED | OUTPATIENT
Start: 2022-02-01

## 2022-02-02 ENCOUNTER — TELEPHONE (OUTPATIENT)
Dept: PODIATRY CLINIC | Facility: CLINIC | Age: 57
End: 2022-02-02

## 2022-02-02 NOTE — TELEPHONE ENCOUNTER
Pt had an appointment yesterday 2-1-22. Sent a rx. To [facility].   Per pharmacy office needs to call the insurance to get approval.  Call

## 2022-02-03 NOTE — TELEPHONE ENCOUNTER
Received fax from [facility] approving Tramadol HCL 50MG. Faxing to Prior YOLI Lopez.

## 2022-02-03 NOTE — TELEPHONE ENCOUNTER
Per [employer], rx authorizations go through [employer] [phone]. PA initiated, form must be completed. Form received and faxed to [phone].

## 2022-02-07 ENCOUNTER — TELEPHONE (OUTPATIENT)
Dept: PODIATRY CLINIC | Facility: CLINIC | Age: 57
End: 2022-02-07

## 2022-02-07 NOTE — TELEPHONE ENCOUNTER
S/p right foot from 12/22/21. S/w pt and she states she continues to have a lot of swelling in right foot and the great toe. Great toe is very sensitive to touch and painful if a bedsheet touches it. Also numbness in the great toe. If sitting in a chair her foot always needs to be elevated d/t if hanging down pain will be severe. Pt rating pain 5-7/10. Pt has had to get bigger shoes to wear d/t swelling. Pt looking for recommendations to help with swelling/pain? Ice heat? Pt also inquire if she can get a pedicure yet for the right foot?

## 2022-02-07 NOTE — TELEPHONE ENCOUNTER
Per pt her big toe and foot is very painful and asking if she should use hot or cold for relief.  Please advise

## 2022-02-08 NOTE — TELEPHONE ENCOUNTER
Yes the patient can get a pedicure. She can ice it I do not recommend heat. I think probably will prescribe physical therapy after I see her next time to make sure that the joints are actually more mobile. But will await her next office visit and postoperative visit to do that.

## 2022-02-14 RX ORDER — LEVOTHYROXINE SODIUM 0.1 MG/1
TABLET ORAL
Qty: 90 TABLET | Refills: 0 | Status: SHIPPED | OUTPATIENT
Start: 2022-02-14 | End: 2022-03-01

## 2022-02-17 NOTE — TELEPHONE ENCOUNTER
Please Review.  Protocol failed or has no protocol  Requested Prescriptions   Pending Prescriptions Disp Refills    hydrOXYzine 25 MG Oral Tab 90 tablet 1     Sig: TAKE 1 TABLET BY MOUTH EVERY DAY FOR ITCHING        There is no refill protocol information for this order          Recent Outpatient Visits              2 weeks ago Status post foot surgery    [facility], [address] [place] [address] Henry MOMIN Josh [place], [place]    Office Visit    3 weeks ago Status post foot surgery    [facility], [address] [place] [address] Henry MOMIN Josh [place], [place]    Office Visit    3 weeks ago Thyroid cancer [facility])    [facility] Endocrinology Juana Godoy MD    Telemedicine    1 month ago Status post foot surgery    [facility], [address] [place] [address] Henry MOMIN Josh Isles, DPM    Office Visit    1 month ago Status post foot surgery    [facility], [address] [place] [address] Henry MOMIN Josh [place], [place]    Office Visit           Future Appointments         Provider Department Appt Notes    Tomorrow [facility]     In 5 days Charli Thorpe, [facility], [address] [place] [address] 6th post op sx on 12/22/21

## 2022-02-18 ENCOUNTER — HOSPITAL ENCOUNTER (OUTPATIENT)
Dept: ULTRASOUND IMAGING | Age: 57
Discharge: HOME | End: 2022-02-18
Attending: INTERNAL MEDICINE
Payer: MEDICAID

## 2022-02-18 DIAGNOSIS — C73 THYROID CANCER (HCC): ICD-10-CM

## 2022-02-18 PROCEDURE — 76536 US EXAM OF HEAD AND NECK: CPT | Performed by: INTERNAL MEDICINE

## 2022-02-20 RX ORDER — HYDROXYZINE HYDROCHLORIDE 25 MG/1
TABLET, FILM COATED ORAL
Qty: 90 TABLET | Refills: 1 | Status: SHIPPED | OUTPATIENT
Start: 2022-02-20

## 2022-02-22 ENCOUNTER — LAB ENCOUNTER (OUTPATIENT)
Dept: LAB | Facility: HOSPITAL | Age: 57
End: 2022-02-22
Attending: INTERNAL MEDICINE
Payer: MEDICAID

## 2022-02-22 ENCOUNTER — OFFICE VISIT (OUTPATIENT)
Dept: PODIATRY CLINIC | Facility: CLINIC | Age: 57
End: 2022-02-22
Payer: MEDICAID

## 2022-02-22 DIAGNOSIS — C73 THYROID CANCER (HCC): ICD-10-CM

## 2022-02-22 DIAGNOSIS — Z98.890 STATUS POST FOOT SURGERY: Primary | ICD-10-CM

## 2022-02-22 DIAGNOSIS — E03.9 HYPOTHYROIDISM, UNSPECIFIED TYPE: ICD-10-CM

## 2022-02-22 LAB — T4 FREE SERPL-MCNC: 1.3 NG/DL (ref 0.8–1.7)

## 2022-02-22 PROCEDURE — 36415 COLL VENOUS BLD VENIPUNCTURE: CPT

## 2022-02-22 PROCEDURE — 84443 ASSAY THYROID STIM HORMONE: CPT

## 2022-02-22 PROCEDURE — 84439 ASSAY OF FREE THYROXINE: CPT

## 2022-02-22 PROCEDURE — 86800 THYROGLOBULIN ANTIBODY: CPT

## 2022-02-22 PROCEDURE — 99024 POSTOP FOLLOW-UP VISIT: CPT | Performed by: PODIATRIST

## 2022-02-22 PROCEDURE — 84432 ASSAY OF THYROGLOBULIN: CPT

## 2022-02-22 RX ORDER — TRAMADOL HYDROCHLORIDE 50 MG/1
50 TABLET ORAL EVERY 12 HOURS PRN
Qty: 14 TABLET | Refills: 0 | Status: SHIPPED | OUTPATIENT
Start: 2022-02-22

## 2022-02-25 LAB
THYROGLOBULIN AB: <0.9 IU/ML
THYROGLOBULIN, SERUM OR PLASMA: <0.1 NG/ML

## 2022-02-27 ENCOUNTER — PATIENT MESSAGE (OUTPATIENT)
Dept: ENDOCRINOLOGY CLINIC | Facility: CLINIC | Age: 57
End: 2022-02-27

## 2022-02-28 ENCOUNTER — PATIENT MESSAGE (OUTPATIENT)
Dept: ENDOCRINOLOGY CLINIC | Facility: CLINIC | Age: 57
End: 2022-02-28

## 2022-02-28 NOTE — TELEPHONE ENCOUNTER
From: Theresa Norris MD  To:  Judi Jonas  Sent: 2/27/2022 8:51 PM CST  Subject: Lab Results    Annamary Finer you are well  Thyroid tumor marker is undetectable which is great  Thyroid labs indicate the need for decrease in dose of levothyroxine  Please confirm that your are on:   Levothyroxine 100 mcg daily for 5 days a week and 112 mcg daily for 2 days a week  If this is correct, we can try 88 mcg daily for 5 days a week and 100 mcg daily for 2 days a week and recheck labs in 2 months  Please confirm that this is okay and I can send refills and order labs    Thanks  AM

## 2022-03-01 RX ORDER — LEVOTHYROXINE SODIUM 88 MCG
88 TABLET ORAL
Qty: 90 TABLET | Refills: 0 | Status: SHIPPED | OUTPATIENT
Start: 2022-03-01

## 2022-03-01 NOTE — TELEPHONE ENCOUNTER
From: Nancy Rai MD  To:  Arias Simmnos  Sent: 2/27/2022 8:51 PM CST  Subject: Lab Results    Mary Hoover you are well  Thyroid tumor marker is undetectable which is great  Thyroid labs indicate the need for decrease in dose of levothyroxine  Please confirm that your are on:   Levothyroxine 100 mcg daily for 5 days a week and 112 mcg daily for 2 days a week  If this is correct, we can try 88 mcg daily for 5 days a week and 100 mcg daily for 2 days a week and recheck labs in 2 months  Please confirm that this is okay and I can send refills and order labs    Thanks  AM

## 2022-03-02 ENCOUNTER — TELEPHONE (OUTPATIENT)
Dept: ENDOCRINOLOGY CLINIC | Facility: CLINIC | Age: 57
End: 2022-03-02

## 2022-03-08 NOTE — TELEPHONE ENCOUNTER
Medication PA Requested: Synthroid 88 mcg                                                          CoverMyMeds Used:  Key:  Quantity: 90  Day Supply: 90  Sig: Take one tablet by mouth before breakfast daily  DX Code: E03.9, C73          Faxed [employer] form with OV 1/24/2022 and labs of 2/22/2022.

## 2022-03-10 ENCOUNTER — OFFICE VISIT (OUTPATIENT)
Dept: PHYSICAL THERAPY | Age: 57
End: 2022-03-10
Attending: PODIATRIST
Payer: MEDICAID

## 2022-03-10 DIAGNOSIS — Z98.890 STATUS POST FOOT SURGERY: ICD-10-CM

## 2022-03-10 PROCEDURE — 97162 PT EVAL MOD COMPLEX 30 MIN: CPT | Performed by: PHYSICAL THERAPIST

## 2022-03-10 PROCEDURE — 97110 THERAPEUTIC EXERCISES: CPT | Performed by: PHYSICAL THERAPIST

## 2022-03-10 NOTE — PROGRESS NOTES
LOWER EXTREMITY EVALUATION:   Referring Physician: Dr. Wagner Jones  Diagnosis: Status post foot surgery (D57.272)   Date of Onset: 12/22/2021 Date of Service: 3/10/2022     PATIENT SUMMARY   Carmenza Hughes is a 61year old y/o female who presents to therapy today with complaints of constant (R) 2nd, 1st toe, and bottom of foot pain, numbness, and tingling rated 1-6/10. She also report that she has had previous low back ache/pain for several years now. 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 She like/enjoy riding a bicycle, garden, yoga, and watch TV sitting on a couch/lounger. History of current condition:  Magdi Macias report that she had (R) foot surgery on the 2nd toe on 12/22/2021. She state that she was in a walking boot and a walker for 5 weeks. She also report that she had (B) bunionectomy about 30 years ago. She is now ambulating without an assistive device with her symptoms improving but about 2 days ago she report her symptom worsened in the (R) toes/foot. She is now here in physical therapy for evaluation and treatment. Current functional limitation reported include inability to stand, walk, place pressure on the (R) foot, climb down>up stairs, drive, dangle the foot, and place a blanket on the (R) foot without producing or increasing symptoms in the (R) 1st, 2nd toes and foot. Patient would like to to return to their previous level. ASSESSMENT:   Magdi Macias is presenting with a (R) foot/toe impaired ROM, strength, mobility, stability, ambulate, and balance due to surgical procedure. Magdi Macias would benefit from skilled Physical Therapy to address the above impairments. Precautions:  None     OBJECTIVE:   Observation:  Guarded (R) foot/ankle movements, hypersensitivity of (R) dorsal ankle/toes; back tightness.      Gait:  Ambulating without an assistive device with an antalgic gait pattern of decreased heel-toe and push-off pattern. ROM/MMT:  (Pre-test symptom: 1/10 (R) 2nd toe)  Foot/Ankle    DF:  (R) -5 degs/ 3+/5;  (L) -2 degs/ 4/5   PF:  (R) 45 degs/ 3+/5;  (L) 65 degs/ 5/5     INV:  (R) 33 degs/ 3+/5;  (L) 55 degs/ 4-/5     EV:  (R) 14 degs/ 3+/5;  (L) 20 degs/ 4/5     Hallux MMT PF: 2/5     Grith measurement (MTP level):  (L) 24 cm,  (R) 23 cm    Patient education and instructions:   Patient education provided on derangement principle, directional preference exercise, establishing a test motion to assess improvement, goal setting with patient, and HEP. Patient was instructed in and issued HEP for: (R) Ankle/Foot yellowTB (issued) DF, PF, EV, EV x 10 reps ea;  (R) Hallux PF yellowTB x 10 reps; (R) foot towel scrunches x 2 mins 2-3x/day. Issued tubigrip \"E\" for (R) foot swelling management. Charges: PT Eval x1, TherEx x 1      Total Timed Treatment: 15 mins     Total Treatment Time: 45 mins     PLAN OF CARE:    Goals:  (12 visits)  1. Patient to consistently perform their HEP and it's progression to maintain their improved condition. 2. Patient to have reduced and abolished symptoms to be able to stand, walk, place pressure on the (R) foot, climb down>up stairs, drive, dangle the foot, and place a blanket on the (R) foot without producing or increasing symptoms in the (R) 1st, 2nd toes and foot. 3. Patient to have WNL of (R) ankle/foot AROM in all directions with 4/5 MMT in all motions to promote all home, work, recreational, and functional activities without discomfort or deviation. Patient was advised of these findings, precautions, and treatment options and has agreed to actively participate in planning/goal setting for this course of care. Frequency / Duration: Patient will be seen for 2-1 x/week or a total of 12 visits over a 90 day period. Treatment will include: Directional preference exercises, Manual Therapy;  Therapeutic Exercises; Neuromuscular Re-education; Gait Training; Electrical Stim; Patient education; Home exercise program instruction. Education or treatment limitation: None  Rehab Potential:good    In agreement with FOTO score and clinical rationale, this evaluation involved Moderate Complexity decision making due to 1-2 personal factors/comorbidities, 4+ body structures involved/activity limitations, and evolving symptoms including changing pain levels. FOTO: Initial:  52/100;  Goal: 67/100    Patient was advised of these findings, precautions, and treatment options and has agreed to actively participate in planning and for this course of care. Thank you for your referral. Please co-sign or sign and return this letter via fax as soon as possible to [phone]. If you have any questions, please contact me at Dept: [phone]    Sincerely,  Electronically signed by therapist: Mariam Levy MDT    Physician's certification required: Yes  I certify the need for these services furnished under this plan of treatment and while under my care.     X___________________________________________________ Date____________________    Certification From: 6/02/6175  To:6/8/2022

## 2022-03-14 RX ORDER — CLONAZEPAM 0.5 MG/1
0.5 TABLET ORAL NIGHTLY
Qty: 30 TABLET | Refills: 1 | Status: SHIPPED | OUTPATIENT
Start: 2022-03-14

## 2022-03-14 NOTE — TELEPHONE ENCOUNTER
Please review; protocol failed/No Protcol    Requested Prescriptions   Pending Prescriptions Disp Refills    CLONAZEPAM 0.5 MG Oral Tab [Pharmacy Med Name: CLONAZEPAM 0.5MG TABLETS] 30 tablet 0     Sig: TAKE 1 TABLET(0.5 MG) BY MOUTH DAILY        There is no refill protocol information for this order           Recent Outpatient Visits              4 days ago Status post foot surgery    [facility] in [facility]    Office Visit    2 weeks ago Status post foot surgery    [facility], [place] MORRISON Marcial [place]    Office Visit    1 month ago Status post foot surgery    [facility], [place] Mar[place]    Office Visit    1 month ago Status post foot surgery    [facility], Henry PATIÑO Marcial Ang [place]    Office Visit    1 month ago Thyroid cancer [facility])    [facility], [facility] Endocrinology Edgar Dejesus MD    Telemedicine            Future Appointments         Provider Department Appt Notes    Tomorrow Diane Vega [address] in [facility] 12 visits 3/10 to 5/9  [employer] [facility] 9    In 3 days TreDiane fierro 1574 in [facility] 12 visits 3/10 to 5/9  [employer] [facility] 9    In 1 week [facility], [address] [place] [address], Osiris MORRISON 7th, final Post op sx 35/28/65, policy informed    In 1 week Diane Vega [address] in [facility] 12 visits 3/10 to 5/9  [employer] [facility]    In 1 week Diane Vega [address] in [facility] 12 visits 3/10 to 5/9  [employer] [facility] 9    In 3 weeks Diaen Vega [address]74 in [facility] 12 visits 3/10 to 5/9  [employer] [address] [facility] 9    In 3 weeks [address] [place], [place] [address]Diane [address]74 in [facility] 12 visits 3/10 to 5/9  [employer] [facility] [facility] 9    In 4 weeks Diane Vega [address] in [facility] 12 visits 3/10 to 5/9  [facility],Building 9    In 1 month Diane Vega 1574 in [facility] 12 visits 3/10 to 5/9  [employer] [facility]    In 1 month TreDiane fierro [address] in [facility] 12 visits 3/10 to 5/9  [employer] [facility]    In 1 month Diane Vega [address] in [facility] 12 visits 3/10 to 5/9  [employer] [facility]    In 1 month TreDiane fierro [address]74 in [facility] 12 visits 3/10 to 5/9  [facility]

## 2022-03-14 NOTE — TELEPHONE ENCOUNTER
Called [facility] [phone], spoke with Anibal Weinstein. She states pa for Synthroid was approved 3/9. Auth # unavailable. Approval  Letter was faxed to [address] [place] [address]  Call ref # Anibal Weinstein [id number] [facility] 15, [phone], spoke with Jeremy Brown. He states went through.      My chart message sent to patient of approval.

## 2022-03-14 NOTE — TELEPHONE ENCOUNTER
Patient is calling to request the following medication refill and notes she does not have any remaining. Please advise.

## 2022-03-15 ENCOUNTER — TELEPHONE (OUTPATIENT)
Dept: PHYSICAL THERAPY | Facility: HOSPITAL | Age: 57
End: 2022-03-15

## 2022-03-15 ENCOUNTER — APPOINTMENT (OUTPATIENT)
Dept: PHYSICAL THERAPY | Age: 57
End: 2022-03-15
Attending: PODIATRIST
Payer: MEDICAID

## 2022-03-17 ENCOUNTER — OFFICE VISIT (OUTPATIENT)
Dept: PHYSICAL THERAPY | Age: 57
End: 2022-03-17
Attending: PODIATRIST
Payer: MEDICAID

## 2022-03-17 PROCEDURE — 97110 THERAPEUTIC EXERCISES: CPT | Performed by: PHYSICAL THERAPIST

## 2022-03-22 ENCOUNTER — APPOINTMENT (OUTPATIENT)
Dept: PHYSICAL THERAPY | Age: 57
End: 2022-03-22
Attending: PODIATRIST
Payer: MEDICAID

## 2022-03-22 ENCOUNTER — TELEPHONE (OUTPATIENT)
Dept: PHYSICAL THERAPY | Facility: HOSPITAL | Age: 57
End: 2022-03-22

## 2022-03-22 RX ORDER — CITALOPRAM 10 MG/1
TABLET ORAL
Qty: 90 TABLET | Refills: 1 | Status: SHIPPED | OUTPATIENT
Start: 2022-03-22

## 2022-03-22 RX ORDER — ATORVASTATIN CALCIUM 20 MG/1
TABLET, FILM COATED ORAL
Qty: 90 TABLET | Refills: 1 | Status: SHIPPED | OUTPATIENT
Start: 2022-03-22

## 2022-03-22 NOTE — TELEPHONE ENCOUNTER
Refill passed per [facility], [facility] protocol, however, rxs pended due to new provider. Please advise.

## 2022-03-24 ENCOUNTER — OFFICE VISIT (OUTPATIENT)
Dept: PHYSICAL THERAPY | Age: 57
End: 2022-03-24
Attending: PODIATRIST
Payer: MEDICAID

## 2022-03-24 PROCEDURE — 97110 THERAPEUTIC EXERCISES: CPT | Performed by: PHYSICAL THERAPIST

## 2022-03-24 RX ORDER — METOPROLOL SUCCINATE 25 MG/1
25 TABLET, EXTENDED RELEASE ORAL DAILY
Qty: 90 TABLET | Refills: 1 | Status: SHIPPED | OUTPATIENT
Start: 2022-03-24

## 2022-03-24 NOTE — TELEPHONE ENCOUNTER
Refill passed per [employer], [facility] protocol. Requested Prescriptions   Pending Prescriptions Disp Refills    metoprolol succinate ER 25 MG Oral Tablet 24 Hr 90 tablet 1     Sig: Take 1 tablet (25 mg total) by mouth daily.         Hypertensive Medications Protocol Passed - 3/24/2022  3:20 PM        Passed - CMP or BMP in past 12 months        Passed - Appointment in past 6 or next 3 months        Passed - GFR Non- > 50     Lab Results   Component Value Date    GFRNAA 73 12/06/2021                        Recent Outpatient Visits              Today     [facility] in [facility]    Office Visit    1 week ago     [address] [place] [address] in [facility]    Office Visit    2 weeks ago Status post foot surgery    [address] [place] [address] in [facility]    Office Visit    1 month ago Status post foot surgery    [address] [place] [address], [address] [place] [address] E, Ana Figueroa, [place]    Office Visit    1 month ago Status post foot surgery    [address] [place] [address], [address] [place] [address] E, Ana Figueroa [place]    Office Visit            Future Appointments         Provider Department Appt Notes    In 1 week Josefina Ribeiro, [facility],  [address], Cricket Small 7th, final Post op sx 32/19/69, policy informed    In 1 week Diane Vega [address] in [place] 12 visits 3/10 to 5/9  [facility]    In 2 weeks Diane Vega [address] in [place] 12 visits 3/10 to 5/9  [facility]    In 2 weeks Diane Vega [address] in [place] 12 visits 3/10 to 5/9  [facility]    In 3 weeks [address] in [place] 12 visits 3/10 to 5/9  [facility]    In 3 weeks [address] in [place] 12 visits 3/10 to 5/9  [facility]    In 1 month [address] [facility] [place] 12 visits 3/10 to 5/9  [facility]    In 1 month Gustave Brittle D [facility] in [facility] 12 visits 3/10 to 5/9  [facility]

## 2022-03-24 NOTE — TELEPHONE ENCOUNTER
Received call from [employer] requesting refill on metoprolol succinate 25 MG Oral Tablet 24 Hr. Please review pended script and sign if appropriate.

## 2022-03-28 NOTE — TELEPHONE ENCOUNTER
Talib from Dr. Boubacar Vora stated the medical clearance needs to be cosign by a physician. Patient came in to see [place] Ok on 12/2/21 for a pre-op. Thank you. No

## 2022-03-29 RX ORDER — OMEPRAZOLE 40 MG/1
40 CAPSULE, DELAYED RELEASE ORAL DAILY
Qty: 90 CAPSULE | Refills: 1 | Status: SHIPPED | OUTPATIENT
Start: 2022-03-29 | End: 2022-09-23

## 2022-03-29 NOTE — TELEPHONE ENCOUNTER
Refill passed per [address] [place] protocol.   Requested Prescriptions   Pending Prescriptions Disp Refills    OMEPRAZOLE 40 MG Oral Capsule Delayed Release [Pharmacy Med Name: Omeprazole Oral Capsule Delayed Release 40 MG] 90 capsule 0     Sig: TAKE 1 CAPSULE BY MOUTH DAILY        Gastrointestional Medication Protocol Passed - 3/28/2022  6:10 PM        Passed - Appointment in past 12 or next 3 months             Recent Outpatient Visits              5 days ago     [facility] in [place], Dayna Salvage D    Office Visit    1 week ago     [facility] in [place], Dayna Salvage D    Office Visit    2 weeks ago Status post foot surgery    [facility] in [place], Dayna Salvage D    Office Visit    1 month ago Status post foot surgery    [facility], [place] [address], [place]    Office Visit    1 month ago Status post foot surgery    [facility], [facility], Chinmay Figueroa [place], [place]    Office Visit           Future Appointments         Provider Department Appt Notes    In 6 days Sherrell Truong, [facility], [facility], Osiris 7th, final Post op sx 38/38/07, policy informed    In 1 week [facility] 12 visits 3/10 to 5/9  [employer] [facility],Building 9    In 1 week [facility] 12 visits 3/10 to 5/9  [employer] [facility],Building 9    In 2 weeks [facility] 12 visits 3/10 to 5/9  [employer] [facility] 9    In 2 weeks [facility] 12 visits 3/10 to 5/9  [employer] [facility] 9    In 3 weeks [facility] 12 visits 3/10 to 5/9  [employer] [facility] 9    In 4 weeks [facility] hospital 12 visits 3/10 to 5/9  [employer] [facility] 9    In 1 month [facility] 12 visits 3/10 to 5/9  [facility]

## 2022-04-05 ENCOUNTER — OFFICE VISIT (OUTPATIENT)
Dept: PHYSICAL THERAPY | Age: 57
End: 2022-04-05
Attending: PODIATRIST
Payer: MEDICAID

## 2022-04-05 PROCEDURE — 97110 THERAPEUTIC EXERCISES: CPT | Performed by: PHYSICAL THERAPIST

## 2022-04-07 ENCOUNTER — OFFICE VISIT (OUTPATIENT)
Dept: INTERNAL MEDICINE CLINIC | Facility: CLINIC | Age: 57
End: 2022-04-07
Payer: MEDICAID

## 2022-04-07 ENCOUNTER — OFFICE VISIT (OUTPATIENT)
Dept: PHYSICAL THERAPY | Age: 57
End: 2022-04-07
Attending: PODIATRIST
Payer: MEDICAID

## 2022-04-07 VITALS
SYSTOLIC BLOOD PRESSURE: 108 MMHG | HEART RATE: 59 BPM | WEIGHT: 177.81 LBS | BODY MASS INDEX: 30.35 KG/M2 | HEIGHT: 64 IN | DIASTOLIC BLOOD PRESSURE: 63 MMHG

## 2022-04-07 DIAGNOSIS — D64.9 ANEMIA, UNSPECIFIED TYPE: ICD-10-CM

## 2022-04-07 DIAGNOSIS — Z00.00 ANNUAL PHYSICAL EXAM: Primary | ICD-10-CM

## 2022-04-07 DIAGNOSIS — Z12.31 ENCOUNTER FOR SCREENING MAMMOGRAM FOR MALIGNANT NEOPLASM OF BREAST: ICD-10-CM

## 2022-04-07 DIAGNOSIS — E55.9 VITAMIN D DEFICIENCY: ICD-10-CM

## 2022-04-07 DIAGNOSIS — E03.9 HYPOTHYROIDISM, UNSPECIFIED TYPE: ICD-10-CM

## 2022-04-07 PROCEDURE — 97110 THERAPEUTIC EXERCISES: CPT | Performed by: PHYSICAL THERAPIST

## 2022-04-07 PROCEDURE — 99396 PREV VISIT EST AGE 40-64: CPT | Performed by: NURSE PRACTITIONER

## 2022-04-07 PROCEDURE — 3078F DIAST BP <80 MM HG: CPT | Performed by: NURSE PRACTITIONER

## 2022-04-07 PROCEDURE — 3074F SYST BP LT 130 MM HG: CPT | Performed by: NURSE PRACTITIONER

## 2022-04-07 PROCEDURE — 3008F BODY MASS INDEX DOCD: CPT | Performed by: NURSE PRACTITIONER

## 2022-04-12 ENCOUNTER — TELEPHONE (OUTPATIENT)
Dept: PHYSICAL THERAPY | Facility: HOSPITAL | Age: 57
End: 2022-04-12

## 2022-04-12 ENCOUNTER — APPOINTMENT (OUTPATIENT)
Dept: PHYSICAL THERAPY | Age: 57
End: 2022-04-12
Attending: PODIATRIST
Payer: MEDICAID

## 2022-04-13 ENCOUNTER — LAB ENCOUNTER (OUTPATIENT)
Dept: LAB | Age: 57
End: 2022-04-13
Attending: NURSE PRACTITIONER
Payer: MEDICAID

## 2022-04-13 DIAGNOSIS — Z00.00 ANNUAL PHYSICAL EXAM: ICD-10-CM

## 2022-04-13 DIAGNOSIS — E55.9 VITAMIN D DEFICIENCY: ICD-10-CM

## 2022-04-13 LAB
ALBUMIN SERPL-MCNC: 3.6 G/DL (ref 3.4–5)
ALBUMIN/GLOB SERPL: 1.1 {RATIO} (ref 1–2)
ALP LIVER SERPL-CCNC: 88 U/L
ALT SERPL-CCNC: 29 U/L
ANION GAP SERPL CALC-SCNC: 7 MMOL/L (ref 0–18)
AST SERPL-CCNC: 21 U/L (ref 15–37)
BASOPHILS # BLD AUTO: 0.06 X10(3) UL (ref 0–0.2)
BASOPHILS NFR BLD AUTO: 1.3 %
BILIRUB SERPL-MCNC: 0.5 MG/DL (ref 0.1–2)
BILIRUB UR QL: NEGATIVE
BUN BLD-MCNC: 7 MG/DL (ref 7–18)
BUN/CREAT SERPL: 7.8 (ref 10–20)
CALCIUM BLD-MCNC: 8.9 MG/DL (ref 8.5–10.1)
CHLORIDE SERPL-SCNC: 109 MMOL/L (ref 98–112)
CHOLEST SERPL-MCNC: 166 MG/DL (ref ?–200)
CO2 SERPL-SCNC: 27 MMOL/L (ref 21–32)
COLOR UR: YELLOW
CREAT BLD-MCNC: 0.9 MG/DL
DEPRECATED RDW RBC AUTO: 40.8 FL (ref 35.1–46.3)
EOSINOPHIL # BLD AUTO: 0.21 X10(3) UL (ref 0–0.7)
EOSINOPHIL NFR BLD AUTO: 4.5 %
ERYTHROCYTE [DISTWIDTH] IN BLOOD BY AUTOMATED COUNT: 12.6 % (ref 11–15)
FASTING PATIENT LIPID ANSWER: YES
FASTING STATUS PATIENT QL REPORTED: YES
GLOBULIN PLAS-MCNC: 3.3 G/DL (ref 2.8–4.4)
GLUCOSE BLD-MCNC: 102 MG/DL (ref 70–99)
GLUCOSE UR-MCNC: NEGATIVE MG/DL
HCT VFR BLD AUTO: 42.4 %
HDLC SERPL-MCNC: 58 MG/DL (ref 40–59)
HGB BLD-MCNC: 13.6 G/DL
HGB UR QL STRIP.AUTO: NEGATIVE
IMM GRANULOCYTES # BLD AUTO: 0.01 X10(3) UL (ref 0–1)
IMM GRANULOCYTES NFR BLD: 0.2 %
KETONES UR-MCNC: NEGATIVE MG/DL
LDLC SERPL CALC-MCNC: 93 MG/DL (ref ?–100)
LYMPHOCYTES # BLD AUTO: 1.69 X10(3) UL (ref 1–4)
LYMPHOCYTES NFR BLD AUTO: 36 %
MCH RBC QN AUTO: 28.3 PG (ref 26–34)
MCHC RBC AUTO-ENTMCNC: 32.1 G/DL (ref 31–37)
MCV RBC AUTO: 88.3 FL
MONOCYTES # BLD AUTO: 0.34 X10(3) UL (ref 0.1–1)
MONOCYTES NFR BLD AUTO: 7.2 %
NEUTROPHILS # BLD AUTO: 2.39 X10 (3) UL (ref 1.5–7.7)
NEUTROPHILS # BLD AUTO: 2.39 X10(3) UL (ref 1.5–7.7)
NEUTROPHILS NFR BLD AUTO: 50.8 %
NITRITE UR QL STRIP.AUTO: NEGATIVE
NONHDLC SERPL-MCNC: 108 MG/DL (ref ?–130)
OSMOLALITY SERPL CALC.SUM OF ELEC: 294 MOSM/KG (ref 275–295)
PH UR: 7 [PH] (ref 5–8)
PLATELET # BLD AUTO: 229 10(3)UL (ref 150–450)
POTASSIUM SERPL-SCNC: 3.9 MMOL/L (ref 3.5–5.1)
PROT SERPL-MCNC: 6.9 G/DL (ref 6.4–8.2)
PROT UR-MCNC: NEGATIVE MG/DL
RBC # BLD AUTO: 4.8 X10(6)UL
SODIUM SERPL-SCNC: 143 MMOL/L (ref 136–145)
SP GR UR STRIP: 1 (ref 1–1.03)
TRIGL SERPL-MCNC: 79 MG/DL (ref 30–149)
UROBILINOGEN UR STRIP-ACNC: <2
VIT B12 SERPL-MCNC: 444 PG/ML (ref 193–986)
VIT C UR-MCNC: NEGATIVE MG/DL
VIT D+METAB SERPL-MCNC: 55.5 NG/ML (ref 30–100)
VLDLC SERPL CALC-MCNC: 13 MG/DL (ref 0–30)
WBC # BLD AUTO: 4.7 X10(3) UL (ref 4–11)

## 2022-04-13 PROCEDURE — 81001 URINALYSIS AUTO W/SCOPE: CPT

## 2022-04-13 PROCEDURE — 85025 COMPLETE CBC W/AUTO DIFF WBC: CPT

## 2022-04-13 PROCEDURE — 80061 LIPID PANEL: CPT

## 2022-04-13 PROCEDURE — 80053 COMPREHEN METABOLIC PANEL: CPT

## 2022-04-13 PROCEDURE — 82607 VITAMIN B-12: CPT

## 2022-04-13 PROCEDURE — 82306 VITAMIN D 25 HYDROXY: CPT

## 2022-04-13 PROCEDURE — 36415 COLL VENOUS BLD VENIPUNCTURE: CPT

## 2022-04-14 ENCOUNTER — OFFICE VISIT (OUTPATIENT)
Dept: PHYSICAL THERAPY | Age: 57
End: 2022-04-14
Attending: PODIATRIST
Payer: MEDICAID

## 2022-04-14 ENCOUNTER — OFFICE VISIT (OUTPATIENT)
Dept: PODIATRY CLINIC | Facility: CLINIC | Age: 57
End: 2022-04-14
Payer: MEDICAID

## 2022-04-14 DIAGNOSIS — Z98.890 STATUS POST FOOT SURGERY: Primary | ICD-10-CM

## 2022-04-14 PROCEDURE — 97110 THERAPEUTIC EXERCISES: CPT | Performed by: PHYSICAL THERAPIST

## 2022-04-14 PROCEDURE — 99212 OFFICE O/P EST SF 10 MIN: CPT | Performed by: PODIATRIST

## 2022-04-18 ENCOUNTER — HOSPITAL ENCOUNTER (OUTPATIENT)
Age: 57
Discharge: HOME | End: 2022-04-18
Payer: MEDICAID

## 2022-04-18 VITALS
RESPIRATION RATE: 18 BRPM | HEART RATE: 75 BPM | DIASTOLIC BLOOD PRESSURE: 71 MMHG | SYSTOLIC BLOOD PRESSURE: 116 MMHG | TEMPERATURE: 97 F | OXYGEN SATURATION: 100 %

## 2022-04-18 DIAGNOSIS — J01.40 ACUTE NON-RECURRENT PANSINUSITIS: Primary | ICD-10-CM

## 2022-04-18 LAB — SARS-COV-2 RNA RESP QL NAA+PROBE: NOT DETECTED

## 2022-04-18 PROCEDURE — U0002 COVID-19 LAB TEST NON-CDC: HCPCS | Performed by: NURSE PRACTITIONER

## 2022-04-18 PROCEDURE — 99213 OFFICE O/P EST LOW 20 MIN: CPT | Performed by: NURSE PRACTITIONER

## 2022-04-18 RX ORDER — SULFAMETHOXAZOLE AND TRIMETHOPRIM 800; 160 MG/1; MG/1
1 TABLET ORAL 2 TIMES DAILY
Qty: 14 TABLET | Refills: 0 | Status: SHIPPED | OUTPATIENT
Start: 2022-04-18 | End: 2022-04-25

## 2022-04-18 NOTE — ED INITIAL ASSESSMENT (HPI)
Pt here with complaints of congestion and sinus pressure that has been going on for 10 days , today pt started having right ear pain , pt denies any fevers

## 2022-04-19 ENCOUNTER — TELEPHONE (OUTPATIENT)
Dept: OTOLARYNGOLOGY | Facility: CLINIC | Age: 57
End: 2022-04-19

## 2022-04-19 ENCOUNTER — APPOINTMENT (OUTPATIENT)
Dept: PHYSICAL THERAPY | Age: 57
End: 2022-04-19
Attending: PODIATRIST
Payer: MEDICAID

## 2022-04-19 NOTE — TELEPHONE ENCOUNTER
Per pt went to UC for sinus infection. Pt will be leaving out of town tomorrow and asking rx.  Please advise

## 2022-04-19 NOTE — TELEPHONE ENCOUNTER
Dr. Salvatore Chacon, patient requesting z-adarsh for sinus infection. She's flying to [place] tomorrow for her son's outdoor graduation and is concerned about taking Bactrim prescribed by urgent care due to sun exposure over the next several days. She has several allergies and knows she can tolerate z-adarsh.

## 2022-04-20 RX ORDER — AZITHROMYCIN 250 MG/1
TABLET, FILM COATED ORAL
Qty: 6 TABLET | Refills: 0 | Status: SHIPPED | OUTPATIENT
Start: 2022-04-20 | End: 2022-12-09

## 2022-04-26 ENCOUNTER — APPOINTMENT (OUTPATIENT)
Dept: PHYSICAL THERAPY | Age: 57
End: 2022-04-26
Attending: PODIATRIST
Payer: MEDICAID

## 2022-04-28 ENCOUNTER — OFFICE VISIT (OUTPATIENT)
Dept: PHYSICAL THERAPY | Age: 57
End: 2022-04-28
Attending: PODIATRIST
Payer: MEDICAID

## 2022-04-28 PROCEDURE — 97110 THERAPEUTIC EXERCISES: CPT | Performed by: PHYSICAL THERAPIST

## 2022-05-04 ENCOUNTER — OFFICE VISIT (OUTPATIENT)
Dept: PHYSICAL THERAPY | Age: 57
End: 2022-05-04
Attending: PODIATRIST
Payer: MEDICAID

## 2022-05-04 PROCEDURE — 97110 THERAPEUTIC EXERCISES: CPT | Performed by: PHYSICAL THERAPIST

## 2022-05-05 ENCOUNTER — OFFICE VISIT (OUTPATIENT)
Dept: PHYSICAL THERAPY | Age: 57
End: 2022-05-05
Attending: PODIATRIST
Payer: MEDICAID

## 2022-05-05 ENCOUNTER — APPOINTMENT (OUTPATIENT)
Dept: PHYSICAL THERAPY | Age: 57
End: 2022-05-05
Attending: PODIATRIST
Payer: MEDICAID

## 2022-05-05 PROCEDURE — 97110 THERAPEUTIC EXERCISES: CPT | Performed by: PHYSICAL THERAPIST

## 2022-05-13 NOTE — TELEPHONE ENCOUNTER
Patient requesting refill on the following medication. Patient will only have enough medication until 5/16.      clonazePAM 0.5 MG Oral Tab

## 2022-05-16 RX ORDER — CLONAZEPAM 0.5 MG/1
0.5 TABLET ORAL NIGHTLY
Qty: 30 TABLET | Refills: 1 | Status: SHIPPED | OUTPATIENT
Start: 2022-05-16

## 2022-06-07 RX ORDER — LEVOTHYROXINE SODIUM 88 MCG
88 TABLET ORAL
Qty: 90 TABLET | Refills: 0 | Status: SHIPPED | OUTPATIENT
Start: 2022-06-07

## 2022-07-14 ENCOUNTER — HOSPITAL ENCOUNTER (OUTPATIENT)
Age: 57
Discharge: ANOTHER HOSPITAL | End: 2022-07-14
Payer: MEDICAID

## 2022-07-14 ENCOUNTER — HOSPITAL ENCOUNTER (EMERGENCY)
Facility: HOSPITAL | Age: 57
Discharge: HOME | End: 2022-07-15
Attending: EMERGENCY MEDICINE
Payer: MEDICAID

## 2022-07-14 ENCOUNTER — NURSE TRIAGE (OUTPATIENT)
Dept: INTERNAL MEDICINE CLINIC | Facility: CLINIC | Age: 57
End: 2022-07-14

## 2022-07-14 VITALS
BODY MASS INDEX: 29.88 KG/M2 | HEART RATE: 64 BPM | RESPIRATION RATE: 18 BRPM | HEIGHT: 64 IN | OXYGEN SATURATION: 100 % | WEIGHT: 175 LBS | DIASTOLIC BLOOD PRESSURE: 72 MMHG | TEMPERATURE: 98 F | SYSTOLIC BLOOD PRESSURE: 120 MMHG

## 2022-07-14 DIAGNOSIS — R42 VERTIGO: Primary | ICD-10-CM

## 2022-07-14 DIAGNOSIS — R42 DIZZINESS: ICD-10-CM

## 2022-07-14 DIAGNOSIS — R51.9 ACUTE NONINTRACTABLE HEADACHE, UNSPECIFIED HEADACHE TYPE: Primary | ICD-10-CM

## 2022-07-14 LAB
ANION GAP SERPL CALC-SCNC: 6 MMOL/L (ref 0–18)
BASOPHILS # BLD AUTO: 0.06 X10(3) UL (ref 0–0.2)
BASOPHILS NFR BLD AUTO: 0.8 %
BUN BLD-MCNC: 10 MG/DL (ref 7–18)
BUN/CREAT SERPL: 10.8 (ref 10–20)
CALCIUM BLD-MCNC: 9.5 MG/DL (ref 8.5–10.1)
CHLORIDE SERPL-SCNC: 106 MMOL/L (ref 98–112)
CO2 SERPL-SCNC: 27 MMOL/L (ref 21–32)
CREAT BLD-MCNC: 0.93 MG/DL
DEPRECATED RDW RBC AUTO: 39.8 FL (ref 35.1–46.3)
EOSINOPHIL # BLD AUTO: 0.25 X10(3) UL (ref 0–0.7)
EOSINOPHIL NFR BLD AUTO: 3.5 %
ERYTHROCYTE [DISTWIDTH] IN BLOOD BY AUTOMATED COUNT: 12.4 % (ref 11–15)
GLUCOSE BLD-MCNC: 96 MG/DL (ref 70–99)
HCT VFR BLD AUTO: 45.1 %
HGB BLD-MCNC: 14.2 G/DL
IMM GRANULOCYTES # BLD AUTO: 0.02 X10(3) UL (ref 0–1)
IMM GRANULOCYTES NFR BLD: 0.3 %
LYMPHOCYTES # BLD AUTO: 2.91 X10(3) UL (ref 1–4)
LYMPHOCYTES NFR BLD AUTO: 40.7 %
MCH RBC QN AUTO: 27.6 PG (ref 26–34)
MCHC RBC AUTO-ENTMCNC: 31.5 G/DL (ref 31–37)
MCV RBC AUTO: 87.7 FL
MONOCYTES # BLD AUTO: 0.51 X10(3) UL (ref 0.1–1)
MONOCYTES NFR BLD AUTO: 7.1 %
NEUTROPHILS # BLD AUTO: 3.4 X10 (3) UL (ref 1.5–7.7)
NEUTROPHILS # BLD AUTO: 3.4 X10(3) UL (ref 1.5–7.7)
NEUTROPHILS NFR BLD AUTO: 47.6 %
OSMOLALITY SERPL CALC.SUM OF ELEC: 287 MOSM/KG (ref 275–295)
PLATELET # BLD AUTO: 220 10(3)UL (ref 150–450)
POTASSIUM SERPL-SCNC: 3.8 MMOL/L (ref 3.5–5.1)
RBC # BLD AUTO: 5.14 X10(6)UL
SODIUM SERPL-SCNC: 139 MMOL/L (ref 136–145)
TROPONIN I HIGH SENSITIVITY: 3 NG/L
WBC # BLD AUTO: 7.2 X10(3) UL (ref 4–11)

## 2022-07-14 PROCEDURE — 96374 THER/PROPH/DIAG INJ IV PUSH: CPT

## 2022-07-14 PROCEDURE — 93010 ELECTROCARDIOGRAM REPORT: CPT | Performed by: EMERGENCY MEDICINE

## 2022-07-14 PROCEDURE — 84484 ASSAY OF TROPONIN QUANT: CPT | Performed by: EMERGENCY MEDICINE

## 2022-07-14 PROCEDURE — 80048 BASIC METABOLIC PNL TOTAL CA: CPT | Performed by: EMERGENCY MEDICINE

## 2022-07-14 PROCEDURE — 99284 EMERGENCY DEPT VISIT MOD MDM: CPT

## 2022-07-14 PROCEDURE — 93005 ELECTROCARDIOGRAM TRACING: CPT

## 2022-07-14 PROCEDURE — 85025 COMPLETE CBC W/AUTO DIFF WBC: CPT | Performed by: EMERGENCY MEDICINE

## 2022-07-14 PROCEDURE — 99205 OFFICE O/P NEW HI 60 MIN: CPT | Performed by: NURSE PRACTITIONER

## 2022-07-14 RX ORDER — MECLIZINE HYDROCHLORIDE 25 MG/1
25 TABLET ORAL ONCE
Status: COMPLETED | OUTPATIENT
Start: 2022-07-14 | End: 2022-07-14

## 2022-07-14 RX ORDER — ONDANSETRON 2 MG/ML
4 INJECTION INTRAMUSCULAR; INTRAVENOUS ONCE
Status: COMPLETED | OUTPATIENT
Start: 2022-07-14 | End: 2022-07-14

## 2022-07-15 ENCOUNTER — OFFICE VISIT (OUTPATIENT)
Dept: OTOLARYNGOLOGY | Facility: CLINIC | Age: 57
End: 2022-07-15
Payer: MEDICAID

## 2022-07-15 ENCOUNTER — OFFICE VISIT (OUTPATIENT)
Dept: AUDIOLOGY | Facility: CLINIC | Age: 57
End: 2022-07-15
Payer: MEDICAID

## 2022-07-15 VITALS — BODY MASS INDEX: 29.88 KG/M2 | HEIGHT: 64 IN | WEIGHT: 175 LBS | TEMPERATURE: 98 F

## 2022-07-15 VITALS
RESPIRATION RATE: 13 BRPM | SYSTOLIC BLOOD PRESSURE: 120 MMHG | DIASTOLIC BLOOD PRESSURE: 68 MMHG | HEART RATE: 56 BPM | WEIGHT: 175 LBS | TEMPERATURE: 98 F | BODY MASS INDEX: 29.88 KG/M2 | OXYGEN SATURATION: 96 % | HEIGHT: 64 IN

## 2022-07-15 DIAGNOSIS — R42 DIZZINESS: Primary | ICD-10-CM

## 2022-07-15 DIAGNOSIS — H93.13 TINNITUS OF BOTH EARS: Primary | ICD-10-CM

## 2022-07-15 PROCEDURE — 99213 OFFICE O/P EST LOW 20 MIN: CPT | Performed by: OTOLARYNGOLOGY

## 2022-07-15 PROCEDURE — 92557 COMPREHENSIVE HEARING TEST: CPT | Performed by: AUDIOLOGIST

## 2022-07-15 PROCEDURE — 92567 TYMPANOMETRY: CPT | Performed by: AUDIOLOGIST

## 2022-07-15 PROCEDURE — 3008F BODY MASS INDEX DOCD: CPT | Performed by: OTOLARYNGOLOGY

## 2022-07-15 RX ORDER — MECLIZINE HYDROCHLORIDE 25 MG/1
25 TABLET ORAL 3 TIMES DAILY PRN
Qty: 20 TABLET | Refills: 0 | Status: SHIPPED | OUTPATIENT
Start: 2022-07-15

## 2022-07-15 RX ORDER — ONDANSETRON 4 MG/1
4 TABLET, ORALLY DISINTEGRATING ORAL EVERY 4 HOURS PRN
Qty: 10 TABLET | Refills: 0 | Status: SHIPPED | OUTPATIENT
Start: 2022-07-15 | End: 2022-07-22

## 2022-07-15 NOTE — ED QUICK NOTES
Pt reports episodes of dizziness since last night, pt describes as \"room spinning\" and states sensation is worse when laying down. Pt denies associated nausea or vomiting. Pt denies CP/SOB. Cardiac monitor and pulse ox applied, NSR noted. WCTM.

## 2022-07-15 NOTE — ED QUICK NOTES
Patient advised to go to [address] [place] [address] ER for further workup for vertigo. Patient in agreement.

## 2022-07-15 NOTE — ED INITIAL ASSESSMENT (HPI)
Reports dizziness since last night after getting a message. Hx of vertigo.  denies dizziness at present but reports fatigue

## 2022-07-15 NOTE — ED INITIAL ASSESSMENT (HPI)
Patient complains of vertigo after she received a massage, she stood up and the room was spinning yesterday. The vertigo he experienced was worse today than yesterday. Last time she experienced something similar she had an inner ear infection. Today ears feel like they have water in them.

## 2022-07-16 RX ORDER — CLONAZEPAM 0.5 MG/1
TABLET ORAL
Qty: 30 TABLET | Refills: 0 | Status: SHIPPED | OUTPATIENT
Start: 2022-07-16

## 2022-07-26 ENCOUNTER — TELEPHONE (OUTPATIENT)
Dept: INTERNAL MEDICINE CLINIC | Facility: CLINIC | Age: 57
End: 2022-07-26

## 2022-07-26 ENCOUNTER — HOSPITAL ENCOUNTER (OUTPATIENT)
Dept: MAMMOGRAPHY | Facility: HOSPITAL | Age: 57
Discharge: HOME | End: 2022-07-26
Attending: NURSE PRACTITIONER
Payer: MEDICAID

## 2022-07-26 DIAGNOSIS — Z12.31 ENCOUNTER FOR SCREENING MAMMOGRAM FOR MALIGNANT NEOPLASM OF BREAST: ICD-10-CM

## 2022-07-26 PROCEDURE — 77067 SCR MAMMO BI INCL CAD: CPT | Performed by: NURSE PRACTITIONER

## 2022-07-26 PROCEDURE — 77063 BREAST TOMOSYNTHESIS BI: CPT | Performed by: NURSE PRACTITIONER

## 2022-07-26 NOTE — TELEPHONE ENCOUNTER
Patient was contacted. She states she saw the normal mammogram screening and will see Katarina DUMONT, next week at her appointment.

## 2022-07-26 NOTE — TELEPHONE ENCOUNTER
Patient called to say they've completed their mammogram, did not understand message that was sent about mammogram. Also stated she has set up appointment with Dr. James Delgado.

## 2022-08-12 RX ORDER — CLONAZEPAM 0.5 MG/1
TABLET ORAL
Qty: 30 TABLET | Refills: 0 | Status: SHIPPED | OUTPATIENT
Start: 2022-08-12

## 2022-08-23 ENCOUNTER — OFFICE VISIT (OUTPATIENT)
Dept: PODIATRY CLINIC | Facility: CLINIC | Age: 57
End: 2022-08-23
Payer: MEDICAID

## 2022-08-23 DIAGNOSIS — T81.41XA POSTOPERATIVE STITCH ABSCESS: Primary | ICD-10-CM

## 2022-08-23 DIAGNOSIS — L60.0 ONYCHOCRYPTOSIS: ICD-10-CM

## 2022-08-23 DIAGNOSIS — M79.674 PAIN OF TOE OF RIGHT FOOT: ICD-10-CM

## 2022-08-23 PROCEDURE — 99213 OFFICE O/P EST LOW 20 MIN: CPT | Performed by: PODIATRIST

## 2022-08-23 RX ORDER — LEVOTHYROXINE SODIUM 0.1 MG/1
100 TABLET ORAL
COMMUNITY
Start: 2022-07-18

## 2022-08-24 DIAGNOSIS — L29.9 PRURITUS: ICD-10-CM

## 2022-08-24 RX ORDER — HYDROXYZINE HYDROCHLORIDE 25 MG/1
TABLET, FILM COATED ORAL
Qty: 90 TABLET | Refills: 1 | Status: SHIPPED | OUTPATIENT
Start: 2022-08-24

## 2022-09-11 NOTE — TELEPHONE ENCOUNTER
Patient's thyroid medication dose was changed in feb and labs were recommended  These have not been completed, please remind her to get labs TSH with reflex  Also, please confirm dose of synthroid so that refills can be sent accordingly  Thanks

## 2022-09-12 RX ORDER — CLONAZEPAM 0.5 MG/1
TABLET ORAL
Qty: 30 TABLET | Refills: 0 | Status: SHIPPED | OUTPATIENT
Start: 2022-09-12

## 2022-09-12 RX ORDER — LEVOTHYROXINE SODIUM 88 MCG
TABLET ORAL
Qty: 60 TABLET | Refills: 0 | Status: SHIPPED | OUTPATIENT
Start: 2022-09-12 | End: 2022-12-04

## 2022-09-12 NOTE — TELEPHONE ENCOUNTER
Called pt to inform her of MD's message below. Pt verbalized understanding and stated she will try to get her lab done this week.  Pt confirmed she is currently taking Synthroid 88 MCG & Levothryxine 100 MCG

## 2022-09-12 NOTE — TELEPHONE ENCOUNTER
Called pt to clarify how many days she is doing of the Synthroid and Levothyroxine. Pt states she is taking the Synthroid 88 MCG 5x days a week. Sunday-Thursday    And pt is taking the Levothyroxine 100 MCG 2x days of the week. Friday and Saturday. Pt also stated that she has been on this regimen for the past 2 months and has gained a total of 6-8 lbs.

## 2022-09-13 ENCOUNTER — TELEPHONE (OUTPATIENT)
Dept: INTERNAL MEDICINE CLINIC | Facility: CLINIC | Age: 57
End: 2022-09-13

## 2022-09-13 ENCOUNTER — LAB ENCOUNTER (OUTPATIENT)
Dept: LAB | Age: 57
End: 2022-09-13
Attending: INTERNAL MEDICINE
Payer: MEDICAID

## 2022-09-13 ENCOUNTER — OFFICE VISIT (OUTPATIENT)
Dept: INTERNAL MEDICINE CLINIC | Facility: CLINIC | Age: 57
End: 2022-09-13
Payer: MEDICAID

## 2022-09-13 VITALS
WEIGHT: 184 LBS | HEART RATE: 69 BPM | BODY MASS INDEX: 31.41 KG/M2 | SYSTOLIC BLOOD PRESSURE: 109 MMHG | HEIGHT: 64 IN | DIASTOLIC BLOOD PRESSURE: 61 MMHG

## 2022-09-13 DIAGNOSIS — F41.9 ANXIETY: ICD-10-CM

## 2022-09-13 DIAGNOSIS — L29.9 PRURITUS: ICD-10-CM

## 2022-09-13 DIAGNOSIS — E03.9 HYPOTHYROIDISM, UNSPECIFIED TYPE: ICD-10-CM

## 2022-09-13 DIAGNOSIS — R42 VERTIGO: Primary | ICD-10-CM

## 2022-09-13 LAB
T4 FREE SERPL-MCNC: 1.1 NG/DL (ref 0.8–1.7)
TSI SER-ACNC: 0.1 MIU/ML (ref 0.36–3.74)

## 2022-09-13 PROCEDURE — 84443 ASSAY THYROID STIM HORMONE: CPT

## 2022-09-13 PROCEDURE — 84439 ASSAY OF FREE THYROXINE: CPT

## 2022-09-13 PROCEDURE — 3078F DIAST BP <80 MM HG: CPT | Performed by: NURSE PRACTITIONER

## 2022-09-13 PROCEDURE — 36415 COLL VENOUS BLD VENIPUNCTURE: CPT

## 2022-09-13 PROCEDURE — 99214 OFFICE O/P EST MOD 30 MIN: CPT | Performed by: NURSE PRACTITIONER

## 2022-09-13 PROCEDURE — 3008F BODY MASS INDEX DOCD: CPT | Performed by: NURSE PRACTITIONER

## 2022-09-13 PROCEDURE — 3074F SYST BP LT 130 MM HG: CPT | Performed by: NURSE PRACTITIONER

## 2022-09-13 RX ORDER — MECLIZINE HYDROCHLORIDE 25 MG/1
25 TABLET ORAL 3 TIMES DAILY PRN
Qty: 30 TABLET | Refills: 1 | Status: SHIPPED | OUTPATIENT
Start: 2022-09-13

## 2022-09-13 RX ORDER — HYDROXYZINE HYDROCHLORIDE 25 MG/1
TABLET, FILM COATED ORAL
Qty: 90 TABLET | Refills: 1 | Status: SHIPPED | OUTPATIENT
Start: 2022-09-13

## 2022-09-13 RX ORDER — CLONAZEPAM 0.5 MG/1
0.5 TABLET ORAL NIGHTLY
Qty: 30 TABLET | Refills: 0 | Status: SHIPPED | OUTPATIENT
Start: 2022-09-13

## 2022-09-13 NOTE — ASSESSMENT & PLAN NOTE
Following up on Vertigo. Patient had the one episode. She wants to have another massage and would like to have meclizine on hand if she develops vertigo again. meclizine 25 MG Oral Tab Take 1 tablet (25 mg total) by mouth 3 (three) times daily as needed.  20 tablet

## 2022-09-14 ENCOUNTER — TELEPHONE (OUTPATIENT)
Dept: ENDOCRINOLOGY CLINIC | Facility: CLINIC | Age: 57
End: 2022-09-14

## 2022-09-14 NOTE — TELEPHONE ENCOUNTER
I am sorry to hear that  Based on my knowledge I do not think her weight gain is related to her thyroid medication since her TSH is low. I understand her concerns and will like her to be comfortable with the plan  However, I am unable to change my recommendation to lower the dose since based on my knowledge that will be the right step to take to prevent complications associated with it including Atrial fibrillation and bone loss  I recommend that she please get a second opinion    She could follow with her primary care physician or she can see another endocrinologist can give information for Dr. Sunil Umana.  Dr Elda Parra    Thanks

## 2022-09-14 NOTE — TELEPHONE ENCOUNTER
Dr. Vilma Mathews to patient to relay message below - patient stated in addition to weight gain, she has been experiencing sleeping too much, cannot get out of bed, no energy  Patient is concerned if she decreases dose her TSH will go lower like in the past    Patient asking if normal range for TSH takes into account if patient does or does not have thyroid     Patient stated not interested in second opinion at this time    Please advise -thanks

## 2022-09-14 NOTE — TELEPHONE ENCOUNTER
Dr. Charles Lara,    Called patient. Patient stated she is furious. She stated she does not want to change medication dose. She stated her TSH level is the highest it's ever been and she does not want to lower medication. She stated she does not want to put on any more additional weight--she said she put on 7-8 lbs. She stated she is finally to a point where she is at a \"good\" level. She wants to continue same dose. Please advise.

## 2022-09-15 ENCOUNTER — HOSPITAL ENCOUNTER (OUTPATIENT)
Dept: GENERAL RADIOLOGY | Facility: HOSPITAL | Age: 57
Discharge: HOME | End: 2022-09-15
Attending: ORTHOPAEDIC SURGERY
Payer: MEDICAID

## 2022-09-15 ENCOUNTER — OFFICE VISIT (OUTPATIENT)
Dept: ORTHOPEDICS CLINIC | Facility: CLINIC | Age: 57
End: 2022-09-15
Payer: MEDICAID

## 2022-09-15 DIAGNOSIS — M65.332 TRIGGER MIDDLE FINGER OF LEFT HAND: Primary | ICD-10-CM

## 2022-09-15 DIAGNOSIS — R52 PAIN: ICD-10-CM

## 2022-09-15 PROCEDURE — 99214 OFFICE O/P EST MOD 30 MIN: CPT | Performed by: ORTHOPAEDIC SURGERY

## 2022-09-15 PROCEDURE — 73140 X-RAY EXAM OF FINGER(S): CPT | Performed by: ORTHOPAEDIC SURGERY

## 2022-09-15 NOTE — TELEPHONE ENCOUNTER
Can please schedule apt VV or in person to discuss further  After next week if adding on/ if using an open slot can add on next week  Can add on Thanks

## 2022-09-16 DIAGNOSIS — E78.2 MIXED HYPERLIPIDEMIA: ICD-10-CM

## 2022-09-16 RX ORDER — ATORVASTATIN CALCIUM 20 MG/1
TABLET, FILM COATED ORAL
Qty: 90 TABLET | Refills: 1 | Status: SHIPPED | OUTPATIENT
Start: 2022-09-16

## 2022-09-16 NOTE — TELEPHONE ENCOUNTER
Refill passed per [facility], [facility] protocol.     Requested Prescriptions   Pending Prescriptions Disp Refills    CITALOPRAM 10 MG Oral Tab [Pharmacy Med Name: CITALOPRAM 10MG TABLETS] 90 tablet 1     Sig: TAKE 1 TABLET(10 MG) BY MOUTH DAILY        Psychiatric Non-Scheduled (Anti-Anxiety) Passed - 9/16/2022  5:57 AM        Passed - In person appointment or virtual visit in the past 6 mos or appointment in next 3 mos       Recent Outpatient Visits              Yesterday Trigger middle finger of left hand    [facility] for HawaNeelima Carcamo MD    Office Visit    3 days ago Vertigo    [facility], [facility], [address], [place], Sally Bolnaos, JULIA    Office Visit    3 weeks ago Postoperative stitch abscess    [address] [facility], Henry MORRISON Susie Sand [place]    Office Visit    2 months ago Tinnitus of both [facility] [address] [place] [address] Audiology NICKY Garcia    Office Visit    2 months ago [address] [facility], [address], Cole Parker MD    Office Visit     Future Appointments         Provider Department Appt Notes    Tomorrow Elvina Nissen, [address] Endocrinology thyroid f/u    In 3 weeks Maine Frias MD [facility], [address], [place]    In 1 month Anabelle Durant MD [facility], [address] [facility] [address] left middle finger *policy informed    In 2 months Tabatha Roldan MD [facility] Dermatology Check Growth Face - LOV 12-17-21                  ATORVASTATIN 20 MG Oral Tab [Pharmacy Med Name: ATORVASTATIN 20MG TABLETS] 90 tablet 1     Sig: TAKE 1 TABLET(20 MG) BY MOUTH EVERY NIGHT        Cholesterol Medication Protocol Passed - 9/16/2022  5:57 AM        Passed - ALT in past 12 months        Passed - LDL in past 12 months        Passed - Last ALT < 80       Lab Results   Component Value Date    ALT 29 04/13/2022 Passed - Last LDL < 130     Lab Results   Component Value Date    LDL 93 04/13/2022               Passed - In person appointment or virtual visit in the past 12 mos or appointment in next 3 mos       Recent Outpatient Visits              Yesterday Trigger middle finger of left hand    [facility] [place] [facility] for Petrona Morales, Malvin Hansen MD    Office Visit    3 days ago Vertigo    [address] [place], [address], [place], [facility], APRN    Office Visit    3 weeks ago Postoperative stitch abscess    [address] [facility], [place], [place]    Office Visit    2 months ago Tinnitus of both [facility] [address] [place] [address] Audiology Earna [address], NICKY    Office Visit    2 months ago [address] [facility], [address], Christine Parker MD    Office Visit     Future Appointments         Provider Department Appt Notes    Tomorrow Clark Lei, [address] Endocrinology thyroid f/u    In 3 weeks Abdirahman Sheth MD [facility], [address], [employer]    In 1 month David Shahid MD [facility], [address] [facility] left middle finger *policy informed    In 2 months Brandyn Mercer MD [facility] Dermatology Check Growth Face - [facility] [phone]                     Future Appointments         Provider Department Appt Notes    Tomorrow Clark Lei, [address] Endocrinology thyroid f/u    In 3 weeks Abdirahman Sheth MD [facility], [address], [employer]    In 1 month David Shahid MD [facility], [facility] left middle finger *policy informed    In 2 months Brandyn Mercer MD [facility] Dermatology Check Growth Face - LOV 12-17-21            Recent Outpatient Visits              Yesterday Trigger middle finger of left hand    [address] [place] [facility] Kay Poon MD    Office Visit    3 days ago Vertigo    [facility], [facility], [address], JULIA Ang    Office Visit    3 weeks ago Postoperative stitch abscess    [address] [facility], [place] Elio Factor, [employer]    Office Visit    2 months ago Tinnitus of both [facility] [address] [place] [address] Audiology NICKY Qureshi    Office Visit    2 months ago [address] [facility] Keith Davis Deliah Plump, MD    Office Visit

## 2022-09-17 ENCOUNTER — TELEMEDICINE (OUTPATIENT)
Dept: ENDOCRINOLOGY CLINIC | Facility: CLINIC | Age: 57
End: 2022-09-17

## 2022-09-17 DIAGNOSIS — E03.9 HYPOTHYROIDISM, UNSPECIFIED TYPE: Primary | ICD-10-CM

## 2022-09-17 DIAGNOSIS — C73 THYROID CANCER (HCC): ICD-10-CM

## 2022-09-17 RX ORDER — CITALOPRAM 10 MG/1
TABLET ORAL
Qty: 90 TABLET | Refills: 1 | OUTPATIENT
Start: 2022-09-17

## 2022-09-20 RX ORDER — CITALOPRAM 10 MG/1
10 TABLET ORAL DAILY
Qty: 90 TABLET | Refills: 1 | Status: SHIPPED | OUTPATIENT
Start: 2022-09-20

## 2022-09-21 ENCOUNTER — HOSPITAL ENCOUNTER (OUTPATIENT)
Age: 57
Discharge: HOME | End: 2022-09-21

## 2022-09-21 VITALS
BODY MASS INDEX: 31.03 KG/M2 | RESPIRATION RATE: 18 BRPM | HEIGHT: 64.5 IN | OXYGEN SATURATION: 98 % | SYSTOLIC BLOOD PRESSURE: 125 MMHG | TEMPERATURE: 99 F | WEIGHT: 184 LBS | DIASTOLIC BLOOD PRESSURE: 61 MMHG | HEART RATE: 83 BPM

## 2022-09-21 DIAGNOSIS — Z20.822 ENCOUNTER FOR SCREENING LABORATORY TESTING FOR COVID-19 VIRUS: Primary | ICD-10-CM

## 2022-09-21 DIAGNOSIS — U07.1 COVID: ICD-10-CM

## 2022-09-21 LAB
S PYO AG THROAT QL: NEGATIVE
SARS-COV-2 RNA RESP QL NAA+PROBE: DETECTED

## 2022-09-21 PROCEDURE — 87880 STREP A ASSAY W/OPTIC: CPT | Performed by: NURSE PRACTITIONER

## 2022-09-21 PROCEDURE — U0002 COVID-19 LAB TEST NON-CDC: HCPCS | Performed by: NURSE PRACTITIONER

## 2022-09-21 PROCEDURE — 99212 OFFICE O/P EST SF 10 MIN: CPT | Performed by: NURSE PRACTITIONER

## 2022-09-23 RX ORDER — OMEPRAZOLE 40 MG/1
40 CAPSULE, DELAYED RELEASE ORAL DAILY
Qty: 90 CAPSULE | Refills: 1 | Status: SHIPPED | OUTPATIENT
Start: 2022-09-23 | End: 2023-03-19

## 2022-09-23 NOTE — TELEPHONE ENCOUNTER
Refill passed per [facility] protocol.     Requested Prescriptions   Pending Prescriptions Disp Refills    OMEPRAZOLE 40 MG Oral Capsule Delayed Release [Pharmacy Med Name: Omeprazole Oral Capsule Delayed Release 40 MG] 90 capsule 0     Sig: TAKE 1 CAPSULE BY MOUTH DAILY        Gastrointestional Medication Protocol Passed - 9/23/2022  9:44 AM        Passed - In person appointment or virtual visit in the past 12 mos or appointment in next 3 mos       Recent Outpatient Visits              6 days ago Hypothyroidism, unspecified type    [facility] Endocrinology Edgar Dejesus MD    Telemedicine    1 week ago Trigger middle finger of left hand    [facility] [place] [facility] Vick Cintron[facility] MD [place]    Office Visit    1 week ago Vertigo    [facility], [address] [place], APRN    Office Visit    1 month ago Postoperative stitch abscess    [address] [facility], [place] Marcial [place], [place]    Office Visit    2 months ago Tinnitus of both [facility] [address] [place] [address] Audiology NICKY Eisenberg    Office Visit     Future Appointments         Provider Department Appt Notes    In 2 weeks Delores Guerrero MD [facility], [address], [place]    In 1 month Lora Garcia MD [facility], [facility] middle finger *policy informed    In 1 month Lan Smith MD [facility] Dermatology Check Growth Face - [address] [facility] [phone]                     Future Appointments         Provider Department Appt Notes    In 2 weeks Delores Guerrero MD [facility], [address], [place]    In 1 month Lora Garcia MD [facility], [facility] left middle finger *policy informed    In 1 month Lan Smith MD [facility] Dermatology Check Growth Face - LOV [phone]            Recent Outpatient Visits              6 days ago Hypothyroidism, unspecified type    [facility] Endocrinology Josafat Gaona MD    Telemedicine    1 week ago Trigger middle finger of left hand    [facility] Michelle Holguin, Nilsa Lala MD    Office Visit    1 week ago Vertigo    [facility], [address] [facility] [place], JULIA Adams    Office Visit    1 month ago Postoperative stitch abscess    [address] [facility] [address] [place] [address]    Office Visit    2 months ago Tinnitus of both [place] [address] [facility] Audiology NICKY William    Office Visit

## 2022-10-04 NOTE — TELEPHONE ENCOUNTER
"Southeast Missouri Community Treatment TSH is low  This could be the cause of her anxiety  Please change medication as follows; She is on   Synthroid 88 MCG 5x days a week.  Sunday-Thursday,  Levothyroxine 100 MCG 2x days       Synthroid 88 x 5 days  LT4 100 --> 50 x 2 days    TSH with reflex and Tg/ Tg AB  in two months and FU in clinic  Please book VV or in person    Thanks YAZAN-7 scores:  YAZAN-7 SCORE 9/10/2021 9/10/2021 10/4/2022   [id number]         Urethral stricture 02/27/[phone]

## 2022-10-10 DIAGNOSIS — I10 BENIGN ESSENTIAL HTN: ICD-10-CM

## 2022-10-10 RX ORDER — METOPROLOL SUCCINATE 25 MG/1
25 TABLET, EXTENDED RELEASE ORAL DAILY
Qty: 90 TABLET | Refills: 1 | Status: SHIPPED | OUTPATIENT
Start: 2022-10-10

## 2022-10-11 RX ORDER — METOPROLOL SUCCINATE 25 MG/1
TABLET, EXTENDED RELEASE ORAL
Qty: 90 TABLET | Refills: 1 | OUTPATIENT
Start: 2022-10-11

## 2022-10-11 NOTE — TELEPHONE ENCOUNTER
Duplicate request, previously addressed. Addressed by RN on 10/10/22.     Please reply to pool: SANDER Grossman

## 2022-10-12 ENCOUNTER — OFFICE VISIT (OUTPATIENT)
Dept: OBGYN CLINIC | Facility: CLINIC | Age: 57
End: 2022-10-12
Payer: MEDICAID

## 2022-10-12 VITALS
HEIGHT: 64.5 IN | DIASTOLIC BLOOD PRESSURE: 76 MMHG | HEART RATE: 89 BPM | BODY MASS INDEX: 30.42 KG/M2 | SYSTOLIC BLOOD PRESSURE: 114 MMHG | WEIGHT: 180.38 LBS

## 2022-10-12 DIAGNOSIS — Z01.419 ENCOUNTER FOR GYNECOLOGICAL EXAMINATION WITHOUT ABNORMAL FINDING: Primary | ICD-10-CM

## 2022-10-12 DIAGNOSIS — Z12.4 SCREENING FOR MALIGNANT NEOPLASM OF CERVIX: ICD-10-CM

## 2022-10-12 PROBLEM — Z01.818 PRE-OP EVALUATION: Status: RESOLVED | Noted: 2021-12-02 | Resolved: 2022-10-12

## 2022-10-12 PROBLEM — Z00.00 ANNUAL PHYSICAL EXAM: Status: RESOLVED | Noted: 2020-01-07 | Resolved: 2022-10-12

## 2022-10-12 PROCEDURE — 3008F BODY MASS INDEX DOCD: CPT | Performed by: OBSTETRICS & GYNECOLOGY

## 2022-10-12 PROCEDURE — 3078F DIAST BP <80 MM HG: CPT | Performed by: OBSTETRICS & GYNECOLOGY

## 2022-10-12 PROCEDURE — 3074F SYST BP LT 130 MM HG: CPT | Performed by: OBSTETRICS & GYNECOLOGY

## 2022-10-12 PROCEDURE — 99396 PREV VISIT EST AGE 40-64: CPT | Performed by: OBSTETRICS & GYNECOLOGY

## 2022-10-13 LAB — HPV I/H RISK 1 DNA SPEC QL NAA+PROBE: NEGATIVE

## 2022-11-10 ENCOUNTER — OFFICE VISIT (OUTPATIENT)
Dept: ORTHOPEDICS CLINIC | Facility: CLINIC | Age: 57
End: 2022-11-10
Payer: MEDICAID

## 2022-11-10 ENCOUNTER — TELEPHONE (OUTPATIENT)
Dept: OBGYN CLINIC | Facility: CLINIC | Age: 57
End: 2022-11-10

## 2022-11-10 ENCOUNTER — TELEPHONE (OUTPATIENT)
Facility: CLINIC | Age: 57
End: 2022-11-10

## 2022-11-10 DIAGNOSIS — R06.83 SNORING: Primary | ICD-10-CM

## 2022-11-10 DIAGNOSIS — M65.332 TRIGGER MIDDLE FINGER OF LEFT HAND: Primary | ICD-10-CM

## 2022-11-10 PROCEDURE — 99213 OFFICE O/P EST LOW 20 MIN: CPT | Performed by: ORTHOPAEDIC SURGERY

## 2022-11-10 NOTE — TELEPHONE ENCOUNTER
Yes you can generate a referral to Dr. Myesha Escudero, ANP  Working with [facility]

## 2022-11-10 NOTE — TELEPHONE ENCOUNTER
Pt asked what it means that her hpv test was negative. In the past it was positive sh she wants to know if this means she does not have it. Pt informed her body can clear the virus to where is does not show up or cause cell issues, but it can pop up in the future as well. Pt expressed understanding.

## 2022-11-10 NOTE — TELEPHONE ENCOUNTER
Patient is requesting referral.     Name of specialist and specialty department : Sleep specialist   Reason for visit with the specialist: snores very loudly- getting worse with time  Address of the specialist office: waiting on referral  Appointment date: waiting on referral          CSS informed patient the turnaround time for referral is 5-7 business days. Patient was informed to check their [employer] account for referral status.

## 2022-11-11 NOTE — TELEPHONE ENCOUNTER
Good afternoon Derrell Duarte    Please sign off on referral to Dr Zak Hernandez.     Thank you    Brenda Breeding

## 2022-11-14 RX ORDER — CLONAZEPAM 0.5 MG/1
0.5 TABLET ORAL NIGHTLY
Qty: 30 TABLET | Refills: 0 | Status: SHIPPED | OUTPATIENT
Start: 2022-11-14

## 2022-11-14 NOTE — TELEPHONE ENCOUNTER
Patient requesting refill of clonazePAM 0.5 MG Oral Tab   Please send to [facility] [address]   Patient is out of medication

## 2022-11-17 ENCOUNTER — TELEPHONE (OUTPATIENT)
Dept: ORTHOPEDICS CLINIC | Facility: CLINIC | Age: 57
End: 2022-11-17

## 2022-11-17 NOTE — TELEPHONE ENCOUNTER
Offered patient surgery dates of 1/11/23 or 2/8/23. Patient wants to speak with her employer before she pick a date.

## 2022-11-18 ENCOUNTER — OFFICE VISIT (OUTPATIENT)
Dept: DERMATOLOGY CLINIC | Facility: CLINIC | Age: 57
End: 2022-11-18
Payer: MEDICAID

## 2022-11-18 DIAGNOSIS — L30.9 DERMATITIS: ICD-10-CM

## 2022-11-18 DIAGNOSIS — D22.9 MULTIPLE NEVI: ICD-10-CM

## 2022-11-18 DIAGNOSIS — L72.0 MILIA: ICD-10-CM

## 2022-11-18 DIAGNOSIS — D23.9 BENIGN NEOPLASM OF SKIN, UNSPECIFIED LOCATION: ICD-10-CM

## 2022-11-18 DIAGNOSIS — L82.1 SEBORRHEIC KERATOSES: Primary | ICD-10-CM

## 2022-11-18 PROCEDURE — 99213 OFFICE O/P EST LOW 20 MIN: CPT | Performed by: DERMATOLOGY

## 2022-11-18 RX ORDER — ADAPALENE 0.1 G/100G
1 CREAM TOPICAL DAILY
Qty: 1 EACH | Refills: 0 | COMMUNITY
Start: 2022-11-18

## 2022-11-21 ENCOUNTER — OFFICE VISIT (OUTPATIENT)
Dept: INTERNAL MEDICINE CLINIC | Facility: CLINIC | Age: 57
End: 2022-11-21
Payer: MEDICAID

## 2022-11-21 VITALS
BODY MASS INDEX: 30.36 KG/M2 | HEIGHT: 64.5 IN | HEART RATE: 76 BPM | SYSTOLIC BLOOD PRESSURE: 104 MMHG | DIASTOLIC BLOOD PRESSURE: 68 MMHG | WEIGHT: 180 LBS

## 2022-11-21 DIAGNOSIS — M54.2 NECK PAIN: Primary | ICD-10-CM

## 2022-11-21 PROCEDURE — 3078F DIAST BP <80 MM HG: CPT | Performed by: NURSE PRACTITIONER

## 2022-11-21 PROCEDURE — 3008F BODY MASS INDEX DOCD: CPT | Performed by: NURSE PRACTITIONER

## 2022-11-21 PROCEDURE — 99214 OFFICE O/P EST MOD 30 MIN: CPT | Performed by: NURSE PRACTITIONER

## 2022-11-21 PROCEDURE — 3074F SYST BP LT 130 MM HG: CPT | Performed by: NURSE PRACTITIONER

## 2022-11-21 RX ORDER — TIZANIDINE 2 MG/1
TABLET ORAL
Qty: 40 TABLET | Refills: 0 | Status: SHIPPED | OUTPATIENT
Start: 2022-11-21

## 2022-11-21 NOTE — ASSESSMENT & PLAN NOTE
Patient complaining of left-sided neck pain and stiffness. She has difficulty turning her head to the left. This is muscle skeletal pain and stiffness. Pain can go up to 7 out of 10 but mostly 3 out of 10. We reviewed different cervical exercises. I also will be giving her information on various neck exercises she can do. Plan  Tizanidine 1 to 2 tablets at night.

## 2022-12-04 RX ORDER — LEVOTHYROXINE SODIUM 88 MCG
TABLET ORAL
Qty: 60 TABLET | Refills: 0 | Status: SHIPPED | OUTPATIENT
Start: 2022-12-04

## 2022-12-07 ENCOUNTER — HOSPITAL ENCOUNTER (OUTPATIENT)
Age: 57
Discharge: HOME | End: 2022-12-07
Payer: MEDICAID

## 2022-12-07 VITALS
SYSTOLIC BLOOD PRESSURE: 95 MMHG | RESPIRATION RATE: 20 BRPM | OXYGEN SATURATION: 98 % | DIASTOLIC BLOOD PRESSURE: 72 MMHG | HEART RATE: 75 BPM | TEMPERATURE: 98 F

## 2022-12-07 DIAGNOSIS — R09.81 NASAL CONGESTION: Primary | ICD-10-CM

## 2022-12-07 LAB
POCT INFLUENZA A: NEGATIVE
POCT INFLUENZA B: NEGATIVE

## 2022-12-07 PROCEDURE — 87502 INFLUENZA DNA AMP PROBE: CPT | Performed by: NURSE PRACTITIONER

## 2022-12-07 PROCEDURE — 99213 OFFICE O/P EST LOW 20 MIN: CPT | Performed by: NURSE PRACTITIONER

## 2022-12-07 NOTE — DISCHARGE INSTRUCTIONS
Push fluids. Rest.  Try an over-the-counter nasal spray. Tylenol or Motrin as needed for pain or fever. Follow-up with your doctor. Return for any concerns.

## 2022-12-09 ENCOUNTER — TELEPHONE (OUTPATIENT)
Dept: INTERNAL MEDICINE CLINIC | Facility: CLINIC | Age: 57
End: 2022-12-09

## 2022-12-09 ENCOUNTER — TELEMEDICINE (OUTPATIENT)
Dept: INTERNAL MEDICINE CLINIC | Facility: CLINIC | Age: 57
End: 2022-12-09

## 2022-12-09 DIAGNOSIS — J01.90 ACUTE RHINOSINUSITIS: Primary | ICD-10-CM

## 2022-12-09 RX ORDER — AZITHROMYCIN 250 MG/1
TABLET, FILM COATED ORAL
Qty: 6 TABLET | Refills: 0 | Status: SHIPPED | OUTPATIENT
Start: 2022-12-09 | End: 2022-12-14

## 2022-12-09 RX ORDER — ALBUTEROL SULFATE 90 UG/1
2 AEROSOL, METERED RESPIRATORY (INHALATION) EVERY 4 HOURS PRN
Qty: 6.7 G | Refills: 0 | Status: SHIPPED | OUTPATIENT
Start: 2022-12-09

## 2022-12-09 NOTE — TELEPHONE ENCOUNTER
Patient was in [address] [place] [address] on Wednesday for congestion. Symptoms started Monday. They couldn't take a Covid test because she had Covid in September. She said that she feels like her face is going to explode and she really feels this is more than viral.     Her voice is very hoarse and she said she is bringing up green sputum. She denies fever but said she rarely, if ever, gets fevers when she is sick. She feels she needs antibiotics. Patient scheduled for video visit with Corey Payton today at 10:30 AM. Patient advised to complete the e-check in in [employer], if active. Understands to follow the prompts and links to complete the visit. Patient advised that there may be a co-pay involved in this type of visit. Patient agreed to proceed, they understand the provider may be calling from a blocked, or unknown phone number on their caller ID and they know to answer the phone.     Best call back:  [phone]

## 2022-12-09 NOTE — PATIENT INSTRUCTIONS
Diagnoses and all orders for this visit:    Acute rhinosinusitis  -Continue allergy medication daily  -encourage fluids  -humidifier at bedside  -tylenol or ibuprofen for pain, fever  -check temperature with thermometer  -netti pot-use distilled water with accompanied salt packets 2-3 times per day and as needed  Antibiotic Information  -complete course of antibiotics as prescribed-not completing course of antibiotics may result in infection not fully eradicated or may lead to antibiotic resistance  -eat probiotic yogurt (Activia)  or take probiotic capsules i.e Align or Florastor (sprinkles are available for children)  -take antibiotics on full stomach unless noted otherwise  -Use albuterol inhaler every 4-6 hours as needed for wheezing. If symptoms worsen go to ER.   -call if symptoms do not improve within 4-5 days or if symptoms worsen  Other orders  -     azithromycin (ZITHROMAX Z-SARAH) 250 MG Oral Tab; Take 2 tablets (500 mg total) by mouth daily for 1 day, THEN 1 tablet (250 mg total) daily for 4 days. -     albuterol (VENTOLIN HFA) 108 (90 Base) MCG/ACT Inhalation Aero Soln; Inhale 2 puffs into the lungs every 4 (four) hours as needed for Wheezing.

## 2022-12-14 ENCOUNTER — OFFICE VISIT (OUTPATIENT)
Dept: SURGERY | Facility: CLINIC | Age: 57
End: 2022-12-14
Payer: MEDICAID

## 2022-12-14 DIAGNOSIS — K64.4 ANAL SKIN TAG: Primary | ICD-10-CM

## 2022-12-14 PROCEDURE — 46600 DIAGNOSTIC ANOSCOPY SPX: CPT | Performed by: SURGERY

## 2022-12-14 PROCEDURE — 99204 OFFICE O/P NEW MOD 45 MIN: CPT | Performed by: SURGERY

## 2022-12-16 ENCOUNTER — TELEPHONE (OUTPATIENT)
Dept: INTERNAL MEDICINE CLINIC | Facility: CLINIC | Age: 57
End: 2022-12-16

## 2022-12-16 NOTE — TELEPHONE ENCOUNTER
Patient is requesting a refill for medication below. . she seems to have to call us every month, she doesn't want to be a bother. . if it can be called in more for more than one month at a time, patient would be grateful.     Patient does not have any med left    clonazePAM 0.5 MG Oral Tab

## 2022-12-18 RX ORDER — CLONAZEPAM 0.5 MG/1
TABLET ORAL
Qty: 30 TABLET | Refills: 0 | Status: SHIPPED | OUTPATIENT
Start: 2022-12-18

## 2023-01-03 ENCOUNTER — TELEPHONE (OUTPATIENT)
Dept: INTERNAL MEDICINE CLINIC | Facility: CLINIC | Age: 58
End: 2023-01-03

## 2023-01-03 NOTE — TELEPHONE ENCOUNTER
Patient calling ( identified name and  ) states had severe covid in  and ever since then has irritated her  allergies,  Has been sick for weeks with URI, sore throat, sinus issues      Went to IC  On     Recently has sneezing, clear nasal drainage, the top of her mouth is dry and numb, has post nasal drip ,   Feels it is worse when the weather changes     Takes cough drops with some relief    Also takes OTC allergy meds daily, uses  her Ventolin inhaler at times       Asking for recommendations       Allergies reviewed and pharmacy confirmed  (  has multiple allergies )     Please advise and thank you.

## 2023-01-04 NOTE — TELEPHONE ENCOUNTER
Would need to listen to her lungs.  Please make an appointment    BIBI Arias  Working with [facility]

## 2023-01-09 ENCOUNTER — OFFICE VISIT (OUTPATIENT)
Dept: INTERNAL MEDICINE CLINIC | Facility: CLINIC | Age: 58
End: 2023-01-09
Payer: MEDICAID

## 2023-01-09 ENCOUNTER — LAB ENCOUNTER (OUTPATIENT)
Dept: LAB | Age: 58
End: 2023-01-09
Attending: INTERNAL MEDICINE
Payer: MEDICAID

## 2023-01-09 ENCOUNTER — TELEPHONE (OUTPATIENT)
Dept: ENDOCRINOLOGY CLINIC | Facility: CLINIC | Age: 58
End: 2023-01-09

## 2023-01-09 VITALS
DIASTOLIC BLOOD PRESSURE: 60 MMHG | HEIGHT: 64.5 IN | SYSTOLIC BLOOD PRESSURE: 90 MMHG | BODY MASS INDEX: 30.13 KG/M2 | HEART RATE: 67 BPM | WEIGHT: 178.63 LBS | TEMPERATURE: 98 F

## 2023-01-09 DIAGNOSIS — E03.9 HYPOTHYROIDISM, UNSPECIFIED TYPE: Primary | ICD-10-CM

## 2023-01-09 DIAGNOSIS — C73 THYROID CANCER (HCC): ICD-10-CM

## 2023-01-09 DIAGNOSIS — E03.9 HYPOTHYROIDISM, UNSPECIFIED TYPE: ICD-10-CM

## 2023-01-09 DIAGNOSIS — T78.40XA ALLERGY, INITIAL ENCOUNTER: Primary | ICD-10-CM

## 2023-01-09 LAB
T3FREE SERPL-MCNC: 2.71 PG/ML (ref 2.4–4.2)
T4 FREE SERPL-MCNC: 1.2 NG/DL (ref 0.8–1.7)
TSI SER-ACNC: 0.06 MIU/ML (ref 0.36–3.74)

## 2023-01-09 PROCEDURE — 84443 ASSAY THYROID STIM HORMONE: CPT

## 2023-01-09 PROCEDURE — 3074F SYST BP LT 130 MM HG: CPT | Performed by: NURSE PRACTITIONER

## 2023-01-09 PROCEDURE — 3078F DIAST BP <80 MM HG: CPT | Performed by: NURSE PRACTITIONER

## 2023-01-09 PROCEDURE — 84432 ASSAY OF THYROGLOBULIN: CPT

## 2023-01-09 PROCEDURE — 84439 ASSAY OF FREE THYROXINE: CPT

## 2023-01-09 PROCEDURE — 99213 OFFICE O/P EST LOW 20 MIN: CPT | Performed by: NURSE PRACTITIONER

## 2023-01-09 PROCEDURE — 86800 THYROGLOBULIN ANTIBODY: CPT

## 2023-01-09 PROCEDURE — 3008F BODY MASS INDEX DOCD: CPT | Performed by: NURSE PRACTITIONER

## 2023-01-09 PROCEDURE — 84481 FREE ASSAY (FT-3): CPT

## 2023-01-09 PROCEDURE — 36415 COLL VENOUS BLD VENIPUNCTURE: CPT

## 2023-01-09 NOTE — PATIENT INSTRUCTIONS
Humidifier    Close your window    No dairy for now    Apply warm compresses to sinus area.     Flonase nasal Spray    Simply Saline mist every night

## 2023-01-09 NOTE — TELEPHONE ENCOUNTER
TSH continues to be low  She is on   She is on Synthroid 88 mcg daily x 5 days a week and LT4 100 mcg daily x 2 days a week    Recommend synthroid 88 mcg daily  TSH with reflex in 10 weeks   FU in clinic  Thanks

## 2023-01-09 NOTE — TELEPHONE ENCOUNTER
Dr. Jj Gustafson to patient regarding lab results. Patient stated she did not stop biotin prior to blood work, and she also did not have enough of her old medication supply left. She would like to continue old regimen and get blood work checked again in 10 weeks. Please advise.

## 2023-01-10 LAB
THYROGLOBULIN AB: <0.9 IU/ML
THYROGLOBULIN, SERUM OR PLASMA: <0.1 NG/ML

## 2023-01-10 NOTE — TELEPHONE ENCOUNTER
Noted  Let us recheck in 8 weeks then TSH with reflex  Hold biotin 5 days prior to getting labs  Please book apt VV / in person to review further   Thanks

## 2023-01-15 RX ORDER — CLONAZEPAM 0.5 MG/1
TABLET ORAL
Qty: 30 TABLET | Refills: 0 | Status: SHIPPED | OUTPATIENT
Start: 2023-01-15

## 2023-01-16 ENCOUNTER — TELEPHONE (OUTPATIENT)
Dept: ORTHOPEDICS CLINIC | Facility: CLINIC | Age: 58
End: 2023-01-16

## 2023-01-16 DIAGNOSIS — M65.332 TRIGGER MIDDLE FINGER OF LEFT HAND: Primary | ICD-10-CM

## 2023-01-17 NOTE — TELEPHONE ENCOUNTER
Called Michel Garcia to offer her surgery dates that are available. She chose 4/12 for her surgery date.

## 2023-01-17 NOTE — TELEPHONE ENCOUNTER
Type of surgery:  Left middle finger trigger finger release  Date: 4/12/23  Location: [facility]  Medical Clearance:  NO     *Medical:      *Dental:      *Other:  Prior Authorization Status: Pending  Workers Comp:  Elder/John:  Hankins:  yes  POV: 4/19/23

## 2023-01-23 RX ORDER — LEVOTHYROXINE SODIUM 88 MCG
TABLET ORAL
Qty: 60 TABLET | Refills: 0 | Status: SHIPPED | OUTPATIENT
Start: 2023-01-23

## 2023-02-02 ENCOUNTER — OFFICE VISIT (OUTPATIENT)
Dept: PULMONOLOGY | Facility: CLINIC | Age: 58
End: 2023-02-02

## 2023-02-02 VITALS
RESPIRATION RATE: 16 BRPM | SYSTOLIC BLOOD PRESSURE: 102 MMHG | HEART RATE: 80 BPM | HEIGHT: 64 IN | DIASTOLIC BLOOD PRESSURE: 60 MMHG | OXYGEN SATURATION: 99 % | BODY MASS INDEX: 30.39 KG/M2 | WEIGHT: 178 LBS

## 2023-02-02 DIAGNOSIS — R06.83 SNORING: Primary | ICD-10-CM

## 2023-02-02 PROCEDURE — 3078F DIAST BP <80 MM HG: CPT | Performed by: INTERNAL MEDICINE

## 2023-02-02 PROCEDURE — 3008F BODY MASS INDEX DOCD: CPT | Performed by: INTERNAL MEDICINE

## 2023-02-02 PROCEDURE — 99203 OFFICE O/P NEW LOW 30 MIN: CPT | Performed by: INTERNAL MEDICINE

## 2023-02-02 PROCEDURE — 3074F SYST BP LT 130 MM HG: CPT | Performed by: INTERNAL MEDICINE

## 2023-02-20 RX ORDER — CLONAZEPAM 0.5 MG/1
TABLET ORAL
Qty: 30 TABLET | Refills: 0 | Status: SHIPPED | OUTPATIENT
Start: 2023-02-20

## 2023-03-07 ENCOUNTER — TELEPHONE (OUTPATIENT)
Dept: ORTHOPEDICS CLINIC | Facility: CLINIC | Age: 58
End: 2023-03-07

## 2023-03-15 ENCOUNTER — TELEPHONE (OUTPATIENT)
Dept: ORTHOPEDICS CLINIC | Facility: CLINIC | Age: 58
End: 2023-03-15

## 2023-03-15 DIAGNOSIS — E78.2 MIXED HYPERLIPIDEMIA: ICD-10-CM

## 2023-03-15 DIAGNOSIS — M65.332 TRIGGER MIDDLE FINGER OF LEFT HAND: Primary | ICD-10-CM

## 2023-03-15 RX ORDER — ATORVASTATIN CALCIUM 20 MG/1
20 TABLET, FILM COATED ORAL NIGHTLY
Qty: 90 TABLET | Refills: 3 | Status: SHIPPED | OUTPATIENT
Start: 2023-03-15

## 2023-03-15 NOTE — TELEPHONE ENCOUNTER
Refill passed per [place] [employer] protocol.    Requested Prescriptions   Pending Prescriptions Disp Refills    ATORVASTATIN 20 MG Oral Tab [Pharmacy Med Name: ATORVASTATIN 20MG TABLETS] 90 tablet 1     Sig: TAKE 1 TABLET(20 MG) BY MOUTH EVERY NIGHT       Cholesterol Medication Protocol Passed - 3/15/2023  5:57 AM        Passed - ALT in past 12 months        Passed - LDL in past 12 months        Passed - Last ALT < 80     Lab Results   Component Value Date    ALT 29 04/13/2022             Passed - Last LDL < 130     Lab Results   Component Value Date    LDL 93 04/13/2022             Passed - In person appointment or virtual visit in the past 12 mos or appointment in next 3 mos     Recent Outpatient Visits              1 month ago Snoring    Christopher Fonseca MD    Office Visit    2 months ago Allergy, initial encounter    [address], [address] [facility] Luisa Porter APRN    Office Visit    3 months ago Anal skin tag    [address], [address], [place]    Office Visit    3 months ago Acute rhinosinusitis    [address] [facility], Patel Christensen, APRN    Telemedicine    3 months ago Neck pain    [address], [address] Daniel Moreno Delois Georges, APRN    Office Visit          Future Appointments         Provider Department Appt Notes    In 1 month Marie Garcia MD [address] [facility], [place] 1st POV Left middle finger tinger finger release    In 7 months Alban Drake MD [address], [address], [address] [facility] Annual                     Recent Outpatient Visits              1 month ago Snoring    Christopher Fonseca MD    Office Visit    2 months ago Allergy, initial encounter    [address], [address] More[address] Delois Georges, APRN    Office Visit    3 months ago Anal skin tag    Earle Celis, [address] [facility] [address], Ama Gomez MD    Office Visit    3 months ago Acute rhinosinusitis    [address] [place] [address], Duane Pineda, Patel, JULIA    Telemedicine    3 months ago Neck pain    Earle Celis, [address] [place] Hugo Pritchett, APRN    Office Visit            Future Appointments         Provider Department Appt Notes    In 1 month Kadie Lucero MD [address] [place] [address], [place] 1st POV Left middle finger tinger finger release    In 7 months MD Earle Zambrano, [address] [facility] [address], [address] [facility]

## 2023-03-15 NOTE — TELEPHONE ENCOUNTER
Type of surgery:  Left middle finger, trigger finger release  Date: 4/10/23  Location:  [facility]  Medical Clearance:  No per Dr Nilsa Henley     *Medical:      *Dental:      *Other:  Prior Authorization Status: Pending  Workers Comp:  Medacta/John:  Glentana: Yes  POV: 4/19/23

## 2023-03-21 RX ORDER — CLONAZEPAM 0.5 MG/1
0.5 TABLET ORAL NIGHTLY
Qty: 90 TABLET | Refills: 1 | Status: SHIPPED | OUTPATIENT
Start: 2023-03-21

## 2023-03-21 NOTE — TELEPHONE ENCOUNTER
Patient is calling to get a refill. She is very upset that she has to call every month for 1 month refill. She wants refills available for her convenience. She is going out of town Monday and has 1 pill left.   Please advise

## 2023-03-21 NOTE — TELEPHONE ENCOUNTER
Please review refill failed/no protocol - patient requesting more then 1 month at a time     Requested Prescriptions     Pending Prescriptions Disp Refills    clonazePAM 0.5 MG Oral Tab 30 tablet 5     Sig: Take 1 tablet (0.5 mg total) by mouth nightly. Recent Visits  Date Type Provider Dept   01/09/23 Office Visit Amanda Menaanthony, APRN [facility]   11/21/22 Office Visit Amanda Pina, APRN [facility]   09/13/22 Office Visit Amanda Pina, APRN [facility]   04/07/22 Office Visit Amanda Pina, APRN [facility]   12/02/21 Office Visit Amanda Pina, APRN [facility]   11/01/21 Office Visit Amanda Pina, APRN [facility]   Showing recent visits within past 540 days with a meds authorizing provider and meeting all other requirements  Future Appointments  No visits were found meeting these conditions. Showing future appointments within next 150 days with a meds authorizing provider and meeting all other requirements    Requested Prescriptions   Pending Prescriptions Disp Refills    clonazePAM 0.5 MG Oral Tab 30 tablet 5     Sig: Take 1 tablet (0.5 mg total) by mouth nightly.        There is no refill protocol information for this order

## 2023-04-07 ENCOUNTER — TELEPHONE (OUTPATIENT)
Dept: ORTHOPEDICS CLINIC | Facility: CLINIC | Age: 58
End: 2023-04-07

## 2023-04-07 NOTE — TELEPHONE ENCOUNTER
S/w pt and she states she is not happy that she is scheduled at 415pm on 4/10 and that she cannot eat food after midnight and only clear fluids until 8am on 4/10. She states she has never heard of a surgery so late in the day. I advised her there is nothing I can do to change the time of her surgery or the eating/drinking restrictions.   She states she will proceed with sx and does not want to cancel, but wants Dr Camryn Husain to be aware that she is not happy with having to wait until 415pm.

## 2023-04-07 NOTE — TELEPHONE ENCOUNTER
Per pt she is scheduled for surgery Monday and states she is scheduled to be there at 4:15pm and states she cannot go all day without eating and is upset and asking to speak to a nurse.  Please advise

## 2023-04-10 ENCOUNTER — ANESTHESIA (OUTPATIENT)
Dept: SURGERY | Facility: HOSPITAL | Age: 58
End: 2023-04-10
Payer: MEDICAID

## 2023-04-10 ENCOUNTER — ANESTHESIA EVENT (OUTPATIENT)
Dept: SURGERY | Facility: HOSPITAL | Age: 58
End: 2023-04-10
Payer: MEDICAID

## 2023-04-10 ENCOUNTER — HOSPITAL ENCOUNTER (OUTPATIENT)
Facility: HOSPITAL | Age: 58
Setting detail: HOSPITAL OUTPATIENT SURGERY
Discharge: HOME | End: 2023-04-10
Attending: ORTHOPAEDIC SURGERY | Admitting: ORTHOPAEDIC SURGERY
Payer: MEDICAID

## 2023-04-10 VITALS
DIASTOLIC BLOOD PRESSURE: 65 MMHG | RESPIRATION RATE: 16 BRPM | WEIGHT: 172.63 LBS | TEMPERATURE: 99 F | BODY MASS INDEX: 29.47 KG/M2 | SYSTOLIC BLOOD PRESSURE: 145 MMHG | HEART RATE: 65 BPM | HEIGHT: 64 IN | OXYGEN SATURATION: 98 %

## 2023-04-10 PROCEDURE — 0LN80ZZ RELEASE LEFT HAND TENDON, OPEN APPROACH: ICD-10-PCS | Performed by: ORTHOPAEDIC SURGERY

## 2023-04-10 RX ORDER — SODIUM CHLORIDE, SODIUM LACTATE, POTASSIUM CHLORIDE, CALCIUM CHLORIDE 600; 310; 30; 20 MG/100ML; MG/100ML; MG/100ML; MG/100ML
INJECTION, SOLUTION INTRAVENOUS CONTINUOUS
Status: DISCONTINUED | OUTPATIENT
Start: 2023-04-10 | End: 2023-04-10

## 2023-04-10 RX ORDER — ACETAMINOPHEN 500 MG
1000 TABLET ORAL ONCE
Status: DISCONTINUED | OUTPATIENT
Start: 2023-04-10 | End: 2023-04-10 | Stop reason: HOSPADM

## 2023-04-10 RX ORDER — NALOXONE HYDROCHLORIDE 0.4 MG/ML
80 INJECTION, SOLUTION INTRAMUSCULAR; INTRAVENOUS; SUBCUTANEOUS AS NEEDED
Status: DISCONTINUED | OUTPATIENT
Start: 2023-04-10 | End: 2023-04-10

## 2023-04-10 RX ORDER — MORPHINE SULFATE 4 MG/ML
2 INJECTION, SOLUTION INTRAMUSCULAR; INTRAVENOUS EVERY 10 MIN PRN
Status: DISCONTINUED | OUTPATIENT
Start: 2023-04-10 | End: 2023-04-10

## 2023-04-10 RX ORDER — HYDROCODONE BITARTRATE AND ACETAMINOPHEN 5; 325 MG/1; MG/1
1 TABLET ORAL EVERY 6 HOURS PRN
Status: DISCONTINUED | OUTPATIENT
Start: 2023-04-10 | End: 2023-04-10

## 2023-04-10 RX ORDER — FAMOTIDINE 20 MG/1
20 TABLET, FILM COATED ORAL ONCE
Status: DISCONTINUED | OUTPATIENT
Start: 2023-04-10 | End: 2023-04-10 | Stop reason: HOSPADM

## 2023-04-10 RX ORDER — SODIUM CHLORIDE 9 MG/ML
INJECTION, SOLUTION INTRAVENOUS CONTINUOUS PRN
Status: DISCONTINUED | OUTPATIENT
Start: 2023-04-10 | End: 2023-04-10 | Stop reason: SURG

## 2023-04-10 RX ORDER — ACETAMINOPHEN 500 MG
1000 TABLET ORAL ONCE
Status: COMPLETED | OUTPATIENT
Start: 2023-04-10 | End: 2023-04-10

## 2023-04-10 RX ORDER — GLYCOPYRROLATE 0.2 MG/ML
INJECTION, SOLUTION INTRAMUSCULAR; INTRAVENOUS AS NEEDED
Status: DISCONTINUED | OUTPATIENT
Start: 2023-04-10 | End: 2023-04-10 | Stop reason: SURG

## 2023-04-10 RX ORDER — MORPHINE SULFATE 4 MG/ML
4 INJECTION, SOLUTION INTRAMUSCULAR; INTRAVENOUS EVERY 10 MIN PRN
Status: DISCONTINUED | OUTPATIENT
Start: 2023-04-10 | End: 2023-04-10

## 2023-04-10 RX ORDER — ONDANSETRON 2 MG/ML
INJECTION INTRAMUSCULAR; INTRAVENOUS AS NEEDED
Status: DISCONTINUED | OUTPATIENT
Start: 2023-04-10 | End: 2023-04-10 | Stop reason: SURG

## 2023-04-10 RX ORDER — HYDROMORPHONE HYDROCHLORIDE 1 MG/ML
0.4 INJECTION, SOLUTION INTRAMUSCULAR; INTRAVENOUS; SUBCUTANEOUS EVERY 5 MIN PRN
Status: DISCONTINUED | OUTPATIENT
Start: 2023-04-10 | End: 2023-04-10

## 2023-04-10 RX ORDER — HYDROCODONE BITARTRATE AND ACETAMINOPHEN 5; 325 MG/1; MG/1
1 TABLET ORAL EVERY 6 HOURS PRN
Qty: 3 TABLET | Refills: 0 | Status: SHIPPED | OUTPATIENT
Start: 2023-04-10

## 2023-04-10 RX ORDER — HYDROMORPHONE HYDROCHLORIDE 1 MG/ML
0.6 INJECTION, SOLUTION INTRAMUSCULAR; INTRAVENOUS; SUBCUTANEOUS EVERY 5 MIN PRN
Status: DISCONTINUED | OUTPATIENT
Start: 2023-04-10 | End: 2023-04-10

## 2023-04-10 RX ORDER — LIDOCAINE HYDROCHLORIDE 20 MG/ML
INJECTION, SOLUTION EPIDURAL; INFILTRATION; INTRACAUDAL; PERINEURAL AS NEEDED
Status: DISCONTINUED | OUTPATIENT
Start: 2023-04-10 | End: 2023-04-10 | Stop reason: HOSPADM

## 2023-04-10 RX ORDER — FAMOTIDINE 20 MG/1
20 TABLET, FILM COATED ORAL ONCE
Status: COMPLETED | OUTPATIENT
Start: 2023-04-10 | End: 2023-04-10

## 2023-04-10 RX ORDER — MORPHINE SULFATE 10 MG/ML
6 INJECTION, SOLUTION INTRAMUSCULAR; INTRAVENOUS EVERY 10 MIN PRN
Status: DISCONTINUED | OUTPATIENT
Start: 2023-04-10 | End: 2023-04-10

## 2023-04-10 RX ORDER — CEFAZOLIN SODIUM/WATER 2 G/20 ML
2 SYRINGE (ML) INTRAVENOUS
Status: COMPLETED | OUTPATIENT
Start: 2023-04-11 | End: 2023-04-10

## 2023-04-10 RX ORDER — HYDROMORPHONE HYDROCHLORIDE 1 MG/ML
0.2 INJECTION, SOLUTION INTRAMUSCULAR; INTRAVENOUS; SUBCUTANEOUS EVERY 5 MIN PRN
Status: DISCONTINUED | OUTPATIENT
Start: 2023-04-10 | End: 2023-04-10

## 2023-04-10 RX ADMIN — GLYCOPYRROLATE 0.2 MG: 0.2 INJECTION, SOLUTION INTRAMUSCULAR; INTRAVENOUS at 17:02:00

## 2023-04-10 RX ADMIN — CEFAZOLIN SODIUM/WATER 2 G: 2 G/20 ML SYRINGE (ML) INTRAVENOUS at 17:01:00

## 2023-04-10 RX ADMIN — SODIUM CHLORIDE: 9 INJECTION, SOLUTION INTRAVENOUS at 16:58:00

## 2023-04-10 RX ADMIN — ONDANSETRON 4 MG: 2 INJECTION INTRAMUSCULAR; INTRAVENOUS at 17:02:00

## 2023-04-10 NOTE — OPERATIVE REPORT
Operative Note    Patient Name: Mau Sawant    Preoperative Diagnosis: Trigger middle finger of left hand [M65.332]    Postoperative Diagnosis: Trigger middle finger of left hand [M65.332]    Primary Surgeon: Torrie Encarnacion MD     Assistant: Kyler Garcia    Procedures: Left hand middle finger trigger finger release    Surgical Findings: above    Anesthesia: Kim Block    Complications: none    Specimen: none    Drains: none    Condition: stable to RR    Estimated Blood Loss: Blade Omalley MD

## 2023-04-10 NOTE — ANESTHESIA PROCEDURE NOTES
Airway  Date/Time: 4/10/2023 5:03 PM  Urgency: elective    Airway not difficult    General Information and Staff    Anesthesiologist: Reid Gage MD  Performed: anesthesiologist   Performed by: Reid Gage MD  Authorized by: Reid Gage MD      Indications and Patient Condition  Indications for airway management: anesthesia  Spontaneous Ventilation: absent  Sedation level: deep  Preoxygenated: yes  Patient position: sniffing  Mask difficulty assessment: 1 - vent by mask  Planned trial extubation    Final Airway Details  Final airway type: supraglottic airway      Successful airway: classic  Size 3       Number of attempts at approach: 1  Number of other approaches attempted: 0

## 2023-04-11 ENCOUNTER — TELEPHONE (OUTPATIENT)
Dept: ORTHOPEDICS CLINIC | Facility: CLINIC | Age: 58
End: 2023-04-11

## 2023-04-11 NOTE — TELEPHONE ENCOUNTER
Pharmacy calling for clarification on quantity of Norco script sent yesterday 4/10/23. Please advise.  Thank you      Medication Detail    Medication Quantity Refills Start End   HYDROcodone-acetaminophen 5-325 MG Oral Tab 3 tablet 0 4/10/2023

## 2023-04-11 NOTE — OPERATIVE REPORT
[facility]    PATIENT'S NAME: Medina, [address] [place] [address]   ATTENDING PHYSICIAN: Buddy Ge MD   OPERATING PHYSICIAN: Buddy Ge MD   PATIENT ACCOUNT#:   [id number]    LOCATION:  [facility]  MEDICAL RECORD #:   [id number]       YOB: 1965  ADMISSION DATE:       04/10/2023      OPERATION DATE:  04/10/2023    OPERATIVE REPORT      PREOPERATIVE DIAGNOSIS:  Left middle finger trigger finger. POSTOPERATIVE DIAGNOSIS:  Left middle finger trigger finger. PROCEDURE:  Left middle finger trigger finger release. ASSISTANT:  Shay Spears PA-C. ANESTHESIA:  General.    COMPLICATIONS:  None. BLOOD LOSS:  1 mL. SPECIMEN:  None. DRAIN:  None. INDICATIONS:  Patient is a 59-year-old female with history of left middle finger triggering and pain unresponsive to conservative care. She is unable to have cortisone injections due to an allergy. She elected for surgical release of the A1 pulley. Risks and benefits of surgery were discussed. Questions were answered. No guarantees were made. OPERATIVE TECHNIQUE:  The patient was identified in the preoperative holding area. The appropriate consents were obtained. She was taken to the operating room, placed in supine position on the operating room table. After adequate general anesthesia was obtained, a tourniquet was placed on the left arm. The left upper extremity was prepped and draped in a sterile fashion. The patient was given preoperative IV antibiotics. SCD devices were placed on both lower extremities. The right upper extremity was placed on a padded armboard. Next, the left hand and upper extremity were prepped and draped in a sterile fashion. The extremity was exsanguinated, and the tourniquet was inflated to 250 mmHg. A transverse incision was made over the volar aspect of the middle finger A1 pulley in the palm. Care was taken to use the flexor crease overlying the MCP joint.   Blunt dissection was carried out down through palmar fascia and subcutaneous tissue to the flexor tendon sheath. There was some hypertrophic synovial tissue just proximal to the A1 pulley which was excised. The A1 pulley was then transected and this adequately decompressed the area. Flexion and extension of the digit showed no nodularity or masses within the flexor tendon sheath or on the flexor tendon. The area was irrigated and locally infiltrated with 3 mL of 2% lidocaine. The skin and subcutaneous layers were then closed with 4-0 nylon sutures in interrupted fashion. A sterile dressing was applied. The patient's anesthesia was reversed. She was extubated and taken to recovery room in stable condition. All sponge and instrument counts were reported as correct. The attending physician, Dr. Crystal Moritz, was present and performed all critical portions of the procedure. There were no complications. The first assistant was medically necessary for the surgery. She assisted with patient positioning, retraction of soft tissues for accurate transection of the pulley. She protected the neurovascular bundles during the procedure. Without the aid of the assistant, the surgical procedure would not have been possible. Dictated By Neelima Borrero.  Crystal Moritz, MD  d: 04/10/2023 17:28:10  t: 04/10/2023 19:07:59  Job [id number]  O/

## 2023-04-11 NOTE — TELEPHONE ENCOUNTER
Pharmacy wanted to confirm the quantity was correct and not meant to be a quantity of 30. I will let them know a quantity of 3 pills is correct. Thank you.

## 2023-04-12 ENCOUNTER — LAB ENCOUNTER (OUTPATIENT)
Dept: LAB | Age: 58
End: 2023-04-12
Attending: INTERNAL MEDICINE
Payer: MEDICAID

## 2023-04-12 DIAGNOSIS — E03.9 HYPOTHYROIDISM, UNSPECIFIED TYPE: ICD-10-CM

## 2023-04-12 LAB
T3FREE SERPL-MCNC: 2.95 PG/ML (ref 2.4–4.2)
T4 FREE SERPL-MCNC: 1.4 NG/DL (ref 0.8–1.7)
TSI SER-ACNC: 0.06 MIU/ML (ref 0.36–3.74)

## 2023-04-12 PROCEDURE — 84481 FREE ASSAY (FT-3): CPT

## 2023-04-12 PROCEDURE — 84439 ASSAY OF FREE THYROXINE: CPT

## 2023-04-12 PROCEDURE — 84443 ASSAY THYROID STIM HORMONE: CPT

## 2023-04-12 PROCEDURE — 36415 COLL VENOUS BLD VENIPUNCTURE: CPT

## 2023-04-14 ENCOUNTER — TELEPHONE (OUTPATIENT)
Dept: ENDOCRINOLOGY CLINIC | Facility: CLINIC | Age: 58
End: 2023-04-14

## 2023-04-14 DIAGNOSIS — E03.9 HYPOTHYROIDISM, UNSPECIFIED TYPE: Primary | ICD-10-CM

## 2023-04-19 ENCOUNTER — OFFICE VISIT (OUTPATIENT)
Dept: ORTHOPEDICS CLINIC | Facility: CLINIC | Age: 58
End: 2023-04-19

## 2023-04-19 DIAGNOSIS — M65.332 TRIGGER MIDDLE FINGER OF LEFT HAND: Primary | ICD-10-CM

## 2023-04-19 PROCEDURE — 99024 POSTOP FOLLOW-UP VISIT: CPT

## 2023-04-19 NOTE — TELEPHONE ENCOUNTER
Dr. Carroll Ricardo,     Please advise regarding scheduling patient, no openings in 10 weeks, ok for first available or during lunch?     Patient verbalized understanding and acknowledged    Lab orders placed

## 2023-04-19 NOTE — TELEPHONE ENCOUNTER
During lunch   I can room myself if needed  Please call and make apt  FYI we do have openings on July 15 saturday  Thanks

## 2023-05-14 DIAGNOSIS — L29.9 PRURITUS: ICD-10-CM

## 2023-05-15 RX ORDER — HYDROXYZINE HYDROCHLORIDE 25 MG/1
25 TABLET, FILM COATED ORAL NIGHTLY
Qty: 90 TABLET | Refills: 1 | Status: SHIPPED | OUTPATIENT
Start: 2023-05-15

## 2023-05-18 ENCOUNTER — OFFICE VISIT (OUTPATIENT)
Dept: ORTHOPEDICS CLINIC | Facility: CLINIC | Age: 58
End: 2023-05-18

## 2023-05-18 ENCOUNTER — TELEPHONE (OUTPATIENT)
Facility: CLINIC | Age: 58
End: 2023-05-18

## 2023-05-18 DIAGNOSIS — M65.332 TRIGGER MIDDLE FINGER OF LEFT HAND: Primary | ICD-10-CM

## 2023-05-18 PROCEDURE — 99024 POSTOP FOLLOW-UP VISIT: CPT | Performed by: ORTHOPAEDIC SURGERY

## 2023-05-18 NOTE — TELEPHONE ENCOUNTER
[facility],  I have never seen this patient. Please remove my name from PCP field. Norma-please find out who put my name as PCP?   I will discuss with you Monday     Thank you

## 2023-05-19 ENCOUNTER — PATIENT OUTREACH (OUTPATIENT)
Dept: CASE MANAGEMENT | Age: 58
End: 2023-05-19

## 2023-05-19 NOTE — PROCEDURES
The office order for PCP removal request is Approved and finalized on May 19, 2023.     Thanks,  [facility] [employer]

## 2023-05-30 ENCOUNTER — TELEPHONE (OUTPATIENT)
Dept: ENDOCRINOLOGY CLINIC | Facility: CLINIC | Age: 58
End: 2023-05-30

## 2023-05-30 NOTE — TELEPHONE ENCOUNTER
Received approval letter dated 5/26/23  from [facility] and [facility] for Synthroid 88 mcg effective 2/25/23-5/26/24. Case #- [id number]    Sent to scanning.

## 2023-06-02 ENCOUNTER — OFFICE VISIT (OUTPATIENT)
Dept: DERMATOLOGY CLINIC | Facility: CLINIC | Age: 58
End: 2023-06-02

## 2023-06-02 DIAGNOSIS — L20.84 INTRINSIC ATOPIC DERMATITIS: Primary | ICD-10-CM

## 2023-06-02 DIAGNOSIS — T78.40XD ALLERGIC DISORDER, SUBSEQUENT ENCOUNTER: ICD-10-CM

## 2023-06-02 PROCEDURE — 99213 OFFICE O/P EST LOW 20 MIN: CPT | Performed by: DERMATOLOGY

## 2023-06-02 RX ORDER — TACROLIMUS 1 MG/G
OINTMENT TOPICAL
Qty: 60 G | Refills: 2 | Status: SHIPPED | OUTPATIENT
Start: 2023-06-02

## 2023-06-05 ENCOUNTER — TELEPHONE (OUTPATIENT)
Dept: DERMATOLOGY CLINIC | Facility: CLINIC | Age: 58
End: 2023-06-05

## 2023-06-05 ENCOUNTER — OFFICE VISIT (OUTPATIENT)
Dept: INTERNAL MEDICINE CLINIC | Facility: CLINIC | Age: 58
End: 2023-06-05

## 2023-06-05 VITALS
WEIGHT: 175.19 LBS | BODY MASS INDEX: 29.91 KG/M2 | HEIGHT: 64 IN | HEART RATE: 74 BPM | DIASTOLIC BLOOD PRESSURE: 60 MMHG | SYSTOLIC BLOOD PRESSURE: 120 MMHG

## 2023-06-05 DIAGNOSIS — J38.01 UNILATERAL PARTIAL PARALYSIS OF VOCAL CORDS OR LARYNX: Primary | ICD-10-CM

## 2023-06-05 DIAGNOSIS — M54.2 NECK PAIN: ICD-10-CM

## 2023-06-05 DIAGNOSIS — L29.9 PRURITUS: ICD-10-CM

## 2023-06-05 PROCEDURE — 3078F DIAST BP <80 MM HG: CPT | Performed by: NURSE PRACTITIONER

## 2023-06-05 PROCEDURE — 3074F SYST BP LT 130 MM HG: CPT | Performed by: NURSE PRACTITIONER

## 2023-06-05 PROCEDURE — 3008F BODY MASS INDEX DOCD: CPT | Performed by: NURSE PRACTITIONER

## 2023-06-05 PROCEDURE — 99213 OFFICE O/P EST LOW 20 MIN: CPT | Performed by: NURSE PRACTITIONER

## 2023-06-05 RX ORDER — CLONAZEPAM 0.5 MG/1
0.5 TABLET ORAL NIGHTLY
Qty: 90 TABLET | Refills: 1 | Status: SHIPPED | OUTPATIENT
Start: 2023-06-05

## 2023-06-05 RX ORDER — HYDROXYZINE HYDROCHLORIDE 25 MG/1
25 TABLET, FILM COATED ORAL NIGHTLY
Qty: 90 TABLET | Refills: 1 | Status: SHIPPED | OUTPATIENT
Start: 2023-06-05

## 2023-06-05 NOTE — ASSESSMENT & PLAN NOTE
Patient complaining of left-sided neck pain and stiffness. She has difficulty turning her head to the left. This is muscle skeletal pain and stiffness. She said ever since she had that massage last year she has had pain. At this time she does not want to further evaluate with any test but would like to do some physical therapy.   Patient does not want to take any muscle relaxers    Plan  Physical therapy ordered

## 2023-06-05 NOTE — PATIENT INSTRUCTIONS
Ibuprofen 400 mg every eight hours x three days. Cervicalgia  Likely muscular strain. No symptoms or signs of cervical radiculopathy. Recommend rest and ice application 2-3 times daily.         Electronically signed by therapist: Jw Noel PT

## 2023-06-16 ENCOUNTER — OFFICE VISIT (OUTPATIENT)
Dept: OTOLARYNGOLOGY | Facility: CLINIC | Age: 58
End: 2023-06-16

## 2023-06-16 VITALS — BODY MASS INDEX: 29.88 KG/M2 | WEIGHT: 175 LBS | HEIGHT: 64 IN

## 2023-06-16 DIAGNOSIS — R07.0 THROAT PAIN: Primary | ICD-10-CM

## 2023-06-16 PROCEDURE — 3008F BODY MASS INDEX DOCD: CPT | Performed by: OTOLARYNGOLOGY

## 2023-06-16 PROCEDURE — 99213 OFFICE O/P EST LOW 20 MIN: CPT | Performed by: OTOLARYNGOLOGY

## 2023-06-16 RX ORDER — AZELASTINE 1 MG/ML
2 SPRAY, METERED NASAL 2 TIMES DAILY
Qty: 30 ML | Refills: 0 | Status: SHIPPED | OUTPATIENT
Start: 2023-06-16

## 2023-06-27 ENCOUNTER — TELEPHONE (OUTPATIENT)
Dept: ENDOCRINOLOGY CLINIC | Facility: CLINIC | Age: 58
End: 2023-06-27

## 2023-06-27 ENCOUNTER — LAB ENCOUNTER (OUTPATIENT)
Dept: LAB | Facility: HOSPITAL | Age: 58
End: 2023-06-27
Attending: INTERNAL MEDICINE
Payer: MEDICAID

## 2023-06-27 DIAGNOSIS — E03.9 HYPOTHYROIDISM, UNSPECIFIED TYPE: ICD-10-CM

## 2023-06-27 LAB
T3FREE SERPL-MCNC: 2.68 PG/ML (ref 2.4–4.2)
T4 FREE SERPL-MCNC: 1.2 NG/DL (ref 0.8–1.7)
TSI SER-ACNC: 0.16 MIU/ML (ref 0.36–3.74)

## 2023-06-27 PROCEDURE — 36415 COLL VENOUS BLD VENIPUNCTURE: CPT

## 2023-06-27 PROCEDURE — 84481 FREE ASSAY (FT-3): CPT

## 2023-06-27 PROCEDURE — 84439 ASSAY OF FREE THYROXINE: CPT

## 2023-06-27 PROCEDURE — 84443 ASSAY THYROID STIM HORMONE: CPT

## 2023-06-28 ENCOUNTER — TELEMEDICINE (OUTPATIENT)
Dept: ENDOCRINOLOGY CLINIC | Facility: CLINIC | Age: 58
End: 2023-06-28

## 2023-06-28 DIAGNOSIS — E03.9 HYPOTHYROIDISM, UNSPECIFIED TYPE: ICD-10-CM

## 2023-06-28 DIAGNOSIS — Z85.850 HISTORY OF THYROID CANCER: Primary | ICD-10-CM

## 2023-06-28 PROCEDURE — 99213 OFFICE O/P EST LOW 20 MIN: CPT | Performed by: INTERNAL MEDICINE

## 2023-06-28 RX ORDER — LEVOTHYROXINE SODIUM 88 MCG
88 TABLET ORAL DAILY
Qty: 90 TABLET | Refills: 1 | Status: SHIPPED | OUTPATIENT
Start: 2023-06-28 | End: 2023-12-25

## 2023-07-17 ENCOUNTER — LAB ENCOUNTER (OUTPATIENT)
Dept: LAB | Facility: HOSPITAL | Age: 58
End: 2023-07-17
Attending: INTERNAL MEDICINE
Payer: MEDICAID

## 2023-07-17 DIAGNOSIS — E03.9 HYPOTHYROIDISM, UNSPECIFIED TYPE: ICD-10-CM

## 2023-07-17 LAB
T3FREE SERPL-MCNC: 2.47 PG/ML (ref 2.4–4.2)
T4 FREE SERPL-MCNC: 1 NG/DL (ref 0.8–1.7)
TSI SER-ACNC: 0.13 MIU/ML (ref 0.36–3.74)

## 2023-07-17 PROCEDURE — 84439 ASSAY OF FREE THYROXINE: CPT

## 2023-07-17 PROCEDURE — 84481 FREE ASSAY (FT-3): CPT

## 2023-07-17 PROCEDURE — 36415 COLL VENOUS BLD VENIPUNCTURE: CPT

## 2023-07-17 PROCEDURE — 84443 ASSAY THYROID STIM HORMONE: CPT

## 2023-07-19 ENCOUNTER — HOSPITAL ENCOUNTER (OUTPATIENT)
Dept: ULTRASOUND IMAGING | Age: 58
Discharge: HOME | End: 2023-07-19
Attending: INTERNAL MEDICINE
Payer: MEDICAID

## 2023-07-19 ENCOUNTER — TELEPHONE (OUTPATIENT)
Dept: ENDOCRINOLOGY CLINIC | Facility: CLINIC | Age: 58
End: 2023-07-19

## 2023-07-19 DIAGNOSIS — E03.9 HYPOTHYROIDISM, UNSPECIFIED TYPE: Primary | ICD-10-CM

## 2023-07-19 DIAGNOSIS — Z85.850 HISTORY OF THYROID CANCER: ICD-10-CM

## 2023-07-19 PROCEDURE — 76536 US EXAM OF HEAD AND NECK: CPT | Performed by: INTERNAL MEDICINE

## 2023-07-19 RX ORDER — LEVOTHYROXINE SODIUM 75 MCG
75 TABLET ORAL
Qty: 90 TABLET | Refills: 0 | Status: SHIPPED | OUTPATIENT
Start: 2023-07-19

## 2023-07-19 NOTE — TELEPHONE ENCOUNTER
Patient requesting to speak with Dr Natasha Hastings regarding most recent [address] and treatment plan. Please call [phone]- does not want to speak with RN. Thank you.

## 2023-07-20 NOTE — TELEPHONE ENCOUNTER
Spoke with the patient   Patient started by saying she is upset and then went on to scream on the phone about why I am recommending a lower dose  When I tried to explain, patient cut me multiple times during the conversation and said\" I am working trying to loose weight and you are asking me to go down on the dose\"   I requested patient to calm down and not scream.  I explained to her why the dose needs to be lowered  Also discussed that biotin might/ might not affect labs    She will like to monitor labs one more time on this dose since TSH has improved slightly   Discussed that she can repeat labs in 8 weeks  If TSH remains low, my recommendation will be to cut back on the dose  Patient can also consider a second opinion if she prefers    ENDO STAFF: please call pharmacy to cancel synthroid 75 prescription     Thanks    Patient verbalized a complete  understanding of all of the above and did not have any further questions.

## 2023-08-16 ENCOUNTER — TELEPHONE (OUTPATIENT)
Dept: OTOLARYNGOLOGY | Facility: CLINIC | Age: 58
End: 2023-08-16

## 2023-08-16 NOTE — TELEPHONE ENCOUNTER
Per pt she is congested and states she went to the dentist because of discomfort who showed her images of congestion above her teeth. Per pt she now has post nasal drip and thinks she has a sinus infection.  Please advise

## 2023-08-16 NOTE — TELEPHONE ENCOUNTER
Jasmyne Kuhn please view below and advise,      Per patient started to feel off last Wednesday. She started to have facial pains, specifically on the Right side of the face feeling like she got hit in the face. 2 days ago she has developed post nasal drip, is spitting green yellow mucous, and loss of voice. Denies any fevers or body aches.

## 2023-08-16 NOTE — TELEPHONE ENCOUNTER
Patient was called regarding phone room message. Patient reports to have been feeling off last Wednesday. Over the course of a few days, she started with facial pains specifically the Right Side. Patient reports to feel like she got hit in the face. She now has developed post nasal drip, green yellow mucous that she's spitting out over 2 days ago. She went to the the Dentist and was told by the provider that he saw congestion above her teeth and saw no dental issues as thought by the patient. Per patient, she was prescribed medication however, due to patients insurance coverage patients med's were not covered. Patient denies any fever, or body aches.

## 2023-08-17 RX ORDER — AZITHROMYCIN 250 MG/1
TABLET, FILM COATED ORAL
Qty: 7 TABLET | Refills: 0 | Status: SHIPPED | OUTPATIENT
Start: 2023-08-17

## 2023-08-17 NOTE — TELEPHONE ENCOUNTER
Spoke with patient regarding phone room message. Patient wanted to follow up regarding Dr. Nakul Mabry response. Spoke with Dr. Kingsley Dalton, and he provided a written order with read back for Willadean Randall. Per pt, she has tolerated Willadean Randall in the past and has no allergies listed for it. Called to inform patient, and verbalized understanding of information provided. Instructed to call office immediately if she has any concerns.

## 2023-09-13 ENCOUNTER — NURSE TRIAGE (OUTPATIENT)
Dept: INTERNAL MEDICINE CLINIC | Facility: CLINIC | Age: 58
End: 2023-09-13

## 2023-09-14 ENCOUNTER — OFFICE VISIT (OUTPATIENT)
Dept: INTERNAL MEDICINE CLINIC | Facility: CLINIC | Age: 58
End: 2023-09-14

## 2023-09-14 VITALS
WEIGHT: 173 LBS | HEIGHT: 64 IN | DIASTOLIC BLOOD PRESSURE: 60 MMHG | BODY MASS INDEX: 29.53 KG/M2 | HEART RATE: 75 BPM | SYSTOLIC BLOOD PRESSURE: 100 MMHG

## 2023-09-14 DIAGNOSIS — M54.31 SCIATIC PAIN, RIGHT: ICD-10-CM

## 2023-09-14 DIAGNOSIS — E78.2 MIXED HYPERLIPIDEMIA: ICD-10-CM

## 2023-09-14 DIAGNOSIS — E03.8 OTHER SPECIFIED HYPOTHYROIDISM: ICD-10-CM

## 2023-09-14 DIAGNOSIS — Z12.31 VISIT FOR SCREENING MAMMOGRAM: Primary | ICD-10-CM

## 2023-09-14 PROBLEM — M54.41 ACUTE RIGHT-SIDED LOW BACK PAIN WITH RIGHT-SIDED SCIATICA: Status: ACTIVE | Noted: 2023-09-14

## 2023-09-14 PROCEDURE — 3008F BODY MASS INDEX DOCD: CPT | Performed by: NURSE PRACTITIONER

## 2023-09-14 PROCEDURE — 99213 OFFICE O/P EST LOW 20 MIN: CPT | Performed by: NURSE PRACTITIONER

## 2023-09-14 PROCEDURE — 3078F DIAST BP <80 MM HG: CPT | Performed by: NURSE PRACTITIONER

## 2023-09-14 PROCEDURE — 3074F SYST BP LT 130 MM HG: CPT | Performed by: NURSE PRACTITIONER

## 2023-09-14 RX ORDER — GABAPENTIN 100 MG/1
100 CAPSULE ORAL NIGHTLY
Qty: 30 CAPSULE | Refills: 0 | Status: SHIPPED | OUTPATIENT
Start: 2023-09-14

## 2023-10-02 ENCOUNTER — TELEPHONE (OUTPATIENT)
Dept: DERMATOLOGY CLINIC | Facility: CLINIC | Age: 58
End: 2023-10-02

## 2023-10-02 NOTE — TELEPHONE ENCOUNTER
Spoke with pt and she states she is having a flare and using Benadryl Cream and it helps but it has never fully helped it clear. Pt states she never received the Tacrolimus that was prescribed in June. Pt advises to try the Tacrolimus for a little bit to see if she notices improvement. Pt agreeable. Rx resent to pt's preferred pharmacy.

## 2023-10-16 ENCOUNTER — LAB ENCOUNTER (OUTPATIENT)
Dept: LAB | Facility: HOSPITAL | Age: 58
End: 2023-10-16
Attending: NURSE PRACTITIONER
Payer: MEDICAID

## 2023-10-16 ENCOUNTER — HOSPITAL ENCOUNTER (OUTPATIENT)
Dept: MAMMOGRAPHY | Age: 58
Discharge: HOME | End: 2023-10-16
Attending: NURSE PRACTITIONER
Payer: MEDICAID

## 2023-10-16 DIAGNOSIS — E05.00 TOXIC DIFFUSE GOITER WITH PRETIBIAL MYXEDEMA: ICD-10-CM

## 2023-10-16 DIAGNOSIS — E03.8 OTHER SPECIFIED HYPOTHYROIDISM: ICD-10-CM

## 2023-10-16 DIAGNOSIS — E78.2 MIXED HYPERLIPIDEMIA: ICD-10-CM

## 2023-10-16 DIAGNOSIS — E05.00 TOXIC DIFFUSE GOITER WITH PRETIBIAL MYXEDEMA: Primary | ICD-10-CM

## 2023-10-16 DIAGNOSIS — E03.8 TOXIC DIFFUSE GOITER WITH PRETIBIAL MYXEDEMA: Primary | ICD-10-CM

## 2023-10-16 DIAGNOSIS — E03.8 TOXIC DIFFUSE GOITER WITH PRETIBIAL MYXEDEMA: ICD-10-CM

## 2023-10-16 DIAGNOSIS — E03.9 HYPOTHYROIDISM, UNSPECIFIED TYPE: ICD-10-CM

## 2023-10-16 DIAGNOSIS — Z12.31 VISIT FOR SCREENING MAMMOGRAM: ICD-10-CM

## 2023-10-16 LAB
ALBUMIN SERPL-MCNC: 4 G/DL (ref 3.4–5)
ALBUMIN/GLOB SERPL: 1.2 {RATIO} (ref 1–2)
ALP LIVER SERPL-CCNC: 69 U/L
ALT SERPL-CCNC: 23 U/L
ANION GAP SERPL CALC-SCNC: 6 MMOL/L (ref 0–18)
AST SERPL-CCNC: 21 U/L (ref 15–37)
BILIRUB SERPL-MCNC: 0.5 MG/DL (ref 0.1–2)
BUN BLD-MCNC: 9 MG/DL (ref 7–18)
BUN/CREAT SERPL: 9.6 (ref 10–20)
CALCIUM BLD-MCNC: 9 MG/DL (ref 8.5–10.1)
CHLORIDE SERPL-SCNC: 108 MMOL/L (ref 98–112)
CO2 SERPL-SCNC: 27 MMOL/L (ref 21–32)
CREAT BLD-MCNC: 0.94 MG/DL
DEPRECATED RDW RBC AUTO: 38.9 FL (ref 35.1–46.3)
EGFRCR SERPLBLD CKD-EPI 2021: 70 ML/MIN/1.73M2 (ref 60–?)
ERYTHROCYTE [DISTWIDTH] IN BLOOD BY AUTOMATED COUNT: 12.2 % (ref 11–15)
FASTING STATUS PATIENT QL REPORTED: NO
GLOBULIN PLAS-MCNC: 3.4 G/DL (ref 2.8–4.4)
GLUCOSE BLD-MCNC: 104 MG/DL (ref 70–99)
HCT VFR BLD AUTO: 41.7 %
HGB BLD-MCNC: 13.7 G/DL
MCH RBC QN AUTO: 28.4 PG (ref 26–34)
MCHC RBC AUTO-ENTMCNC: 32.9 G/DL (ref 31–37)
MCV RBC AUTO: 86.3 FL
OSMOLALITY SERPL CALC.SUM OF ELEC: 291 MOSM/KG (ref 275–295)
PLATELET # BLD AUTO: 211 10(3)UL (ref 150–450)
POTASSIUM SERPL-SCNC: 4 MMOL/L (ref 3.5–5.1)
PROT SERPL-MCNC: 7.4 G/DL (ref 6.4–8.2)
RBC # BLD AUTO: 4.83 X10(6)UL
SODIUM SERPL-SCNC: 141 MMOL/L (ref 136–145)
TSI SER-ACNC: 0.67 MIU/ML (ref 0.36–3.74)
VIT B12 SERPL-MCNC: 524 PG/ML (ref 193–986)
VIT D+METAB SERPL-MCNC: 49.7 NG/ML (ref 30–100)
WBC # BLD AUTO: 5.6 X10(3) UL (ref 4–11)

## 2023-10-16 PROCEDURE — 77067 SCR MAMMO BI INCL CAD: CPT | Performed by: NURSE PRACTITIONER

## 2023-10-16 PROCEDURE — 77063 BREAST TOMOSYNTHESIS BI: CPT | Performed by: NURSE PRACTITIONER

## 2023-10-16 PROCEDURE — 80053 COMPREHEN METABOLIC PANEL: CPT

## 2023-10-16 PROCEDURE — 82607 VITAMIN B-12: CPT

## 2023-10-16 PROCEDURE — 85027 COMPLETE CBC AUTOMATED: CPT

## 2023-10-16 PROCEDURE — 36415 COLL VENOUS BLD VENIPUNCTURE: CPT

## 2023-10-16 PROCEDURE — 82306 VITAMIN D 25 HYDROXY: CPT

## 2023-10-16 PROCEDURE — 84443 ASSAY THYROID STIM HORMONE: CPT

## 2023-10-17 ENCOUNTER — OFFICE VISIT (OUTPATIENT)
Dept: OBGYN CLINIC | Facility: CLINIC | Age: 58
End: 2023-10-17

## 2023-10-17 VITALS
WEIGHT: 172 LBS | SYSTOLIC BLOOD PRESSURE: 99 MMHG | DIASTOLIC BLOOD PRESSURE: 68 MMHG | HEART RATE: 84 BPM | BODY MASS INDEX: 30 KG/M2

## 2023-10-17 DIAGNOSIS — Z01.419 ENCOUNTER FOR GYNECOLOGICAL EXAMINATION WITHOUT ABNORMAL FINDING: Primary | ICD-10-CM

## 2023-10-17 PROCEDURE — 99396 PREV VISIT EST AGE 40-64: CPT | Performed by: OBSTETRICS & GYNECOLOGY

## 2023-10-17 PROCEDURE — 3078F DIAST BP <80 MM HG: CPT | Performed by: OBSTETRICS & GYNECOLOGY

## 2023-10-17 PROCEDURE — 3074F SYST BP LT 130 MM HG: CPT | Performed by: OBSTETRICS & GYNECOLOGY

## 2023-10-17 NOTE — PROGRESS NOTES
Radha Mckenna is a 60year old female [id number] Patient's last menstrual period was 10/01/2018. Patient presents with:  Gyn Exam: Annual -- dx w/ eczema recently. Using med from derm on elbow Now w/ rash near rectum that same med helped  . She has no complaints.        OBSTETRICS HISTORY:     OB History    Para Term  AB Living   6 4 4   1 4   SAB IAB Ectopic Multiple Live Births           4      # Outcome Date GA Lbr Clayton/2nd Weight Sex Delivery Anes PTL Lv   6 Term 96   11 lb (4.99 kg) F NORMAL SPONT   OMAR   5 Term 10/27/94   9 lb 12 oz (4.423 kg) M NORMAL SPONT   OMAR   4 Term 93   9 lb 9 oz (4.338 kg) F NORMAL SPONT   OMAR   3 Term 07/15/89   8 lb 10 oz (3.912 kg) M NORMAL SPONT   OMAR   2       SAB      1 AB                GYNE HISTORY:     Periods none due to menopause and none due to ablation        Latest Ref Rng & Units 10/12/2022    12:03 PM 10/25/2019    10:44 AM   RECENT PAP RESULTS   Thinprep Pap Negative for intraepithelial lesion or malignancy Negative for intraepithelial lesion or malignancy  Negative for intraepithelial lesion or malignancy - Positive for HPV    HPV Negative Negative  Positive          MEDICAL HISTORY:     Past Medical History:   Diagnosis Date    Allergic rhinitis     Anxiety state     Appendicitis     Asthma     Cancer ([address]     Colonic polyp     per NG: \"? colonic polyps\"; via proctoscope,     Depression     Deviated nasal septum     Disorder of thyroid     Esophageal reflux     Exposure to medical diagnostic radiation     Head and neck cancer ([address]     Hearing impairment     no hearing aids    Hemorrhoids     High cholesterol     Intermenstrual bleeding     per NG: intermenstrual VB    Menometrorrhagia     Migraines     Neurofibroma of upper arm 2021    left posterior upper arm on biopsy    Osteoarthritis     Pneumonia due to organism     PONV (postoperative nausea and vomiting)     Thyroid cancer ([address]     2007    Tonsillitis     Visual impairment     readers    Walking pneumonia      Past Surgical History:   Procedure Laterality Date    ABLATION  2007    per NG: l131 ablation    APPENDECTOMY  1988    BIOPSY OF UTERUS LINING  2009    neg endometrial biopsy    CARPAL TUNNEL RELEASE Right     Right hand    COLONOSCOPY N/A 2018    Procedure: COLONOSCOPY;  Surgeon: Roseanne Bates MD;  Location: [address] [place] [address] ENDOSCOPY    D & C  2001    FOOT SURGERY  2007    R foot neuroma excision    HAND/FINGER SURGERY UNLISTED      hand surgery    HEMORRHOIDECTOMY  1991    LIGATION OF HEMORRHOID(S)  2016    Rubberband Ligation of Hemorrhoid    NASAL SURG PROC UNLISTED  2007    septoplasty    NEEDLE BIOPSY RIGHT      benign    OTHER SURGICAL HISTORY  2006    sebaceous cyst on face excised    THYROIDECTOMY  2007    TONSILLECTOMY  1974     OB History     T4    L4    SAB0  IAB0  Ectopic0  Multiple0  Live Births4      SOCIAL HISTORY:     Social History    Socioeconomic History      Marital status:       Spouse name: Not on file      Number of children: 4      Years of education: Not on file      Highest education level: Not on file    Occupational History      Occupation: [employer]    Tobacco Use      Smoking status: Never      Smokeless tobacco: Never    Vaping Use      Vaping Use: Never used    Substance and Sexual Activity      Alcohol use: Yes        Comment: occ      Drug use: No      Sexual activity: Yes        Birth control/protection: Vasectomy    Other Topics      Concerns:         Service: Not Asked        Blood Transfusions: Not Asked        Caffeine Concern: Yes          coffee, 1 cup        Occupational Exposure: Not Asked        Hobby Hazards: Not Asked        Sleep Concern: Not Asked        Stress Concern: Not Asked        Weight Concern: Not Asked        Special Diet: Not Asked        Back Care: Not Asked        Exercise: Not Asked        Bike Helmet: Not Asked        Seat Belt: Not Asked        Self-Exams: Not Asked Grew up on a farm: Not Asked        History of tanning: Not Asked        Outdoor occupation: Not Asked        Pt has a pacemaker: No        Pt has a defibrillator: No        Breast feeding: Not Asked        Reaction to local anesthetic: No          Novacaine ok to use    Social History Narrative      Lives alone    Social Determinants of Health  Financial Resource Strain: Not on file  Food Insecurity: Not on file  Transportation Needs: Not on file  Physical Activity: Not on file  Stress: Not on file  Social Connections: Not on file  Housing Stability: Not on file    FAMILY HISTORY:     Family History   Problem Relation Age of Onset    Stroke Father         CVA    Hypertension Father     Diabetes Mother     Neurological Disorder Paternal Grandfather         Alzheimer's    Cancer Brother 46        asbestos ,asphalt lung cancer    Lipids Other         family h/o hyperlipidemia     Cancer Maternal Uncle 18        spinal cancer    Ovarian Cancer Paternal Aunt 55        d.51    Diabetes Sister     Cancer Paternal Cousin Female         tumor in neck; d.32; substance abuse    Glaucoma Neg     Macular degeneration Neg        MEDICATIONS:       Current Outpatient Medications:     tacrolimus (PROTOPIC) 0.1 % External Ointment, Use bid, Disp: 60 g, Rfl: 2    gabapentin 100 MG Oral Cap, Take 1 capsule (100 mg total) by mouth nightly., Disp: 30 capsule, Rfl: 0    SYNTHROID 75 MCG Oral Tab, Take 1 tablet (75 mcg total) by mouth before breakfast. (Patient taking differently: Take 88 mcg by mouth before breakfast.), Disp: 90 tablet, Rfl: 0    azelastine 0.1 % Nasal Solution, 2 sprays by Nasal route 2 (two) times daily. , Disp: 30 mL, Rfl: 0    loratadine-pseudoephedrine ER 5-120 MG Oral Tablet 12 Hr, Take 1 tablet by mouth every 12 (twelve) hours. , Disp: 60 tablet, Rfl: 3    hydrOXYzine 25 MG Oral Tab, Take 1 tablet (25 mg total) by mouth at bedtime.  TAKE 1 TABLET BY MOUTH EVERY DAY FOR ITCHING, Disp: 90 tablet, Rfl: 1 clonazePAM 0.5 MG Oral Tab, Take 1 tablet (0.5 mg total) by mouth nightly. (Patient taking differently: Take 0.5 tablets (0.25 mg total) by mouth nightly.), Disp: 90 tablet, Rfl: 1    citalopram 10 MG Oral Tab, Take 1 tablet (10 mg total) by mouth daily. , Disp: 90 tablet, Rfl: 3    Omeprazole 40 MG Oral Capsule Delayed Release, Take 1 capsule (40 mg total) by mouth daily. , Disp: 90 capsule, Rfl: 3    atorvastatin 20 MG Oral Tab, Take 1 tablet (20 mg total) by mouth nightly., Disp: 90 tablet, Rfl: 3    Fluorometholone 0.1 % Ophthalmic Ointment, Use bid to eyelids prn eczema as directed, Disp: 3.5 g, Rfl: 2    albuterol (VENTOLIN HFA) 108 (90 Base) MCG/ACT Inhalation Aero Soln, Inhale 2 puffs into the lungs every 4 (four) hours as needed for Wheezing., Disp: 6.7 g, Rfl: 0    Cholecalciferol (VITAMIN D3) 50 MCG (2000 UT) Oral Chew Tab, Chew by mouth., Disp: , Rfl:     EPINEPHrine (EPIPEN 2-SARAH) 0.3 MG/0.3ML Injection Solution Auto-injector, Inject IM in event of  allergic reaction (Patient taking differently: Inject IM in event of  allergic reaction), Disp: 1 each, Rfl: 0    Biotin 5 MG Oral Cap, Take by mouth 2 (two) times a day., Disp: , Rfl:     Calcium Carbonate-Vitamin D 600-200 MG-UNIT Oral Tab, Take 2 tablets by mouth., Disp: , Rfl:     ALLERGIES:       Adhesive Tape           HIVES    Comment:Weeping hives and pain - SILK TAPE  Apple                   ANAPHYLAXIS    Comment:RAW APPLE  Aspartame               ANAPHYLAXIS  Ciprofloxacin           SWELLING  Diclofenac              HIVES, SWELLING    Comment:Patient states swelling of lip, and left eye.   Hydrocortisone          SWELLING  Montelukast             PALPITATIONS  Peach                   ANAPHYLAXIS    Comment:RAW PEACH  Peanut Oil              TONGUE SWELLING  Prednisone                Soybean Oil             SWELLING    Comment:Mouth and tongue swelling with pure soy  Vicodin [Hydrocodon*    NAUSEA AND VOMITING    Comment:Okay with Tylenol  Augmentin [Amoxicil*    DIZZINESS  Hydrocodone             NAUSEA AND VOMITING, OTHER (SEE                            COMMENTS)    Comment:Other reaction(s): Fainting  Terbinafine             RASH    Comment:Urinary retention also  Pear                    UNKNOWN    Comment:raw      REVIEW OF SYSTEMS:     Constitutional:    denies fever / chills  Eyes:     denies blurred or double vision  Cardiovascular:  denies chest pain or palpitations  Respiratory:    denies shortness of breath  Gastrointestinal:  denies severe abdominal pain, frequent diarrhea or constipation, nausea / vomiting  Genitourinary:    denies dysuria, bothersome incontinence  Skin/Breast:   denies any breast pain, lumps, or discharge  Neurological:    denies frequent severe headaches  Psychiatric:   denies depression or anxiety, thoughts of harming self or others  Heme/Lymph:    denies easy bruising or bleeding    PHYSICAL EXAM:   Blood pressure 99/68, pulse 84, weight 172 lb (78 kg), last menstrual period 10/01/2018, not currently breastfeeding. Constitutional: well developed, well nourished  Head/Face: normocephalic  Neck/Thyroid: thyroid symmetric, no thyromegaly, no nodules, no adenopathy  Lymphatic:no abnormal supraclavicular or axillary adenopathy is noted  Breast: normal without palpable masses, tenderness, asymmetry, nipple discharge, nipple retraction or skin changes  Respiratory:  nonlabored breathing  Cardiovascular: regular rate and rhythm  Abdomen:  soft, nontender, nondistended, no masses  Skin/Hair: no unusual rashes or bruises  Extremities: no edema, no cyanosis  Psychiatric:  Oriented to time, place, person and situation.  Appropriate mood and affect    Pelvic Exam:  External Genitalia: normal appearance, hair distribution, and no lesions  Urethral Meatus:  normal in size, location, without lesions and prolapse  Bladder:  No fullness, masses or tenderness  Vagina:  Normal appearance without lesions, no abnormal discharge  Cervix:  Normal without tenderness on motion  Uterus: normal in size, contour, position, mobility, without tenderness  Adnexa: normal without masses or tenderness  Perineum: normal  Rectovaginal: no masses, normal tone, guiac negative  Anus: no hemorroids    ASSESSMENT & PLAN:     Maria Isabel Candelaria was seen today for gyn exam.    Diagnoses and all orders for this visit:    Encounter for gynecological examination without abnormal finding      Defer eczema Rx to derm    SUMMARY:    Pap: Next cotest 10/25-27 per ASCCP guidelines. Mammogram: done recently    BCM: Postmenopausal    STD screening: declines    Colon cancer screening: UTD    Misc: Calcium needs reviewed (1500 mg diet + supplement). Weight bearing exercise encouraged. Call if any VB (if perimenopausal, reviewed abn VB patterns)    HM updated    Depression screen:   Depression Screening (PHQ-2/PHQ-9): Over the LAST 2 WEEKS   Little interest or pleasure in doing things (over the last two weeks)?: Not at all    Feeling down, depressed, or hopeless (over the last two weeks)?: Not at all    PHQ-2 SCORE: 0          FOLLOW-UP     Return in about 1 year (around 10/17/2024) for annual gyne exam.    Note to patient and family:  The [facility] makes medical notes available to patients in the interest of transparency. However, please be advised that this is a medical document. It is intended as a peer to peer communication. It is written in medical language and may contain abbreviations or verbiage that are technical and unfamiliar. It may appear blunt or direct. Medical documents are intended to carry relevant information, facts as evident, and the clinical opinion of the practitioner.

## 2023-12-01 NOTE — TELEPHONE ENCOUNTER
Current Outpatient Medications:       gabapentin 100 MG Oral Cap, Take 1 capsule (100 mg total) by mouth nightly., Disp: 30 capsule, Rfl: 0

## 2023-12-04 RX ORDER — GABAPENTIN 100 MG/1
100 CAPSULE ORAL NIGHTLY
Qty: 90 CAPSULE | Refills: 3 | Status: SHIPPED | OUTPATIENT
Start: 2023-12-04

## 2023-12-04 NOTE — TELEPHONE ENCOUNTER
Refill passed per [facility] [facility] protocol. Requested Prescriptions   Pending Prescriptions Disp Refills    gabapentin 100 MG Oral Cap 30 capsule 0     Sig: Take 1 capsule (100 mg total) by mouth nightly.        Neurology Medications Passed - 12/4/2023  2:21 PM        Passed - In person appointment or virtual visit in the past 6 mos or appointment in next 3 mos     Recent Outpatient Visits              1 month ago Encounter for gynecological examination without abnormal finding    Dinah Levine MD    Office Visit    2 months ago Visit for screening mammogram    Luisa Williamson APRN    Office Visit    5 months ago History of thyroid cancer    [address] [facility] Richard Wyman MD    Telemedicine    5 months ago Throat pain    [address], [address], Breanna Parker MD    Office Visit    6 months ago Neck pain    [address], Luisa Elizabeth APRN    Office Visit          Future Appointments         Provider Department Appt Notes    In 10 months Omer Levine MD [address], [address], [address] [facility] annual                  Recent Outpatient Visits              1 month ago Encounter for gynecological examination without abnormal finding    Dinah Levine MD    Office Visit    2 months ago Visit for screening mammogram    Luisa Williamson APRN    Office Visit    5 months ago History of thyroid cancer    [address] [place] [address] Richard Wyman MD    Telemedicine    5 months ago Throat pain    [address], [address], Breanna Parker MD    Office Visit    6 months ago Neck pain    [facility], [facility], JULIA Murphy    Office Visit          Future Appointments         Provider Department Appt Notes    In 10 months Pedro Pablo Ames MD [address], [address] [facility],[address], [address] [facility]

## 2023-12-11 ENCOUNTER — APPOINTMENT (OUTPATIENT)
Dept: GENERAL RADIOLOGY | Age: 58
End: 2023-12-11
Attending: NURSE PRACTITIONER
Payer: MEDICAID

## 2023-12-11 ENCOUNTER — HOSPITAL ENCOUNTER (OUTPATIENT)
Age: 58
Discharge: HOME | End: 2023-12-11
Payer: MEDICAID

## 2023-12-11 VITALS
DIASTOLIC BLOOD PRESSURE: 66 MMHG | RESPIRATION RATE: 16 BRPM | HEART RATE: 78 BPM | OXYGEN SATURATION: 99 % | SYSTOLIC BLOOD PRESSURE: 111 MMHG | TEMPERATURE: 99 F

## 2023-12-11 DIAGNOSIS — R05.9 COUGH: Primary | ICD-10-CM

## 2023-12-11 DIAGNOSIS — J01.90 ACUTE SINUSITIS, RECURRENCE NOT SPECIFIED, UNSPECIFIED LOCATION: ICD-10-CM

## 2023-12-11 PROCEDURE — 99213 OFFICE O/P EST LOW 20 MIN: CPT | Performed by: NURSE PRACTITIONER

## 2023-12-11 PROCEDURE — 71046 X-RAY EXAM CHEST 2 VIEWS: CPT | Performed by: NURSE PRACTITIONER

## 2023-12-11 RX ORDER — AZITHROMYCIN 250 MG/1
TABLET, FILM COATED ORAL
Qty: 6 TABLET | Refills: 0 | Status: SHIPPED | OUTPATIENT
Start: 2023-12-11 | End: 2023-12-16

## 2023-12-18 ENCOUNTER — TELEPHONE (OUTPATIENT)
Dept: OTOLARYNGOLOGY | Facility: CLINIC | Age: 58
End: 2023-12-18

## 2023-12-18 NOTE — TELEPHONE ENCOUNTER
Pt called stating pt went to urgent care for post nasal drip, vertigo and cough. Finished the antibiotic on Friday 12-15-23. Pt still having post nasal drip and cough.   Please call pt

## 2023-12-18 NOTE — TELEPHONE ENCOUNTER
Contacted patient and still having sinus issues, nasal congestion, post nasal drip and nonproductive cough, had CXR at urgent care, too. Requests appointment for reeval. Appointment made.   Future Appointments   Date Time Provider Jazz Cooli   12/19/2023 10:10 AM Corinna Calzada MD [address] [facility]   10/18/2024 11:40 AM Mely Burgess MD [facility]   '

## 2023-12-19 ENCOUNTER — OFFICE VISIT (OUTPATIENT)
Dept: OTOLARYNGOLOGY | Facility: CLINIC | Age: 58
End: 2023-12-19

## 2023-12-19 VITALS — HEIGHT: 64 IN | BODY MASS INDEX: 29.02 KG/M2 | WEIGHT: 170 LBS

## 2023-12-19 DIAGNOSIS — R07.0 THROAT PAIN: Primary | ICD-10-CM

## 2023-12-19 PROCEDURE — 99213 OFFICE O/P EST LOW 20 MIN: CPT | Performed by: OTOLARYNGOLOGY

## 2023-12-19 PROCEDURE — 3008F BODY MASS INDEX DOCD: CPT | Performed by: OTOLARYNGOLOGY

## 2023-12-19 RX ORDER — LORATADINE 10 MG/1
10 TABLET ORAL DAILY
Qty: 30 TABLET | Refills: 3 | Status: SHIPPED | OUTPATIENT
Start: 2023-12-19

## 2023-12-19 RX ORDER — AMOXICILLIN AND CLAVULANATE POTASSIUM 875; 125 MG/1; MG/1
1 TABLET, FILM COATED ORAL EVERY 12 HOURS
Qty: 20 TABLET | Refills: 0 | Status: SHIPPED | OUTPATIENT
Start: 2023-12-19 | End: 2023-12-21

## 2023-12-20 ENCOUNTER — TELEPHONE (OUTPATIENT)
Dept: OTOLARYNGOLOGY | Facility: CLINIC | Age: 58
End: 2023-12-20

## 2023-12-26 RX ORDER — CLARITHROMYCIN 500 MG/1
500 TABLET, COATED ORAL 2 TIMES DAILY
Qty: 14 TABLET | Refills: 0 | Status: SHIPPED | OUTPATIENT
Start: 2023-12-26

## 2023-12-26 NOTE — TELEPHONE ENCOUNTER
Clarithromycin called into hr pharmacy. She has so many allergie and interactions with other meds that it is diffivcult to find a med she can use.  Stop atorvastin if possible while taking antibiotic

## 2024-01-01 NOTE — TELEPHONE ENCOUNTER
Please review; protocol failed.     Hypothyroid Medications  Protocol Criteria:  Appointment scheduled in the past 12 months or the next 3 months  TSH resulted in the past 12 months that is normal  Recent Outpatient Visits            4 days ago Malignant ne Statement Selected

## 2024-01-08 ENCOUNTER — PATIENT MESSAGE (OUTPATIENT)
Dept: ENDOCRINOLOGY CLINIC | Facility: CLINIC | Age: 59
End: 2024-01-08

## 2024-01-08 RX ORDER — LEVOTHYROXINE SODIUM 88 MCG
88 TABLET ORAL DAILY
Qty: 90 TABLET | Refills: 1 | OUTPATIENT
Start: 2024-01-08

## 2024-01-22 ENCOUNTER — TELEPHONE (OUTPATIENT)
Dept: INTERNAL MEDICINE CLINIC | Facility: CLINIC | Age: 59
End: 2024-01-22

## 2024-01-22 ENCOUNTER — TELEPHONE (OUTPATIENT)
Dept: OBGYN CLINIC | Facility: CLINIC | Age: 59
End: 2024-01-22

## 2024-01-22 NOTE — TELEPHONE ENCOUNTER
Could be thyroid related. Menopausal Sx do not surge out of the blue. Try melatonin for issues w/ sleeping.

## 2024-01-22 NOTE — TELEPHONE ENCOUNTER
Last seen [facility] on 10/17/2023 for annual. Pt calling states she is starting to have hot flashes, irritability and having sleepless night. Pt states she has been w/o a cycle for 5 yrs. Did have thyroid CA.     Pt states she has similar s/s 11 yrs ago when she went threw CA treatment but then it subsided. Pt states for last 3 months she has been experiencing the above. Pt states she is doing light, loose cotton clothing in layers. Has to use a fan at not and has been running around in a t-shirt and shorts most of winter d/t being hot.    Pt asking if [facility] would recommend any testing or treatment options. Pt informed [facility] would want to see her in office. Pt states she was just here in Oct. Pt informed will route to [facility] for recs, but since s/s are new on-set she may want pt to be seen.     To [facility] to please review and advise. Thank you.

## 2024-01-23 ENCOUNTER — PATIENT OUTREACH (OUTPATIENT)
Dept: CASE MANAGEMENT | Age: 59
End: 2024-01-23

## 2024-01-23 NOTE — TELEPHONE ENCOUNTER
PCP showing PHYSICIAN NONSTAFF   RN generated PCP change request per protocol.         Dr Araujo=collaborating physician for JULIA Horn

## 2024-01-23 NOTE — PROCEDURES
The office order for PCP removal request is Approved and finalized on January 23, 2024.    Added Dr. Joshua Dutta to PCP field  Added JULIA Horn to Team member     Thanks,  [facility] Team

## 2024-02-01 ENCOUNTER — OFFICE VISIT (OUTPATIENT)
Dept: ORTHOPEDICS CLINIC | Facility: CLINIC | Age: 59
End: 2024-02-01

## 2024-02-01 DIAGNOSIS — G56.21 ULNAR NEURITIS, RIGHT: Primary | ICD-10-CM

## 2024-02-01 PROCEDURE — 99214 OFFICE O/P EST MOD 30 MIN: CPT | Performed by: ORTHOPAEDIC SURGERY

## 2024-02-01 NOTE — PROGRESS NOTES
NURSING INTAKE COMMENTS:   Chief Complaint   Patient presents with    Finger Pain     Consult- Trigger finger  - R 4th finger - Pt states finger is very stiff and painful onset 1  year ago.  Numbness on lateal aspect of finger- some swelling.        HPI: This 59 year old female presents today with complaints of right hand pain and numbness.  She has noted some triggering and locking of her right ring finger for the past few weeks.  She has 2 tooth infections currently.  She has numbness of her fifth finger which has been ongoing for more than a year.  She feels stiffness in the fourth and fifth fingers.  No history of injury.  She had a left middle finger trigger finger release about a year ago which helped significantly.    Past Medical History:   Diagnosis Date    Allergic rhinitis     Anxiety state     Appendicitis     Asthma     Cancer (HCC)     Colonic polyp     per NG: \"? 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    left posterior upper arm on biopsy    Osteoarthritis     Pneumonia due to organism     PONV (postoperative nausea and vomiting)     Thyroid cancer (HCC)     2007    Tonsillitis     Visual impairment     readers    Walking pneumonia      Past Surgical History:   Procedure Laterality Date    ABLATION  2007    per NG: l131 ablation    APPENDECTOMY  1988    BIOPSY OF UTERUS LINING  2009    neg endometrial biopsy    CARPAL TUNNEL RELEASE Right     Right hand    COLONOSCOPY N/A 12/17/2018    Procedure: COLONOSCOPY;  Surgeon: Jayden Leonard MD;  Location: [facility] ENDOSCOPY    D & C  2001    FOOT SURGERY  2007    R foot neuroma excision     HAND/FINGER SURGERY UNLISTED  2004    hand surgery    HEMORRHOIDECTOMY  1991    LIGATION OF HEMORRHOID(S)  11/28/2016    Rubberband Ligation of Hemorrhoid    NASAL SURG PROC UNLISTED  2007    septoplasty    NEEDLE BIOPSY RIGHT      benign    OTHER SURGICAL HISTORY  2006    sebaceous cyst on face excised    THYROIDECTOMY  2007    TONSILLECTOMY  1974     Current Outpatient Medications   Medication Sig Dispense Refill    clonazePAM 0.5 MG Oral Tab Take 1 tablet (0.5 mg total) by mouth nightly. 90 tablet 0    SYNTHROID 88 MCG Oral Tab Take 1 tablet (88 mcg total) by mouth before breakfast. 90 tablet 0    clarithromycin 500 MG Oral Tab Take 1 tablet (500 mg total) by mouth 2 (two) times daily. 14 tablet 0    loratadine 10 MG Oral Tab Take 1 tablet (10 mg total) by mouth daily. 30 tablet 3    gabapentin 100 MG Oral Cap Take 1 capsule (100 mg total) by mouth nightly. 90 capsule 3    tacrolimus (PROTOPIC) 0.1 % External Ointment Use bid 60 g 2    azelastine 0.1 % Nasal Solution 2 sprays by Nasal route 2 (two) times daily. 30 mL 0    loratadine-pseudoephedrine ER 5-120 MG Oral Tablet 12 Hr Take 1 tablet by mouth every 12 (twelve) hours. 60 tablet 3    hydrOXYzine 25 MG Oral Tab Take 1 tablet (25 mg total) by mouth at bedtime. TAKE 1 TABLET BY MOUTH EVERY DAY FOR ITCHING 90 tablet 1    citalopram 10 MG Oral Tab Take 1 tablet (10 mg total) by mouth daily. 90 tablet 3    Omeprazole 40 MG Oral Capsule Delayed Release Take 1 capsule (40 mg total) by mouth daily. 90 capsule 3    atorvastatin 20 MG Oral Tab Take 1 tablet (20 mg total) by mouth nightly. 90 tablet 3    Fluorometholone 0.1 % Ophthalmic Ointment Use bid to eyelids prn eczema as directed 3.5 g 2    albuterol (VENTOLIN HFA) 108 (90 Base) MCG/ACT Inhalation Aero Soln Inhale 2 puffs into the lungs every 4 (four) hours as needed for Wheezing. 6.7 g 0    Cholecalciferol (VITAMIN D3) 50 MCG (2000 UT) Oral Chew Tab Chew by mouth.      EPINEPHrine (EPIPEN 2-SARAH) 0.3 MG/0.3ML  Injection Solution Auto-injector Inject IM in event of  allergic reaction (Patient taking differently: Inject IM in event of  allergic reaction) 1 each 0    Biotin 5 MG Oral Cap Take by mouth 2 (two) times a day.      Calcium Carbonate-Vitamin D 600-200 MG-UNIT Oral Tab Take 2 tablets by mouth.       Allergies   Allergen Reactions    Adhesive Tape HIVES     Weeping hives and pain - SILK TAPE    Apple ANAPHYLAXIS     RAW APPLE    Aspartame ANAPHYLAXIS    Augmentin [Amoxicillin-Pot Clavulanate] HIVES and DIZZINESS    Ciprofloxacin SWELLING    Diclofenac HIVES and SWELLING     Patient states swelling of lip, and left eye.    Hydrocortisone SWELLING    Montelukast PALPITATIONS    Peach ANAPHYLAXIS     RAW PEACH    Peanut Oil TONGUE SWELLING    Prednisone     Soybean Oil SWELLING     Mouth and tongue swelling with pure soy    Vicodin [Hydrocodone-Acetaminophen] NAUSEA AND VOMITING     Okay with Tylenol    Hydrocodone NAUSEA AND VOMITING and OTHER (SEE COMMENTS)     Other reaction(s): Fainting    Terbinafine RASH     Urinary retention also    Pear UNKNOWN     raw     Family History   Problem Relation Age of Onset    Stroke Father         CVA    Hypertension Father     Diabetes Mother     Neurological Disorder Paternal Grandfather         Alzheimer's    Cancer Brother 51        asbestos ,asphalt lung cancer    Lipids Other         family h/o hyperlipidemia     Cancer Maternal Uncle 18        spinal cancer    Ovarian Cancer Paternal Aunt 51        d.51    Diabetes Sister     Cancer Paternal Cousin Female         tumor in neck; d.32; substance abuse    Glaucoma Neg     Macular degeneration Neg        Social History     Occupational History    Occupation: [employer]   Tobacco Use    Smoking status: Never    Smokeless tobacco: Never   Vaping Use    Vaping Use: Never used   Substance and Sexual Activity    Alcohol use: Yes     Comment: occ    Drug use: No    Sexual activity: Yes     Birth control/protection: Vasectomy        Review  of Systems:  GENERAL: denies fevers, chills, night sweats, fatigue, unintentional weight loss/gain  SKIN: denies skin lesions, open sores, rash  HEENT:denies recent vision change, new nasal congestion,hearing loss, tinnitus, sore throat, headaches  RESPIRATORY: denies new shortness of breath, cough, asthma, wheezing  CARDIOVASCULAR: denies chest pain, leg cramps with exertion, palpitations, leg swelling  GI: denies abdominal pain, nausea, vomiting, diarrhea, constipation, hematochezia, worsening heartburn or stomach ulcers  : denies dysuria, hematuria, incontinence, increased frequency, urgency, difficulty urinating  MUSCULOSKELETAL: denies musculoskeletal complaints other than in HPI  NEURO: denies numbness, tingling, weakness, balance issues, dizziness, memory loss  PSYCHIATRIC: denies Hx of depression, anxiety, other psychiatric disorders  HEMATOLOGIC: denies blood clots, anemia, blood clotting disorders, blood transfusion  ENDOCRINE: denies autoimmune disease, thyroid issues, or diabetes  ALLERGY: denies asthma, seasonal allergies    Physical Examination:    [facility] 10/01/2018   Constitutional: appears well hydrated, alert and responsive, no acute distress noted  Extremities: Right hand tender at the fourth finger A1 pulley.  Diminished light touch sensation over the ulnar border of the fourth digit and entire fifth digit.  Elbow flexion testing produces some numbness in the small finger.  Tinel's sign at the ulnar tunnel and cubital tunnel are both positive.  Spurling sign equivocal.  Neurological: Light touch sensation slightly diminished in the fifth finger.    Imaging:   No results found.     Labs:  Lab Results   Component Value Date    WBC 5.6 10/16/2023    HGB 13.7 10/16/2023    .0 10/16/2023      Lab Results   Component Value Date     (H) 10/16/2023    BUN 9 10/16/2023    CREATSERUM 0.94 10/16/2023    GFRNAA 68 07/14/2022    GFRAA 78 07/14/2022        Assessment and Plan:  Diagnoses and all  orders for this visit:    Ulnar neuritis, right  -     EMG ([facility]); Future        Assessment: Right hand numbness, findings suggestive of cubital tunnel syndrome.  Right ring finger trigger finger    Plan: I recommended nonoperative treatment for now.  Advised icing and oral anti-inflammatory use as medically tolerated.  Advised sleeping with the elbow extended.  Ordered EMG nerve conduction velocity testing of the right upper extremity.  Follow-up again after the testing.    Follow Up: Return for follow-up visit after ordered tests completed.    GRAY CHRISTIANSON MD

## 2024-02-19 ENCOUNTER — NURSE ONLY (OUTPATIENT)
Dept: DERMATOLOGY CLINIC | Facility: CLINIC | Age: 59
End: 2024-02-19

## 2024-02-19 ENCOUNTER — TELEPHONE (OUTPATIENT)
Dept: DERMATOLOGY CLINIC | Facility: CLINIC | Age: 59
End: 2024-02-19

## 2024-02-19 ENCOUNTER — OFFICE VISIT (OUTPATIENT)
Dept: DERMATOLOGY CLINIC | Facility: CLINIC | Age: 59
End: 2024-02-19
Payer: MEDICAID

## 2024-02-19 DIAGNOSIS — T78.40XD ALLERGIC DISORDER, SUBSEQUENT ENCOUNTER: ICD-10-CM

## 2024-02-19 DIAGNOSIS — L20.9 ATOPIC DERMATITIS, UNSPECIFIED TYPE: Primary | ICD-10-CM

## 2024-02-19 DIAGNOSIS — L20.84 INTRINSIC ATOPIC DERMATITIS: Primary | ICD-10-CM

## 2024-02-19 PROCEDURE — 99215 OFFICE O/P EST HI 40 MIN: CPT | Performed by: DERMATOLOGY

## 2024-02-19 PROCEDURE — 96900 ACTINOTHERAPY UV LIGHT: CPT | Performed by: DERMATOLOGY

## 2024-02-19 RX ORDER — FAMOTIDINE 40 MG/1
40 TABLET, FILM COATED ORAL DAILY
Qty: 30 TABLET | Refills: 0 | Status: SHIPPED | OUTPATIENT
Start: 2024-02-19

## 2024-02-19 RX ORDER — DUPILUMAB 300 MG/2ML
600 INJECTION, SOLUTION SUBCUTANEOUS ONCE
Qty: 4 ML | Refills: 0 | Status: SHIPPED | OUTPATIENT
Start: 2024-02-19 | End: 2024-02-19

## 2024-02-19 RX ORDER — DUPILUMAB 300 MG/2ML
300 INJECTION, SOLUTION SUBCUTANEOUS
Qty: 4 ML | Refills: 5 | Status: SHIPPED | OUTPATIENT
Start: 2024-02-19 | End: 2024-03-18

## 2024-02-19 NOTE — TELEPHONE ENCOUNTER
Dupixent forms completed by patient.  She will also be starting light tx.  Plan to continue light until biologic approved or pending if light is sufficient enough for treatment.  Given to MD for orders and approval.

## 2024-02-20 RX ORDER — FAMOTIDINE 40 MG/1
40 TABLET, FILM COATED ORAL DAILY
Qty: 90 TABLET | Refills: 0 | OUTPATIENT
Start: 2024-02-20

## 2024-02-20 NOTE — TELEPHONE ENCOUNTER
Current refill request refused due to refill is either a duplicate request or has active refills at the pharmacy.  Check previous templates.    Requested Prescriptions     Refused Prescriptions Disp Refills    FAMOTIDINE 40 MG Oral Tab [Pharmacy Med Name: FAMOTIDINE 40MG TABLETS] 90 tablet 0     Sig: TAKE 1 TABLET(40 MG) BY MOUTH DAILY     Refused By: ROSELYN HAMILTON     Reason for Refusal: Request already responded to by other means (e.g. phone or fax)

## 2024-02-21 ENCOUNTER — APPOINTMENT (OUTPATIENT)
Dept: DERMATOLOGY CLINIC | Facility: CLINIC | Age: 59
End: 2024-02-21

## 2024-02-21 DIAGNOSIS — L20.9 ATOPIC DERMATITIS, UNSPECIFIED TYPE: ICD-10-CM

## 2024-02-21 PROCEDURE — 96900 ACTINOTHERAPY UV LIGHT: CPT | Performed by: DERMATOLOGY

## 2024-02-23 ENCOUNTER — NURSE ONLY (OUTPATIENT)
Dept: DERMATOLOGY CLINIC | Facility: CLINIC | Age: 59
End: 2024-02-23

## 2024-02-23 DIAGNOSIS — L20.9 ATOPIC DERMATITIS, UNSPECIFIED TYPE: ICD-10-CM

## 2024-02-23 PROCEDURE — 96900 ACTINOTHERAPY UV LIGHT: CPT | Performed by: DERMATOLOGY

## 2024-02-26 ENCOUNTER — PROCEDURE VISIT (OUTPATIENT)
Dept: PHYSICAL MEDICINE AND REHAB | Facility: CLINIC | Age: 59
End: 2024-02-26
Payer: MEDICAID

## 2024-02-26 ENCOUNTER — NURSE ONLY (OUTPATIENT)
Dept: DERMATOLOGY CLINIC | Facility: CLINIC | Age: 59
End: 2024-02-26

## 2024-02-26 VITALS — BODY MASS INDEX: 29.02 KG/M2 | WEIGHT: 170 LBS | HEIGHT: 64 IN

## 2024-02-26 DIAGNOSIS — L20.9 ATOPIC DERMATITIS, UNSPECIFIED TYPE: ICD-10-CM

## 2024-02-26 DIAGNOSIS — R20.2 NUMBNESS AND TINGLING IN LEFT HAND: Primary | ICD-10-CM

## 2024-02-26 DIAGNOSIS — R20.0 NUMBNESS AND TINGLING IN LEFT HAND: Primary | ICD-10-CM

## 2024-02-26 PROCEDURE — 96900 ACTINOTHERAPY UV LIGHT: CPT | Performed by: DERMATOLOGY

## 2024-02-26 NOTE — PROCEDURES
[address]  [address] [place] [id number]  Phone: [phone]  Fax: [phone]    ELECTRODIAGNOSTIC REPORT          Patient: ELLEN JAY Hand Dominance:  RIGHT   Patient ID: [id number] Referring Dr:  DR. GRAY PARMAR.   Sex: Female Test Dr:  DR. CHEPE VIEYRA   YOB: 1965           Visit Date: 59 Years Examining MD:     Age: 59 Years Referred by:     Height: 5 feet 5 inch     Referred for: 59 year old RH female presents with constant numbness in the right little finger for about 3 years. Has been dropping objects. She has a history of carpal tunnel release in the right wrist.     PMH: thyroidectomy for thyroid cancer. No history DM. No EtOH abuse.     Physical exam:  slight weakness 4th and 5th FDP; normal ABP, wrist extension, FDI, ADM  Decreased sensation right medial 5th finger  2/4 RUE reflexes     Summary    The motor conduction test was performed on 2 nerve(s). The results were normal in 1 nerve(s): R Ulnar - ADM. Results outside the specified normal range were found in 1 nerve(s), as follows:  In the R Median - APB study  the amplitude was reduced for Elbow stimulation    The sensory conduction test was performed on 4 nerve(s). The results were normal in 2 nerve(s): R Median - Digit II (Antidromic), R Ulnar - Digit V (Antidromic). Results outside the specified normal range were found in 2 nerve(s), as follows:  In the R Dorsal ulnar cutaneous - Hand dorsum (Forearm) study  the peak latency was mildly increased, similar to the asymptomatic left upper extremity  In the L Dorsal ulnar cutaneous - Hand dorsum (Forearm) study  the peak latency was mildly increased, similar to the right upper extremity    The needle EMG examination was performed in 6 muscles. It was normal in 2 muscle(s): R. Deltoid, R. Pronator teres. The study was abnormal in 4 muscle(s), with the following distribution:  Abnormal spontaneous/insertional activity was found in  R. Flexor digitorum profundus, dig 4 & 5, R. First dorsal interosseous, R. Abductor digiti minimi (manus), R. Extensor indicis proprius.      Conclusion:   1) This is an abnormal electrodiagnostic study.   2) There is electrodiagnostic evidence of a mild right, chronic C8 radiculopathy in the right upper extremity.   3) There is no electrodiagnostic evidence of mononeuropathy, brachial plexopathy or myopathy in the right upper extremity.     Consider MRI of the cervical spine; given the findings and duration of symptoms the numbness may remain despite treatment.     Thank you for allowing me to participate in this patient's care,    Marco Guerrero DO  Physical Medicine and Rehabilitation  [facility]  ________________________________    Motor NCS      Nerve / Sites Muscle Latency Amplitude Amp % Duration Segments Distance Lat Diff Velocity     ms mV % ms  cm ms m/s   R Median - APB      Wrist APB 2.96 15.7 100 7.71 Wrist - APB 8        Ref.  ?4.40 ?4.0 ?100  Ref.         Elbow APB 6.90 12.4 78.9 7.94 Elbow - Wrist 21 3.94 53      Ref.    ?80  Ref.   ?49   R Ulnar - ADM      Wrist ADM 2.06 13.3 100 5.85 Wrist - ADM 8        Ref.  ?3.90 ?5.0 ?100  Ref.         B.Elbow ADM 5.50 12.8 95.9 5.96 B.Elbow - Wrist 20.5 3.44 60      Ref.    ?80  Ref.   ?50      A.Elbow ADM 6.96 11.1 83.4 5.90 A.Elbow - B.Elbow 9.5 1.46 65      Ref.      Ref.   ?50       Sensory NCS      Nerve / Sites Rec. Site Onset Lat Peak Lat NP Amp PP Amp Segments Distance Velocity     ms ms µV µV  cm m/s   R Median - Digit II (Antidromic)      Wrist Dig II 2.52 3.38 18.5 22.6 Wrist - Dig II 13 52      Ref.   ?3.40 ?15.0 ?20.0 Ref.     R Ulnar - Digit V (Antidromic)      Wrist Dig V 2.44 3.15 23.4 35.7 Wrist - Dig V 14 57      Ref.   ?3.70 ?15.0 ?15.0 Ref.     R Dorsal ulnar cutaneous - Hand dorsum (Forearm)      Forearm Hand dorsum 1.88 2.31 6.0 11.8 Forearm - Hand dorsum 10 53      Ref.   ?2.30 ?5.0 ?5.0 Ref.     L Dorsal ulnar  cutaneous - Hand dorsum (Forearm)      Forearm Hand dorsum 2.15 2.42 1.5 6.1 Forearm - Hand dorsum 10 47      Ref.   ?2.30 ?5.0 ?5.0 Ref.         EMG Summary Table     Spontaneous MUAP Recruitment   Muscle Nerve Roots IA Fib PSW Fasc H.F. Comments Amp Dur. PPP Pattern   R. Deltoid Axillary C5-C6 N None None None None Normal N N N N   R. Pronator teres Median C6-C7 N None None None None Normal N N N N   R. Flexor digitorum profundus, dig 4 & 5 Ulnar C8-T1 1+ None None None None CRDs N N N N   R. First dorsal interosseous Ulnar C8-T1 1+ None None None None CRDs N N N N   R. Abductor digiti minimi (manus) Ulnar C8-T1 1+ None None None None CRDs N N N N   R. Extensor indicis proprius Radial C7-C8 1+ None None None None CRDs N N N N

## 2024-02-28 ENCOUNTER — NURSE ONLY (OUTPATIENT)
Dept: DERMATOLOGY CLINIC | Facility: CLINIC | Age: 59
End: 2024-02-28

## 2024-02-28 DIAGNOSIS — L20.9 ATOPIC DERMATITIS, UNSPECIFIED TYPE: ICD-10-CM

## 2024-02-28 PROCEDURE — 96900 ACTINOTHERAPY UV LIGHT: CPT | Performed by: DERMATOLOGY

## 2024-02-28 NOTE — PROGRESS NOTES
Macy Berger is a 59 year old female.    Chief Complaint   Patient presents with    Derm Problem     LOV 06/23. Pt present for ezcema f/u. Pt present with a flare up around her neck, elbows, behind knees, around arms, left panty lining and a spot on abdomen. Pt using tacrolimus (PROTOPIC) 0.1 % Ointment, which she states It didn't seem to work. Pt states benadryl itch stopping gel. Pt states breakouts are worse, extremely itchy and painful.              Adhesive tape, Apple, Aspartame, Augmentin [amoxicillin-pot clavulanate], Ciprofloxacin, Diclofenac, Hydrocortisone, Montelukast, Peach, Peanut oil, Prednisone, Soybean oil, Vicodin [hydrocodone-acetaminophen], Hydrocodone, Terbinafine, and Pear  Current Outpatient Medications   Medication Sig Dispense Refill    famotidine 40 MG Oral Tab Take 1 tablet (40 mg total) by mouth daily. 30 tablet 0    Crisaborole 2 % External Ointment Use bid to eczema 100 g 3    Dupilumab (DUPIXENT) 300 MG/2ML Subcutaneous Solution Pen-injector Inject 300 mg into the skin every 14 (fourteen) days for 28 days. 4 mL 5    clonazePAM 0.5 MG Oral Tab Take 1 tablet (0.5 mg total) by mouth nightly. 90 tablet 0    SYNTHROID 88 MCG Oral Tab Take 1 tablet (88 mcg total) by mouth before breakfast. 90 tablet 0    clarithromycin 500 MG Oral Tab Take 1 tablet (500 mg total) by mouth 2 (two) times daily. 14 tablet 0    loratadine 10 MG Oral Tab Take 1 tablet (10 mg total) by mouth daily. 30 tablet 3    gabapentin 100 MG Oral Cap Take 1 capsule (100 mg total) by mouth nightly. 90 capsule 3    tacrolimus (PROTOPIC) 0.1 % External Ointment Use bid 60 g 2    azelastine 0.1 % Nasal Solution 2 sprays by Nasal route 2 (two) times daily. 30 mL 0    loratadine-pseudoephedrine ER 5-120 MG Oral Tablet 12 Hr Take 1 tablet by mouth every 12 (twelve) hours. 60 tablet 3    hydrOXYzine 25 MG Oral Tab Take 1 tablet (25 mg total) by mouth at bedtime. TAKE 1 TABLET BY MOUTH EVERY DAY FOR ITCHING 90 tablet 1     citalopram 10 MG Oral Tab Take 1 tablet (10 mg total) by mouth daily. 90 tablet 3    Omeprazole 40 MG Oral Capsule Delayed Release Take 1 capsule (40 mg total) by mouth daily. 90 capsule 3    atorvastatin 20 MG Oral Tab Take 1 tablet (20 mg total) by mouth nightly. 90 tablet 3    Fluorometholone 0.1 % Ophthalmic Ointment Use bid to eyelids prn eczema as directed 3.5 g 2    albuterol (VENTOLIN HFA) 108 (90 Base) MCG/ACT Inhalation Aero Soln Inhale 2 puffs into the lungs every 4 (four) hours as needed for Wheezing. 6.7 g 0    Cholecalciferol (VITAMIN D3) 50 MCG (2000 UT) Oral Chew Tab Chew by mouth.      EPINEPHrine (EPIPEN 2-SARAH) 0.3 MG/0.3ML Injection Solution Auto-injector Inject IM in event of  allergic reaction (Patient taking differently: Inject IM in event of  allergic reaction) 1 each 0    Biotin 5 MG Oral Cap Take by mouth 2 (two) times a day.      Calcium Carbonate-Vitamin D 600-200 MG-UNIT Oral Tab Take 2 tablets by mouth.        Past Medical History:   Diagnosis Date    Allergic rhinitis     Anxiety state     Appendicitis     Asthma (HCC)     Cancer (HCC)     Colonic polyp     per NG: \"? 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    left posterior upper arm on biopsy    Osteoarthritis     Pneumonia due to organism     PONV (postoperative nausea and vomiting)     Thyroid cancer (HCC)     2007    Tonsillitis     Visual impairment     readers    Walking pneumonia       Social History:  Social History     Socioeconomic History    Marital status:     Number of children: 4    Occupational History    Occupation: [employer]   Tobacco Use    Smoking status: Never    Smokeless tobacco: Never   Vaping Use    Vaping Use: Never used   Substance and Sexual Activity    Alcohol use: Yes     Alcohol/week: 1.0 standard drink of alcohol     Types: 1 Standard drinks or equivalent per week     Comment: I rarely drink    Drug use: No    Sexual activity: Yes     Birth control/protection: Vasectomy   Other Topics Concern    Caffeine Concern Yes     Comment: coffee, 1 cup    Grew up on a farm No    History of tanning No    Outdoor occupation No    Pt has a pacemaker No    Pt has a defibrillator No    Breast feeding Yes     Comment: I delivered 4 children and i breast fed all 4.    Reaction to local anesthetic No   Social History Narrative    Lives alone                 Current Outpatient Medications   Medication Sig Dispense Refill    famotidine 40 MG Oral Tab Take 1 tablet (40 mg total) by mouth daily. 30 tablet 0    Crisaborole 2 % External Ointment Use bid to eczema 100 g 3    Dupilumab (DUPIXENT) 300 MG/2ML Subcutaneous Solution Pen-injector Inject 300 mg into the skin every 14 (fourteen) days for 28 days. 4 mL 5    clonazePAM 0.5 MG Oral Tab Take 1 tablet (0.5 mg total) by mouth nightly. 90 tablet 0    SYNTHROID 88 MCG Oral Tab Take 1 tablet (88 mcg total) by mouth before breakfast. 90 tablet 0    clarithromycin 500 MG Oral Tab Take 1 tablet (500 mg total) by mouth 2 (two) times daily. 14 tablet 0    loratadine 10 MG Oral Tab Take 1 tablet (10 mg total) by mouth daily. 30 tablet 3    gabapentin 100 MG Oral Cap Take 1 capsule (100 mg total) by mouth nightly. 90 capsule 3    tacrolimus (PROTOPIC) 0.1 % External Ointment Use bid 60 g 2    azelastine 0.1 % Nasal Solution 2 sprays by Nasal route 2 (two) times daily. 30 mL 0    loratadine-pseudoephedrine ER 5-120 MG Oral Tablet 12 Hr Take 1 tablet by mouth every 12 (twelve) hours. 60 tablet 3    hydrOXYzine 25 MG Oral Tab Take 1 tablet (25 mg total) by mouth  at bedtime. TAKE 1 TABLET BY MOUTH EVERY DAY FOR ITCHING 90 tablet 1    citalopram 10 MG Oral Tab Take 1 tablet (10 mg total) by mouth daily. 90 tablet 3    Omeprazole 40 MG Oral Capsule Delayed Release Take 1 capsule (40 mg total) by mouth daily. 90 capsule 3    atorvastatin 20 MG Oral Tab Take 1 tablet (20 mg total) by mouth nightly. 90 tablet 3    Fluorometholone 0.1 % Ophthalmic Ointment Use bid to eyelids prn eczema as directed 3.5 g 2    albuterol (VENTOLIN HFA) 108 (90 Base) MCG/ACT Inhalation Aero Soln Inhale 2 puffs into the lungs every 4 (four) hours as needed for Wheezing. 6.7 g 0    Cholecalciferol (VITAMIN D3) 50 MCG (2000 UT) Oral Chew Tab Chew by mouth.      EPINEPHrine (EPIPEN 2-SARAH) 0.3 MG/0.3ML Injection Solution Auto-injector Inject IM in event of  allergic reaction (Patient taking differently: Inject IM in event of  allergic reaction) 1 each 0    Biotin 5 MG Oral Cap Take by mouth 2 (two) times a day.      Calcium Carbonate-Vitamin D 600-200 MG-UNIT Oral Tab Take 2 tablets by mouth.       Allergies:   Allergies   Allergen Reactions    Adhesive Tape HIVES     Weeping hives and pain - SILK TAPE    Apple ANAPHYLAXIS     RAW APPLE    Aspartame ANAPHYLAXIS    Augmentin [Amoxicillin-Pot Clavulanate] HIVES and DIZZINESS    Ciprofloxacin SWELLING    Diclofenac HIVES and SWELLING     Patient states swelling of lip, and left eye.    Hydrocortisone SWELLING    Montelukast PALPITATIONS    Peach ANAPHYLAXIS     RAW PEACH    Peanut Oil TONGUE SWELLING    Prednisone     Soybean Oil SWELLING     Mouth and tongue swelling with pure soy    Vicodin [Hydrocodone-Acetaminophen] NAUSEA AND VOMITING     Okay with Tylenol    Hydrocodone NAUSEA AND VOMITING and OTHER (SEE COMMENTS)     Other reaction(s): Fainting    Terbinafine RASH     Urinary retention also    Pear UNKNOWN     raw       Past Medical History:   Diagnosis Date    Allergic rhinitis     Anxiety state     Appendicitis     Asthma (HCC)     Cancer (HCC)      Colonic polyp     per NG: \"? colonic polyps\"; via proctoscope, 1993    Contact allergic reaction     Depression     Deviated nasal septum     Disorder of thyroid     Environmental allergies     Esophageal reflux     Exposure to medical diagnostic radiation     Head and neck cancer (HCC)     Hearing impairment     no hearing aids    Hemorrhoids     High cholesterol     Intermenstrual bleeding     per NG: intermenstrual VB    Menometrorrhagia     Migraines     Neurofibroma of upper arm 2021    left posterior upper arm on biopsy    Osteoarthritis     Pneumonia due to organism     PONV (postoperative nausea and vomiting)     Thyroid cancer (HCC)     2007    Tonsillitis     Visual impairment     readers    Walking pneumonia      Past Surgical History:   Procedure Laterality Date    ABLATION  2007    per NG: l131 ablation    APPENDECTOMY  1988    BIOPSY OF UTERUS LINING  2009    neg endometrial biopsy    CARPAL TUNNEL RELEASE Right     Right hand    COLONOSCOPY N/A 2018    Procedure: COLONOSCOPY;  Surgeon: Jayden Leonard MD;  Location: [facility] ENDOSCOPY    D & C      FOOT SURGERY  2007    R foot neuroma excision    HAND/FINGER SURGERY UNLISTED      hand surgery    HEMORRHOIDECTOMY  1991    LIGATION OF HEMORRHOID(S)  2016    Rubberband Ligation of Hemorrhoid    NASAL SURG PROC UNLISTED  2007    septoplasty    NEEDLE BIOPSY RIGHT      benign          OTHER SURGICAL HISTORY  2006    sebaceous cyst on face excised    THYROIDECTOMY  2007    TONSILLECTOMY  1974     Social History     Socioeconomic History    Marital status:      Spouse name: Not on file    Number of children: 4    Years of education: Not on file    Highest education level: Not on file   Occupational History    Occupation:    Tobacco Use    Smoking status: Never    Smokeless tobacco: Never   Vaping Use    Vaping Use: Never used   Substance and Sexual Activity    Alcohol use: Yes     Alcohol/week: 1.0 standard drink  of alcohol     Types: 1 Standard drinks or equivalent per week     Comment: I rarely drink    Drug use: No    Sexual activity: Yes     Birth control/protection: Vasectomy   Other Topics Concern     Service Not Asked    Blood Transfusions Not Asked    Caffeine Concern Yes     Comment: coffee, 1 cup    Occupational Exposure Not Asked    Hobby Hazards Not Asked    Sleep Concern Not Asked    Stress Concern Not Asked    Weight Concern Not Asked    Special Diet Not Asked    Back Care Not Asked    Exercise Not Asked    Bike Helmet Not Asked    Seat Belt Not Asked    Self-Exams Not Asked    Grew up on a farm No    History of tanning No    Outdoor occupation No    Pt has a pacemaker No    Pt has a defibrillator No    Breast feeding Yes     Comment: I delivered 4 children and i breast fed all 4.    Reaction to local anesthetic No   Social History Narrative    Lives alone     Social Determinants of Health     Financial Resource Strain: Not on file   Food Insecurity: Not on file   Transportation Needs: Not on file   Physical Activity: Not on file   Stress: Not on file   Social Connections: Not on file   Housing Stability: Not on file     Family History   Problem Relation Age of Onset    Stroke Father         CVA    Hypertension Father     Diabetes Mother     Neurological Disorder Paternal Grandfather         Alzheimer's    Cancer Brother 51        asbestos ,asphalt lung cancer    Lipids Other         family h/o hyperlipidemia     Cancer Maternal Uncle 18        spinal cancer    Ovarian Cancer Paternal Aunt 51        d.51    Diabetes Sister     Diabetes Sister     Cancer Sister     Cancer Paternal Cousin Female         tumor in neck; d.32; substance abuse    Glaucoma Neg     Macular degeneration Neg                       HPI :      Chief Complaint   Patient presents with    Derm Problem     LOV 06/23. Pt present for ezcema f/u. Pt present with a flare up around her neck, elbows, behind knees, around arms, left panty  lining and a spot on abdomen. Pt using tacrolimus (PROTOPIC) 0.1 % Ointment, which she states It didn't seem to work. Pt states benadryl itch stopping gel. Pt states breakouts are worse, extremely itchy and painful.      Patient frustrated with repeated episodes of erythema edema itching predominantly on the head neck chest face with significant swelling.  Patient with significant allergies to multiple steroids including hydrocortisone, unable to tolerate oral steroids generally.  Tacrolimus cause more burning and irritation.  Has had more persistent areas over the arms legs inguinal creases.    Patient had allergy testing done multiple times in the past.  Noted in allergies.    Breakouts causing more difficulty with work.,  More randomly.  Has noted when traveling recently in the sun slightly better as far as the itching.  Erythema still may come and go had episode coming back from visiting her son recently with neck swelling irritation in the airport.  Although Benadryl gel does stop the itching does not decrease the redness or shorten duration of reactions.  Only makes itching tolerable.  Has had more difficulty tolerating antihistamines as well.  Loratadine and hydroxyzine minimally helpful.    Patient with history of chronic eyelid dermatitis.  FML tolerable has been able to use TobraDex with good improvement.  Other topical eye ointments unable to tolerate    Frustrated with repeated episodes  Patient presents with concerns above.    Past notes/ records and appropriate/relevant lab results including pathology and past body maps reviewed. Updated and new information noted in current visit.       ROS:    Denies any other systemic complaints.  No fevers, chills, night sweats, sensitivity to the sun, deeper lumps or bumps.  No other skin complaints.  History, medications, allergies as noted.    Physical examination: Patient  well-developed well-nourished, alert oriented in no acute distress.  Exam of involved,  appropriate areas of skin performed, including scalp, head, neck, face,nails, hair, external eyes, including conjunctival mucosa, eyelids, lips, external ears, back, chest, abdomen, arms, legs, palms.  Remarkable for lesions as noted   See map for details    Labs recent labs reviewed liver functions renal function essentially unremarkable, B12 normal serum protein is normal vitamin D adequate thyroid normal CBC unremarkable last eosinophil count 3.5 previous HUNTER screens negative rheumatoid factor negative celiac negative     ASSESSMENT AND PLAN:     No diagnosis found.    Assessment / plan:    Chronic atopic dermatitis.  Patient with numerous allergies.  Difficulty tolerating antihistamines.  Will add famotidine for itching, any urticarial component.  Continue her current regimen.    Benadryl gel has been more helpful than other topicals.  Burning irritation with tacrolimus.    Eyelid dermatitis response to TobraDex/FML continue.  Patient has had to purchase this.  Unable to tolerate hydrocortisone.    Numerous contact and systemic allergies.  Continue avoidance monitoring.    More frequent outbreaks once to twice a week.  Interfere with her ability to work, sleep, perform her daily activities  IGA 4    Discussed options.  Given longstanding history of atopic dermatitis, reviewed options.  Patient concern regarding side effects with Boris inhibitors and potential for immunosuppression, infection and frequent monitoring.  Patient's age recommend proceeding with course of Dupixent.  Medically necessary given increasing frequency of outbreaks of atopic dermatitis, itching not able to manage with topical steroids due to allergies, unable to tolerate tacrolimus,  Antihistamines not helpful.  Topical pramoxine only reduces itching does not clear rash or improve patient's overall functioning    Aware of risks benefits including infections, herpes lesions, worsening eye, eyelid dermatitis facial erythema patient wishes to  proceed    In the interim we will begin phototherapy for pruritus since patient does note some improvement when in the sun.    Lesion posterior arm biopsied 2021 benign neurofibroma    Pathophysiology discussed with patient.  Therapeutic options reviewed.  See  Medications in grid.  Instructions reviewed at length.     General skin care questions answered.   Reassurance regarding benign skin lesions.Signs and symptoms of skin cancer, ABCDE's of melanoma briefly reviewed.  Sunscreen use (broad-spectrum, ideally mineral, UVA UVB coverage SPF 30 or greater recommended), sun protection, encouraged.  Followup as noted in rtc or p.r.n.    Encounter Times new patient  Including precharting, reviewing chart, prior notes obtaining history: 10 minutes, medical exam :10 minutes, notes on body map, plan, counseling 20minutes My total time spent caring for the patient on the day of the encounter: 50 minutes     The patient indicates understanding of these issues and agrees to the plan.  The patient is asked to return as noted in follow-up /as noted above    This note was generated using Dragon voice recognition software.  Please contact me regarding any confusion resulting from errors in recognition.  Note to patient and family: The 21st Century Cures Act makes medical notes like these available to patients. However, be advised this is a medical document. It is intended as [id number] communication and monitoring of a patient's care needs. It is written in medical language and may contain abbreviations or verbiage that are unfamiliar. It may appear blunt or direct. Medical documents are intended to carry relevant information, facts as evident and the clinical opinion of the practitioner.  No orders of the defined types were placed in this encounter.      Meds & Refills for this Visit:   Requested Prescriptions     Signed Prescriptions Disp Refills    famotidine 40 MG Oral Tab 30 tablet 0     Sig: Take 1 tablet (40 mg total) by  mouth daily.    Crisaborole 2 % External Ointment 100 g 3     Sig: Use bid to eczema    Dupilumab (DUPIXENT) 300 MG/2ML Subcutaneous Solution Pen-injector 4 mL 0     Sig: Inject 600 mg into the skin one time for 1 dose.    Dupilumab (DUPIXENT) 300 MG/2ML Subcutaneous Solution Pen-injector 4 mL 5     Sig: Inject 300 mg into the skin every 14 (fourteen) days for 28 days.       No diagnosis found.    No orders of the defined types were placed in this encounter.      Results From Past 48 Hours:  No results found for this or any previous visit (from the past 48 hour(s)).    Meds This Visit:      Imaging Orders:  None     Referral Orders:  No orders of the defined types were placed in this encounter.

## 2024-03-01 ENCOUNTER — NURSE ONLY (OUTPATIENT)
Dept: DERMATOLOGY CLINIC | Facility: CLINIC | Age: 59
End: 2024-03-01

## 2024-03-01 DIAGNOSIS — L20.9 ATOPIC DERMATITIS, UNSPECIFIED TYPE: ICD-10-CM

## 2024-03-01 PROCEDURE — 96900 ACTINOTHERAPY UV LIGHT: CPT | Performed by: DERMATOLOGY

## 2024-03-04 ENCOUNTER — NURSE ONLY (OUTPATIENT)
Dept: DERMATOLOGY CLINIC | Facility: CLINIC | Age: 59
End: 2024-03-04
Payer: MEDICAID

## 2024-03-04 DIAGNOSIS — L20.9 ATOPIC DERMATITIS, UNSPECIFIED TYPE: ICD-10-CM

## 2024-03-04 PROCEDURE — 96900 ACTINOTHERAPY UV LIGHT: CPT | Performed by: DERMATOLOGY

## 2024-03-06 ENCOUNTER — TELEPHONE (OUTPATIENT)
Dept: ORTHOPEDICS CLINIC | Facility: CLINIC | Age: 59
End: 2024-03-06

## 2024-03-06 ENCOUNTER — NURSE ONLY (OUTPATIENT)
Dept: DERMATOLOGY CLINIC | Facility: CLINIC | Age: 59
End: 2024-03-06

## 2024-03-06 DIAGNOSIS — L20.9 ATOPIC DERMATITIS, UNSPECIFIED TYPE: ICD-10-CM

## 2024-03-06 PROCEDURE — 96900 ACTINOTHERAPY UV LIGHT: CPT | Performed by: DERMATOLOGY

## 2024-03-07 ENCOUNTER — OFFICE VISIT (OUTPATIENT)
Dept: ORTHOPEDICS CLINIC | Facility: CLINIC | Age: 59
End: 2024-03-07
Payer: MEDICAID

## 2024-03-07 DIAGNOSIS — M54.12 CERVICAL RADICULOPATHY: Primary | ICD-10-CM

## 2024-03-07 PROCEDURE — 99213 OFFICE O/P EST LOW 20 MIN: CPT | Performed by: ORTHOPAEDIC SURGERY

## 2024-03-07 NOTE — PROGRESS NOTES
NURSING INTAKE COMMENTS:   Chief Complaint   Patient presents with    Finger Pain      R 4th finger f/u - Pt here today to go over EMG results - Finger stiff and hand is swelling this morning -        HPI: This 59 year old female presents today with complaints of right hand numbness and pain follow-up.  She reports minimal mechanical catching in the fourth finger.  Majority of her numbness is in the fourth and fifth fingers.  She had electrodiagnostic testing and is here for the results.  Symptoms are essentially unchanged.    Past Medical History:   Diagnosis Date    Allergic rhinitis     Anxiety state     Appendicitis     Asthma (HCC)     Cancer (HCC)     Colonic polyp     per NG: \"? 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    left posterior upper arm on biopsy    Osteoarthritis     Pneumonia due to organism     PONV (postoperative nausea and vomiting)     Thyroid cancer (HCC)     2007    Tonsillitis     Visual impairment     readers    Walking pneumonia      Past Surgical History:   Procedure Laterality Date    ABLATION  2007    per NG: l131 ablation    APPENDECTOMY  1988    BIOPSY OF UTERUS LINING  2009    neg endometrial biopsy    CARPAL TUNNEL RELEASE Right     Right hand    COLONOSCOPY N/A 12/17/2018    Procedure: COLONOSCOPY;  Surgeon: Jayden Leonard MD;  Location: [facility]    D & C  2001    FOOT SURGERY  2007    R foot neuroma excision    HAND/FINGER SURGERY UNLISTED  2004    hand surgery    HEMORRHOIDECTOMY  1991    LIGATION OF HEMORRHOID(S)  11/28/2016    Rubberband  Ligation of Hemorrhoid    NASAL SURG PROC UNLISTED  2007    septoplasty    NEEDLE BIOPSY RIGHT      benign          OTHER SURGICAL HISTORY  2006    sebaceous cyst on face excised    THYROIDECTOMY  2007    TONSILLECTOMY  1974     Current Outpatient Medications   Medication Sig Dispense Refill    famotidine 40 MG Oral Tab Take 1 tablet (40 mg total) by mouth daily. 30 tablet 0    Crisaborole 2 % External Ointment Use bid to eczema 100 g 3    Dupilumab (DUPIXENT) 300 MG/2ML Subcutaneous Solution Pen-injector Inject 300 mg into the skin every 14 (fourteen) days for 28 days. 4 mL 5    clonazePAM 0.5 MG Oral Tab Take 1 tablet (0.5 mg total) by mouth nightly. 90 tablet 0    SYNTHROID 88 MCG Oral Tab Take 1 tablet (88 mcg total) by mouth before breakfast. 90 tablet 0    clarithromycin 500 MG Oral Tab Take 1 tablet (500 mg total) by mouth 2 (two) times daily. 14 tablet 0    loratadine 10 MG Oral Tab Take 1 tablet (10 mg total) by mouth daily. 30 tablet 3    gabapentin 100 MG Oral Cap Take 1 capsule (100 mg total) by mouth nightly. 90 capsule 3    tacrolimus (PROTOPIC) 0.1 % External Ointment Use bid 60 g 2    azelastine 0.1 % Nasal Solution 2 sprays by Nasal route 2 (two) times daily. 30 mL 0    loratadine-pseudoephedrine ER 5-120 MG Oral Tablet 12 Hr Take 1 tablet by mouth every 12 (twelve) hours. 60 tablet 3    hydrOXYzine 25 MG Oral Tab Take 1 tablet (25 mg total) by mouth at bedtime. TAKE 1 TABLET BY MOUTH EVERY DAY FOR ITCHING 90 tablet 1    citalopram 10 MG Oral Tab Take 1 tablet (10 mg total) by mouth daily. 90 tablet 3    Omeprazole 40 MG Oral Capsule Delayed Release Take 1 capsule (40 mg total) by mouth daily. 90 capsule 3    atorvastatin 20 MG Oral Tab Take 1 tablet (20 mg total) by mouth nightly. 90 tablet 3    Fluorometholone 0.1 % Ophthalmic Ointment Use bid to eyelids prn eczema as directed 3.5 g 2    albuterol (VENTOLIN HFA) 108 (90 Base) MCG/ACT Inhalation Aero Soln Inhale 2 puffs into the lungs every 4  (four) hours as needed for Wheezing. 6.7 g 0    Cholecalciferol (VITAMIN D3) 50 MCG (2000 UT) Oral Chew Tab Chew by mouth.      EPINEPHrine (EPIPEN 2-SARAH) 0.3 MG/0.3ML Injection Solution Auto-injector Inject IM in event of  allergic reaction (Patient taking differently: Inject IM in event of  allergic reaction) 1 each 0    Biotin 5 MG Oral Cap Take by mouth 2 (two) times a day.      Calcium Carbonate-Vitamin D 600-200 MG-UNIT Oral Tab Take 2 tablets by mouth.       Allergies   Allergen Reactions    Adhesive Tape HIVES     Weeping hives and pain - SILK TAPE    Apple ANAPHYLAXIS     RAW APPLE    Aspartame ANAPHYLAXIS    Augmentin [Amoxicillin-Pot Clavulanate] HIVES and DIZZINESS    Ciprofloxacin SWELLING    Diclofenac HIVES and SWELLING     Patient states swelling of lip, and left eye.    Hydrocortisone SWELLING    Montelukast PALPITATIONS    Peach ANAPHYLAXIS     RAW PEACH    Peanut Oil TONGUE SWELLING    Prednisone     Soybean Oil SWELLING     Mouth and tongue swelling with pure soy    Vicodin [Hydrocodone-Acetaminophen] NAUSEA AND VOMITING     Okay with Tylenol    Hydrocodone NAUSEA AND VOMITING and OTHER (SEE COMMENTS)     Other reaction(s): Fainting    Terbinafine RASH     Urinary retention also    Pear UNKNOWN     raw     Family History   Problem Relation Age of Onset    Stroke Father         CVA    Hypertension Father     Diabetes Mother     Neurological Disorder Paternal Grandfather         Alzheimer's    Cancer Brother 51        asbestos ,asphalt lung cancer    Lipids Other         family h/o hyperlipidemia     Cancer Maternal Uncle 18        spinal cancer    Ovarian Cancer Paternal Aunt 51        d.51    Diabetes Sister     Diabetes Sister     Cancer Sister     Cancer Paternal Cousin Female         tumor in neck; d.32; substance abuse    Glaucoma Neg     Macular degeneration Neg        Social History     Occupational History    Occupation: camny   Tobacco Use    Smoking status: Never    Smokeless tobacco:  Never   Vaping Use    Vaping Use: Never used   Substance and Sexual Activity    Alcohol use: Yes     Alcohol/week: 1.0 standard drink of alcohol     Types: 1 Standard drinks or equivalent per week     Comment: I rarely drink    Drug use: No    Sexual activity: Yes     Birth control/protection: Vasectomy        Review of Systems:  GENERAL: denies fevers, chills, night sweats, fatigue, unintentional weight loss/gain  SKIN: denies skin lesions, open sores, rash  HEENT:denies recent vision change, new nasal congestion,hearing loss, tinnitus, sore throat, headaches  RESPIRATORY: denies new shortness of breath, cough, asthma, wheezing  CARDIOVASCULAR: denies chest pain, leg cramps with exertion, palpitations, leg swelling  GI: denies abdominal pain, nausea, vomiting, diarrhea, constipation, hematochezia, worsening heartburn or stomach ulcers  : denies dysuria, hematuria, incontinence, increased frequency, urgency, difficulty urinating  MUSCULOSKELETAL: denies musculoskeletal complaints other than in HPI  NEURO: denies numbness, tingling, weakness, balance issues, dizziness, memory loss  PSYCHIATRIC: denies Hx of depression, anxiety, other psychiatric disorders  HEMATOLOGIC: denies blood clots, anemia, blood clotting disorders, blood transfusion  ENDOCRINE: denies autoimmune disease, thyroid issues, or diabetes  ALLERGY: denies asthma, seasonal allergies    Physical Examination:    LMP 10/01/2018   Constitutional: appears well hydrated, alert and responsive, no acute distress noted  Extremities: Right hand physical exam unchanged.  No palpable triggering at the A1 pulley.  Mild tenderness at the A1 pulley fourth and fifth fingers.  Neurological: Unchanged    Imaging:   No results found.     Electrodiagnostic testing was reviewed.  The findings are consistent with C8 radiculopathy    Labs:  Lab Results   Component Value Date    WBC 5.6 10/16/2023    HGB 13.7 10/16/2023    .0 10/16/2023      Lab Results   Component  Value Date     (H) 10/16/2023    BUN 9 10/16/2023    CREATSERUM 0.94 10/16/2023    GFRNAA 68 07/14/2022    GFRAA 78 07/14/2022        Assessment and Plan:  Diagnoses and all orders for this visit:    Cervical radiculopathy  -     MRI SPINE CERVICAL (CPT=72141); Future  -     Neurosurgery Referral - Contracted Specialist - [facility]        Assessment: Right upper extremity numbness, findings consistent with C8 radiculopathy    Plan: I recommended nonoperative care for now.  Suggested follow-up with a spine specialist.  I gave her referral for neurosurgery.  I ordered an MRI of the cervical spine.  Follow-up with me again as needed.    Follow Up: Return if symptoms worsen or fail to improve.    GRAY CHRISTIANSON MD

## 2024-03-08 ENCOUNTER — NURSE ONLY (OUTPATIENT)
Dept: DERMATOLOGY CLINIC | Facility: CLINIC | Age: 59
End: 2024-03-08
Payer: MEDICAID

## 2024-03-08 ENCOUNTER — TELEPHONE (OUTPATIENT)
Dept: DERMATOLOGY CLINIC | Facility: CLINIC | Age: 59
End: 2024-03-08

## 2024-03-08 DIAGNOSIS — L20.9 ATOPIC DERMATITIS, UNSPECIFIED TYPE: ICD-10-CM

## 2024-03-08 PROCEDURE — 96900 ACTINOTHERAPY UV LIGHT: CPT | Performed by: DERMATOLOGY

## 2024-03-09 RX ORDER — FAMOTIDINE 40 MG/1
40 TABLET, FILM COATED ORAL 2 TIMES DAILY
Qty: 60 TABLET | Refills: 2 | Status: SHIPPED | OUTPATIENT
Start: 2024-03-09

## 2024-03-09 RX ORDER — SERTRALINE HYDROCHLORIDE 25 MG/1
25 TABLET, FILM COATED ORAL EVERY EVENING
Qty: 30 TABLET | Refills: 2 | Status: SHIPPED | OUTPATIENT
Start: 2024-03-09

## 2024-03-11 ENCOUNTER — NURSE ONLY (OUTPATIENT)
Dept: DERMATOLOGY CLINIC | Facility: CLINIC | Age: 59
End: 2024-03-11

## 2024-03-11 DIAGNOSIS — L20.9 ATOPIC DERMATITIS, UNSPECIFIED TYPE: ICD-10-CM

## 2024-03-11 PROCEDURE — 96900 ACTINOTHERAPY UV LIGHT: CPT | Performed by: DERMATOLOGY

## 2024-03-15 ENCOUNTER — NURSE ONLY (OUTPATIENT)
Dept: DERMATOLOGY CLINIC | Facility: CLINIC | Age: 59
End: 2024-03-15

## 2024-03-15 DIAGNOSIS — L20.9 ATOPIC DERMATITIS, UNSPECIFIED TYPE: ICD-10-CM

## 2024-03-15 PROCEDURE — 96900 ACTINOTHERAPY UV LIGHT: CPT | Performed by: DERMATOLOGY

## 2024-03-18 ENCOUNTER — NURSE ONLY (OUTPATIENT)
Dept: DERMATOLOGY CLINIC | Facility: CLINIC | Age: 59
End: 2024-03-18

## 2024-03-18 DIAGNOSIS — L20.9 ATOPIC DERMATITIS, UNSPECIFIED TYPE: ICD-10-CM

## 2024-03-18 PROCEDURE — 96900 ACTINOTHERAPY UV LIGHT: CPT | Performed by: DERMATOLOGY

## 2024-03-20 ENCOUNTER — APPOINTMENT (OUTPATIENT)
Dept: DERMATOLOGY CLINIC | Facility: CLINIC | Age: 59
End: 2024-03-20

## 2024-03-20 DIAGNOSIS — L20.9 ATOPIC DERMATITIS, UNSPECIFIED TYPE: ICD-10-CM

## 2024-03-20 PROCEDURE — 96900 ACTINOTHERAPY UV LIGHT: CPT | Performed by: DERMATOLOGY

## 2024-03-22 ENCOUNTER — NURSE ONLY (OUTPATIENT)
Dept: DERMATOLOGY CLINIC | Facility: CLINIC | Age: 59
End: 2024-03-22

## 2024-03-22 DIAGNOSIS — L20.9 ATOPIC DERMATITIS, UNSPECIFIED TYPE: ICD-10-CM

## 2024-03-22 PROCEDURE — 96900 ACTINOTHERAPY UV LIGHT: CPT | Performed by: DERMATOLOGY

## 2024-03-25 ENCOUNTER — NURSE ONLY (OUTPATIENT)
Dept: DERMATOLOGY CLINIC | Facility: CLINIC | Age: 59
End: 2024-03-25

## 2024-03-25 ENCOUNTER — TELEPHONE (OUTPATIENT)
Dept: DERMATOLOGY CLINIC | Facility: CLINIC | Age: 59
End: 2024-03-25

## 2024-03-25 DIAGNOSIS — L20.9 ATOPIC DERMATITIS, UNSPECIFIED TYPE: ICD-10-CM

## 2024-03-25 PROCEDURE — 96900 ACTINOTHERAPY UV LIGHT: CPT | Performed by: DERMATOLOGY

## 2024-03-25 NOTE — TELEPHONE ENCOUNTER
Dr. Hinkle - pt called asking for a refill for her eye ointment but wasn't sure what the name.  I pended the only eye ointment I could find in pt's chart.  Please advise and send if ok.  Thank you.

## 2024-03-26 NOTE — TELEPHONE ENCOUNTER
Last sent was the fluorometholone , rf sent previously the tobradex was the one that worked, but not covered, can send this but the tobradex is nonformulary

## 2024-03-26 NOTE — TELEPHONE ENCOUNTER
Tobradex oint sent- pt needs oint.  She is allergic to hydrocortisone so other options are not appropriate other than TobraDex or FML unless the pharmacy can provide options with a topical steroid that she is not allergic to.  Plain antibiotic ointments are not appropriate    See message to pharmacy attached to prescription in pharmacy comments

## 2024-03-26 NOTE — TELEPHONE ENCOUNTER
[facility]    Ointment not available. Ok to chage to Drops? Please advise    Placed fax in pa inbox

## 2024-03-27 ENCOUNTER — TELEPHONE (OUTPATIENT)
Dept: DERMATOLOGY CLINIC | Facility: CLINIC | Age: 59
End: 2024-03-27

## 2024-03-27 RX ORDER — FLUOROMETHOLONE 0.1 %
SUSPENSION, DROPS(FINAL DOSAGE FORM)(ML) OPHTHALMIC (EYE)
Qty: 5 ML | Refills: 1 | Status: SHIPPED | OUTPATIENT
Start: 2024-03-27

## 2024-03-27 NOTE — TELEPHONE ENCOUNTER
Dr. Hinkle,    [facility] does not have the below ointment you had prescribed for eczema.   Instead they have it in suspension/drop form.    Fluorometholone 0.1 % Ophthalmic Ointment     Thank You!

## 2024-03-28 RX ORDER — OMEPRAZOLE 40 MG/1
40 CAPSULE, DELAYED RELEASE ORAL DAILY
Qty: 90 CAPSULE | Refills: 0 | OUTPATIENT
Start: 2024-03-28

## 2024-03-28 NOTE — TELEPHONE ENCOUNTER
Medication PA Requested:   tobramycin-dexamethasone 0.3-0.1 % Ophthalmic Ointment                                                        CoverMyMeds Used: No  Key:  Quantity: 3.5  Day Supply: 30  Sig: bid to eyelids  DX Code:   Allergic dermatitis of upper and lower lids of both eyes H01.111, H01.115, H01.114, H01.112                                   CPT code (if applicable):   Case Number/Pending Ref#       EPA submitted, LOV 2/19  Awaiting determination

## 2024-03-28 NOTE — TELEPHONE ENCOUNTER
I was about to approve the refill.  I noticed patient is on PPI and H2 blockers.  Please check and if needed approve the PPI

## 2024-03-28 NOTE — TELEPHONE ENCOUNTER
Thanks for your review.    Dr Hinkle put her on the famotidine for itching.    I called the patient and she was instructed to take both.    Pharmacist also was questioning this    Dawna Francis DNP

## 2024-03-28 NOTE — TELEPHONE ENCOUNTER
Medication PA Requested:   tobramycin-dexamethasone 0.3-0.1 % Ophthalmic Ointment                                                        CoverMyMeds Used:  Key:  Quantity: 3.5  Day Supply: 30  Sig: bid to eyelids  DX Code:   Allergic dermatitis of upper and lower lids of both eyes H01.111, H01.115, H01.114, H01.112                                   CPT code (if applicable):   Case Number/Pending Ref#:

## 2024-03-28 NOTE — TELEPHONE ENCOUNTER
Refill Per Protocol   Please review pended refill request as unable to refill due to high/very high interaction warning copied here:  Duplicate Therapy: famotidine, OmeprazolePEPTIC ULCER AGENTS. No Abuse/Dependency Potential.  Details  Override reason        Omeprazole 40 MG Oral Capsule Delayed Release [Pharmacy Med Name: Omeprazole Oral Capsule Delayed Release 40 MG], Daily  Prescription. Reordered.  famotidine 40 MG Oral Tab, 2 times dailyPrescription. Active.     Requested Prescriptions   Pending Prescriptions Disp Refills    OMEPRAZOLE 40 MG Oral Capsule Delayed Release [Pharmacy Med Name: Omeprazole Oral Capsule Delayed Release 40 MG] 90 capsule 0     Sig: TAKE 1 CAPSULE BY MOUTH DAILY       Gastrointestional Medication Protocol Passed - 3/27/2024  9:32 AM        Passed - In person appointment or virtual visit in the past 12 mos or appointment in next 3 mos     Recent Outpatient Visits              3 days ago Atopic dermatitis, unspecified type    [facility]    Nurse Only    6 days ago Atopic dermatitis, unspecified type    [facility]    Nurse Only    1 week ago Atopic dermatitis, unspecified type    [facility]    Nurse Only    1 week ago Atopic dermatitis, unspecified type    [facility]    Nurse Only    2 weeks ago Atopic dermatitis, unspecified type    [facility]    Nurse Only          Future Appointments         Provider Department Appt Notes    In 2 weeks LMB MRI RM1 (1.5T WIDE) [facility] [place]     In 2 weeks Chucho Hodgson MD [facility] rfd by Dr. Buddy Martin Cervical radiculopathy MRI Done @ [facility] on 4/10/24    In 6 months Deandra Price MD [facility] - OB/GYN annual                       Future Appointments         Provider Department Appt Notes    In 2 weeks LMB MRI RM1 (1.5T WIDE) [facility] - [place]     In 2 weeks Chucho Hodgson MD [facility] rfd by Dr. Buddy Martin Cervical radiculopathy MRI Done @ [facility] on 4/10/24    In 6 months Deandra Price MD [facility] - OB/GYN annual          Recent Outpatient Visits              3 days ago Atopic dermatitis, unspecified type    [facility]    Nurse Only    6 days ago Atopic dermatitis, unspecified type    [facility]    Nurse Only    1 week ago Atopic dermatitis, unspecified type    [facility]    Nurse Only    1 week ago Atopic dermatitis, unspecified type    [facility]    Nurse Only    2 weeks ago Atopic dermatitis, unspecified type    [facility]    Nurse Only

## 2024-03-28 NOTE — TELEPHONE ENCOUNTER
Please check if pt ok with the suspension.  Sent suspension.  If she is ok to try ok to try. Please see prior message also. Ok to proceed with the pa's if she does not want the suspension

## 2024-03-29 NOTE — TELEPHONE ENCOUNTER
Fax from [facility]    Approved for TOBRADEX OINTMENT from 1/1/24-3/28/25

## 2024-04-05 ENCOUNTER — TELEPHONE (OUTPATIENT)
Dept: DERMATOLOGY CLINIC | Facility: CLINIC | Age: 59
End: 2024-04-05

## 2024-04-05 NOTE — TELEPHONE ENCOUNTER
Patient would like to speak to someone in regards if she could get a refill on medication for her eczema. Please advise.

## 2024-04-08 ENCOUNTER — NURSE ONLY (OUTPATIENT)
Dept: DERMATOLOGY CLINIC | Facility: CLINIC | Age: 59
End: 2024-04-08

## 2024-04-08 DIAGNOSIS — L20.9 ATOPIC DERMATITIS, UNSPECIFIED TYPE: ICD-10-CM

## 2024-04-08 PROCEDURE — 96900 ACTINOTHERAPY UV LIGHT: CPT | Performed by: DERMATOLOGY

## 2024-04-10 ENCOUNTER — NURSE ONLY (OUTPATIENT)
Dept: DERMATOLOGY CLINIC | Facility: CLINIC | Age: 59
End: 2024-04-10
Payer: MEDICAID

## 2024-04-10 DIAGNOSIS — L20.9 ATOPIC DERMATITIS, UNSPECIFIED TYPE: ICD-10-CM

## 2024-04-10 PROCEDURE — 96900 ACTINOTHERAPY UV LIGHT: CPT | Performed by: DERMATOLOGY

## 2024-04-10 NOTE — TELEPHONE ENCOUNTER
Refill Request for medication(s): tobramycin-dexamethasone 0.3-0.1 % Ophthalmic Ointment     Last Office Visit: 02/19/2024    Last Refill: 03/26/24    Pharmacy, Dosage verified: [id number] [place], [place] - [facility] [address] AT [facility] [address], [phone], [phone]     Condition Update (if applicable): Patient states pharmacy will not provide refill    Rx pended and sent to provider for approval, please advise. Thank You!

## 2024-04-11 ENCOUNTER — HOSPITAL ENCOUNTER (OUTPATIENT)
Dept: MRI IMAGING | Age: 59
Discharge: HOME | End: 2024-04-11
Attending: ORTHOPAEDIC SURGERY
Payer: MEDICAID

## 2024-04-11 DIAGNOSIS — M54.12 CERVICAL RADICULOPATHY: ICD-10-CM

## 2024-04-11 PROCEDURE — 72141 MRI NECK SPINE W/O DYE: CPT | Performed by: ORTHOPAEDIC SURGERY

## 2024-04-11 NOTE — TELEPHONE ENCOUNTER
Please see prior messages as well regarding this also. [facility] basically said they can only get the drops and nothing else we sent was covered or available even for cash pay.

## 2024-04-12 ENCOUNTER — OFFICE VISIT (OUTPATIENT)
Dept: SURGERY | Facility: CLINIC | Age: 59
End: 2024-04-12
Payer: MEDICAID

## 2024-04-12 ENCOUNTER — NURSE ONLY (OUTPATIENT)
Dept: DERMATOLOGY CLINIC | Facility: CLINIC | Age: 59
End: 2024-04-12

## 2024-04-12 VITALS
DIASTOLIC BLOOD PRESSURE: 56 MMHG | WEIGHT: 170 LBS | SYSTOLIC BLOOD PRESSURE: 93 MMHG | HEART RATE: 79 BPM | BODY MASS INDEX: 29.02 KG/M2 | HEIGHT: 64 IN

## 2024-04-12 DIAGNOSIS — M25.521 RIGHT ELBOW PAIN: ICD-10-CM

## 2024-04-12 DIAGNOSIS — R29.898 BILATERAL ARM WEAKNESS: ICD-10-CM

## 2024-04-12 DIAGNOSIS — Z87.39 HISTORY OF TRIGGER FINGER: ICD-10-CM

## 2024-04-12 DIAGNOSIS — R26.89 IMBALANCE: ICD-10-CM

## 2024-04-12 DIAGNOSIS — R29.898 DECREASED GRIP STRENGTH: ICD-10-CM

## 2024-04-12 DIAGNOSIS — R20.2 NUMBNESS AND TINGLING IN RIGHT HAND: ICD-10-CM

## 2024-04-12 DIAGNOSIS — R27.8 POOR MANUAL DEXTERITY: ICD-10-CM

## 2024-04-12 DIAGNOSIS — Z98.890 HISTORY OF CARPAL TUNNEL SURGERY OF RIGHT WRIST: ICD-10-CM

## 2024-04-12 DIAGNOSIS — Z85.850 HISTORY OF THYROID CANCER: ICD-10-CM

## 2024-04-12 DIAGNOSIS — R20.0 NUMBNESS AND TINGLING IN RIGHT HAND: ICD-10-CM

## 2024-04-12 DIAGNOSIS — Z98.890 S/P TRIGGER FINGER RELEASE: ICD-10-CM

## 2024-04-12 DIAGNOSIS — L20.9 ATOPIC DERMATITIS, UNSPECIFIED TYPE: ICD-10-CM

## 2024-04-12 DIAGNOSIS — G56.21 ULNAR NEUROPATHY AT ELBOW OF RIGHT UPPER EXTREMITY: ICD-10-CM

## 2024-04-12 DIAGNOSIS — M54.2 NECK PAIN: Primary | ICD-10-CM

## 2024-04-12 PROCEDURE — 99215 OFFICE O/P EST HI 40 MIN: CPT | Performed by: STUDENT IN AN ORGANIZED HEALTH CARE EDUCATION/TRAINING PROGRAM

## 2024-04-12 PROCEDURE — 96900 ACTINOTHERAPY UV LIGHT: CPT | Performed by: DERMATOLOGY

## 2024-04-12 NOTE — PROGRESS NOTES
Patient was referred by Buddy Chan MD due to Cervical Radiculopathy.   Most recent imaging dated on: 4/11/24 - MRI Spine Cervical   Physical Therapy / Injections:  Patient denies completing any recent Physical Therapy / Injections  Numbness / Tingling: Patient reports numbness and tingling on fingers on right hand primarily the ring and pinker finger.    Pain Level: 2.5/10. Patient states that they are having pain located on their right hand.

## 2024-04-12 NOTE — PATIENT INSTRUCTIONS
Refill policies:    Allow 2-3 business days for refills; controlled substances may take longer.  Contact your pharmacy at least 5 days prior to running out of medication and have them send an electronic request or submit request through the “request refill” option in your [employer] account.  Refills are not addressed on weekends; covering physicians do not authorize routine medications on weekends.  No narcotics or controlled substances are refilled after noon on Fridays or by on call physicians.  By law, narcotics must be electronically prescribed.  A 30 day supply with no refills is the maximum allowed.  If your prescription is due for a refill, you may be due for a follow up appointment.  To best provide you care, patients receiving routine medications need to be seen at least once a year.  Patients receiving narcotic/controlled substance medications need to be seen at least once every 3 months.  In the event that your preferred pharmacy does not have the requested medication in stock (e.g. Backordered), it is your responsibility to find another pharmacy that has the requested medication available.  We will gladly send a new prescription to that pharmacy at your request.    Scheduling Tests:    If your physician has ordered radiology tests such as MRI or CT scans, please contact Central Scheduling at [phone] right away to schedule the test.  Once scheduled, the [facility] Centralized Referral Team will work with your insurance carrier to obtain pre-certification or prior authorization.  Depending on your insurance carrier, approval may take 3-10 days.  It is highly recommended patients assure they have received an authorization before having a test performed.  If test is done without insurance authorization, patient may be responsible for the entire amount billed.      Precertification and Prior Authorizations:  If your physician has recommended that you have a procedure or additional testing performed the [facility]  Centralized Referral Team will contact your insurance carrier to obtain pre-certification or prior authorization.    You are strongly encouraged to contact your insurance carrier to verify that your procedure/test has been approved and is a COVERED benefit.  Although the [facility] Centralized Referral Team does its due diligence, the insurance carrier gives the disclaimer that \"Although the procedure is authorized, this does not guarantee payment.\"    Ultimately the patient is responsible for payment.   Thank you for your understanding in this matter.  Paperwork Completion:  If you require FMLA or disability paperwork for your recovery, please make sure to either drop it off or have it faxed to our office at [phone]. Be sure the form has your name and date of birth on it.  The form will be faxed to our Forms Department and they will complete it for you.  There is a 25$ fee for all forms that need to be filled out.  Please be aware there is a 10-14 day turnaround time.  You will need to sign a release of information (CAM) form if your paperwork does not come with one.  You may call the Forms Department with any questions at [phone].  Their fax number is [phone].
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
## 2024-04-17 ENCOUNTER — NURSE ONLY (OUTPATIENT)
Dept: DERMATOLOGY CLINIC | Facility: CLINIC | Age: 59
End: 2024-04-17

## 2024-04-17 DIAGNOSIS — L20.9 ATOPIC DERMATITIS, UNSPECIFIED TYPE: ICD-10-CM

## 2024-04-17 PROCEDURE — 96900 ACTINOTHERAPY UV LIGHT: CPT | Performed by: DERMATOLOGY

## 2024-04-18 ENCOUNTER — OFFICE VISIT (OUTPATIENT)
Dept: PHYSICAL THERAPY | Age: 59
End: 2024-04-18
Attending: STUDENT IN AN ORGANIZED HEALTH CARE EDUCATION/TRAINING PROGRAM
Payer: MEDICAID

## 2024-04-18 DIAGNOSIS — Z87.39 HISTORY OF TRIGGER FINGER: ICD-10-CM

## 2024-04-18 DIAGNOSIS — R26.89 IMBALANCE: ICD-10-CM

## 2024-04-18 DIAGNOSIS — M54.2 NECK PAIN: Primary | ICD-10-CM

## 2024-04-18 DIAGNOSIS — Z98.890 S/P TRIGGER FINGER RELEASE: ICD-10-CM

## 2024-04-18 DIAGNOSIS — Z98.890 HISTORY OF CARPAL TUNNEL SURGERY OF RIGHT WRIST: ICD-10-CM

## 2024-04-18 DIAGNOSIS — R29.898 DECREASED GRIP STRENGTH: ICD-10-CM

## 2024-04-18 DIAGNOSIS — R20.0 NUMBNESS AND TINGLING IN RIGHT HAND: ICD-10-CM

## 2024-04-18 DIAGNOSIS — R27.8 POOR MANUAL DEXTERITY: ICD-10-CM

## 2024-04-18 DIAGNOSIS — R20.2 NUMBNESS AND TINGLING IN RIGHT HAND: ICD-10-CM

## 2024-04-18 DIAGNOSIS — R29.898 BILATERAL ARM WEAKNESS: ICD-10-CM

## 2024-04-18 DIAGNOSIS — Z85.850 HISTORY OF THYROID CANCER: ICD-10-CM

## 2024-04-18 DIAGNOSIS — G56.21 ULNAR NEUROPATHY AT ELBOW OF RIGHT UPPER EXTREMITY: ICD-10-CM

## 2024-04-18 DIAGNOSIS — M25.521 RIGHT ELBOW PAIN: ICD-10-CM

## 2024-04-18 PROCEDURE — 97110 THERAPEUTIC EXERCISES: CPT | Performed by: PHYSICAL THERAPIST

## 2024-04-18 PROCEDURE — 97162 PT EVAL MOD COMPLEX 30 MIN: CPT | Performed by: PHYSICAL THERAPIST

## 2024-04-18 NOTE — PATIENT INSTRUCTIONS
Home exercise program instruction:  Seated: c/s retraction with self overpressure x 10 reps 4-6x/day.  Sitting posture correction on a supportive chair with lumbar roll (recommended).  Avoidance of sitting on couch/sofa.

## 2024-04-18 NOTE — PROGRESS NOTES
CERVICAL SPINE EVALUATION:   Referring Physician: Gabino Valle PA-C  Diagnosis:  Neck pain (M54.2)  Numbness and tingling in right hand (R20.0,R20.2)  Right elbow pain (M25.521)  Bilateral arm weakness (R29.898)  Decreased  strength (R29.898)  Poor manual dexterity (R27.8)  Imbalance (R26.89)  History of carpal tunnel surgery of right wrist (Z98.890)  History of trigger finger (Z87.39)  S/P trigger finger release (Z98.890)  History of thyroid cancer (Z85.850)  Ulnar neuropathy at elbow of right upper extremity (G56.21)   Date of Service: 4/18/2024   Date of Onset: 2 Months ago        SUBJECTIVE:   PATIENT SUMMARY:  Macy Berger is a 59 year old y/o female who presents to therapy today with complaints of constant (B) neck, upper trapezius ache/pain/tightness/spasm with (R) 5-4th finger pain/numbness/throbbing rated 1-5/10.  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  She likes to walk her dog (small), do seated UE/LE exercises, neck ROM, and watch TV sitting on a couch.      History of current condition:  Macy report that she has had (B) neck pain for 2 years.  She said it started after getting a massage.  She felt pain/ache for about a year before it subsided.  Then 2 months ago for no apparent reason she started feeling the same pain again in the neck but also in the upper trapezius and the base of skull.  She is now referred to physical therapy for evaluation and treatment.     Previous history:  Neck/Thyroid cancer 17 years ago;  Trigger finger (L) 3 years ago.      Current functional limitation reported include the inability to sit (watch TV), turn her head (R>L), lay down on her back, do laundry, and drive without producing or increasing symptoms at (B) neck, upper trapezius, and base of skull area.      ASSESSMENT   Macy is presenting with a cervical symmetrical above elbow derangement with a directional preference toward lower cervical spine.  Her neck (R/L)  rotation and extension ROM increased and symptoms decreased at endrange after this directional preference exercise.  Explained to Macy the mechanical production of her symptoms and the need to perform her directional preference exercise consistently to have a good carryover of her improved symptoms.  Discussed the important role of posture correction in sitting using a lumbar roll to augment her reduced symptoms.  She will be re-assessed next session and will be progressed or modified according to her presentation.  She understood and agreed with the plan of care.  All questions and concerns were answered and addressed at this time.    Macy would benefit from skilled Physical Therapy to address the above impairments.     Precautions: None       OBJECTIVE:     Observation/Posture: Slouched, kyphotic, forward head ((+) Dowager's hump), protruded shoulders.  Posture correction in sitting with lumbar roll on a supportive chair reduced (B) neck symptoms.  No wry neck observed.     ROM: (Pre-test symptoms:  2-3/10 ache/tightness (R>L) neck and upper trapezius)  Cervical :             Movement Loss Pain   Protrusion Nil loss Inc, NW   Flexion Minimal loss NE   Retraction Moderate loss NE   Extension Major-moderate loss Inc, NW   R Lat. Flexion Major loss Inc, NW and tight opposite side   L Lat. Flexion Major loss Inc, NW and tight opposite side   R Rotation Moderate loss Inc, NW and tight opposite side   L Rotation Major loss Inc, NW and tight opposite side     Repeated Motion Testing: Sitting posture correction on a supportive chair with a lumbar roll.  Seated: c/s retraction 2 x 10 reps; Dec, B 1/10,  + self ovperpressure x 10 reps (corrected technique); Dec, Czing to neck.  CROM re-check: increased (R/L) rotation to minimal and moderate respectively and moderate toward extension with less endrange pain and opposite side tightness.     Charges: PT Eval x 1, TherEx x 1    Total Timed treatment: 15 min      Total  Treatment Time: 48 min     PLAN OF CARE:    Goals: (8 visits)  1. Patient to consistently perform their HEP and it's progression to maintain their improved condition.  2.  Patient to have reduced, centralized, and abolished symptoms in the neck to enable easier home, work, functional, and recreational activities.   3. Patient to have WNL of CROM in all directions to be able to sit (watch TV), turn her head (R>L), lay down on her back, do laundry, and drive without producing or increasing symptoms at (B) neck, upper trapezius, and base of skull area.   4. Patient to consistently have good posture to promote their symptom free condition.    Patient was advised of these findings, precautions, and treatment options and has agreed to actively participate in planning/goal setting for this course of care.    Frequency/Duration: Patient will be seen for 2-1x/week or a total of 8 visits over a 90 day period. Treatment will include: Directional preference exercises; Therapeutic Exercises; Neuromuscular Re-education; Posture correction; Patient education.    Home exercise program instruction:  Seated: c/s retraction with self overpressure x 10 reps 4-6x/day.  Sitting posture correction on a supportive chair with lumbar roll (recommended).  Avoidance of sitting on couch/sofa.     Education or treatment limitation: None  Rehab Potential: good    In agreement with functional assessment testing score and clinical rationale, this evaluation involved Moderate Complexity decision making due to 3+ personal factors/comorbidities, 4+ body structures involved/activity limitations, and evolving symptoms including changing pain levels.    NDI Score:  Initial: 16    Patient was advised of these findings, precautions, and treatment options and has agreed to actively participate in planning and for this course of care.    Thank you for your referral. Please co-sign or sign and return this letter via fax as soon as possible to [phone]. If  you have any question please contact me at Dept: [phone].    Sincerely,  Electronically signed by therapist: LENNY HUTTON PT Cert MDT    Physician’s certification required: Yes  I certify the need for these services furnished under this plan of treatment and while under my care.    X______________________________________________ Date________________  Certification From: 4/18/2024      To: 7/17/2024

## 2024-04-19 ENCOUNTER — NURSE ONLY (OUTPATIENT)
Dept: DERMATOLOGY CLINIC | Facility: CLINIC | Age: 59
End: 2024-04-19

## 2024-04-19 DIAGNOSIS — L20.9 ATOPIC DERMATITIS, UNSPECIFIED TYPE: ICD-10-CM

## 2024-04-19 PROCEDURE — 96900 ACTINOTHERAPY UV LIGHT: CPT | Performed by: DERMATOLOGY

## 2024-04-19 RX ORDER — LEVOTHYROXINE SODIUM 88 MCG
88 TABLET ORAL
Qty: 90 TABLET | Refills: 0 | Status: SHIPPED | OUTPATIENT
Start: 2024-04-19

## 2024-04-19 RX ORDER — LEVOTHYROXINE SODIUM 88 MCG
88 TABLET ORAL
Qty: 30 TABLET | Refills: 1 | Status: SHIPPED | OUTPATIENT
Start: 2024-04-19

## 2024-04-19 NOTE — TELEPHONE ENCOUNTER
Endocrine refill protocol for medications for hypothyroidism and hyperthyroidism    Protocol Criteria:  Appointment with Endocrinology completed in the last 12 months or scheduled in the next  6months     Verify appointment has been completed or scheduled in the appropriate timeline. If so can send a 90 day supply with 1 refill per provider protocol.    Normal TSH result in the past 12 months   Review recent telephone encounters and mychart communications with patient to ensure a dose change has not occurred since last office visit that was not updated in the medication history list   Last completed office visit:06/28/23  Next scheduled Follow up: no f/u  Last TSH result: 0.665

## 2024-04-19 NOTE — TELEPHONE ENCOUNTER
Endocrine refill protocol for medications for hypothyroidism and hyperthyroidism    Protocol Criteria:  Appointment with Endocrinology completed in the last 12 months or scheduled in the next  6months     Verify appointment has been completed or scheduled in the appropriate timeline. If so can send a 90 day supply with 1 refill per provider protocol.    Normal TSH result in the past 12 months   Review recent telephone encounters and mychart communications with patient to ensure a dose change has not occurred since last office visit that was not updated in the medication history list   Last completed office visit:06/28/23  Next scheduled Follow up: no f/u mcm sent  Last TSH result: 0.665

## 2024-04-20 NOTE — TELEPHONE ENCOUNTER
Informed patient that med was refilled this morning and to contact the office if having issues getting medication.

## 2024-04-22 ENCOUNTER — NURSE ONLY (OUTPATIENT)
Dept: DERMATOLOGY CLINIC | Facility: CLINIC | Age: 59
End: 2024-04-22

## 2024-04-22 ENCOUNTER — TELEPHONE (OUTPATIENT)
Dept: DERMATOLOGY CLINIC | Facility: CLINIC | Age: 59
End: 2024-04-22

## 2024-04-22 DIAGNOSIS — L20.9 ATOPIC DERMATITIS, UNSPECIFIED TYPE: ICD-10-CM

## 2024-04-22 PROCEDURE — 96900 ACTINOTHERAPY UV LIGHT: CPT | Performed by: DERMATOLOGY

## 2024-04-22 RX ORDER — OMEPRAZOLE 40 MG/1
40 CAPSULE, DELAYED RELEASE ORAL DAILY
Qty: 90 CAPSULE | Refills: 3 | Status: SHIPPED | OUTPATIENT
Start: 2024-04-22

## 2024-04-22 RX ORDER — OMEPRAZOLE 40 MG/1
40 CAPSULE, DELAYED RELEASE ORAL DAILY
Qty: 90 CAPSULE | Refills: 3 | Status: SHIPPED | OUTPATIENT
Start: 2024-04-22 | End: 2024-04-22

## 2024-04-23 NOTE — TELEPHONE ENCOUNTER
Patient on famotidine for hives- needs to take bid- slight flare of hives on every day, tried to take the omeprazole less often, with worsening reflux, famotidine does not help with her reflux.  Rf sent for the omeprazole, take the famotidine also  at least every day and bid ideally for hives , discussed with patient at her light treatment.  Anxiety over this triggering more itching.

## 2024-04-24 ENCOUNTER — APPOINTMENT (OUTPATIENT)
Dept: DERMATOLOGY CLINIC | Facility: CLINIC | Age: 59
End: 2024-04-24

## 2024-04-24 ENCOUNTER — OFFICE VISIT (OUTPATIENT)
Dept: PHYSICAL THERAPY | Age: 59
End: 2024-04-24
Attending: STUDENT IN AN ORGANIZED HEALTH CARE EDUCATION/TRAINING PROGRAM
Payer: MEDICAID

## 2024-04-24 DIAGNOSIS — L20.9 ATOPIC DERMATITIS, UNSPECIFIED TYPE: ICD-10-CM

## 2024-04-24 PROCEDURE — 97110 THERAPEUTIC EXERCISES: CPT | Performed by: PHYSICAL THERAPIST

## 2024-04-24 PROCEDURE — 96900 ACTINOTHERAPY UV LIGHT: CPT | Performed by: DERMATOLOGY

## 2024-04-24 NOTE — PROGRESS NOTES
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  It is less than before but still there.  She has been doing her HEP seated c/s retraction, even while driving.  She is also more aware of her posture and have been using the lumbar roll when she is sitting.    Objective:   ROM: (Pre-test symptoms:  2/10 ache (R) medial scapula)  Cervical :             Movement Loss Pain   Protrusion Nil loss Inc, NW   Flexion Nil loss NE   Retraction Minimal loss Dec, NB   Extension Moderate loss Inc, NW   R Lat. Flexion Major loss Inc, NW and tight opposite side   L Lat. Flexion Major loss Inc, NW and tight opposite side   R Rotation Moderate loss Inc, NW and tight opposite side   L Rotation Major loss Inc, NW and tight opposite side     Treatment:  Date: 4/24/2024  Tx#: 2/8 Date:   Tx#: 3/ Date:   Tx#: 4/ Date:   Tx#: 5/ Date:   Tx#: 6/ Date:   Tx#: 7/ Date:   Tx#: 8/   Scifit UE only L3 fwd/bkwd x 5 mins ea; NE postural training with lumbar roll    Sitting posture correction with lumbar roll on  a supportive chair; Dec, B    C/s retraction with self OP 2 x 10 reps; Dec, B    Supinelying no pillow x 2 mins; Inc, NW     + 1-2 pillow under head and shoulder x 2 mins; Dec, B     + c/s retraction x 10 reps; Dec, NB     Supine to sit: cueing needed to maintain good spine alignment    Sitting posture correction in sitting with lumbar roll           Assessment:   Macy continue to respond to lower cervical extension (retraction) directional preference exercise.  Her symptoms are reduced and centralized to the (R) medial scapula.  She was instructed to be consistent with her HEP and posture correction specially while laying in bed by  placing the pillow from the head to the shoulders and in sitting using a lumbar roll while sitting.  She understood and agreed with the treatment plan.  All questions and concerns were answered and addressed at this time.     HEP:    - Seated: c/s retraction with self OP x 10 reps 2-3x/day or  - Supine with/without pillow (head to shoulders) c/s retraction x 10 reps 2-3x/day    Goals:    (8 visits)  1. Patient to consistently perform their HEP and it's progression to maintain their improved condition.  2.  Patient to have reduced, centralized, and abolished symptoms in the neck to enable easier home, work, functional, and recreational activities.   3. Patient to have WNL of CROM in all directions to be able to sit (watch TV), turn her head (R>L), lay down on her back, do laundry, and drive without producing or increasing symptoms at (B) neck, upper trapezius, and base of skull area.   4. Patient to consistently have good posture to promote their symptom free condition.      Plan:   Re-assess next session and continue with directional preference exercise, posture correction, patient education, and HEP progression.

## 2024-04-26 ENCOUNTER — NURSE ONLY (OUTPATIENT)
Dept: DERMATOLOGY CLINIC | Facility: CLINIC | Age: 59
End: 2024-04-26

## 2024-04-26 DIAGNOSIS — L20.9 ATOPIC DERMATITIS, UNSPECIFIED TYPE: ICD-10-CM

## 2024-04-26 PROCEDURE — 96900 ACTINOTHERAPY UV LIGHT: CPT | Performed by: DERMATOLOGY

## 2024-04-29 ENCOUNTER — TELEPHONE (OUTPATIENT)
Dept: ENDOCRINOLOGY CLINIC | Facility: CLINIC | Age: 59
End: 2024-04-29

## 2024-04-29 ENCOUNTER — LAB ENCOUNTER (OUTPATIENT)
Dept: LAB | Facility: REFERENCE LAB | Age: 59
End: 2024-04-29
Attending: NURSE PRACTITIONER
Payer: MEDICAID

## 2024-04-29 DIAGNOSIS — E78.2 MIXED HYPERLIPIDEMIA: ICD-10-CM

## 2024-04-29 DIAGNOSIS — Z85.850 HISTORY OF THYROID CANCER: Primary | ICD-10-CM

## 2024-04-29 DIAGNOSIS — E03.9 HYPOTHYROIDISM, UNSPECIFIED TYPE: ICD-10-CM

## 2024-04-29 DIAGNOSIS — E03.8 OTHER SPECIFIED HYPOTHYROIDISM: ICD-10-CM

## 2024-04-29 LAB
CHOLEST SERPL-MCNC: 188 MG/DL (ref ?–200)
FASTING PATIENT LIPID ANSWER: YES
HDLC SERPL-MCNC: 67 MG/DL (ref 40–59)
LDLC SERPL CALC-MCNC: 106 MG/DL (ref ?–100)
NONHDLC SERPL-MCNC: 121 MG/DL (ref ?–130)
TRIGL SERPL-MCNC: 82 MG/DL (ref 30–149)
VLDLC SERPL CALC-MCNC: 14 MG/DL (ref 0–30)

## 2024-04-29 PROCEDURE — 36415 COLL VENOUS BLD VENIPUNCTURE: CPT

## 2024-04-29 PROCEDURE — 80061 LIPID PANEL: CPT

## 2024-04-29 NOTE — TELEPHONE ENCOUNTER
Patient wants to know if she is due for labs before tomorrow's appointment.  Please call.  Thank you.

## 2024-04-29 NOTE — TELEPHONE ENCOUNTER
Spoke to patient to advise lab orders placed for f/u tomorrow - patient stated she will go to lab in the morning

## 2024-04-30 ENCOUNTER — OFFICE VISIT (OUTPATIENT)
Dept: ENDOCRINOLOGY CLINIC | Facility: CLINIC | Age: 59
End: 2024-04-30

## 2024-04-30 ENCOUNTER — LAB ENCOUNTER (OUTPATIENT)
Dept: LAB | Age: 59
End: 2024-04-30
Attending: INTERNAL MEDICINE
Payer: MEDICAID

## 2024-04-30 VITALS
DIASTOLIC BLOOD PRESSURE: 73 MMHG | HEIGHT: 64 IN | WEIGHT: 172 LBS | HEART RATE: 81 BPM | BODY MASS INDEX: 29.37 KG/M2 | SYSTOLIC BLOOD PRESSURE: 115 MMHG

## 2024-04-30 DIAGNOSIS — Z85.850 HISTORY OF THYROID CANCER: ICD-10-CM

## 2024-04-30 DIAGNOSIS — E03.9 HYPOTHYROIDISM, UNSPECIFIED TYPE: Primary | ICD-10-CM

## 2024-04-30 DIAGNOSIS — E03.9 HYPOTHYROIDISM, UNSPECIFIED TYPE: ICD-10-CM

## 2024-04-30 LAB
T3FREE SERPL-MCNC: 3.03 PG/ML (ref 2.4–4.2)
T4 FREE SERPL-MCNC: 1.3 NG/DL (ref 0.8–1.7)
TSI SER-ACNC: 0.48 MIU/ML (ref 0.55–4.78)

## 2024-04-30 PROCEDURE — 86800 THYROGLOBULIN ANTIBODY: CPT

## 2024-04-30 PROCEDURE — 99213 OFFICE O/P EST LOW 20 MIN: CPT | Performed by: INTERNAL MEDICINE

## 2024-04-30 PROCEDURE — 84481 FREE ASSAY (FT-3): CPT

## 2024-04-30 PROCEDURE — 36415 COLL VENOUS BLD VENIPUNCTURE: CPT

## 2024-04-30 PROCEDURE — 84439 ASSAY OF FREE THYROXINE: CPT

## 2024-04-30 PROCEDURE — 84443 ASSAY THYROID STIM HORMONE: CPT

## 2024-04-30 NOTE — PROGRESS NOTES
Return Office Visit     CHIEF COMPLAINT:    Thyroid cancer  Hypothyroidism     HISTORY OF PRESENT ILLNESS:  Macy Berger is a 59 year old female who presents for follow up for for h/o thyroid cancer and hypothyroidism.      She is s/p total thyroidectomy in 2007 , pathology PTC.   She received LEE in May 2007     Last Tg was in Jan 2023 and was low.   Thyroid US July 2023: no residual tissue  WBS 2009 and 2015 : normal study       She is on Synthroid 88 mcg daily   Reports compliance  Takes it empty stomach         CURRENT MEDICATION:    Current Outpatient Medications   Medication Sig Dispense Refill    Omeprazole 40 MG Oral Capsule Delayed Release Take 1 capsule (40 mg total) by mouth daily. 90 capsule 3    SYNTHROID 88 MCG Oral Tab Take 1 tablet (88 mcg total) by mouth before breakfast. 30 tablet 1    SYNTHROID 88 MCG Oral Tab Take 1 tablet (88 mcg total) by mouth before breakfast. 90 tablet 0    tobramycin-dexamethasone 0.3-0.1 % Ophthalmic Ointment Apply bid to eyelids as directed 3.5 g 1    fluorometholone 0.1 % Ophthalmic Suspension Apply to eyelids bid for atopic dermatitis 5 mL 1    Fluorometholone 0.1 % Ophthalmic Ointment Use bid to eyelids prn eczema as directed 3.5 g 2    famotidine 40 MG Oral Tab Take 1 tablet (40 mg total) by mouth 2 (two) times daily. 60 tablet 2    sertraline 25 MG Oral Tab Take 1 tablet (25 mg total) by mouth every evening. 30 tablet 2    Crisaborole 2 % External Ointment Use bid to eczema 100 g 3    clonazePAM 0.5 MG Oral Tab Take 1 tablet (0.5 mg total) by mouth nightly. 90 tablet 0    clarithromycin 500 MG Oral Tab Take 1 tablet (500 mg total) by mouth 2 (two) times daily. 14 tablet 0    loratadine 10 MG Oral Tab Take 1 tablet (10 mg total) by mouth daily. 30 tablet 3    gabapentin 100 MG Oral Cap Take 1 capsule (100 mg total) by mouth nightly. 90 capsule 3    tacrolimus (PROTOPIC) 0.1 % External Ointment Use bid 60 g 2    azelastine 0.1 % Nasal Solution 2 sprays by  Nasal route 2 (two) times daily. 30 mL 0    loratadine-pseudoephedrine ER 5-120 MG Oral Tablet 12 Hr Take 1 tablet by mouth every 12 (twelve) hours. 60 tablet 3    hydrOXYzine 25 MG Oral Tab Take 1 tablet (25 mg total) by mouth at bedtime. TAKE 1 TABLET BY MOUTH EVERY DAY FOR ITCHING 90 tablet 1    citalopram 10 MG Oral Tab Take 1 tablet (10 mg total) by mouth daily. 90 tablet 3    atorvastatin 20 MG Oral Tab Take 1 tablet (20 mg total) by mouth nightly. 90 tablet 3    albuterol (VENTOLIN HFA) 108 (90 Base) MCG/ACT Inhalation Aero Soln Inhale 2 puffs into the lungs every 4 (four) hours as needed for Wheezing. 6.7 g 0    Cholecalciferol (VITAMIN D3) 50 MCG (2000 UT) Oral Chew Tab Chew by mouth.      EPINEPHrine (EPIPEN 2-SARAH) 0.3 MG/0.3ML Injection Solution Auto-injector Inject IM in event of  allergic reaction (Patient taking differently: Inject IM in event of  allergic reaction) 1 each 0    Biotin 5 MG Oral Cap Take by mouth 2 (two) times a day.      Calcium Carbonate-Vitamin D 600-200 MG-UNIT Oral Tab Take 2 tablets by mouth.           ALLERGY:  Allergies   Allergen Reactions    Adhesive Tape HIVES     Weeping hives and pain - SILK TAPE    Apple ANAPHYLAXIS     RAW APPLE    Aspartame ANAPHYLAXIS    Augmentin [Amoxicillin-Pot Clavulanate] HIVES and DIZZINESS    Ciprofloxacin SWELLING    Diclofenac HIVES and SWELLING     Patient states swelling of lip, and left eye.    Hydrocortisone SWELLING    Montelukast PALPITATIONS    Peach ANAPHYLAXIS     RAW PEACH    Peanut Oil TONGUE SWELLING    Prednisone     Soybean Oil SWELLING     Mouth and tongue swelling with pure soy    Vicodin [Hydrocodone-Acetaminophen] NAUSEA AND VOMITING     Okay with Tylenol    Hydrocodone NAUSEA AND VOMITING and OTHER (SEE COMMENTS)     Other reaction(s): Fainting    Terbinafine RASH     Urinary retention also    Pear UNKNOWN     raw       PAST MEDICAL, SOCIAL AND FAMILY HISTORY:  See past medical history marked as reviewed.  See past surgical  history marked as reviewed.  See past family history marked as reviewed.  See past social history marked as reviewed.    ASSESSMENTS:     REVIEW OF SYSTEMS:  Constitutional: Negative for: weight change, fever,  fatigue, cold/heat intolerance  Eyes: Negative for:  Visual changes, proptosis, blurring  ENT: Negative for:  dysphagia, neck swelling, dysphonia  Respiratory: Negative for:  dyspnea, cough  Cardiovascular: Negative for:  chest pain, palpitations, orthopnea  GI: Negative for:  abdominal pain, nausea, vomiting, diarrhea, constipation, bleeding  Neurology: Negative for: headache, numbness, weakness  Genito-Urinary: Negative for: dysuria, frequency  Psychiatric: Negative for:  depression, anxiety  Hematology/Lymphatics: Negative for: bruising, lower extremity edema  Endocrine: Negative for: polyuria, polydypsia  Skin: Negative for: rash, blister, cellulitis,       PHYSICAL EXAM:     Vitals reviewed    General Appearance:  alert, well developed, in no acute distress  Head: Atraumatic  Eyes:  normal conjunctivae, sclera., normal sclera and normal pupils  Throat/Neck: normal sound to voice. Normal hearing, normal speech,no residual thyroid tissue palpated  Respiratory:  Speaking in full sentences, non-labored. no increased work of breathing, no audible wheezing    Neuro: motor grossly intact, moving all extremities without difficulty  Psychiatric:  oriented to time, self, and place         DATA:     Pertinent data reviewed    ASSESSMENT AND PLAN:      Patient is a 59 year old female with :     1. H/o thyroid cancer     S/p TT and LEE in 2007  Discussed FU with Thyroid US, Tg Tg AB and WBS .  - Thyroid US  July 2023:  with no residual thyroid tissue--. Will repeat in Fall 2024  - Tg Tg AB low--> repeat in process, will notify her once in get the results  Discussed WBS, patient will like to hold off at this time        2. Post surgical Hypothyroidism  Reviewed TSH goals   TSH is slightly low  Clinically euthyroid, no  symptoms of over replacement   Since she feels good on current dose, will cpm and repeat labs in 3 months  Discussed holding biotin/ vitamin B completely for 3-5 days before blood work --> she did not hold it this time  Will decide further based on that TSH level  She does not have symptoms of over replacement, we discussed staying on the current regimen and repeating labs in two months  The patient is aware that she needs to take thyroid hormone replacement for the rest of her life; every am one hour before breakfast and atleast four hours apart from other meds especially calcium, iron, multivitamins containing Ca/iron  Stressed the importance of compliance with meds  Side effects of noncompliance including the development of myxedema coma and death discussed        Patient verbalized a complete  understanding of all of the above and did not have any further questions    Orders Placed This Encounter   Procedures    TSH W Reflex To Free T4     RTC in 10 months  Call for results      Porsha Sims MD                  Please note that the following visit was completed using two-way, real-time interactive audio and video communication.  This has been done in good ethan to provide continuity of care in the best interest of the provider-patient relationship.  There are limitations of this visit as no physical exam could be performed.  Every conscious effort was taken to allow for sufficient and adequate time.  This billing was spent on reviewing labs, medications, radiology tests and decision making.  Appropriate medical decision-making and tests are ordered as detailed in the plan of care above.”        .

## 2024-05-01 ENCOUNTER — NURSE ONLY (OUTPATIENT)
Dept: DERMATOLOGY CLINIC | Facility: CLINIC | Age: 59
End: 2024-05-01

## 2024-05-01 ENCOUNTER — TELEPHONE (OUTPATIENT)
Dept: DERMATOLOGY CLINIC | Facility: CLINIC | Age: 59
End: 2024-05-01

## 2024-05-01 ENCOUNTER — OFFICE VISIT (OUTPATIENT)
Dept: PHYSICAL THERAPY | Age: 59
End: 2024-05-01
Attending: STUDENT IN AN ORGANIZED HEALTH CARE EDUCATION/TRAINING PROGRAM
Payer: MEDICAID

## 2024-05-01 DIAGNOSIS — L20.9 ATOPIC DERMATITIS, UNSPECIFIED TYPE: ICD-10-CM

## 2024-05-01 PROCEDURE — 97110 THERAPEUTIC EXERCISES: CPT

## 2024-05-01 PROCEDURE — 96900 ACTINOTHERAPY UV LIGHT: CPT | Performed by: DERMATOLOGY

## 2024-05-01 NOTE — PROGRESS NOTES
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)      Referring Provider:  NEAL Valle PA-C Date of Evaluation:  4/18/2024    Precautions:  None Date POC Expires: 6/17/2024     Date of Surgery: NA    Next MD visit:   none scheduled     Today's Date: 5/1/2024    Insurance Primary/Secondary: [facility]     Authorized Visits: 8     Visit Number: 3    Charges: TherEx x 3  Total Timed Treatment: 45 min  Total Treatment time: 46 min    Medication Changes since last visit?: No    Subjective:   Macy reports feeling a pain in the (L) side of her neck on this date that she rates 4/10 and describes it as an \"ache.\" She believes that her sleeping position affects her symptoms and now she sleeps with 2 pilows that help keep her C/s in neutral alignment. She still feels tingling down her (L) UE into her 4th and 5th digits but with less intensity compared to previous session. She no longer c/o of feeling pain in the (L) scapula. She says that she has been doing her HEP regularly.    Objective:   ROM: (Pre-test symptoms:  2/10 ache (R) medial scapula)  Cervical :             Movement Loss Pain   Protrusion Nil loss NE   Flexion Nil loss NE   Retraction Minimal loss Dec, A (alleviates pain in L side of neck)   Extension Min loss Inc, NW (felt on L side of neck)   R Lat. Flexion Major loss Inc, NW (pain felt in L side of neck)   L Lat. Flexion Major loss Inc, NW (felt pull down R UE)   R Rotation Mod Loss Inc, NW (pull in R SCM muscle)   L Rotation Mod loss NE     Treatment:  Date: 4/24/2024  Tx#: 2/8  Date: 5/1/2024  Tx#: 3/8 Date:   Tx#: 4/ Date:   Tx#: 5/ Date:   Tx#: 6/ Date:   Tx#: 7/ Date:   Tx#: 8/   Scifit UE only L3 fwd/bkwd x 5 mins ea; NE postural training with lumbar roll    Sitting posture correction with lumbar roll on a supportive chair; Dec, B    C/s retraction with self OP 2 x 10 reps; Dec, B    Supinelying no pillow x 2 mins; Inc, NW     + 1-2 pillow under head and shoulder x 2 mins; Dec, B     + c/s retraction x 10 reps; Dec, NB     Supine to sit: cueing needed to maintain good spine alignment    Sitting posture correction in sitting with lumbar roll Scifit UE only L3 fwd/bkwd x 5 mins ea; NE postural training with lumbar roll. Dec, B (felt alleviating in L side of neck)    - C/s testing: Inc, NW    C/s retraction with self OP. 2 sets x 10 reps; NE    - retest: Inc, NW (less symptoms than previous)    C/s retraction with extension. 2 sets x 10 reps x 2 cts. NE.    - symptoms decreased during (R/L) LF with improved ROM)    Sitting posture correction in sitting with lumbar roll            Assessment:   Macy continues to respond to lower cervical extension (retraction with extension) directional preference exercise.  Her symptoms are reduced during (L/R) LF and she also had improved ROM to Mod loss with L/R LF.  She was re-educated on the importance of maintaining proper posture while sitting in the car/office/home by using a lumbar roll to maintain lordotic curve of lumbar spine. She was also instructed to be consistent with her HEP on a regular basis in order to assist with symptoms alleviation and maintenance of cervical ROM.  She understood and agreed with the treatment plan.  All questions and concerns were answered and addressed at this time.       HEP:    - Seated: c/s retraction with extension x 10 reps 3-4x/day      Goals: (8 visits) In Progress  1. Patient to consistently perform their HEP and it's progression to maintain their improved condition.  2.  Patient to have reduced,  centralized, and abolished symptoms in the neck to enable easier home, work, functional, and recreational activities.   3. Patient to have WNL of CROM in all directions to be able to sit (watch TV), turn her head (R>L), lay down on her back, do laundry, and drive without producing or increasing symptoms at (B) neck, upper trapezius, and base of skull area.   4. Patient to consistently have good posture to promote their symptom free condition.      Plan:   Re-assess next session and continue with directional preference exercise, posture correction, patient education, and HEP progression.     On this date patient was seen by Derick Blackwood PTA under the supervision of Zacarias Vega PT. Cert MDT.

## 2024-05-02 LAB
THYROGLOB AB: <1 IU/ML
THYROGLOB IMA: 0.1 NG/ML

## 2024-05-02 NOTE — TELEPHONE ENCOUNTER
Last UVB 5/1/24 - Pt states she was driving home after her light treatment and notice 2 age spots on her face after the light treatment.  Pt asking if the UVB might be causing this? Pt currently at [address]  Please advise.  Pt asking if she should cover her face moving forward?  Thank you.

## 2024-05-03 ENCOUNTER — APPOINTMENT (OUTPATIENT)
Dept: PHYSICAL THERAPY | Age: 59
End: 2024-05-03
Attending: STUDENT IN AN ORGANIZED HEALTH CARE EDUCATION/TRAINING PROGRAM
Payer: MEDICAID

## 2024-05-03 NOTE — TELEPHONE ENCOUNTER
Please get an update with next light treatment. Yes, it would be a good idea to cover her face for the light treatments.  It may be time to hold her mJ's at this point also.

## 2024-05-06 ENCOUNTER — LAB ENCOUNTER (OUTPATIENT)
Dept: LAB | Age: 59
End: 2024-05-06
Attending: INTERNAL MEDICINE
Payer: MEDICAID

## 2024-05-06 ENCOUNTER — PATIENT MESSAGE (OUTPATIENT)
Dept: ENDOCRINOLOGY CLINIC | Facility: CLINIC | Age: 59
End: 2024-05-06

## 2024-05-06 ENCOUNTER — NURSE ONLY (OUTPATIENT)
Dept: DERMATOLOGY CLINIC | Facility: CLINIC | Age: 59
End: 2024-05-06

## 2024-05-06 DIAGNOSIS — E03.9 HYPOTHYROIDISM, UNSPECIFIED TYPE: ICD-10-CM

## 2024-05-06 DIAGNOSIS — E03.9 HYPOTHYROIDISM, UNSPECIFIED TYPE: Primary | ICD-10-CM

## 2024-05-06 DIAGNOSIS — L20.9 ATOPIC DERMATITIS, UNSPECIFIED TYPE: ICD-10-CM

## 2024-05-06 LAB
T3FREE SERPL-MCNC: 3.14 PG/ML (ref 2.4–4.2)
T4 FREE SERPL-MCNC: 1.2 NG/DL (ref 0.8–1.7)
TSI SER-ACNC: 0.4 MIU/ML (ref 0.55–4.78)

## 2024-05-06 PROCEDURE — 96900 ACTINOTHERAPY UV LIGHT: CPT | Performed by: DERMATOLOGY

## 2024-05-06 PROCEDURE — 84443 ASSAY THYROID STIM HORMONE: CPT

## 2024-05-06 PROCEDURE — 84439 ASSAY OF FREE THYROXINE: CPT

## 2024-05-06 PROCEDURE — 36415 COLL VENOUS BLD VENIPUNCTURE: CPT

## 2024-05-06 PROCEDURE — 84481 FREE ASSAY (FT-3): CPT

## 2024-05-07 NOTE — TELEPHONE ENCOUNTER
From: Macy Berger  To: Porsha Sims  Sent: 5/6/2024 2:42 PM CDT  Subject: Biotin     I stopped taking Biotin 5 days ago  How is it I stop the Biotin and my level number goes even lower? I don't understand it. Do you have an answer for that?    I will take the .88 6 days a week and half a pill on the 7th day.

## 2024-05-08 ENCOUNTER — NURSE ONLY (OUTPATIENT)
Dept: DERMATOLOGY CLINIC | Facility: CLINIC | Age: 59
End: 2024-05-08

## 2024-05-08 ENCOUNTER — APPOINTMENT (OUTPATIENT)
Dept: PHYSICAL THERAPY | Age: 59
End: 2024-05-08
Attending: STUDENT IN AN ORGANIZED HEALTH CARE EDUCATION/TRAINING PROGRAM
Payer: MEDICAID

## 2024-05-08 DIAGNOSIS — L20.9 ATOPIC DERMATITIS, UNSPECIFIED TYPE: ICD-10-CM

## 2024-05-08 PROCEDURE — 96900 ACTINOTHERAPY UV LIGHT: CPT | Performed by: DERMATOLOGY

## 2024-05-08 RX ORDER — LEVOTHYROXINE SODIUM 0.07 MG/1
TABLET ORAL
Qty: 24 TABLET | Refills: 0 | Status: SHIPPED | OUTPATIENT
Start: 2024-05-08

## 2024-05-09 RX ORDER — SERTRALINE HYDROCHLORIDE 25 MG/1
25 TABLET, FILM COATED ORAL EVERY EVENING
Qty: 30 TABLET | Refills: 2 | Status: SHIPPED | OUTPATIENT
Start: 2024-05-09

## 2024-05-09 NOTE — TELEPHONE ENCOUNTER
Refill Request for medication(s):     Last Office Visit:     Last Refill: 3/9/2024    Pharmacy, Dosage verified: yes    Please advise - are we routing this to PCP?

## 2024-05-18 DIAGNOSIS — L29.9 PRURITUS: ICD-10-CM

## 2024-05-20 ENCOUNTER — OFFICE VISIT (OUTPATIENT)
Dept: PHYSICAL THERAPY | Age: 59
End: 2024-05-20
Attending: STUDENT IN AN ORGANIZED HEALTH CARE EDUCATION/TRAINING PROGRAM
Payer: MEDICAID

## 2024-05-20 PROCEDURE — 97110 THERAPEUTIC EXERCISES: CPT

## 2024-05-20 NOTE — PROGRESS NOTES
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  She says that her (L) UE \"has not been falling asleep as much\" and that she continues to experience a \"numbness\" in the 4th and 5th digits of the (R) hand. She no longer feels symptoms radiating to the (L) scapula. She says that she has been doing her HEP regularly (she mentioned trying to do her HEP while walking her dog and was educated on not doing her HEP in standing but instead in a seated position in order to promote proper technique).    Objective:   ROM: (Pre-test symptoms:  2/10 ache on (L) side of neck, (-) (R) medial scapula sharp pain)  Cervical :             Movement Loss Pain   Protrusion Nil loss Inc, NW (felt in R>L neck)   Flexion Nil loss NE   Retraction Minimal loss Dec, A (alleviates pain in L side of neck, but tight in (B) SCM)   Extension Min loss NE   R Lat. Flexion Major loss Inc, NW (felt in R side of neck)   L Lat. Flexion Major loss Inc, NW (felt pain into front  of head)   R Rotation Mod - Min Loss Inc, NW (tight in R SCM muscle)   L Rotation Mod - Min loss NE     Treatment:  Date: 4/24/2024  Tx#: 2/8 Date: 5/1/2024  Tx#: 3/8 Date: 5/20/2024  Tx#: 4/8 Date:   Tx#: 5/ Date:   Tx#: 6/ Date:   Tx#: 7/ Date:   Tx#: 8/   Scifit UE only L3 fwd/bkwd x 5 mins ea; NE postural training with lumbar roll    Sitting posture correction with lumbar roll on a supportive chair; Dec, B    C/s retraction with self OP 2 x 10 reps; Dec, B    Supinelying no pillow x 2 mins; Inc, NW     + 1-2 pillow under head and shoulder x 2 mins; Dec, B     + c/s retraction x 10 reps; Dec, NB     Supine to sit: cueing needed to maintain good spine alignment    Sitting posture correction in sitting with lumbar roll Scifit UE only L3 fwd/bkwd x 5 mins ea; NE postural training with lumbar roll. Dec, B (felt alleviating in L side of neck)    - C/s testing: Inc, NW    C/s retraction with self OP. 2 sets x 10 reps; NE    - retest: Inc, NW (less symptoms than previous)    C/s retraction with extension. 2 sets x 10 reps x 2 cts. NE.    - symptoms decreased during (R/L) LF with improved ROM)    Sitting posture correction in sitting with lumbar roll   Scifit UE only L3 fwd/bkwd x 5 mins ea; NE postural training with lumbar roll.    C/s retraction with self OP. 2 sets x 10 reps. Inc, NW.    - retest c/s: (Improved ROM into (R) rot, decreased symptoms overall)    Prone: (B) UE Ext. 10 reps x 10 cts. Inc,  NW (felt tingling in (R) UE from elbow to 5th digit)    Prone: (B) Horz. Abd. 10 reps x 10 cts. Inc, NW (felt cramping in lower back, placed bolster under ankles to alleviate)    Prone: Alt LE lifts. 10 reps x 5 ct hold ea. NE, tired.    Seated: C/s retraction with self OP. 10 reps x 2 cts. NE           Assessment:   Macy presented to therapy on this date with symptoms in the (L) side of her neck that presented as an ache/STR.  She was educated on the importance of performing her HEP in a hard chair with a lumbar roll  to augment her sitting posture.   She responded to seated c/s retraction directional preference exercise with an increase in cervical AROM during (R) rotation including an overall decrease in symptoms in all planes of motion.  She was introduced to strengthening exercises on this date, in prone, in order to challenge and develop the strength in her parascapular/postural musculature in order to assist in addressing poor sitting posture and decrease symptoms in the neck.  She will continue to benefit from physical therapy in order to promote increased ROM and Strength throughout the c/s.  She was told to continue with her updated HEP 4-6x daily and to try and implement her exercises while seated in a hard chair with her lumbar support.  She verbalized understanding/agreement and all questions were answered on this date.      HEP:    - Seated: c/s retraction with self OP x 10 reps 3-6x/day      Goals: (8 visits) In Progress  1. Patient to consistently perform their HEP and it's progression to maintain their improved condition.  2.  Patient to have reduced, centralized, and abolished symptoms in the neck to enable easier home, work, functional, and recreational activities.   3. Patient to have WNL of CROM in all directions to be able to sit (watch TV), turn her head (R>L), lay down on her back, do laundry, and drive without producing or increasing symptoms at (B) neck, upper trapezius, and base of skull area.   4. Patient to consistently have good posture to promote their symptom free condition.      Plan:   Re-assess next session and continue with directional preference exercise, posture correction, patient education, and HEP progression.     On this date patient was seen by Derick Blackwood PTA under the supervision of Zacarias Vega PT. Cert MDT.

## 2024-05-21 RX ORDER — HYDROXYZINE HYDROCHLORIDE 25 MG/1
25 TABLET, FILM COATED ORAL NIGHTLY
Qty: 90 TABLET | Refills: 1 | Status: SHIPPED | OUTPATIENT
Start: 2024-05-21

## 2024-05-21 NOTE — TELEPHONE ENCOUNTER
Please Review. Protocol Failed; No Protocol     Requested Prescriptions   Pending Prescriptions Disp Refills    HYDROXYZINE 25 MG Oral Tab [Pharmacy Med Name: HYDROXYZINE HCL 25MG TABS (WHITE)] 90 tablet 1     Sig: TAKE 1 TABLET(25 MG) BY MOUTH EVERY DAY AT BEDTIME FOR ITCHING       There is no refill protocol information for this order            Future Appointments         Provider Department Appt Notes    In 2 days Derick Blackwood PTA [facility] [place] 8 visits 4/18 to 6/17  [id number]  [facility]    In 1 week Derick Blackwood PTA [facility] [place] 8 visits 4/18 to 6/17  [id number]  [facility]    In 1 week Derick Blackwood PTA [facility] [place] 8 visits 4/18 to 6/17  [id number]  [facility]    In 2 weeks Derick Blackwood PTA [facility] [place] 8 visits 4/18 to 6/17  [id number]  [facility]    In 5 months Deandra Price MD [facility] - OB/GYN annual          Recent Outpatient Visits              Yesterday     [facility] [place] Derick Blackwood PTA    Office Visit    1 week ago Atopic dermatitis, unspecified type    [facility]    Nurse Only    2 weeks ago Atopic dermatitis, unspecified type    [facility]    Nurse Only    2 weeks ago Atopic dermatitis, unspecified type    [facility]    Nurse Only    2 weeks ago     [facility] [place] Derick Blackwood PTA    Office Visit

## 2024-05-23 ENCOUNTER — OFFICE VISIT (OUTPATIENT)
Dept: PHYSICAL THERAPY | Age: 59
End: 2024-05-23
Attending: STUDENT IN AN ORGANIZED HEALTH CARE EDUCATION/TRAINING PROGRAM
Payer: MEDICAID

## 2024-05-23 PROCEDURE — 97110 THERAPEUTIC EXERCISES: CPT

## 2024-05-23 NOTE — PROGRESS NOTES
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  She says that her (L) UE \"has not been falling asleep as much\" and that she continues to experience a \"tingling\" in the 4th and 5th digits of the (R) hand. She no longer feels symptoms radiating to the (L) scapula. Macy reports doing her HEP 2-3x daily, while sitting in a hard chair at home and while sitting in her car.      Objective:   ROM: (Pre-test symptoms:  5/10 ache on (L) side of neck, (-) (R) medial scapula sharp pain)  Cervical :             Movement Loss Pain   Protrusion Nil loss Inc, NW (felt in L side of neck)   Flexion Nil loss Inc, NW (felt in L side of neck)   Retraction Min loss Inc, NW (tight suboccipital)   Extension Min loss Inc, NW (felt in (B) sides of the neck)   R Lat. Flexion Blake - Mod loss Inc, NW (felt in top of head and (L) neck/UT)   L Lat. Flexion Blake - Mod loss Inc, NW (felt pain into front of head)   R Rotation Mod - Min Loss NE (stretch in L SCM)   L Rotation Mod -  Min loss NE (stretch in R SCM)     Treatment:  Date: 4/24/2024  Tx#: 2/8 Date: 5/1/2024  Tx#: 3/8 Date: 5/20/2024  Tx#: 4/8 Date: 5/23/2024  Tx#: 5/8 Date:   Tx#: 6/ Date:   Tx#: 7/ Date:   Tx#: 8/   Scifit UE only L3 fwd/bkwd x 5 mins ea; NE postural training with lumbar roll    Sitting posture correction with lumbar roll on a supportive chair; Dec, B    C/s retraction with self OP 2 x 10 reps; Dec, B    Supinelying no pillow x 2 mins; Inc, NW     + 1-2 pillow under head and shoulder x 2 mins; Dec, B     + c/s retraction x 10 reps; Dec, NB     Supine to sit: cueing needed to maintain good spine alignment    Sitting posture correction in sitting with lumbar roll Scifit UE only L3 fwd/bkwd x 5 mins ea; NE postural training with lumbar roll. Dec, B (felt alleviating in L side of neck)    - C/s testing: Inc, NW    C/s retraction with self OP. 2 sets x 10 reps; NE    - retest: Inc, NW (less symptoms than previous)    C/s retraction with extension. 2 sets x 10 reps x 2 cts. NE.    - symptoms decreased during (R/L) LF with improved ROM)    Sitting posture correction in sitting with lumbar roll   Scifit UE only L3 fwd/bkwd x 5 mins ea; NE postural training with lumbar roll.    C/s retraction with self OP. 2 sets x 10 reps. Inc, NW.    - retest c/s: (Improved ROM into (R) rot, decreased symptoms overall)    Prone: (B) UE Ext. 10 reps x 10 cts. Inc,  NW (felt tingling in (R) UE from elbow to 5th digit)    Prone: (B) Horz. Abd. 10 reps x 10 cts. Inc, NW (felt cramping in lower back, placed bolster under ankles to alleviate)    Prone: Alt LE lifts. 10 reps x 5 ct hold ea. NE, tired.    Seated: C/s retraction with self OP. 10 reps x 2 cts. NE   Scifit UE only L3 fwd/bkwd x 5 mins ea; Inc, NW (pain in L side of neck)    C/s retraction with self OP. 2 sets x 10 reps. Inc, NW. ([place] in suboccipitals)    - retest c/s: (Improved ROM into (L/R) rotation to Min loss, (L) LF to Min loss)    C/s retraction and ext x 10 reps. Inc, NW.  (Felt less numbness in R hand/fingers and increased tingling)    - retest: (Improved ROM into (R) Rot to Min loss, (L) Rot to Nil Loss, (R) LF to Mod Loss)    Prone: Sustained Cervical Ext on fists x 1 min. Inc, W (Felt in center of neck, increased numbness in (R) hand/fingers)    Prone: (B) UE Ext. 10 reps x 10 cts. NE, tired.    Prone: (B) Horz. Abd. 10 reps x 10 cts. NE, tired.    Prone: (B) Scaption. 10 reps x 10 cts. NE, tired.      Seated: C/s retraction with ext. 10 reps x 2 cts. NE          Assessment:   Macy continues to respond to her cervical directional preference exercise into lower cervical extension; on this date she was introduced to c/s retraction and extension to which she responded with increase AROM into (R/L) rotation and (R) LF to Min-Nil loss, she also presented with a decrease in tingling felt in the (R) LE.  During sustained cervical extension she did experience an increase in tingling felt in the (R) LE but was no worse as a result at the end of today's session.  She continues to progress slowly with her dynamic postural strengthening exercises without any exacerbation of symptoms.  She was told to continue with her updated HEP 4-6x daily.  She verbalized understanding/agreement and all questions were answered on this date.      HEP:    - Seated: c/s retraction with extension x 10 reps 3-5x/day      Goals: (8 visits) In Progress  1. Patient to consistently perform their HEP and it's progression to maintain their improved condition.  2.  Patient to have reduced, centralized, and abolished symptoms in the neck to enable easier home, work, functional, and recreational activities.   3. Patient to have WNL of CROM in all directions to be able to sit (watch TV), turn her head (R>L), lay down on her back, do laundry, and drive without producing or increasing symptoms at (B) neck, upper trapezius, and base of skull area.   4. Patient to consistently have good posture to promote their symptom  free condition.      Plan:   Re-assess next session and continue with directional preference exercise, posture correction, patient education, and HEP progression.     On this date patient was seen by Derick Blackwood PTA under the supervision of Zacarias Vega PT. Cert MDT.

## 2024-05-29 ENCOUNTER — APPOINTMENT (OUTPATIENT)
Dept: PHYSICAL THERAPY | Age: 59
End: 2024-05-29
Attending: STUDENT IN AN ORGANIZED HEALTH CARE EDUCATION/TRAINING PROGRAM
Payer: MEDICAID

## 2024-05-29 ENCOUNTER — NURSE ONLY (OUTPATIENT)
Dept: DERMATOLOGY CLINIC | Facility: CLINIC | Age: 59
End: 2024-05-29

## 2024-05-29 DIAGNOSIS — L20.9 ATOPIC DERMATITIS, UNSPECIFIED TYPE: ICD-10-CM

## 2024-05-29 PROCEDURE — 96900 ACTINOTHERAPY UV LIGHT: CPT | Performed by: DERMATOLOGY

## 2024-05-31 ENCOUNTER — NURSE ONLY (OUTPATIENT)
Dept: DERMATOLOGY CLINIC | Facility: CLINIC | Age: 59
End: 2024-05-31

## 2024-05-31 DIAGNOSIS — L20.9 ATOPIC DERMATITIS, UNSPECIFIED TYPE: ICD-10-CM

## 2024-05-31 DIAGNOSIS — E78.2 MIXED HYPERLIPIDEMIA: ICD-10-CM

## 2024-05-31 PROCEDURE — 96900 ACTINOTHERAPY UV LIGHT: CPT | Performed by: DERMATOLOGY

## 2024-05-31 RX ORDER — ATORVASTATIN CALCIUM 20 MG/1
20 TABLET, FILM COATED ORAL NIGHTLY
Qty: 90 TABLET | Refills: 0 | Status: SHIPPED | OUTPATIENT
Start: 2024-05-31

## 2024-05-31 NOTE — TELEPHONE ENCOUNTER
Please Review. Protocol Failed; No Protocol   Recent Visits  Date Type Provider Dept   09/14/23 Office Visit Dawna Francis APRN [facility]   06/05/23 Office Visit Dawna Francis APRN [facility]   01/09/23 Office Visit Dawna Francis APRN [facility]       Requested Prescriptions   Pending Prescriptions Disp Refills    citalopram 10 MG Oral Tab 90 tablet 3     Sig: Take 1 tablet (10 mg total) by mouth daily.       Psychiatric Non-Scheduled (Anti-Anxiety) Failed - 5/31/2024  2:04 PM        Failed - In person appointment or virtual visit in the past 6 mos or appointment in next 3 mos     Recent Outpatient Visits              2 days ago Atopic dermatitis, unspecified type    [facility]    Nurse Only    1 week ago     [facility] [place] Derick Blackwood PTA    Office Visit    1 week ago     [facility] [place] Derick Blackwood PTA    Office Visit    3 weeks ago Atopic dermatitis, unspecified type    [facility]    Nurse Only    3 weeks ago Atopic dermatitis, unspecified type    [facility]    Nurse Only          Future Appointments         Provider Department Appt Notes    Today [facility] DERM RN & LIGHT [place] [facility]    In 3 days Derick Blackwood PTA [facility] [place] 8 visits 4/18 to 6/17  [id number]  [facility]    In 1 week Derick Blackwood PTA [facility] [place] 8 visits 4/18 to 6/17  [id number]  [facility]    In 4 months Deandra Price MD [facility] - OB/GYN annual                    Passed - Depression Screening completed within the past 12 months               Future Appointments         Provider Department Appt Notes    [facility]    In  3 days Derick Blackwood PTA [facility] [place] 8 visits 4/18 to 6/17  [id number]  [facility]    In 1 week Derick Blackwood PTA [facility] [place] 8 visits 4/18 to 6/17  [id number]  [facility]    In 4 months Deandra Price MD [facility] [place] - OB/GYN annual          Recent Outpatient Visits              2 days ago Atopic dermatitis, unspecified type    [facility] [place]    Nurse Only    1 week ago     [facility] [place] Derick Blackwood PTA    Office Visit    1 week ago     [facility] [place] Derick Blackwood PTA    Office Visit    3 weeks ago Atopic dermatitis, unspecified type    [facility] [place]    Nurse Only    3 weeks ago Atopic dermatitis, unspecified type    [facility] [place]    Nurse Only

## 2024-05-31 NOTE — TELEPHONE ENCOUNTER
Spoke with patient, Date of Birth verified  She wants to know if she should continue taking atorvastatin 20 mg ?   Patient had labs done on 4-29-24 and was normal, nobody advise her the next step?   She stated she called and spoke with someone but never follow through.   Patient stated she didn't take atorvastatin since 3/2024 as she run out.   She was advised she will need to continue taking cholesterol meds unless instructed not, also advised she is due for appointment.   She stated understanding.   Routed to RN triage to run for protocol , thanks.         Requested Prescriptions     Pending Prescriptions Disp Refills    atorvastatin 20 MG Oral Tab 90 tablet 0     Sig: Take 1 tablet (20 mg total) by mouth nightly.     Future Appointments   Date Time Provider Department Center   5/31/2024  3:15 PM [facility] DERM RN & [facility]   6/3/2024 11:30 AM Derick Blackwood PTA [facility] [place]   6/3/2024  5:00 PM Dawna Francis APRN [facility] [place]

## 2024-05-31 NOTE — TELEPHONE ENCOUNTER
Patient would like a refill on her citalopram medication. Per the patient she is out of medication. Pharmacy: [place] (listed)     Current Outpatient Medications   Medication Sig Dispense Refill    citalopram 10 MG Oral Tab Take 1 tablet (10 mg total) by mouth daily. 90 tablet 3

## 2024-06-02 RX ORDER — CITALOPRAM HYDROBROMIDE 10 MG/1
10 TABLET ORAL DAILY
Qty: 90 TABLET | Refills: 3 | Status: SHIPPED | OUTPATIENT
Start: 2024-06-02

## 2024-06-03 ENCOUNTER — OFFICE VISIT (OUTPATIENT)
Dept: PHYSICAL THERAPY | Age: 59
End: 2024-06-03
Attending: STUDENT IN AN ORGANIZED HEALTH CARE EDUCATION/TRAINING PROGRAM
Payer: MEDICAID

## 2024-06-03 ENCOUNTER — OFFICE VISIT (OUTPATIENT)
Dept: INTERNAL MEDICINE CLINIC | Facility: CLINIC | Age: 59
End: 2024-06-03
Payer: MEDICAID

## 2024-06-03 ENCOUNTER — NURSE ONLY (OUTPATIENT)
Dept: DERMATOLOGY CLINIC | Facility: CLINIC | Age: 59
End: 2024-06-03
Payer: MEDICAID

## 2024-06-03 VITALS
DIASTOLIC BLOOD PRESSURE: 72 MMHG | WEIGHT: 173 LBS | HEIGHT: 64 IN | BODY MASS INDEX: 29.53 KG/M2 | HEART RATE: 76 BPM | SYSTOLIC BLOOD PRESSURE: 113 MMHG

## 2024-06-03 DIAGNOSIS — Z00.00 ANNUAL PHYSICAL EXAM: Primary | ICD-10-CM

## 2024-06-03 DIAGNOSIS — L20.9 ATOPIC DERMATITIS, UNSPECIFIED TYPE: ICD-10-CM

## 2024-06-03 DIAGNOSIS — E78.2 MIXED HYPERLIPIDEMIA: ICD-10-CM

## 2024-06-03 PROCEDURE — 96900 ACTINOTHERAPY UV LIGHT: CPT | Performed by: DERMATOLOGY

## 2024-06-03 PROCEDURE — 97110 THERAPEUTIC EXERCISES: CPT

## 2024-06-03 NOTE — PROGRESS NOTES
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 She reports performing her HEP intermittently and was educated on the importance of doing her exercises on a regular basis in order to maintain ROM and symptom free condition - pt verbalized understanding/agreement.    Objective:   ROM: (Pre-test symptoms:  0/10 ache on (L) side of neck, (-) (R) medial scapula sharp pain)  Cervical :             Movement Loss Pain   Protrusion Nil loss NE (stretch in front of neck)   Flexion Nil loss NE   Retraction Min loss NE (stretch in back of neck, under skull)   Extension Min loss NE   R Lat. Flexion Blake - Mod loss NE (Stretch in (L) UT)   L Lat. Flexion Mod Loss NE (Stretch in (B) UT)   R Rotation Min Loss NE (stretch in L SCM)   L Rotation Min Loss NE (stretch in R SCM)     Treatment:  Date: 4/24/2024  Tx#: 2/8 Date: 5/1/2024  Tx#: 3/8 Date: 5/20/2024  Tx#: 4/8 Date: 5/23/2024  Tx#: 5/8 Date: 6/3/2024  Tx#: 6/8 Date:    Tx#: 7/ Date:   Tx#: 8/   Scifit UE only L3 fwd/bkwd x 5 mins ea; NE postural training with lumbar roll    Sitting posture correction with lumbar roll on a supportive chair; Dec, B    C/s retraction with self OP 2 x 10 reps; Dec, B    Supinelying no pillow x 2 mins; Inc, NW     + 1-2 pillow under head and shoulder x 2 mins; Dec, B     + c/s retraction x 10 reps; Dec, NB     Supine to sit: cueing needed to maintain good spine alignment    Sitting posture correction in sitting with lumbar roll Scifit UE only L3 fwd/bkwd x 5 mins ea; NE postural training with lumbar roll. Dec, B (felt alleviating in L side of neck)    - C/s testing: Inc, NW    C/s retraction with self OP. 2 sets x 10 reps; NE    - retest: Inc, NW (less symptoms than previous)    C/s retraction with extension. 2 sets x 10 reps x 2 cts. NE.    - symptoms decreased during (R/L) LF with improved ROM)    Sitting posture correction in sitting with lumbar roll   Scifit UE only L3 fwd/bkwd x 5 mins ea; NE postural training with lumbar roll.    C/s retraction with self OP. 2 sets x 10 reps. Inc, NW.    - retest c/s: (Improved ROM into (R) rot, decreased symptoms overall)    Prone: (B) UE Ext. 10 reps x 10 cts. Inc,  NW (felt tingling in (R) UE from elbow to 5th digit)    Prone: (B) Horz. Abd. 10 reps x 10 cts. Inc, NW (felt cramping in lower back, placed bolster under ankles to alleviate)    Prone: Alt LE lifts. 10 reps x 5 ct hold ea. NE, tired.    Seated: C/s retraction with self OP. 10 reps x 2 cts. NE   Scifit UE only L3 fwd/bkwd x 5 mins ea; Inc, NW (pain in L side of neck)    C/s retraction with self OP. 2 sets x 10 reps. Inc, NW. ([place] in suboccipitals)    - retest c/s: (Improved ROM into (L/R) rotation to Min loss, (L) LF to Min loss)    C/s retraction and ext x 10 reps. Inc, NW. ([place] less numbness in R hand/fingers and increased tingling)    - retest: (Improved ROM into (R) Rot to Min loss, (L) Rot to Nil Loss, (R) LF to Mod Loss)    Prone:  Sustained Cervical Ext on fists x 1 min. Inc, W (Felt in center of neck, increased numbness in (R) hand/fingers)    Prone: (B) UE Ext. 10 reps x 10 cts. NE, tired.    Prone: (B) Horz. Abd. 10 reps x 10 cts. NE, tired.    Prone: (B) Scaption. 10 reps x 10 cts. NE, tired.      Seated: C/s retraction with ext. 10 reps x 2 cts. NE   Scifit UE only L4 fwd/bkwd x 5 mins ea; NE.    C/s retraction with self OP. 10 reps. NE    Standing: (B) UE Ext with Scap Ret using GTB. 10 reps x 10 cts. NE.    Standing: W. Rows with Scap Ret using GTB. 10 reps x 10 cts. P, NW (pain in low back)    Standing: N. Rows with Scap Ret using GTB. 10 reps x 10 cts. NE    BLANCA x 10 reps. NE.          + at bar x 10 reps. NE (stretch in lower back)    - retest low back: (no pain, only sore)    C/s retraction with self OP. 10 reps. NE    Ulnar Nerve Glides. 5 reps x 5 cts.       Assessment:   Macy continues to respond to her cervical directional preference exercise into lower cervical extension; she responded with increase AROM into (R/L) rotation to Min-Nil loss, She also presented with a decrease in tingling felt in the (R) LE after performing her ulnar nerve glides.  During her postural strengthening exercises she experienced lower back pain to which she was assigned lumbar extension exercises which alleviated her symptoms.  She was told be mindful of her sitting posture while using her lumbar support pillow and she verbalized understanding/agreement and all questions were answered on this date.      HEP:  (Provided GTB and documentation/handouts on 6/3/2024)  - Seated: c/s retraction x 10 reps 3-5x/day  - Ulnar Nerve Glides, 2x daily, 5 reps x 5 cts each  - N. Rows, W. Rows, (B) UE Ext 2x daily, 10       Goals: (8 visits) In Progress  1. Patient to consistently perform their HEP and it's progression to maintain their improved condition.  2.  Patient to have reduced, centralized, and abolished symptoms in the neck to enable easier home, work,  functional, and recreational activities.   3. Patient to have WNL of CROM in all directions to be able to sit (watch TV), turn her head (R>L), lay down on her back, do laundry, and drive without producing or increasing symptoms at (B) neck, upper trapezius, and base of skull area.   4. Patient to consistently have good posture to promote their symptom free condition.      Plan:   Re-assess next session and continue with directional preference exercise, posture correction, patient education, and HEP progression.     On this date patient was seen by Derick Blackwood PTA under the supervision of Zacarias Vega PT. Cert MDT.

## 2024-06-04 NOTE — PROGRESS NOTES
HPI:    Patient ID: Macy Berger is a 59 year old female.    HPI Follow up  59 year old female who I know well. She is states she is here for refills on her medication.  She is very busy running around working several jobs.  She needed refillson her atorvastatin and her citalopram which were filled over the weekend.    Rash  Following with derm for rash on her inner arms and neck.      Trigger Finger  She is following with Dr. Jensen for her Trigger Fingers.  Immunization History   Administered Date(s) Administered    Covid-19 Vaccine Pfizer 30 mcg/0.3 ml 08/30/2021, 09/20/2021   Pended Date(s) Pended    Influenza Vaccine Refused 02/24/2021       Past Medical History:    Allergic rhinitis    Anxiety state    Appendicitis    Asthma (HCC)    Cancer (HCC)    Colonic polyp    per NG: \"? colonic polyps\"; via proctoscope, 1993    Contact allergic reaction    Depression    Deviated nasal septum    Disorder of thyroid    Environmental allergies    Esophageal reflux    Exposure to medical diagnostic radiation    Head and neck cancer (HCC)    Hearing impairment    no hearing aids    Hemorrhoids    High cholesterol    Intermenstrual bleeding    per NG: intermenstrual VB    Menometrorrhagia    Migraines    Neurofibroma of upper arm    left posterior upper arm on biopsy    Osteoarthritis    Pneumonia due to organism    PONV (postoperative nausea and vomiting)    Thyroid cancer (HCC)    2007    Tonsillitis    Visual impairment    readers    Walking pneumonia      Past Surgical History:   Procedure Laterality Date    Ablation  2007    per NG: l131 ablation    Appendectomy  1988    Biopsy of uterus lining  2009    neg endometrial biopsy    Carpal tunnel release Right     Right hand    Colonoscopy N/A 12/17/2018    Procedure: COLONOSCOPY;  Surgeon: Jayden Leonard MD;  Location: [facility] END[facility]COPY    D & c  2001    Foot surgery  2007    R foot neuroma excision    Hand/finger surgery unlisted  2004    hand surgery     Hemorrhoidectomy  1991    Ligation of hemorrhoid(s)  2016    Rubberband Ligation of Hemorrhoid    Nasal surg proc unlisted  2007    septoplasty    Needle biopsy right      benign          Other surgical history  2006    sebaceous cyst on face excised    Thyroidectomy  2007    Tonsillectomy  1974      Social History     Socioeconomic History    Marital status:     Number of children: 4   Occupational History    Occupation: nanny   Tobacco Use    Smoking status: Never    Smokeless tobacco: Never   Vaping Use    Vaping status: Never Used   Substance and Sexual Activity    Alcohol use: Yes     Alcohol/week: 1.0 standard drink of alcohol     Types: 1 Standard drinks or equivalent per week     Comment: I rarely drink    Drug use: No    Sexual activity: Yes     Birth control/protection: Vasectomy   Other Topics Concern    Caffeine Concern Yes     Comment: coffee, 1 cup    Grew up on a farm No    History of tanning No    Outdoor occupation No    Pt has a pacemaker No    Pt has a defibrillator No    Breast feeding Yes     Comment: I delivered 4 children and i breast fed all 4.    Reaction to local anesthetic No          Review of Systems           Current Outpatient Medications   Medication Sig Dispense Refill    citalopram 10 MG Oral Tab Take 1 tablet (10 mg total) by mouth daily. 90 tablet 3    hydrOXYzine 25 MG Oral Tab Take 1 tablet (25 mg total) by mouth at bedtime. TAKE 1 TABLET BY MOUTH EVERY DAY FOR ITCHING 90 tablet 1    levothyroxine 75 MCG Oral Tab Take one tablet for 2 days a week 24 tablet 0    Omeprazole 40 MG Oral Capsule Delayed Release Take 1 capsule (40 mg total) by mouth daily. 90 capsule 3    SYNTHROID 88 MCG Oral Tab Take 1 tablet (88 mcg total) by mouth before breakfast. 30 tablet 1    fluorometholone 0.1 % Ophthalmic Suspension Apply to eyelids bid for atopic dermatitis 5 mL 1    Fluorometholone 0.1 % Ophthalmic Ointment Use bid to eyelids prn eczema as directed 3.5 g 2    famotidine  40 MG Oral Tab Take 1 tablet (40 mg total) by mouth 2 (two) times daily. 60 tablet 2    Crisaborole 2 % External Ointment Use bid to eczema 100 g 3    clonazePAM 0.5 MG Oral Tab Take 1 tablet (0.5 mg total) by mouth nightly. 90 tablet 0    loratadine 10 MG Oral Tab Take 1 tablet (10 mg total) by mouth daily. 30 tablet 3    tacrolimus (PROTOPIC) 0.1 % External Ointment Use bid 60 g 2    albuterol (VENTOLIN HFA) 108 (90 Base) MCG/ACT Inhalation Aero Soln Inhale 2 puffs into the lungs every 4 (four) hours as needed for Wheezing. 6.7 g 0    Cholecalciferol (VITAMIN D3) 50 MCG (2000 UT) Oral Chew Tab Chew by mouth.      EPINEPHrine (EPIPEN 2-SARAH) 0.3 MG/0.3ML Injection Solution Auto-injector Inject IM in event of  allergic reaction (Patient taking differently: Inject IM in event of  allergic reaction) 1 each 0    Biotin 5 MG Oral Cap Take by mouth 2 (two) times a day.      Calcium Carbonate-Vitamin D 600-200 MG-UNIT Oral Tab Take 2 tablets by mouth.      atorvastatin 20 MG Oral Tab Take 1 tablet (20 mg total) by mouth nightly. (Patient not taking: Reported on 6/3/2024) 90 tablet 0    tobramycin-dexamethasone 0.3-0.1 % Ophthalmic Ointment Apply bid to eyelids as directed (Patient not taking: Reported on 6/3/2024) 3.5 g 1    clarithromycin 500 MG Oral Tab Take 1 tablet (500 mg total) by mouth 2 (two) times daily. (Patient not taking: Reported on 6/3/2024) 14 tablet 0    gabapentin 100 MG Oral Cap Take 1 capsule (100 mg total) by mouth nightly. (Patient not taking: Reported on 6/3/2024) 90 capsule 3    azelastine 0.1 % Nasal Solution 2 sprays by Nasal route 2 (two) times daily. (Patient not taking: Reported on 6/3/2024) 30 mL 0    loratadine-pseudoephedrine ER 5-120 MG Oral Tablet 12 Hr Take 1 tablet by mouth every 12 (twelve) hours. (Patient not taking: Reported on 6/3/2024) 60 tablet 3     Allergies:  Allergies   Allergen Reactions    Adhesive Tape HIVES     Weeping hives and pain - SILK TAPE    Apple ANAPHYLAXIS     RAW  APPLE    Aspartame ANAPHYLAXIS    Augmentin [Amoxicillin-Pot Clavulanate] HIVES and DIZZINESS    Ciprofloxacin SWELLING    Diclofenac HIVES and SWELLING     Patient states swelling of lip, and left eye.    Hydrocortisone SWELLING    Montelukast PALPITATIONS    Peach ANAPHYLAXIS     RAW PEACH    Peanut Oil TONGUE SWELLING    Prednisone     Soybean Oil SWELLING     Mouth and tongue swelling with pure soy    Vicodin [Hydrocodone-Acetaminophen] NAUSEA AND VOMITING     Okay with Tylenol    Hydrocodone NAUSEA AND VOMITING and OTHER (SEE COMMENTS)     Other reaction(s): Fainting    Terbinafine RASH     Urinary retention also    Pear UNKNOWN     raw      PHYSICAL EXAM:   Physical Exam  /72 (BP Location: Left arm, Patient Position: Sitting, Cuff Size: adult)   Pulse 76   Ht 5' 4\" (1.626 m)   Wt 173 lb (78.5 kg)   LMP 10/01/2018   BMI 29.70 kg/m²   Wt Readings from Last 2 Encounters:   06/03/24 173 lb (78.5 kg)   04/30/24 172 lb (78 kg)     Body mass index is 29.7 kg/m².(2)  Lab Results   Component Value Date    WBC 5.6 10/16/2023    RBC 4.83 10/16/2023    HGB 13.7 10/16/2023    HCT 41.7 10/16/2023    MCV 86.3 10/16/2023    MCH 28.4 10/16/2023    MCHC 32.9 10/16/2023    RDW 12.2 10/16/2023    .0 10/16/2023    MPV 8.6 10/22/2018      Lab Results   Component Value Date     (H) 10/16/2023    BUN 9 10/16/2023    BUNCREA 9.6 (L) 10/16/2023    CREATSERUM 0.94 10/16/2023    ANIONGAP 6 10/16/2023    GFRNAA 68 07/14/2022    GFRAA 78 07/14/2022    CA 9.0 10/16/2023    OSMOCALC 291 10/16/2023    ALKPHO 69 10/16/2023    AST 21 10/16/2023    ALT 23 10/16/2023    ALKPHOS 50 10/01/2015    BILT 0.5 10/16/2023    TP 7.4 10/16/2023    ALB 4.0 10/16/2023    GLOBULIN 3.4 10/16/2023    AGRATIO 1.5 10/01/2015     10/16/2023    K 4.0 10/16/2023     10/16/2023    CO2 27.0 10/16/2023      No results found for: \"EAG\", \"A1C\"   Lab Results   Component Value Date    CHOLEST 188 04/29/2024    TRIG 82 04/29/2024    HDL  67 (H) 04/29/2024     (H) 04/29/2024    VLDL 14 04/29/2024    NONHDLC 121 04/29/2024    CALCNONHDL 160 (H) 10/01/2015      Lab Results   Component Value Date    T4F 1.2 05/06/2024    TSH 0.400 (L) 05/06/2024                ASSESSMENT/PLAN:     Problem List Items Addressed This Visit       Hyperlipidemia     Lipid panel and liver function tests have been stable on atorvastatin 20 mg daily .  She has tolerated her medications well  Continue on the same dose of medication.    Plan   Continue Atorvastatin  Annual labs to be completed before Physical Exam          Other Visit Diagnoses       Annual physical exam    -  Primary    Relevant Orders    Comp Metabolic Panel (14)    Lipid Panel    Vitamin D, 25-Hydroxy    Vitamin B12    TSH W Reflex To Free T4    CBC, NO DIFFERENTIAL/PLATELET    Adult Food Allergy Prof [E]               Orders Placed This Encounter   Procedures    Comp Metabolic Panel (14)    Lipid Panel    Vitamin D, 25-Hydroxy    Vitamin B12    TSH W Reflex To Free T4    CBC, NO DIFFERENTIAL/PLATELET    Adult Food Allergy Prof [E]       Meds This Visit:  Requested Prescriptions      No prescriptions requested or ordered in this encounter       Imaging & Referrals:  None         JULIA Horn

## 2024-06-04 NOTE — ASSESSMENT & PLAN NOTE
Lipid panel and liver function tests have been stable on atorvastatin 20 mg daily .  She has tolerated her medications well  Continue on the same dose of medication.    Plan   Continue Atorvastatin  Annual labs to be completed before Physical Exam

## 2024-06-05 ENCOUNTER — NURSE ONLY (OUTPATIENT)
Dept: DERMATOLOGY CLINIC | Facility: CLINIC | Age: 59
End: 2024-06-05
Payer: MEDICAID

## 2024-06-05 DIAGNOSIS — L20.9 ATOPIC DERMATITIS, UNSPECIFIED TYPE: ICD-10-CM

## 2024-06-05 PROCEDURE — 96900 ACTINOTHERAPY UV LIGHT: CPT | Performed by: DERMATOLOGY

## 2024-06-07 ENCOUNTER — NURSE ONLY (OUTPATIENT)
Dept: DERMATOLOGY CLINIC | Facility: CLINIC | Age: 59
End: 2024-06-07

## 2024-06-07 DIAGNOSIS — L20.9 ATOPIC DERMATITIS, UNSPECIFIED TYPE: ICD-10-CM

## 2024-06-07 PROCEDURE — 96900 ACTINOTHERAPY UV LIGHT: CPT | Performed by: DERMATOLOGY

## 2024-06-10 ENCOUNTER — NURSE ONLY (OUTPATIENT)
Dept: DERMATOLOGY CLINIC | Facility: CLINIC | Age: 59
End: 2024-06-10
Payer: MEDICAID

## 2024-06-10 ENCOUNTER — APPOINTMENT (OUTPATIENT)
Dept: PHYSICAL THERAPY | Age: 59
End: 2024-06-10
Attending: STUDENT IN AN ORGANIZED HEALTH CARE EDUCATION/TRAINING PROGRAM
Payer: MEDICAID

## 2024-06-10 ENCOUNTER — OFFICE VISIT (OUTPATIENT)
Dept: DERMATOLOGY CLINIC | Facility: CLINIC | Age: 59
End: 2024-06-10

## 2024-06-10 DIAGNOSIS — L20.9 ATOPIC DERMATITIS, UNSPECIFIED TYPE: Primary | ICD-10-CM

## 2024-06-10 DIAGNOSIS — T78.40XD ALLERGIC DISORDER, SUBSEQUENT ENCOUNTER: ICD-10-CM

## 2024-06-10 DIAGNOSIS — L20.9 ATOPIC DERMATITIS, UNSPECIFIED TYPE: ICD-10-CM

## 2024-06-10 DIAGNOSIS — L03.019 ONYCHIA AND PARONYCHIA OF FINGER: ICD-10-CM

## 2024-06-10 PROCEDURE — 99214 OFFICE O/P EST MOD 30 MIN: CPT | Performed by: DERMATOLOGY

## 2024-06-10 PROCEDURE — 96900 ACTINOTHERAPY UV LIGHT: CPT | Performed by: DERMATOLOGY

## 2024-06-10 NOTE — TELEPHONE ENCOUNTER
Called [employer] we are not able to do PA's over the phone only via fax. [employer] PA form filled out and sent to [phone] and [phone] [employer] PA dept sent as urgent will wait reply and f/u tomorrow. [Alert] : alert [Oriented to Person] : oriented to person [Oriented to Place] : oriented to place [Oriented to Time] : oriented to time [Calm] : calm [de-identified] : He  is alert, well-groomed, and in NAD   [de-identified] : anicteric.  Nasal mucosa pink, septum midline. Oral mucosa pink.  Tongue midline, Pharynx without exudates.   [de-identified] : Neck supple. Trachea midline. Thyroid isthmus barely palpable, lobes not felt.   [de-identified] :  right anterior leg mass is mobile, Firm,  Smooth, non-tender,   Well defined. deep. No palpable lymph nodes.   Mass size - 0.5 cm x  0.5 cm.

## 2024-06-14 ENCOUNTER — NURSE ONLY (OUTPATIENT)
Dept: DERMATOLOGY CLINIC | Facility: CLINIC | Age: 59
End: 2024-06-14

## 2024-06-14 DIAGNOSIS — L20.9 ATOPIC DERMATITIS, UNSPECIFIED TYPE: ICD-10-CM

## 2024-06-14 PROCEDURE — 96900 ACTINOTHERAPY UV LIGHT: CPT | Performed by: DERMATOLOGY

## 2024-06-14 RX ORDER — LEVOTHYROXINE SODIUM 88 MCG
TABLET ORAL
Qty: 60 TABLET | Refills: 1 | Status: SHIPPED | OUTPATIENT
Start: 2024-06-14

## 2024-06-14 NOTE — TELEPHONE ENCOUNTER
Per chart review, patient is on:   Synthroid 88 for 5 days a week and 75 other one 2 days a week.     Endocrine refill protocol for medications for hypothyroidism and hyperthyroidism    Protocol Criteria: failed, TSH was abnormal last  Appointment with Endocrinology completed in the last 12 months or scheduled in the next  6months     Verify appointment has been completed or scheduled in the appropriate timeline. If so can send a 90 day supply with 1 refill per provider protocol.    Normal TSH result in the past 12 months   Review recent telephone encounters and mychart communications with patient to ensure a dose change has not occurred since last office visit that was not updated in the medication history list   Last completed office visit: 4/30/24   Next scheduled Follow up: none  Last TSH result: 0.400 on 5/6/24

## 2024-06-14 NOTE — TELEPHONE ENCOUNTER
Medication PA Requested:   Synthroid 88 mcg                                                       CoverMyMeds Used:   Key: [id number]   Quantity: 60  Day Supply: 90 days  Sig: Take 1 tablet five days a week  DX Code:   E03.9                                  CPT code (if applicable):   Case Number/Pending Ref#:

## 2024-06-17 NOTE — TELEPHONE ENCOUNTER
Refill Request for medication(s): FAMOTIDINE 40 MG Oral Tab     Last Office Visit: 06/10/24    Last Refill: 03/09/24    Pharmacy, Dosage verified: [facility] #[id number] [place] [address] [address] AT [facility] [address], [phone], [phone]    Rx pended and sent to provider for approval, please advise. Thank You!

## 2024-06-17 NOTE — PROGRESS NOTES
Macy Berger is a 59 year old female.    Chief Complaint   Patient presents with    Dermatitis Atopic     LOV 02/19/24- Pt presents for Atopic Dermatitis F/U. Flaring patch on the Right AC x several days. Pt has been applying  Protopic and benadryl cream. Patch is red, inflammed, flaky, and itchy. Benadryl helps with temporary relief. Current treatment with UVB for dermatitis. Change to condition unknown.     Derm Problem     Pt has a concern of infected and inflammed cuticles x 1 month on bilateral hands. Skin around nails is crusty and peeling with yellow discharge.  Pt has been applying neosporin nightly without improvement.             Adhesive tape, Apple, Aspartame, Augmentin [amoxicillin-pot clavulanate], Ciprofloxacin, Diclofenac, Hydrocortisone, Montelukast, Peach, Peanut oil, Prednisone, Soybean oil, Vicodin [hydrocodone-acetaminophen], Hydrocodone, Terbinafine, and Pear  Current Outpatient Medications   Medication Sig Dispense Refill    citalopram 10 MG Oral Tab Take 1 tablet (10 mg total) by mouth daily. 90 tablet 3    hydrOXYzine 25 MG Oral Tab Take 1 tablet (25 mg total) by mouth at bedtime. TAKE 1 TABLET BY MOUTH EVERY DAY FOR ITCHING 90 tablet 1    levothyroxine 75 MCG Oral Tab Take one tablet for 2 days a week 24 tablet 0    Omeprazole 40 MG Oral Capsule Delayed Release Take 1 capsule (40 mg total) by mouth daily. 90 capsule 3    fluorometholone 0.1 % Ophthalmic Suspension Apply to eyelids bid for atopic dermatitis 5 mL 1    famotidine 40 MG Oral Tab Take 1 tablet (40 mg total) by mouth 2 (two) times daily. 60 tablet 2    clonazePAM 0.5 MG Oral Tab Take 1 tablet (0.5 mg total) by mouth nightly. 90 tablet 0    loratadine 10 MG Oral Tab Take 1 tablet (10 mg total) by mouth daily. 30 tablet 3    tacrolimus (PROTOPIC) 0.1 % External Ointment Use bid 60 g 2    albuterol (VENTOLIN HFA) 108 (90 Base) MCG/ACT Inhalation Aero Soln Inhale 2 puffs into the lungs every 4 (four) hours as needed for  Wheezing. 6.7 g 0    Cholecalciferol (VITAMIN D3) 50 MCG (2000 UT) Oral Chew Tab Chew by mouth.      Biotin 5 MG Oral Cap Take by mouth 2 (two) times a day.      Calcium Carbonate-Vitamin D 600-200 MG-UNIT Oral Tab Take 2 tablets by mouth.      SYNTHROID 88 MCG Oral Tab Take 1 tablet five days of the week 60 tablet 1    atorvastatin 20 MG Oral Tab Take 1 tablet (20 mg total) by mouth nightly. (Patient not taking: Reported on 6/3/2024) 90 tablet 0    tobramycin-dexamethasone 0.3-0.1 % Ophthalmic Ointment Apply bid to eyelids as directed (Patient not taking: Reported on 6/3/2024) 3.5 g 1    Fluorometholone 0.1 % Ophthalmic Ointment Use bid to eyelids prn eczema as directed (Patient not taking: Reported on 6/10/2024) 3.5 g 2    Crisaborole 2 % External Ointment Use bid to eczema (Patient not taking: Reported on 6/10/2024) 100 g 3    clarithromycin 500 MG Oral Tab Take 1 tablet (500 mg total) by mouth 2 (two) times daily. (Patient not taking: Reported on 6/3/2024) 14 tablet 0    gabapentin 100 MG Oral Cap Take 1 capsule (100 mg total) by mouth nightly. (Patient not taking: Reported on 6/3/2024) 90 capsule 3    azelastine 0.1 % Nasal Solution 2 sprays by Nasal route 2 (two) times daily. (Patient not taking: Reported on 6/3/2024) 30 mL 0    loratadine-pseudoephedrine ER 5-120 MG Oral Tablet 12 Hr Take 1 tablet by mouth every 12 (twelve) hours. (Patient not taking: Reported on 6/3/2024) 60 tablet 3    EPINEPHrine (EPIPEN 2-SARAH) 0.3 MG/0.3ML Injection Solution Auto-injector Inject IM in event of  allergic reaction (Patient not taking: Reported on 6/10/2024) 1 each 0      Past Medical History:    Allergic rhinitis    Anxiety state    Appendicitis    Asthma (HCC)    Cancer (HCC)    Colonic polyp    per NG: \"? colonic polyps\"; via proctoscope, 1993    Contact allergic reaction    Depression    Deviated nasal septum    Disorder of thyroid    Environmental allergies    Esophageal reflux    Exposure to medical diagnostic  radiation    Head and neck cancer (HCC)    Hearing impairment    no hearing aids    Hemorrhoids    High cholesterol    Intermenstrual bleeding    per NG: intermenstrual VB    Menometrorrhagia    Migraines    Neurofibroma of upper arm    left posterior upper arm on biopsy    Osteoarthritis    Pneumonia due to organism    PONV (postoperative nausea and vomiting)    Thyroid cancer (HCC)    2007    Tonsillitis    Visual impairment    readers    Walking pneumonia      Social History:  Social History     Socioeconomic History    Marital status:     Number of children: 4   Occupational History    Occupation: nanny   Tobacco Use    Smoking status: Never    Smokeless tobacco: Never   Vaping Use    Vaping status: Never Used   Substance and Sexual Activity    Alcohol use: Yes     Alcohol/week: 1.0 standard drink of alcohol     Types: 1 Standard drinks or equivalent per week     Comment: I rarely drink    Drug use: No    Sexual activity: Yes     Birth control/protection: Vasectomy   Other Topics Concern    Caffeine Concern Yes     Comment: coffee, 1 cup    Grew up on a farm No    History of tanning No    Outdoor occupation No    Pt has a pacemaker No    Pt has a defibrillator No    Breast feeding Yes     Comment: I delivered 4 children and i breast fed all 4.    Reaction to local anesthetic No   Social History Narrative    Lives alone                 Current Outpatient Medications   Medication Sig Dispense Refill    citalopram 10 MG Oral Tab Take 1 tablet (10 mg total) by mouth daily. 90 tablet 3    hydrOXYzine 25 MG Oral Tab Take 1 tablet (25 mg total) by mouth at bedtime. TAKE 1 TABLET BY MOUTH EVERY DAY FOR ITCHING 90 tablet 1    levothyroxine 75 MCG Oral Tab Take one tablet for 2 days a week 24 tablet 0    Omeprazole 40 MG Oral Capsule Delayed Release Take 1 capsule (40 mg total) by mouth daily. 90 capsule 3    fluorometholone 0.1 % Ophthalmic Suspension Apply to eyelids bid for atopic dermatitis 5 mL 1     famotidine 40 MG Oral Tab Take 1 tablet (40 mg total) by mouth 2 (two) times daily. 60 tablet 2    clonazePAM 0.5 MG Oral Tab Take 1 tablet (0.5 mg total) by mouth nightly. 90 tablet 0    loratadine 10 MG Oral Tab Take 1 tablet (10 mg total) by mouth daily. 30 tablet 3    tacrolimus (PROTOPIC) 0.1 % External Ointment Use bid 60 g 2    albuterol (VENTOLIN HFA) 108 (90 Base) MCG/ACT Inhalation Aero Soln Inhale 2 puffs into the lungs every 4 (four) hours as needed for Wheezing. 6.7 g 0    Cholecalciferol (VITAMIN D3) 50 MCG (2000 UT) Oral Chew Tab Chew by mouth.      Biotin 5 MG Oral Cap Take by mouth 2 (two) times a day.      Calcium Carbonate-Vitamin D 600-200 MG-UNIT Oral Tab Take 2 tablets by mouth.      SYNTHROID 88 MCG Oral Tab Take 1 tablet five days of the week 60 tablet 1    atorvastatin 20 MG Oral Tab Take 1 tablet (20 mg total) by mouth nightly. (Patient not taking: Reported on 6/3/2024) 90 tablet 0    tobramycin-dexamethasone 0.3-0.1 % Ophthalmic Ointment Apply bid to eyelids as directed (Patient not taking: Reported on 6/3/2024) 3.5 g 1    Fluorometholone 0.1 % Ophthalmic Ointment Use bid to eyelids prn eczema as directed (Patient not taking: Reported on 6/10/2024) 3.5 g 2    Crisaborole 2 % External Ointment Use bid to eczema (Patient not taking: Reported on 6/10/2024) 100 g 3    clarithromycin 500 MG Oral Tab Take 1 tablet (500 mg total) by mouth 2 (two) times daily. (Patient not taking: Reported on 6/3/2024) 14 tablet 0    gabapentin 100 MG Oral Cap Take 1 capsule (100 mg total) by mouth nightly. (Patient not taking: Reported on 6/3/2024) 90 capsule 3    azelastine 0.1 % Nasal Solution 2 sprays by Nasal route 2 (two) times daily. (Patient not taking: Reported on 6/3/2024) 30 mL 0    loratadine-pseudoephedrine ER 5-120 MG Oral Tablet 12 Hr Take 1 tablet by mouth every 12 (twelve) hours. (Patient not taking: Reported on 6/3/2024) 60 tablet 3    EPINEPHrine (EPIPEN 2-SARAH) 0.3 MG/0.3ML Injection Solution  Auto-injector Inject IM in event of  allergic reaction (Patient not taking: Reported on 6/10/2024) 1 each 0     Allergies:   Allergies   Allergen Reactions    Adhesive Tape HIVES     Weeping hives and pain - SILK TAPE    Apple ANAPHYLAXIS     RAW APPLE    Aspartame ANAPHYLAXIS    Augmentin [Amoxicillin-Pot Clavulanate] HIVES and DIZZINESS    Ciprofloxacin SWELLING    Diclofenac HIVES and SWELLING     Patient states swelling of lip, and left eye.    Hydrocortisone SWELLING    Montelukast PALPITATIONS    Peach ANAPHYLAXIS     RAW PEACH    Peanut Oil TONGUE SWELLING    Prednisone     Soybean Oil SWELLING     Mouth and tongue swelling with pure soy    Vicodin [Hydrocodone-Acetaminophen] NAUSEA AND VOMITING     Okay with Tylenol    Hydrocodone NAUSEA AND VOMITING and OTHER (SEE COMMENTS)     Other reaction(s): Fainting    Terbinafine RASH     Urinary retention also    Pear UNKNOWN     raw       Past Medical History:    Allergic rhinitis    Anxiety state    Appendicitis    Asthma (HCC)    Cancer (HCC)    Colonic polyp    per NG: \"? colonic polyps\"; via proctoscope, 1993    Contact allergic reaction    Depression    Deviated nasal septum    Disorder of thyroid    Environmental allergies    Esophageal reflux    Exposure to medical diagnostic radiation    Head and neck cancer (HCC)    Hearing impairment    no hearing aids    Hemorrhoids    High cholesterol    Intermenstrual bleeding    per NG: intermenstrual VB    Menometrorrhagia    Migraines    Neurofibroma of upper arm    left posterior upper arm on biopsy    Osteoarthritis    Pneumonia due to organism    PONV (postoperative nausea and vomiting)    Thyroid cancer (HCC)    2007    Tonsillitis    Visual impairment    readers    Walking pneumonia     Past Surgical History:   Procedure Laterality Date    Ablation  2007    per NG: l131 ablation    Appendectomy  1988    Biopsy of uterus lining  2009    neg endometrial biopsy    Carpal tunnel release Right     Right hand     Colonoscopy N/A 2018    Procedure: COLONOSCOPY;  Surgeon: Jayden Leonard MD;  Location: [facility] ENDOSCOPY    D & c      Foot surgery  2007    R foot neuroma excision    Hand/finger surgery unlisted      hand surgery    Hemorrhoidectomy  1991    Ligation of hemorrhoid(s)  2016    Rubberband Ligation of Hemorrhoid    Nasal surg proc unlisted  2007    septoplasty    Needle biopsy right      benign          Other surgical history  2006    sebaceous cyst on face excised    Thyroidectomy  2007    Tonsillectomy  1974     Social History     Socioeconomic History    Marital status:      Spouse name: Not on file    Number of children: 4    Years of education: Not on file    Highest education level: Not on file   Occupational History    Occupation: [employer]   Tobacco Use    Smoking status: Never    Smokeless tobacco: Never   Vaping Use    Vaping status: Never Used   Substance and Sexual Activity    Alcohol use: Yes     Alcohol/week: 1.0 standard drink of alcohol     Types: 1 Standard drinks or equivalent per week     Comment: I rarely drink    Drug use: No    Sexual activity: Yes     Birth control/protection: Vasectomy   Other Topics Concern     Service Not Asked    Blood Transfusions Not Asked    Caffeine Concern Yes     Comment: coffee, 1 cup    Occupational Exposure Not Asked    Hobby Hazards Not Asked    Sleep Concern Not Asked    Stress Concern Not Asked    Weight Concern Not Asked    Special Diet Not Asked    Back Care Not Asked    Exercise Not Asked    Bike Helmet Not Asked    Seat Belt Not Asked    Self-Exams Not Asked    Grew up on a farm No    History of tanning No    Outdoor occupation No    Pt has a pacemaker No    Pt has a defibrillator No    Breast feeding Yes     Comment: I delivered 4 children and i breast fed all 4.    Reaction to local anesthetic No   Social History Narrative    Lives alone     Social Determinants of Health     Financial Resource Strain: Not on file    Food Insecurity: Not on file   Transportation Needs: Not on file   Physical Activity: Not on file   Stress: Not on file   Social Connections: Not on file   Housing Stability: Not on file     Family History   Problem Relation Age of Onset    Stroke Father         CVA    Hypertension Father     Diabetes Mother     Neurological Disorder Paternal Grandfather         Alzheimer's    Cancer Brother 51        asbestos ,asphalt lung cancer    Lipids Other         family h/o hyperlipidemia     Cancer Maternal Uncle 18        spinal cancer    Ovarian Cancer Paternal Aunt 51        d.51    Diabetes Sister     Diabetes Sister     Cancer Sister     Cancer Paternal Cousin Female         tumor in neck; d.32; substance abuse    Glaucoma Neg     Macular degeneration Neg                       HPI :      Chief Complaint   Patient presents with    Dermatitis Atopic     LOV 02/19/24- Pt presents for Atopic Dermatitis F/U. Flaring patch on the Right AC x several days. Pt has been applying  Protopic and benadryl cream. Patch is red, inflammed, flaky, and itchy. Benadryl helps with temporary relief. Current treatment with UVB for dermatitis. Change to condition unknown.     Derm Problem     Pt has a concern of infected and inflammed cuticles x 1 month on bilateral hands. Skin around nails is crusty and peeling with yellow discharge.  Pt has been applying neosporin nightly without improvement.       New problem crusting swelling around nails painful draining uncomfortable has had difficulty with usual activities due to swelling and oozing      Follow-up atopic dermatitis.  Phototherapy has been helping in general with itching.  Had been on vacation visiting her son has resumed.  Notes more itching irritation.  Tacrolimus sometimes helpful  Over-the-counter Benadryl cream has been the most helpful  Has not been tolerating other antihistamines.  Famotidine does help.  TobraDex not covered which had been most helpful for eyelid  dermatitis    More recent issue increasing erythematous edematous plaques in the antecubital fossa  Nail folds with diffuse crusting and swelling.  Has been applying Neosporin  Does get her nails done with gel manicure UV used.  Patient relates that had a great deal difficulty tolerating any sunscreen agents.  Has not use anything around the cuticles with this.  Previously had been using [employer] product which had been very helpful this appears to be discontinued    Patient with significant allergies to multiple steroids including hydrocortisone, unable to tolerate oral steroids generally.  Tacrolimus cause more burning and irritation.  Has had more persistent areas over the arms legs inguinal creases.    Patient had allergy testing done multiple times in the past.  Noted in allergies.    Breakouts causing more difficulty with work.,  More randomly.  Has noted when traveling recently in the sun slightly better as far as the itching.  Erythema still may come and go had episode coming back from visiting her son recently with neck swelling irritation in the airport.  Although Benadryl gel does stop the itching does not decrease the redness or shorten duration of reactions.  Only makes itching tolerable.  Has had more difficulty tolerating antihistamines as well.  Loratadine and hydroxyzine minimally helpful.    Patient with history of chronic eyelid dermatitis.  FML tolerable has been able to use TobraDex with good improvement.  Other topical eye ointments unable to tolerate    Frustrated with repeated episodes  Patient presents with concerns above.    Past notes/ records and appropriate/relevant lab results including pathology and past body maps reviewed. Updated and new information noted in current visit.       ROS:    Denies any other systemic complaints.  No fevers, chills, night sweats, sensitivity to the sun, deeper lumps or bumps.  No other skin complaints.  History, medications, allergies as noted.    Physical  examination: Patient  well-developed well-nourished, alert oriented in no acute distress.  Exam of involved, appropriate areas of skin performed, including scalp, head, neck, face,nails, hair, external eyes, including conjunctival mucosa, eyelids, lips, external ears, back, chest, abdomen, arms, legs, palms.  Remarkable for lesions as noted   See map for details    Edema crusting diffusely around the nail folds digits hands, eczematous urticarial plaques over the antecubital fossa    Labs stable, recent labs reviewed liver functions renal function essentially unremarkable, B12 normal serum protein is normal vitamin D adequate thyroid normal CBC unremarkable last eosinophil count 3.5 previous HUNTER screens negative rheumatoid factor negative celiac negative     ASSESSMENT AND PLAN:     Encounter Diagnoses   Name Primary?    Atopic dermatitis, unspecified type Yes    Allergic disorder, subsequent encounter     Onychia and paronychia of finger        Assessment / plan:    Onychia paronychia  Possible photodermatitis, discontinue Neosporin as this may be additional contact allergen.  Mupirocin, triamcinolone which she does have at home may use this or the Protopic.  Await update over the next week or 2  Discussed possible sensitizers with manicjasson.  Extreme caution with UV cure for no chip.    Chronic atopic dermatitis.    Again reviewed possible Dupixent or Boris inhibitor patient apprehensive regarding any additional systemic medications    With consistent phototherapy overall pruritus has improved significantly.  Pros cons of additional agents reviewed  Still airborne and contact allergies likely contributing  Patient with numerous allergies.  Difficulty tolerating antihistamines.  Will add famotidine for itching, any urticarial component.  Continue her current regimen.    Benadryl gel has been more helpful than other topicals.  Burning irritation with tacrolimus.    Eyelid dermatitis response to TobraDex/FML continue.   Patient has had to purchase this.  Unable to tolerate hydrocortisone.    Numerous contact and systemic allergies.  Continue avoidance monitoring.    More frequent outbreaks once to twice a week.  Interfere with her ability to work, sleep, perform her daily activities  IGA 4    Discussed options.  Given longstanding history of atopic dermatitis, reviewed options.  Patient concern regarding side effects with Boris inhibitors and potential for immunosuppression, infection and frequent monitoring.  Patient's age recommend proceeding with course of Dupixent.  Medically necessary given increasing frequency of outbreaks of atopic dermatitis, itching not able to manage with topical steroids due to allergies, unable to tolerate tacrolimus,  Antihistamines not helpful.  Topical pramoxine only reduces itching does not clear rash or improve patient's overall functioning    Aware of risks benefits including infections, herpes lesions, worsening eye, eyelid dermatitis facial erythema patient wishes to proceed    In the interim we will begin phototherapy for pruritus since patient does note some improvement when in the sun.    Lesion posterior arm biopsied 2021 benign neurofibroma    Pathophysiology discussed with patient.  Therapeutic options reviewed.  See  Medications in grid.  Instructions reviewed at length.     General skin care questions answered.   Reassurance regarding benign skin lesions.Signs and symptoms of skin cancer, ABCDE's of melanoma briefly reviewed.  Sunscreen use (broad-spectrum, ideally mineral, UVA UVB coverage SPF 30 or greater recommended), sun protection, encouraged.  Followup as noted in rtc or p.r.n.    Encounter Times new patient  Including precharting, reviewing chart, prior notes obtaining history: 10 minutes, medical exam :10 minutes, notes on body map, plan, counseling 20minutes My total time spent caring for the patient on the day of the encounter: 50 minutes     The patient indicates understanding  of these issues and agrees to the plan.  The patient is asked to return as noted in follow-up /as noted above    This note was generated using Dragon voice recognition software.  Please contact me regarding any confusion resulting from errors in recognition.  Note to patient and family: The 21st Century Cures Act makes medical notes like these available to patients. However, be advised this is a medical document. It is intended as [id number]-[id number] communication and monitoring of a patient's care needs. It is written in medical language and may contain abbreviations or verbiage that are unfamiliar. It may appear blunt or direct. Medical documents are intended to carry relevant information, facts as evident and the clinical opinion of the practitioner.  No orders of the defined types were placed in this encounter.      Meds & Refills for this Visit:   Requested Prescriptions      No prescriptions requested or ordered in this encounter       No diagnosis found.    No orders of the defined types were placed in this encounter.      Results From Past 48 Hours:  No results found for this or any previous visit (from the past 48 hour(s)).    Meds This Visit:      Imaging Orders:  None     Referral Orders:  No orders of the defined types were placed in this encounter.

## 2024-06-18 RX ORDER — FAMOTIDINE 40 MG/1
40 TABLET, FILM COATED ORAL 2 TIMES DAILY
Qty: 60 TABLET | Refills: 5 | Status: SHIPPED | OUTPATIENT
Start: 2024-06-18

## 2024-06-19 ENCOUNTER — APPOINTMENT (OUTPATIENT)
Dept: PHYSICAL THERAPY | Age: 59
End: 2024-06-19
Attending: STUDENT IN AN ORGANIZED HEALTH CARE EDUCATION/TRAINING PROGRAM
Payer: MEDICAID

## 2024-06-20 ENCOUNTER — LAB ENCOUNTER (OUTPATIENT)
Dept: LAB | Age: 59
End: 2024-06-20
Attending: NURSE PRACTITIONER

## 2024-06-20 DIAGNOSIS — Z00.00 ANNUAL PHYSICAL EXAM: ICD-10-CM

## 2024-06-20 LAB
ALBUMIN SERPL-MCNC: 4.7 G/DL (ref 3.2–4.8)
ALBUMIN/GLOB SERPL: 2 {RATIO} (ref 1–2)
ALP LIVER SERPL-CCNC: 71 U/L
ALT SERPL-CCNC: 17 U/L
ANION GAP SERPL CALC-SCNC: 8 MMOL/L (ref 0–18)
AST SERPL-CCNC: 27 U/L (ref ?–34)
BILIRUB SERPL-MCNC: 0.7 MG/DL (ref 0.2–1.1)
BUN BLD-MCNC: 8 MG/DL (ref 9–23)
BUN/CREAT SERPL: 8.2 (ref 10–20)
CALCIUM BLD-MCNC: 9.7 MG/DL (ref 8.7–10.4)
CHLORIDE SERPL-SCNC: 110 MMOL/L (ref 98–112)
CHOLEST SERPL-MCNC: 214 MG/DL (ref ?–200)
CO2 SERPL-SCNC: 27 MMOL/L (ref 21–32)
CREAT BLD-MCNC: 0.98 MG/DL
DEPRECATED RDW RBC AUTO: 38.1 FL (ref 35.1–46.3)
EGFRCR SERPLBLD CKD-EPI 2021: 66 ML/MIN/1.73M2 (ref 60–?)
ERYTHROCYTE [DISTWIDTH] IN BLOOD BY AUTOMATED COUNT: 12.5 % (ref 11–15)
FASTING PATIENT LIPID ANSWER: YES
FASTING STATUS PATIENT QL REPORTED: YES
GLOBULIN PLAS-MCNC: 2.4 G/DL (ref 2–3.5)
GLUCOSE BLD-MCNC: 96 MG/DL (ref 70–99)
HCT VFR BLD AUTO: 40.4 %
HDLC SERPL-MCNC: 63 MG/DL (ref 40–59)
HGB BLD-MCNC: 13.7 G/DL
LDLC SERPL CALC-MCNC: 133 MG/DL (ref ?–100)
MCH RBC QN AUTO: 28.7 PG (ref 26–34)
MCHC RBC AUTO-ENTMCNC: 33.9 G/DL (ref 31–37)
MCV RBC AUTO: 84.7 FL
NONHDLC SERPL-MCNC: 151 MG/DL (ref ?–130)
OSMOLALITY SERPL CALC.SUM OF ELEC: 298 MOSM/KG (ref 275–295)
PLATELET # BLD AUTO: 224 10(3)UL (ref 150–450)
POTASSIUM SERPL-SCNC: 4 MMOL/L (ref 3.5–5.1)
PROT SERPL-MCNC: 7.1 G/DL (ref 5.7–8.2)
RBC # BLD AUTO: 4.77 X10(6)UL
SODIUM SERPL-SCNC: 145 MMOL/L (ref 136–145)
TRIGL SERPL-MCNC: 100 MG/DL (ref 30–149)
TSI SER-ACNC: 0.76 MIU/ML (ref 0.55–4.78)
VIT B12 SERPL-MCNC: 506 PG/ML (ref 211–911)
VIT D+METAB SERPL-MCNC: 69.6 NG/ML (ref 30–100)
VLDLC SERPL CALC-MCNC: 18 MG/DL (ref 0–30)
WBC # BLD AUTO: 4.3 X10(3) UL (ref 4–11)

## 2024-06-20 PROCEDURE — 86003 ALLG SPEC IGE CRUDE XTRC EA: CPT

## 2024-06-20 PROCEDURE — 80053 COMPREHEN METABOLIC PANEL: CPT

## 2024-06-20 PROCEDURE — 84443 ASSAY THYROID STIM HORMONE: CPT

## 2024-06-20 PROCEDURE — 82306 VITAMIN D 25 HYDROXY: CPT

## 2024-06-20 PROCEDURE — 36415 COLL VENOUS BLD VENIPUNCTURE: CPT

## 2024-06-20 PROCEDURE — 80061 LIPID PANEL: CPT

## 2024-06-20 PROCEDURE — 82607 VITAMIN B-12: CPT

## 2024-06-20 PROCEDURE — 85027 COMPLETE CBC AUTOMATED: CPT

## 2024-06-20 PROCEDURE — 82785 ASSAY OF IGE: CPT

## 2024-06-20 RX ORDER — SERTRALINE HYDROCHLORIDE 25 MG/1
25 TABLET, FILM COATED ORAL EVERY EVENING
Qty: 30 TABLET | Refills: 2 | OUTPATIENT
Start: 2024-06-20

## 2024-06-20 NOTE — TELEPHONE ENCOUNTER
It looks like this was changed by Dawna Francis to citalopram.  Please confirm with patient. Thank you

## 2024-06-20 NOTE — TELEPHONE ENCOUNTER
Refill Request for medication(s): sertraline 25 MG Oral Tab     Last Office Visit: 6/2024    Last Refill:    Pharmacy, Dosage verified:     Condition Update (if applicable): would you like pt to f/u with [facility] for continued therapy?    Rx pended and sent to provider for approval, please advise. Thank You!

## 2024-06-24 ENCOUNTER — LAB ENCOUNTER (OUTPATIENT)
Dept: LAB | Facility: HOSPITAL | Age: 59
End: 2024-06-24
Attending: INTERNAL MEDICINE

## 2024-06-24 ENCOUNTER — NURSE TRIAGE (OUTPATIENT)
Dept: INTERNAL MEDICINE CLINIC | Facility: CLINIC | Age: 59
End: 2024-06-24

## 2024-06-24 DIAGNOSIS — Z11.52 ENCOUNTER FOR SCREENING LABORATORY TESTING FOR COVID-19 VIRUS: ICD-10-CM

## 2024-06-24 DIAGNOSIS — Z11.52 ENCOUNTER FOR SCREENING LABORATORY TESTING FOR COVID-19 VIRUS: Primary | ICD-10-CM

## 2024-06-24 PROCEDURE — 87635 SARS-COV-2 COVID-19 AMP PRB: CPT

## 2024-06-24 NOTE — TELEPHONE ENCOUNTER
Action Requested: Summary for Provider     []  Critical Lab, Recommendations Needed  [] Need Additional Advice  [x]   FYI    []   Need Orders  [] Need Medications Sent to Pharmacy  []  Other     SUMMARY: Since yesterday feeling fatigue,tired, weak,all she wants is to lay down, did not go to work today, brain fog, bilateral ears clogged, occasional sneezing , took allergy pill yesterday ,not herself,thinks she has COVID .   Patient is requesting COVID test order .    Annual blood tests were done on 6/20/24 and all were normal. See below.   Denied depression.   RN generated COVID test order per protocol.     Chart reviewed , with PROBLEM list ;  Vitamin D deficiency  Hypothyroidism  Anxiety   Chronic fatigue      Reason for call: Fatigue  Onset: yesterday         Reason for Disposition   MILD weakness or fatigue with acute minor illness (e.g., colds)    Protocols used: Weakness (Generalized) and Fatigue-A-OH          LABS 6/20/24;  Macy Berger   I have reviewed your blood work.  Blood counts are normal-you are not anemic  Kidney function is normal  Liver function is normal  Diabetes number is good.  Thyroid test is normal  Cholesterol numbers are in acceptable levels.     Vitamin B and Vitamin D are good.       I encourage you to eat healthy, exercise for 30 to 40 minutes 3-4 times a week or as tolerated.  If you have any questions, please contact our office.       Thank you.     Dawna Francis DNP  Working with

## 2024-06-25 LAB — SARS-COV-2 RNA RESP QL NAA+PROBE: NOT DETECTED

## 2024-06-25 NOTE — TELEPHONE ENCOUNTER
Spoke with patient, Date of Birth verified  She is looking for covid test result done yesterday.   Patient was informed still in process.   Patient stated understanding.   We'll follow up with lab.

## 2024-06-25 NOTE — TELEPHONE ENCOUNTER
Spoke with Eda at [facility], Date of Birth verified.  She was informed of below message, she stated covid result still pending.     Also patient allergy test still pending from 6/20, she stated they had lab issue last week but the test will be run tomorrow.

## 2024-06-26 LAB
ALLERGEN BRAZIL NUT: <0.1 KUA/L (ref ?–0.1)
ALMOND IGE QN: <0.1 KUA/L (ref ?–0.1)
CASHEW NUT IGE QN: <0.1 KUA/L (ref ?–0.1)
CLAM IGE QN: <0.1 KUA/L (ref ?–0.1)
CODFISH IGE QN: <0.1 KUA/L (ref ?–0.1)
CORN IGE QN: <0.1 KUA/L (ref ?–0.1)
COW MILK IGE QN: <0.1 KUA/L (ref ?–0.1)
EGG WHITE IGE QN: <0.1 KUA/L (ref ?–0.1)
HAZELNUT IGE QN: 0.31 KUA/L (ref ?–0.1)
IGE SERPL-ACNC: 26.6 KU/L (ref 2–214)
PEANUT IGE QN: <0.1 KUA/L (ref ?–0.1)
SALMON IGE QN: <0.1 KUA/L (ref ?–0.1)
SCALLOP IGE QN: <0.1 KUA/L (ref ?–0.1)
SESAME SEED IGE QN: <0.1 KUA/L (ref ?–0.1)
SHRIMP IGE QN: 0.39 KUA/L (ref ?–0.1)
SOYBEAN IGE QN: <0.1 KUA/L (ref ?–0.1)
WALNUT IGE QN: <0.1 KUA/L (ref ?–0.1)
WHEAT IGE QN: <0.1 KUA/L (ref ?–0.1)

## 2024-07-11 ENCOUNTER — OFFICE VISIT (OUTPATIENT)
Dept: PHYSICAL THERAPY | Age: 59
End: 2024-07-11
Attending: INTERNAL MEDICINE
Payer: MEDICAID

## 2024-07-11 ENCOUNTER — OFFICE VISIT (OUTPATIENT)
Dept: INTERNAL MEDICINE CLINIC | Facility: CLINIC | Age: 59
End: 2024-07-11

## 2024-07-11 VITALS
HEART RATE: 71 BPM | SYSTOLIC BLOOD PRESSURE: 115 MMHG | HEIGHT: 64 IN | BODY MASS INDEX: 29.5 KG/M2 | DIASTOLIC BLOOD PRESSURE: 73 MMHG | WEIGHT: 172.81 LBS | OXYGEN SATURATION: 99 %

## 2024-07-11 DIAGNOSIS — Z00.00 ANNUAL PHYSICAL EXAM: Primary | ICD-10-CM

## 2024-07-11 DIAGNOSIS — E78.2 MIXED HYPERLIPIDEMIA: ICD-10-CM

## 2024-07-11 DIAGNOSIS — R63.5 WEIGHT GAIN: ICD-10-CM

## 2024-07-11 PROCEDURE — 99396 PREV VISIT EST AGE 40-64: CPT | Performed by: NURSE PRACTITIONER

## 2024-07-11 PROCEDURE — 97110 THERAPEUTIC EXERCISES: CPT

## 2024-07-11 NOTE — PROGRESS NOTES
HPI:    Patient ID: Macy Berger is a 59 year old female.  House Manger  See GYNE  HPIPhysical Exam  59 year old female is here for a physical exam  She is here for full physical and to discuss health maintenance.  Labs reviewed-  Food allergy panel reviewed.    Colonoscopy 2028 DUE    Immunization History   Administered Date(s) Administered    Covid-19 Vaccine Pfizer 30 mcg/0.3 ml 08/30/2021, 09/20/2021   Pended Date(s) Pended    Influenza Vaccine Refused 02/24/2021       Past Medical History:    Allergic rhinitis    Anxiety state    Appendicitis    Asthma (HCC)    Cancer (HCC)    Colonic polyp    per NG: \"? colonic polyps\"; via proctoscope, 1993    Contact allergic reaction    Depression    Deviated nasal septum    Disorder of thyroid    Environmental allergies    Esophageal reflux    Exposure to medical diagnostic radiation    Head and neck cancer (HCC)    Hearing impairment    no hearing aids    Hemorrhoids    High cholesterol    Intermenstrual bleeding    per NG: intermenstrual VB    Menometrorrhagia    Migraines    Neurofibroma of upper arm    left posterior upper arm on biopsy    Osteoarthritis    Pneumonia due to organism    PONV (postoperative nausea and vomiting)    Thyroid cancer (HCC)    2007    Tonsillitis    Visual impairment    readers    Walking pneumonia      Past Surgical History:   Procedure Laterality Date    Ablation  2007    per NG: l131 ablation    Appendectomy  1988    Biopsy of uterus lining  2009    neg endometrial biopsy    Carpal tunnel release Right     Right hand    Colonoscopy N/A 12/17/2018    Procedure: COLONOSCOPY;  Surgeon: Jayden Leonard MD;  Location: [facility] ENDOSCOPY    D & c  2001    Foot surgery  2007    R foot neuroma excision    Hand/finger surgery unlisted  2004    hand surgery    Hemorrhoidectomy  1991    Ligation of hemorrhoid(s)  11/28/2016    Rubberband Ligation of Hemorrhoid    Nasal surg proc unlisted  2007    septoplasty    Needle biopsy right      benign           Other surgical history  2006    sebaceous cyst on face excised    Thyroidectomy  2007    Tonsillectomy  1974      Social History     Socioeconomic History    Marital status:     Number of children: 4   Occupational History    Occupation:    Tobacco Use    Smoking status: Never    Smokeless tobacco: Never   Vaping Use    Vaping status: Never Used   Substance and Sexual Activity    Alcohol use: Yes     Alcohol/week: 1.0 standard drink of alcohol     Types: 1 Standard drinks or equivalent per week     Comment: I rarely drink    Drug use: No    Sexual activity: Yes     Birth control/protection: Vasectomy   Other Topics Concern    Caffeine Concern Yes     Comment: coffee, 1 cup    Grew up on a farm No    History of tanning No    Outdoor occupation No    Pt has a pacemaker No    Pt has a defibrillator No    Breast feeding Yes     Comment: I delivered 4 children and i breast fed all 4.    Reaction to local anesthetic No          Review of Systems   Constitutional:  Negative for chills, fatigue and fever.   HENT:  Positive for ear pain. Negative for hearing loss, sinus pressure, sinus pain, sore throat, trouble swallowing and voice change.    Eyes:  Negative for pain and visual disturbance.   Respiratory:  Negative for cough, chest tightness and shortness of breath.    Cardiovascular:  Negative for chest pain, palpitations and leg swelling.   Gastrointestinal:  Negative for abdominal pain, constipation, diarrhea, nausea and vomiting.   Endocrine: Negative for cold intolerance and heat intolerance.   Genitourinary:  Negative for dysuria, hematuria and vaginal discharge.   Musculoskeletal:  Negative for back pain and joint swelling.   Skin:  Negative for rash.   Allergic/Immunologic: Negative for environmental allergies.   Neurological:  Negative for weakness, numbness and headaches.   Hematological:  Does not bruise/bleed easily.   Psychiatric/Behavioral:  Negative for dysphoric mood and sleep  disturbance. The patient is not nervous/anxious.               Current Outpatient Medications   Medication Sig Dispense Refill    famotidine 40 MG Oral Tab Take 1 tablet (40 mg total) by mouth 2 (two) times daily. 60 tablet 5    SYNTHROID 88 MCG Oral Tab Take 1 tablet five days of the week 60 tablet 1    citalopram 10 MG Oral Tab Take 1 tablet (10 mg total) by mouth daily. 90 tablet 3    atorvastatin 20 MG Oral Tab Take 1 tablet (20 mg total) by mouth nightly. 90 tablet 0    hydrOXYzine 25 MG Oral Tab Take 1 tablet (25 mg total) by mouth at bedtime. TAKE 1 TABLET BY MOUTH EVERY DAY FOR ITCHING 90 tablet 1    levothyroxine 75 MCG Oral Tab Take one tablet for 2 days a week 24 tablet 0    Omeprazole 40 MG Oral Capsule Delayed Release Take 1 capsule (40 mg total) by mouth daily. 90 capsule 3    clonazePAM 0.5 MG Oral Tab Take 1 tablet (0.5 mg total) by mouth nightly. 90 tablet 0    loratadine 10 MG Oral Tab Take 1 tablet (10 mg total) by mouth daily. 30 tablet 3    albuterol (VENTOLIN HFA) 108 (90 Base) MCG/ACT Inhalation Aero Soln Inhale 2 puffs into the lungs every 4 (four) hours as needed for Wheezing. 6.7 g 0    Cholecalciferol (VITAMIN D3) 50 MCG (2000 UT) Oral Chew Tab Chew by mouth.      Biotin 5 MG Oral Cap Take by mouth 2 (two) times a day.      Calcium Carbonate-Vitamin D 600-200 MG-UNIT Oral Tab Take 2 tablets by mouth.      Fluorometholone 0.1 % Ophthalmic Ointment Use bid to eyelids prn eczema as directed (Patient not taking: Reported on 6/10/2024) 3.5 g 2    Crisaborole 2 % External Ointment Use bid to eczema (Patient not taking: Reported on 6/10/2024) 100 g 3    clarithromycin 500 MG Oral Tab Take 1 tablet (500 mg total) by mouth 2 (two) times daily. (Patient not taking: Reported on 6/3/2024) 14 tablet 0    gabapentin 100 MG Oral Cap Take 1 capsule (100 mg total) by mouth nightly. (Patient not taking: Reported on 6/3/2024) 90 capsule 3    tacrolimus (PROTOPIC) 0.1 % External Ointment Use bid 60 g 2     azelastine 0.1 % Nasal Solution 2 sprays by Nasal route 2 (two) times daily. (Patient not taking: Reported on 6/3/2024) 30 mL 0    EPINEPHrine (EPIPEN 2-SARAH) 0.3 MG/0.3ML Injection Solution Auto-injector Inject IM in event of  allergic reaction (Patient not taking: Reported on 6/10/2024) 1 each 0     Allergies:  Allergies   Allergen Reactions    Adhesive Tape HIVES     Weeping hives and pain - SILK TAPE    Apple ANAPHYLAXIS     RAW APPLE    Aspartame ANAPHYLAXIS    Augmentin [Amoxicillin-Pot Clavulanate] HIVES and DIZZINESS    Ciprofloxacin SWELLING    Diclofenac HIVES and SWELLING     Patient states swelling of lip, and left eye.    Hydrocortisone SWELLING    Montelukast PALPITATIONS    Peach ANAPHYLAXIS     RAW PEACH    Peanut Oil TONGUE SWELLING    Prednisone     Soybean Oil SWELLING     Mouth and tongue swelling with pure soy    Vicodin [Hydrocodone-Acetaminophen] NAUSEA AND VOMITING     Okay with Tylenol    Hydrocodone NAUSEA AND VOMITING and OTHER (SEE COMMENTS)     Other reaction(s): Fainting    Terbinafine RASH     Urinary retention also    Pear UNKNOWN     raw      PHYSICAL EXAM:   Physical Exam  Constitutional:       Appearance: Normal appearance. She is well-developed.   HENT:      Head: Normocephalic.      Right Ear: Tympanic membrane normal.      Left Ear: Tympanic membrane normal.      Nose: Nose normal.      Mouth/Throat:      Mouth: Mucous membranes are moist.      Pharynx: No oropharyngeal exudate or posterior oropharyngeal erythema.   Eyes:      General:         Right eye: No discharge.         Left eye: No discharge.      Pupils: Pupils are equal, round, and reactive to light.   Cardiovascular:      Rate and Rhythm: Normal rate and regular rhythm.      Heart sounds: Normal heart sounds. No murmur heard.     No friction rub. No gallop.   Pulmonary:      Effort: Pulmonary effort is normal. No respiratory distress.      Breath sounds: Normal breath sounds. No wheezing, rhonchi or rales.   Abdominal:       General: Bowel sounds are normal. There is no distension.      Palpations: Abdomen is soft. There is no mass.      Tenderness: There is no abdominal tenderness. There is no right CVA tenderness, left CVA tenderness or guarding.   Musculoskeletal:         General: No tenderness.      Cervical back: Normal range of motion and neck supple. No tenderness.      Right lower leg: No edema.      Left lower leg: No edema.   Lymphadenopathy:      Cervical: No cervical adenopathy.   Skin:     General: Skin is warm and dry.      Findings: No rash.   Neurological:      Mental Status: She is alert and oriented to person, place, and time.      Coordination: Coordination normal.      Gait: Gait normal.   Psychiatric:         Mood and Affect: Mood normal.         Behavior: Behavior normal.         Thought Content: Thought content normal.         Judgment: Judgment normal.       /73 (BP Location: Left arm, Patient Position: Sitting, Cuff Size: adult)   Pulse 71   Ht 5' 4\" (1.626 m)   Wt 172 lb 12.8 oz (78.4 kg)   LMP 10/01/2018   SpO2 99%   BMI 29.66 kg/m²   Wt Readings from Last 2 Encounters:   07/11/24 172 lb 12.8 oz (78.4 kg)   06/03/24 173 lb (78.5 kg)     Body mass index is 29.66 kg/m².(2)  Lab Results   Component Value Date    WBC 4.3 06/20/2024    RBC 4.77 06/20/2024    HGB 13.7 06/20/2024    HCT 40.4 06/20/2024    MCV 84.7 06/20/2024    MCH 28.7 06/20/2024    MCHC 33.9 06/20/2024    RDW 12.5 06/20/2024    .0 06/20/2024    MPV 8.6 10/22/2018      Lab Results   Component Value Date    GLU 96 06/20/2024    BUN 8 (L) 06/20/2024    BUNCREA 8.2 (L) 06/20/2024    CREATSERUM 0.98 06/20/2024    ANIONGAP 8 06/20/2024    GFRNAA 68 07/14/2022    GFRAA 78 07/14/2022    CA 9.7 06/20/2024    OSMOCALC 298 (H) 06/20/2024    ALKPHO 71 06/20/2024    AST 27 06/20/2024    ALT 17 06/20/2024    ALKPHOS 50 10/01/2015    BILT 0.7 06/20/2024    TP 7.1 06/20/2024    ALB 4.7 06/20/2024    GLOBULIN 2.4 06/20/2024    AGRATIO 1.5  10/01/2015     06/20/2024    K 4.0 06/20/2024     06/20/2024    CO2 27.0 06/20/2024      No results found for: \"EAG\", \"A1C\"   Lab Results   Component Value Date    CHOLEST 214 (H) 06/20/2024    TRIG 100 06/20/2024    HDL 63 (H) 06/20/2024     (H) 06/20/2024    VLDL 18 06/20/2024    NONHDLC 151 (H) 06/20/2024    CALCNONHDL 160 (H) 10/01/2015      Lab Results   Component Value Date    T4F 1.2 05/06/2024    TSH 0.762 06/20/2024                ASSESSMENT/PLAN:     Problem List Items Addressed This Visit       Weight gain     Wt Readings from Last 6 Encounters:   07/11/24 172 lb 12.8 oz (78.4 kg)   06/03/24 173 lb (78.5 kg)   04/30/24 172 lb (78 kg)   04/12/24 170 lb (77.1 kg)   02/26/24 170 lb (77.1 kg)   12/19/23 170 lb (77.1 kg)     Weight stable         Hyperlipidemia     Lipid panel and liver function tests have been stable on atorvastatin 20 mg daily .  She has tolerated her medications well  Continue on the same dose of medication.         Annual physical exam - Primary     Normal exam.  Labs as ordered.  Skin check normal.  No significant abnormal nevi.  adenopathy.  No cervical or inguinal lymphadenopathy.  Hernial orifices intact.  Follows with GYNE  Immunizations-Has reactions to meds and prednisone. Does not want immunizations             Patient is following her thyroid labs with Dr Sims    No orders of the defined types were placed in this encounter.      Meds This Visit:  Requested Prescriptions      No prescriptions requested or ordered in this encounter       Imaging & Referrals:  None         JULIA Horn

## 2024-07-11 NOTE — ASSESSMENT & PLAN NOTE
Normal exam.  Labs as ordered.  Skin check normal.  No significant abnormal nevi.  adenopathy.  No cervical or inguinal lymphadenopathy.  Hernial orifices intact.  Follows with GYNE  Immunizations-Has reactions to meds and prednisone. Does not want immunizations

## 2024-07-11 NOTE — PROGRESS NOTES
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 She says that she has been trying her best to implement her cervical HEP at home and when driving her car.  She cites feeling lower back pain with symptoms of sciatica that is intermittent.    Objective:   ROM: (Pre-test symptoms:  0/10 ache on (L) side of neck, (-) (R) medial scapula sharp pain)  Cervical :             Movement Loss Pain   Protrusion Nil loss NE   Flexion Nil loss NE   Retraction Min loss NE   Extension Min loss NE   R Lat. Flexion Mod loss NE   L Lat. Flexion Mod Loss NE   R Rotation Min Loss NE   L Rotation Min Loss NE     Treatment:  Date: 4/24/2024  Tx#: 2/8 Date: 5/1/2024  Tx#: 3/8 Date: 5/20/2024  Tx#: 4/8 Date: 5/23/2024  Tx#: 5/8 Date: 6/3/2024  Tx#: 6/8 Date: 7/11/2024  Tx#: 7/8 Date:   Tx#: 8/   Scifit UE only L3 fwd/bkwd x 5 mins ea; NE postural training with lumbar roll    Sitting posture correction with lumbar roll on a supportive chair; Dec, B    C/s retraction with self OP 2 x 10 reps; Dec, B    Supinelying no  pillow x 2 mins; Inc, NW     + 1-2 pillow under head and shoulder x 2 mins; Dec, B     + c/s retraction x 10 reps; Dec, NB     Supine to sit: cueing needed to maintain good spine alignment    Sitting posture correction in sitting with lumbar roll Scifit UE only L3 fwd/bkwd x 5 mins ea; NE postural training with lumbar roll. Dec, B (felt alleviating in L side of neck)    - C/s testing: Inc, NW    C/s retraction with self OP. 2 sets x 10 reps; NE    - retest: Inc, NW (less symptoms than previous)    C/s retraction with extension. 2 sets x 10 reps x 2 cts. NE.    - symptoms decreased during (R/L) LF with improved ROM)    Sitting posture correction in sitting with lumbar roll   Scifit UE only L3 fwd/bkwd x 5 mins ea; NE postural training with lumbar roll.    C/s retraction with self OP. 2 sets x 10 reps. Inc, NW.    - retest c/s: (Improved ROM into (R) rot, decreased symptoms overall)    Prone: (B) UE Ext. 10 reps x 10 cts. Inc,  NW (felt tingling in (R) UE from elbow to 5th digit)    Prone: (B) Horz. Abd. 10 reps x 10 cts. Inc, NW (felt cramping in lower back, placed bolster under ankles to alleviate)    Prone: Alt LE lifts. 10 reps x 5 ct hold ea. NE, tired.    Seated: C/s retraction with self OP. 10 reps x 2 cts. NE   Scifit UE only L3 fwd/bkwd x 5 mins ea; Inc, NW (pain in L side of neck)    C/s retraction with self OP. 2 sets x 10 reps. Inc, NW. ([place] in suboccipitals)    - retest c/s: (Improved ROM into (L/R) rotation to Min loss, (L) LF to Min loss)    C/s retraction and ext x 10 reps. Inc, NW. ([place] less numbness in R hand/fingers and increased tingling)    - retest: (Improved ROM into (R) Rot to Min loss, (L) Rot to Nil Loss, (R) LF to Mod Loss)    Prone: Sustained Cervical Ext on fists x 1 min. Inc, W (Felt in center of neck, increased numbness in (R) hand/fingers)    Prone: (B) UE Ext. 10 reps x 10 cts. NE, tired.    Prone: (B) Horz. Abd. 10 reps x 10 cts. NE, tired.    Prone: (B) [place]. 10 reps x 10 cts.  NE, tired.      Seated: C/s retraction with ext. 10 reps x 2 cts. NE   Scifit UE only L4 fwd/bkwd x 5 mins ea; NE.    C/s retraction with self OP. 10 reps. NE    Standing: (B) UE Ext with Scap Ret using GTB. 10 reps x 10 cts. NE.    Standing: W. Rows with Scap Ret using GTB. 10 reps x 10 cts. P, NW (pain in low back)    Standing: N. Rows with Scap Ret using GTB. 10 reps x 10 cts. NE    BLANCA x 10 reps. NE.          + at bar x 10 reps. NE (stretch in lower back)    - retest low back: (no pain, only sore)    C/s retraction with self OP. 10 reps. NE    Ulnar Nerve Glides. 5 reps x 5 cts. Scifit UE only L4 fwd/bkwd x 5 mins ea; NE.    C/s retraction with self OP. 10 reps. NE    OH Wall Walks with YTB x 10 reps. NE, tired.    Standing: (B) UE Ext with Scap Ret using GTB. 10 reps x 5 cts. P, NW (felt in lower back)    Standing: W. Rows with Scap Ret using GTB. 10 reps x 10 cts. P, NW (pain in low back)    Standing: N. Rows with Scap Ret using GTB. 10 reps x 10 cts. NE    C/s retraction with self OP. 10 reps. NE    BLANCA over rail x 10 reps. NE.      Assessment:   Macy continues to respond to her cervical directional preference exercises into lower cervical extension.  She has limited ROM in all cervical planes of motion except for flexion and protrusion without any symptoms in all motions.  She demonstrated an exacerbation of symptoms in the lower back during her postural strengthening exercises which were alleviated with repeated lumbar extension.  She was provided with instruction/handouts to continue with her updated HEP in order to maintain her improved condition and also improve the strength of her postural muscles.  She was told be mindful of her sitting posture while using her lumbar support pillow and she verbalized understanding/agreement and all questions were answered on this date.      HEP:  (Provided GTB and documentation/handouts on 6/3/2024)  - Seated: c/s retraction w/self OP x 10 reps 3-5x/day  - Lumbar  Extension over counter/table. 2x daily x 10 reps.  - N. Rows, W. Rows, (B) UE Ext 2x daily x 10   - OH Wall Walks with YTB. 2x daily x 10 reps.      Goals: (8 visits) In Progress  1. Patient to consistently perform their HEP and it's progression to maintain their improved condition.  2.  Patient to have reduced, centralized, and abolished symptoms in the neck to enable easier home, work, functional, and recreational activities.   3. Patient to have WNL of CROM in all directions to be able to sit (watch TV), turn her head (R>L), lay down on her back, do laundry, and drive without producing or increasing symptoms at (B) neck, upper trapezius, and base of skull area.   4. Patient to consistently have good posture to promote their symptom free condition.      Plan:   Re-assess next session and continue with directional preference exercise, posture correction, patient education, and HEP progression.     On this date patient was seen by Derick Blackwood PTA under the supervision of Zacarias Vega PT. Cert MDT.

## 2024-07-12 NOTE — ASSESSMENT & PLAN NOTE
Wt Readings from Last 6 Encounters:   07/11/24 172 lb 12.8 oz (78.4 kg)   06/03/24 173 lb (78.5 kg)   04/30/24 172 lb (78 kg)   04/12/24 170 lb (77.1 kg)   02/26/24 170 lb (77.1 kg)   12/19/23 170 lb (77.1 kg)     Weight stable

## 2024-07-18 ENCOUNTER — OFFICE VISIT (OUTPATIENT)
Dept: PHYSICAL THERAPY | Age: 59
End: 2024-07-18
Attending: INTERNAL MEDICINE
Payer: MEDICAID

## 2024-07-18 PROCEDURE — 97110 THERAPEUTIC EXERCISES: CPT

## 2024-07-18 NOTE — PROGRESS NOTES
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 She happily recalls being able to turn her head to the L/R with less difficulty/restriction/pain and therefore feeling \"safer\" in her vehicle as she is now able to check her blind spots while driving.  She says that she has been trying her best to implement her cervical HEP at home and when driving her car.  She cites feeling lower back pain with symptoms of sciatica and was advised to see her PCP to obtain a script for PT in order to address those symptoms.    Objective:   ROM: (Pre-test symptoms:  0/10 ache on (L) side of neck, (-) (R) medial scapula sharp pain)  Cervical :             Movement Loss Pain   Protrusion Nil loss NE   Flexion Nil loss NE   Retraction Nil loss NE   Extension Min loss NE   R Lat. Flexion Mod loss NE   L Lat. Flexion Mod Loss NE   R Rotation Min Loss NE   L Rotation Min Loss NE     Treatment:  Date: 4/24/2024  Tx#: 2/8 Date: 5/1/2024  Tx#: 3/8 Date: 5/20/2024  Tx#: 4/8 Date: 5/23/2024  Tx#: 5/8 Date:  6/3/2024  Tx#: 6/8 Date: 7/11/2024  Tx#: 7/8 Date: 7/18/2024  Tx#: 8/8 D/c   Scifit UE only L3 fwd/bkwd x 5 mins ea; NE postural training with lumbar roll    Sitting posture correction with lumbar roll on a supportive chair; Dec, B    C/s retraction with self OP 2 x 10 reps; Dec, B    Supinelying no pillow x 2 mins; Inc, NW     + 1-2 pillow under head and shoulder x 2 mins; Dec, B     + c/s retraction x 10 reps; Dec, NB     Supine to sit: cueing needed to maintain good spine alignment    Sitting posture correction in sitting with lumbar roll Scifit UE only L3 fwd/bkwd x 5 mins ea; NE postural training with lumbar roll. Dec, B (felt alleviating in L side of neck)    - C/s testing: Inc, NW    C/s retraction with self OP. 2 sets x 10 reps; NE    - retest: Inc, NW (less symptoms than previous)    C/s retraction with extension. 2 sets x 10 reps x 2 cts. NE.    - symptoms decreased during (R/L) LF with improved ROM)    Sitting posture correction in sitting with lumbar roll   Scifit UE only L3 fwd/bkwd x 5 mins ea; NE postural training with lumbar roll.    C/s retraction with self OP. 2 sets x 10 reps. Inc, NW.    - retest c/s: (Improved ROM into (R) rot, decreased symptoms overall)    Prone: (B) UE Ext. 10 reps x 10 cts. Inc,  NW (felt tingling in (R) UE from elbow to 5th digit)    Prone: (B) Horz. Abd. 10 reps x 10 cts. Inc, NW (felt cramping in lower back, placed bolster under ankles to alleviate)    Prone: Alt LE lifts. 10 reps x 5 ct hold ea. NE, tired.    Seated: C/s retraction with self OP. 10 reps x 2 cts. NE   Scifit UE only L3 fwd/bkwd x 5 mins ea; Inc, NW (pain in L side of neck)    C/s retraction with self OP. 2 sets x 10 reps. Inc, NW. ([place] in suboccipitals)    - retest c/s: (Improved ROM into (L/R) rotation to Min loss, (L) LF to Min loss)    C/s retraction and ext x 10 reps. Inc, NW. ([place] less numbness in R hand/fingers and increased tingling)    - retest: (Improved ROM into (R) Rot to Min loss, (L) Rot to  Nil Loss, (R) LF to Mod Loss)    Prone: Sustained Cervical Ext on fists x 1 min. Inc, W (Felt in center of neck, increased numbness in (R) hand/fingers)    Prone: (B) UE Ext. 10 reps x 10 cts. NE, tired.    Prone: (B) Horz. Abd. 10 reps x 10 cts. NE, tired.    Prone: (B) Scaption. 10 reps x 10 cts. NE, tired.      Seated: C/s retraction with ext. 10 reps x 2 cts. NE   Scifit UE only L4 fwd/bkwd x 5 mins ea; NE.    C/s retraction with self OP. 10 reps. NE    Standing: (B) UE Ext with Scap Ret using GTB. 10 reps x 10 cts. NE.    Standing: W. Rows with Scap Ret using GTB. 10 reps x 10 cts. P, NW (pain in low back)    Standing: N. Rows with Scap Ret using GTB. 10 reps x 10 cts. NE    BLANCA x 10 reps. NE.          + at bar x 10 reps. NE (stretch in lower back)    - retest low back: (no pain, only sore)    C/s retraction with self OP. 10 reps. NE    Ulnar Nerve Glides. 5 reps x 5 cts. Scifit UE only L4 fwd/bkwd x 5 mins ea; NE.    C/s retraction with self OP. 10 reps. NE    OH Wall Walks with YTB x 10 reps. NE, tired.    Standing: (B) UE Ext with Scap Ret using GTB. 10 reps x 5 cts. P, NW (felt in lower back)    Standing: W. Rows with Scap Ret using GTB. 10 reps x 10 cts. P, NW (pain in low back)    Standing: N. Rows with Scap Ret using GTB. 10 reps x 10 cts. NE    C/s retraction with self OP. 10 reps. NE    BLANCA over rail x 10 reps. NE. Scifit UE only L4 fwd/bkwd x 5 mins ea; NE.    C/s retraction with self OP. 10 reps. NE    OH Wall Walks with YTB x 10 reps. NE, tired.    Standing: (B) UE Ext with Scap Ret using GTB. 10 reps x 5 cts. NE    Standing: W. Rows with Scap Ret using GTB. 10 reps x 10 cts. P, NW (pain in low back)    Standing: N. Rows with Scap Ret using GTB. 10 reps x 10 cts. NE    Prone Lying x 1 min. (Felt good on lower back)    JOJO x 2 min. Dec, B (in lower back)    REIL. 2 sets x 5 reps. Dec, A. (Felt good on lower back and symptoms abolished)    Skydivers. 10 reps x 10 cts; NE    C/s retraction with self  OP x 10 reps. NE    BLANCA over rail x 10 reps. NE.     Assessment:   Macy Berger is a 58 y/o female who was being seen in PT for c/o of constant (B) neck, upper trapezius ache/pain/tightness/spasm.  She has attended 8 sessions from 4/18/2024 to 7/18/2024.  She has responded very well to her cervical directional preference exercise into lower cervical extension with self OP.  She continues to demonstrate moderate loss of ROM into (L/R) lateral flexion, and minimal loss of ROM into (L/R) rotation and extension; Although she remains symptom free in all planes of motion.  She is able to lay on her back, sleep without being awaken by pain, drive her vehicle safely, and in general perform many other functional activities without an exacerbation of symptoms.  She was told be mindful of her sitting posture while using her lumbar support pillow at home in a hard chair or when driving.  She has been motivated and compliant to her HEP regularly and was educated on the importance of implementing her updated HEP regularly in order to maintain her improved condition.  Macy understood and verbalized agreement to discharge on this date.  All questions/concerns were addressed on this date.        HEP:  (Provided GTB and documentation/handouts on 6/3/2024)  - Seated: c/s retraction w/self OP x 10 reps 3-5x/day  - Lumbar Extension over counter/table. 2x daily x 10 reps.  - N. Rows, W. Rows, (B) UE Ext 2x daily x 10   - OH Wall Walks with YTB. 2x daily x 10 reps.  - Supine Bridges with resisted hip abd. 10 reps x 10 cts; 2x daily.      Goals: (8 visits) MET  1. Patient to consistently perform their HEP and it's progression to maintain their improved condition.  2.  Patient to have reduced, centralized, and abolished symptoms in the neck to enable easier home, work, functional, and recreational activities.   3. Patient to have WNL of CROM in all directions to be able to sit (watch TV), turn her head (R>L), lay down on her back,  do laundry, and drive without producing or increasing symptoms at (B) neck, upper trapezius, and base of skull area.   4. Patient to consistently have good posture to promote their symptom free condition.      Plan:   Macy Berger will be discharged on this date for formal PT after reaching her allotted sessions through her insurance company.  She was instructed to see her PCP if symptoms were to return without any alleviation after implementing her updated HEP.  She rafaela be Re-assessed in the future session and continue with directional preference exercise, posture correction, patient education, and HEP progression.     On this date patient was seen by Derick Blackwood PTA under the supervision of Zacarias Vega PT. Cert MDT.

## 2024-07-19 NOTE — PROGRESS NOTES
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 She happily recalls being able to turn her head to the L/R with less difficulty/restriction/pain and therefore feeling \"safer\" in her vehicle as she is now able to check her blind spots while driving.  She says that she has been trying her best to implement her cervical HEP at home and when driving her car.  She cites feeling lower back pain with symptoms of sciatica and was advised to see her PCP to obtain a script for PT in order to address those symptoms.    Objective/Assessment/HEP:   Macy Berger is a 58 y/o female who was being seen in PT for c/o of constant (B) neck, upper trapezius ache/pain/tightness/spasm.  She has attended 8 sessions from 4/18/2024 to 7/18/2024.  She has responded very well to her cervical directional preference exercise into lower cervical extension with self OP.  She continues to demonstrate moderate loss of ROM into (L/R) lateral flexion, and minimal loss of ROM into (L/R) rotation and extension; Although she remains symptom free in all planes of motion.  She is able to lay on her back, sleep without being awaken by  pain, drive her vehicle safely, and in general perform many other functional activities without an exacerbation of symptoms.  She was told be mindful of her sitting posture while using her lumbar support pillow at home in a hard chair or when driving.  She has been motivated and compliant to her HEP regularly and was educated on the importance of implementing her updated HEP regularly in order to maintain her improved condition.  Macy understood and verbalized agreement to discharge on this date.  All questions/concerns were addressed on this date.      NDI Post: 8%      Goals: (8 visits) MET  1. Patient to consistently perform their HEP and it's progression to maintain their improved condition.  2.  Patient to have reduced, centralized, and abolished symptoms in the neck to enable easier home, work, functional, and recreational activities.   3. Patient to have WNL of CROM in all directions to be able to sit (watch TV), turn her head (R>L), lay down on her back, do laundry, and drive without producing or increasing symptoms at (B) neck, upper trapezius, and base of skull area.   4. Patient to consistently have good posture to promote their symptom free condition.      Plan:   Macy Berger will be discharged on this date for formal PT after reaching her allotted sessions through her insurance company.  She was instructed to see her PCP if symptoms were to return without any alleviation after implementing her updated HEP.  She rafaela be Re-assessed in the future session and continue with directional preference exercise, posture correction, patient education, and HEP progression.     On this date patient was seen by Derick Blackwood PTA under the supervision of Zacarias eVga PT. Cert MDT.

## 2024-07-19 NOTE — PROGRESS NOTES
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 She happily recalls being able to turn her head to the L/R with less difficulty/restriction/pain and therefore feeling \"safer\" in her vehicle as she is now able to check her blind spots while driving.  She says that she has been trying her best to implement her cervical HEP at home and when driving her car.  She cites feeling lower back pain with symptoms of sciatica and was advised to see her PCP to obtain a script for PT in order to address those symptoms.    Objective/Assessment/HEP:   Macy Berger is a 60 y/o female who was being seen in PT for c/o of constant (B) neck, upper trapezius ache/pain/tightness/spasm.  She has attended 8 sessions from 4/18/2024 to 7/18/2024.  She has responded very well to her cervical directional preference exercise into lower cervical extension with self OP.  She continues to demonstrate moderate loss of ROM into (L/R) lateral flexion, and minimal loss of ROM into (L/R) rotation and extension; Although she remains symptom free in all planes of motion.  She is able to lay on her back, sleep without being awaken by  pain, drive her vehicle safely, and in general perform many other functional activities without an exacerbation of symptoms.  She was told be mindful of her sitting posture while using her lumbar support pillow at home in a hard chair or when driving.  She has been motivated and compliant to her HEP regularly and was educated on the importance of implementing her updated HEP regularly in order to maintain her improved condition.  Macy understood and verbalized agreement to discharge on this date.  All questions/concerns were addressed on this date.      NDI Post: 8%      Goals: (8 visits) MET  1. Patient to consistently perform their HEP and it's progression to maintain their improved condition.  2.  Patient to have reduced, centralized, and abolished symptoms in the neck to enable easier home, work, functional, and recreational activities.   3. Patient to have WNL of CROM in all directions to be able to sit (watch TV), turn her head (R>L), lay down on her back, do laundry, and drive without producing or increasing symptoms at (B) neck, upper trapezius, and base of skull area.   4. Patient to consistently have good posture to promote their symptom free condition.      Plan:   Macy Berger will be discharged on this date for formal PT after reaching her allotted sessions through her insurance company.  She was instructed to see her PCP if symptoms were to return without any alleviation after implementing her updated HEP.  She rafaela be Re-assessed in the future session and continue with directional preference exercise, posture correction, patient education, and HEP progression.     On this date patient was seen by Derick Blackwood PTA under the supervision of Zacarias Vega PT. Cert MDT.

## 2024-07-24 DIAGNOSIS — F41.9 ANXIETY: ICD-10-CM

## 2024-07-24 DIAGNOSIS — E03.9 HYPOTHYROIDISM, UNSPECIFIED TYPE: Primary | ICD-10-CM

## 2024-07-24 RX ORDER — LEVOTHYROXINE SODIUM 0.07 MG/1
TABLET ORAL
Qty: 24 TABLET | Refills: 0 | OUTPATIENT
Start: 2024-07-24

## 2024-07-24 NOTE — TELEPHONE ENCOUNTER
Endocrine refill protocol for medications for hypothyroidism and hyperthyroidism    Protocol Criteria:  Appointment with Endocrinology completed in the last 12 months or scheduled in the next 6 months     Verify appointment has been completed or scheduled in the appropriate timeline. If so can send a 90 day supply with 1 refill per provider protocol.    Normal TSH result in the past 12 months   Review recent telephone encounters and mychart communications with patient to ensure a dose change has not occurred since last office visit that was not updated in the medication history list   Last completed office visit:4/30/2024 Porsha Sims MD   Next scheduled Follow up:   Future Appointments   Date Time Provider Department Center   10/18/2024 11:40 AM Deandra Price MD [facility]      Last TSH result:   TSH   Date Value Ref Range Status   06/20/2024 0.762 0.550 - 4.780 mIU/mL Final     TSH (S)   Date Value Ref Range Status   08/09/2014 0.03 (L) 0.34 - 5.60 uIU/mL Final

## 2024-07-28 RX ORDER — CLONAZEPAM 0.5 MG/1
0.5 TABLET ORAL NIGHTLY
Qty: 90 TABLET | Refills: 0 | Status: SHIPPED | OUTPATIENT
Start: 2024-07-28

## 2024-07-28 NOTE — TELEPHONE ENCOUNTER
Please Review. Protocol Failed; No Protocol     Recent fills: Quantity: 14,30,30  06/25/2024, 04/19/2024, 02/20/2024                                                                      Patient is due  Last Rx written: 01/23/2024  Last Office Visit: 07/11/2024      Requested Prescriptions   Pending Prescriptions Disp Refills    CLONAZEPAM 0.5 MG Oral Tab [Pharmacy Med Name: CLONAZEPAM 0.5MG TABLETS] 90 tablet 0     Sig: TAKE 1 TABLET(0.5 MG) BY MOUTH EVERY NIGHT       Controlled Substance Medication Failed - 7/24/2024  1:13 PM        Failed - This medication is a controlled substance - forward to provider to refill               Future Appointments         Provider Department Appt Notes    In 2 months [facility]          Recent Outpatient Visits              1 week ago     [facility]    Office Visit    2 weeks ago [facility]    Office Visit    2 weeks ago     [facility]    Office Visit    1 month ago Atopic dermatitis, unspecified type    [facility]    Nurse Only    1 month ago Atopic dermatitis, unspecified type    [facility]    Office Visit

## 2024-07-29 RX ORDER — LEVOTHYROXINE SODIUM 0.07 MG/1
TABLET ORAL
Qty: 24 TABLET | Refills: 1 | Status: SHIPPED | OUTPATIENT
Start: 2024-07-29

## 2024-07-29 NOTE — TELEPHONE ENCOUNTER
Spoke with pt and confirmed dosage:  -Levothyroxine 75mcg 2 days per week  -Synthroid 88mcg 5 days per week    Prescription sent per protocol     Endocrine refill protocol for medications for hypothyroidism and hyperthyroidism    Protocol Criteria: PASSED  Appointment with Endocrinology completed in the last 12 months or scheduled in the next 6 months     Verify appointment has been completed or scheduled in the appropriate timeline. If so can send a 90 day supply with 1 refill per provider protocol.    Normal TSH result in the past 12 months   Review recent telephone encounters and mychart communications with patient to ensure a dose change has not occurred since last office visit that was not updated in the medication history list   Last completed office visit:4/30/2024 Porsha Sims MD   Next scheduled Follow up:   Future Appointments   Date Time Provider Department Center   10/18/2024 11:40 AM Deandra Price MD [facility]      Last TSH result:   TSH   Date Value Ref Range Status   06/20/2024 0.762 0.550 - 4.780 mIU/mL Final     TSH (S)   Date Value Ref Range Status   08/09/2014 0.03 (L) 0.34 - 5.60 uIU/mL Final

## 2024-08-09 ENCOUNTER — TELEPHONE (OUTPATIENT)
Dept: DERMATOLOGY CLINIC | Facility: CLINIC | Age: 59
End: 2024-08-09

## 2024-08-09 NOTE — TELEPHONE ENCOUNTER
Fax from [facility]    placed in pa inbox    Pa required for OMEPRAZOLE 10mg capsules    Please call [phone] id [phone]

## 2024-08-11 NOTE — TELEPHONE ENCOUNTER
Please proceed with pa if possible- this medication for chronic reflux- cough- per ENT notes patient to continue long term- famotidine is for allergies/hives. Good prescription may be an option if not covered.

## 2024-08-12 NOTE — TELEPHONE ENCOUNTER
Medication PA Requested:    omeprazole 40mg caps                                              CoverMyMeds Used:  Key:  Quantity:  30  Day Supply: 30  Sig: take 1 cap daily  DX Code:   K21.9                                  CPT code (if applicable):   Case Number/Pending Ref#:

## 2024-08-24 DIAGNOSIS — E78.2 MIXED HYPERLIPIDEMIA: ICD-10-CM

## 2024-08-26 RX ORDER — ATORVASTATIN CALCIUM 20 MG/1
20 TABLET, FILM COATED ORAL NIGHTLY
Qty: 90 TABLET | Refills: 3 | Status: SHIPPED | OUTPATIENT
Start: 2024-08-26

## 2024-08-26 NOTE — TELEPHONE ENCOUNTER
Please review: Medication passes protocol, but unable to refill due to medium/high/very high drug interaction warning copied here:  Current Warnings (1)  High  Drug-Drug: atorvastatin and clarithromycinPharmacologic and toxic effects of atorvastatin may be increased by CY inhibitors (e.g. clarithromycin, erythromycin, telithromycin). Coadministration of Macrolides and Ketolides and atorvastatin may increase the risk of liver dysfunction and rhabdomyolysis.    **Clarithromycin 500 mg; Patient not taking. Reported on 6/3/2024     Refill passes per [facility] protocol.    No future appointments with family medicine/internal medicine.  LAST OFFICE VISIT: 2024    Requested Prescriptions   Pending Prescriptions Disp Refills    ATORVASTATIN 20 MG Oral Tab [Pharmacy Med Name: ATORVASTATIN 20MG TABLETS] 90 tablet 0     Sig: TAKE 1 TABLET(20 MG) BY MOUTH EVERY NIGHT       Cholesterol Medication Protocol Passed - 2024  9:49 AM        Passed - ALT < 80     Lab Results   Component Value Date    ALT 17 2024             Passed - ALT resulted within past year        Passed - Lipid panel within past 12 months     Lab Results   Component Value Date    CHOLEST 214 (H) 2024    TRIG 100 2024    HDL 63 (H) 2024     (H) 2024    VLDL 18 2024    NONHDLC 151 (H) 2024             Passed - In person appointment or virtual visit in the past 12 mos or appointment in next 3 mos     Recent Outpatient Visits              1 month ago     [facility] in [facility]    Office Visit    1 month ago Annual physical exam    [facility] Dawna Francis APRN    Office Visit    1 month ago     [facility] in [facility] Derick [facility]    Office Visit    2 months ago Atopic dermatitis, unspecified type    [facility]    Nurse Only    2 months ago Atopic dermatitis,  unspecified type    [facility], Maeve Chance MD    Office Visit          Future Appointments         Provider Department Appt Notes    In 1 month Deandra Price MD [facility] - OB[facility]GYN annual                         Future Appointments         Provider Department Appt Notes    In 1 month Deandra Price MD [facility] - OB/GYN annual          Recent Outpatient Visits              1 month ago     [facility]    Office Visit    1 month ago Annual physical exam    [facility], Dawna Mills, APRN    Office Visit    1 month ago     [facility]    Office Visit    2 months ago Atopic dermatitis, unspecified type    [facility], Luisa    Nurse Only    2 months ago Atopic dermatitis, unspecified type    [facility], Maeve Chanec MD    Office Visit

## 2024-09-09 RX ORDER — SERTRALINE HYDROCHLORIDE 25 MG/1
25 TABLET, FILM COATED ORAL EVERY EVENING
Qty: 30 TABLET | Refills: 6 | Status: SHIPPED | OUTPATIENT
Start: 2024-09-09

## 2024-09-17 RX ORDER — LEVOTHYROXINE SODIUM 88 MCG
TABLET ORAL
Qty: 60 TABLET | Refills: 1 | Status: SHIPPED | OUTPATIENT
Start: 2024-09-17

## 2024-09-26 NOTE — ASSESSMENT & PLAN NOTE
History of papillary carcinoma of the thyroid, status post thyroidectomy in 2007. She received I-131 ablation for the thyroid remanence in May 2007. She has been followed up on a regular basis per Dr. Luisito De Santiago. Last seen a while back.   She has been monit Please see the attached refill request.

## 2024-10-02 ENCOUNTER — OFFICE VISIT (OUTPATIENT)
Dept: DERMATOLOGY CLINIC | Facility: CLINIC | Age: 59
End: 2024-10-02
Payer: MEDICAID

## 2024-10-02 DIAGNOSIS — L03.019 ONYCHIA AND PARONYCHIA OF FINGER: ICD-10-CM

## 2024-10-02 DIAGNOSIS — L20.84 INTRINSIC ATOPIC DERMATITIS: ICD-10-CM

## 2024-10-02 DIAGNOSIS — L30.8 PSORIASIFORM DERMATITIS: Primary | ICD-10-CM

## 2024-10-02 PROCEDURE — 99214 OFFICE O/P EST MOD 30 MIN: CPT | Performed by: DERMATOLOGY

## 2024-10-02 RX ORDER — KETOCONAZOLE 20 MG/G
1 CREAM TOPICAL 2 TIMES DAILY
Qty: 30 G | Refills: 3 | Status: SHIPPED | OUTPATIENT
Start: 2024-10-02

## 2024-10-02 RX ORDER — MUPIROCIN 20 MG/G
OINTMENT TOPICAL
Qty: 22 G | Refills: 3 | Status: SHIPPED | OUTPATIENT
Start: 2024-10-02

## 2024-10-08 ENCOUNTER — TELEPHONE (OUTPATIENT)
Dept: DERMATOLOGY CLINIC | Facility: CLINIC | Age: 59
End: 2024-10-08

## 2024-10-08 NOTE — TELEPHONE ENCOUNTER
Fax from [facility]    placed in pa inbox    Pa required for EUCRISA OINTMENT    Please call [phone] id [phone] exceeds 180 day limit please submit override

## 2024-10-09 NOTE — PROGRESS NOTES
Macy Berger is a 59 year old female.    Chief Complaint   Patient presents with    Lesion     LOV 6/10/24- pt in for new lesions, on face and neck,     Dermatitis Atopic     Pt in for f/u, has patches on arms that is flaky, has been applying eucrisa and benadryl cream. With mild improvements.             Adhesive tape, Apple, Aspartame, Augmentin [amoxicillin-pot clavulanate], Ciprofloxacin, Diclofenac, Hydrocortisone, Montelukast, Peach, Peanut oil, Prednisone, Soybean oil, Vicodin [hydrocodone-acetaminophen], Hydrocodone, Terbinafine, and Pear  Current Outpatient Medications   Medication Sig Dispense Refill    Crisaborole 2 % External Ointment Apply bid to areas of atopic dermatitis 100 g 3    mupirocin 2 % External Ointment Use as directed bid to affected area 22 g 3    ketoconazole 2 % External Cream Apply 1 Application topically 2 (two) times daily. Apply to affected area 2 times daily. 30 g 3    SYNTHROID 88 MCG Oral Tab TAKE 1 TABLET BY MOUTH FIVE DAYS OF THE WEEK 60 tablet 1    sertraline 25 MG Oral Tab Take 1 tablet (25 mg total) by mouth every evening. 30 tablet 6    atorvastatin 20 MG Oral Tab Take 1 tablet (20 mg total) by mouth nightly. 90 tablet 3    levothyroxine 75 MCG Oral Tab TAKE 1 TABLET BY MOUTH 2 DAYS A WEEK 24 tablet 1    clonazePAM 0.5 MG Oral Tab Take 1 tablet (0.5 mg total) by mouth nightly. 90 tablet 0    famotidine 40 MG Oral Tab Take 1 tablet (40 mg total) by mouth 2 (two) times daily. 60 tablet 5    citalopram 10 MG Oral Tab Take 1 tablet (10 mg total) by mouth daily. 90 tablet 3    hydrOXYzine 25 MG Oral Tab Take 1 tablet (25 mg total) by mouth at bedtime. TAKE 1 TABLET BY MOUTH EVERY DAY FOR ITCHING 90 tablet 1    Omeprazole 40 MG Oral Capsule Delayed Release Take 1 capsule (40 mg total) by mouth daily. 90 capsule 3    Fluorometholone 0.1 % Ophthalmic Ointment Use bid to eyelids prn eczema as directed 3.5 g 2    Crisaborole 2 % External Ointment Use bid to eczema 100 g 3     loratadine 10 MG Oral Tab Take 1 tablet (10 mg total) by mouth daily. 30 tablet 3    gabapentin 100 MG Oral Cap Take 1 capsule (100 mg total) by mouth nightly. 90 capsule 3    tacrolimus (PROTOPIC) 0.1 % External Ointment Use bid 60 g 2    azelastine 0.1 % Nasal Solution 2 sprays by Nasal route 2 (two) times daily. 30 mL 0    albuterol (VENTOLIN HFA) 108 (90 Base) MCG/ACT Inhalation Aero Soln Inhale 2 puffs into the lungs every 4 (four) hours as needed for Wheezing. 6.7 g 0    Cholecalciferol (VITAMIN D3) 50 MCG (2000 UT) Oral Chew Tab Chew by mouth.      EPINEPHrine (EPIPEN 2-SARAH) 0.3 MG/0.3ML Injection Solution Auto-injector Inject IM in event of  allergic reaction 1 each 0    Biotin 5 MG Oral Cap Take by mouth 2 (two) times a day.      Calcium Carbonate-Vitamin D 600-200 MG-UNIT Oral Tab Take 2 tablets by mouth.        Past Medical History:    Allergic rhinitis    Anxiety state    Appendicitis    Asthma (HCC)    Cancer (formerly [facility])    Colonic polyp    per NG: \"? colonic polyps\"; via proctoscope, 1993    Contact allergic reaction    Depression    Deviated nasal septum    Disorder of thyroid    Environmental allergies    Esophageal reflux    Exposure to medical diagnostic radiation    Head and neck cancer (HCC)    Hearing impairment    no hearing aids    Hemorrhoids    High cholesterol    Intermenstrual bleeding    per NG: intermenstrual VB    Menometrorrhagia    Migraines    Neurofibroma of upper arm    left posterior upper arm on biopsy    Osteoarthritis    Pneumonia due to organism    PONV (postoperative nausea and vomiting)    Thyroid cancer (HCC)    2007    Tonsillitis    Visual impairment    readers    Walking pneumonia      Social History:  Social History     Socioeconomic History    Marital status:     Number of children: 4   Occupational History    Occupation: [employer]   Tobacco Use    Smoking status: Never    Smokeless tobacco: Never   Vaping Use    Vaping status: Never Used   Substance and Sexual Activity     Alcohol use: Yes     Alcohol/week: 1.0 standard drink of alcohol     Types: 1 Standard drinks or equivalent per week     Comment: I rarely drink    Drug use: No    Sexual activity: Yes     Birth control/protection: Vasectomy   Other Topics Concern    Caffeine Concern Yes     Comment: coffee, 1 cup    Grew up on a farm No    History of tanning No    Outdoor occupation No    Pt has a pacemaker No    Pt has a defibrillator No    Breast feeding Yes     Comment: I delivered 4 children and i breast fed all 4.    Reaction to local anesthetic No   Social History Narrative    Lives alone                 Current Outpatient Medications   Medication Sig Dispense Refill    Crisaborole 2 % External Ointment Apply bid to areas of atopic dermatitis 100 g 3    mupirocin 2 % External Ointment Use as directed bid to affected area 22 g 3    ketoconazole 2 % External Cream Apply 1 Application topically 2 (two) times daily. Apply to affected area 2 times daily. 30 g 3    SYNTHROID 88 MCG Oral Tab TAKE 1 TABLET BY MOUTH FIVE DAYS OF THE WEEK 60 tablet 1    sertraline 25 MG Oral Tab Take 1 tablet (25 mg total) by mouth every evening. 30 tablet 6    atorvastatin 20 MG Oral Tab Take 1 tablet (20 mg total) by mouth nightly. 90 tablet 3    levothyroxine 75 MCG Oral Tab TAKE 1 TABLET BY MOUTH 2 DAYS A WEEK 24 tablet 1    clonazePAM 0.5 MG Oral Tab Take 1 tablet (0.5 mg total) by mouth nightly. 90 tablet 0    famotidine 40 MG Oral Tab Take 1 tablet (40 mg total) by mouth 2 (two) times daily. 60 tablet 5    citalopram 10 MG Oral Tab Take 1 tablet (10 mg total) by mouth daily. 90 tablet 3    hydrOXYzine 25 MG Oral Tab Take 1 tablet (25 mg total) by mouth at bedtime. TAKE 1 TABLET BY MOUTH EVERY DAY FOR ITCHING 90 tablet 1    Omeprazole 40 MG Oral Capsule Delayed Release Take 1 capsule (40 mg total) by mouth daily. 90 capsule 3    Fluorometholone 0.1 % Ophthalmic Ointment Use bid to eyelids prn eczema as directed 3.5 g 2    Crisaborole 2 %  External Ointment Use bid to eczema 100 g 3    loratadine 10 MG Oral Tab Take 1 tablet (10 mg total) by mouth daily. 30 tablet 3    gabapentin 100 MG Oral Cap Take 1 capsule (100 mg total) by mouth nightly. 90 capsule 3    tacrolimus (PROTOPIC) 0.1 % External Ointment Use bid 60 g 2    azelastine 0.1 % Nasal Solution 2 sprays by Nasal route 2 (two) times daily. 30 mL 0    albuterol (VENTOLIN HFA) 108 (90 Base) MCG/ACT Inhalation Aero Soln Inhale 2 puffs into the lungs every 4 (four) hours as needed for Wheezing. 6.7 g 0    Cholecalciferol (VITAMIN D3) 50 MCG (2000 UT) Oral Chew Tab Chew by mouth.      EPINEPHrine (EPIPEN 2-SARAH) 0.3 MG/0.3ML Injection Solution Auto-injector Inject IM in event of  allergic reaction 1 each 0    Biotin 5 MG Oral Cap Take by mouth 2 (two) times a day.      Calcium Carbonate-Vitamin D 600-200 MG-UNIT Oral Tab Take 2 tablets by mouth.       Allergies:   Allergies   Allergen Reactions    Adhesive Tape HIVES     Weeping hives and pain - SILK TAPE    Apple ANAPHYLAXIS     RAW APPLE    Aspartame ANAPHYLAXIS    Augmentin [Amoxicillin-Pot Clavulanate] HIVES and DIZZINESS    Ciprofloxacin SWELLING    Diclofenac HIVES and SWELLING     Patient states swelling of lip, and left eye.    Hydrocortisone SWELLING    Montelukast PALPITATIONS    Peach ANAPHYLAXIS     RAW PEACH    Peanut Oil TONGUE SWELLING    Prednisone     Soybean Oil SWELLING     Mouth and tongue swelling with pure soy    Vicodin [Hydrocodone-Acetaminophen] NAUSEA AND VOMITING     Okay with Tylenol    Hydrocodone NAUSEA AND VOMITING and OTHER (SEE COMMENTS)     Other reaction(s): Fainting    Terbinafine RASH     Urinary retention also    Pear UNKNOWN     raw       Past Medical History:    Allergic rhinitis    Anxiety state    Appendicitis    Asthma (HCC)    Cancer (HCC)    Colonic polyp    per NG: \"? colonic polyps\"; via proctoscope, 1993    Contact allergic reaction    Depression    Deviated nasal septum    Disorder of thyroid     Environmental allergies    Esophageal reflux    Exposure to medical diagnostic radiation    Head and neck cancer (HCC)    Hearing impairment    no hearing aids    Hemorrhoids    High cholesterol    Intermenstrual bleeding    per NG: intermenstrual VB    Menometrorrhagia    Migraines    Neurofibroma of upper arm    left posterior upper arm on biopsy    Osteoarthritis    Pneumonia due to organism    PONV (postoperative nausea and vomiting)    Thyroid cancer (HCC)    2007    Tonsillitis    Visual impairment    readers    Walking pneumonia     Past Surgical History:   Procedure Laterality Date    Ablation  2007    per NG: l131 ablation    Appendectomy  1988    Biopsy of uterus lining  2009    neg endometrial biopsy    Carpal tunnel release Right     Right hand    Colonoscopy N/A 2018    Procedure: COLONOSCOPY;  Surgeon: Jayden Leonard MD;  Location: [facility] ENDOSCOPY    D & c  2001    Foot surgery      R foot neuroma excision    Hand/finger surgery unlisted      hand surgery    Hemorrhoidectomy  1991    Ligation of hemorrhoid(s)  2016    Rubberband Ligation of Hemorrhoid    Nasal surg proc unlisted  2007    septoplasty    Needle biopsy right      benign          Other surgical history  2006    sebaceous cyst on face excised    Thyroidectomy  2007    Tonsillectomy  1974     Social History     Socioeconomic History    Marital status:      Spouse name: Not on file    Number of children: 4    Years of education: Not on file    Highest education level: Not on file   Occupational History    Occupation:    Tobacco Use    Smoking status: Never    Smokeless tobacco: Never   Vaping Use    Vaping status: Never Used   Substance and Sexual Activity    Alcohol use: Yes     Alcohol/week: 1.0 standard drink of alcohol     Types: 1 Standard drinks or equivalent per week     Comment: I rarely drink    Drug use: No    Sexual activity: Yes     Birth control/protection: Vasectomy   Other Topics  Concern     Service Not Asked    Blood Transfusions Not Asked    Caffeine Concern Yes     Comment: coffee, 1 cup    Occupational Exposure Not Asked    Hobby Hazards Not Asked    Sleep Concern Not Asked    Stress Concern Not Asked    Weight Concern Not Asked    Special Diet Not Asked    Back Care Not Asked    Exercise Not Asked    Bike Helmet Not Asked    Seat Belt Not Asked    Self-Exams Not Asked    Grew up on a farm No    History of tanning No    Outdoor occupation No    Pt has a pacemaker No    Pt has a defibrillator No    Breast feeding Yes     Comment: I delivered 4 children and i breast fed all 4.    Reaction to local anesthetic No   Social History Narrative    Lives alone     Social Determinants of Health     Financial Resource Strain: Not on file   Food Insecurity: Not on file   Transportation Needs: Not on file   Physical Activity: Not on file   Stress: Not on file   Social Connections: Not on file   Housing Stability: Not on file     Family History   Problem Relation Age of Onset    Stroke Father         CVA    Hypertension Father     Diabetes Mother     Neurological Disorder Paternal Grandfather         Alzheimer's    Cancer Brother 51        asbestos ,asphalt lung cancer    Lipids Other         family h/o hyperlipidemia     Cancer Maternal Uncle 18        spinal cancer    Ovarian Cancer Paternal Aunt 51        d.51    Diabetes Sister     Diabetes Sister     Cancer Sister     Cancer Paternal Cousin Female         tumor in neck; d.32; substance abuse    Glaucoma Neg     Macular degeneration Neg                       HPI :      Chief Complaint   Patient presents with    Lesion     LOV 6/10/24- pt in for new lesions, on face and neck,     Dermatitis Atopic     Pt in for f/u, has patches on arms that is flaky, has been applying eucrisa and benadryl cream. With mild improvements.       New problem crusting swelling around nails painful draining uncomfortable has had difficulty with usual activities due  to swelling and oozing      Follow-up atopic dermatitis.  Phototherapy has been helping in general with itching.  Had been on vacation visiting her son has resumed.  Notes more itching irritation.  Tacrolimus sometimes helpful  Over-the-counter Benadryl cream has been the most helpful  Has not been tolerating other antihistamines.  Famotidine does help.  TobraDex not covered which had been most helpful for eyelid dermatitis    More recent issue increasing erythematous edematous plaques in the antecubital fossa  Nail folds with diffuse crusting and swelling.  Has been applying Neosporin  Does get her nails done with gel manicure UV used.  Patient relates that had a great deal difficulty tolerating any sunscreen agents.  Has not use anything around the cuticles with this.  Previously had been using [employer] product which had been very helpful this appears to be discontinued    Patient with significant allergies to multiple steroids including hydrocortisone, unable to tolerate oral steroids generally.  Tacrolimus cause more burning and irritation.  Has had more persistent areas over the arms legs inguinal creases.    Patient had allergy testing done multiple times in the past.  Noted in allergies.    Breakouts causing more difficulty with work.,  More randomly.  Has noted when traveling recently in the sun slightly better as far as the itching.  Erythema still may come and go had episode coming back from visiting her son recently with neck swelling irritation in the airport.  Although Benadryl gel does stop the itching does not decrease the redness or shorten duration of reactions.  Only makes itching tolerable.  Has had more difficulty tolerating antihistamines as well.  Loratadine and hydroxyzine minimally helpful.    Patient with history of chronic eyelid dermatitis.  FML tolerable has been able to use TobraDex with good improvement.  Other topical eye ointments unable to tolerate    Frustrated with repeated  episodes  Patient presents with concerns above.    Past notes/ records and appropriate/relevant lab results including pathology and past body maps reviewed. Updated and new information noted in current visit.       ROS:    Denies any other systemic complaints.  No fevers, chills, night sweats, sensitivity to the sun, deeper lumps or bumps.  No other skin complaints.  History, medications, allergies as noted.    Physical examination: Patient  well-developed well-nourished, alert oriented in no acute distress.  Exam of involved, appropriate areas of skin performed, including scalp, head, neck, face,nails, hair, external eyes, including conjunctival mucosa, eyelids, lips, external ears, back, chest, abdomen, arms, legs, palms.  Remarkable for lesions as noted   See map for details    Edema crusting diffusely around the nail folds digits hands, eczematous urticarial plaques over the antecubital fossa    Labs stable, recent labs reviewed liver functions renal function essentially unremarkable, B12 normal serum protein is normal vitamin D adequate thyroid normal CBC unremarkable last eosinophil count 3.5 previous HUNTER screens negative rheumatoid factor negative celiac negative     ASSESSMENT AND PLAN:     Encounter Diagnoses   Name Primary?    Psoriasiform dermatitis Yes    Intrinsic atopic dermatitis     Onychia and paronychia of finger        Assessment / plan:  Patient seen for follow-up long-term monitoring, treatment of  Atopic dermatitis, long-term management  Plan of care:  ongoing surveillance, monitoring including regular follow-up due to longer term risk of recurrence, new lesions.  See previous notes.  There is a longitudinal care relationship with me, the care plan reflects the ongoing nature of the continuous relationship of care, and the medical record indicates that there is ongoing treatment of a serious/complex medical condition which I am currently managing.  is Applicable      Onychia  paronychia  Possible photodermatitis, not really helping.  Continues to be scaly around the nail folds.  Has been using Eucrisa which has helped some.  No significant change with mupirocin and ketoconazole has been using these.   Mupirocin, generally uses Eucrisa tolerates this.  Burning irritation with Protopic unable to use this.  Await update over the next week or 2  Discussed possible sensitizers with manicures.  Extreme caution with UV cure for no chip.  Has been continuing with no chip manicures discussed possible allergic contact dermatitis triggering this.    In view of persistence as well as eyelid dermatitis, possible photo exacerbation of her atopic dermatitis will check HUNTER, additional labs as noted.  Psoriasiform dermatitis over sacrum, left medial buttocks.  Will use mupirocin and ketoconazole to this area.  Await response    Chronic atopic dermatitis.    Again reviewed possible Dupixent or Boris inhibitor patient apprehensive regarding any additional systemic medications patient concern regarding side effects labs as noted    With consistent phototherapy overall pruritus has improved significantly.  Pros cons of additional agents reviewed  Still airborne and contact allergies likely contributing  Patient with numerous allergies.  Difficulty tolerating antihistamines.  Will add famotidine for itching, any urticarial component.  Continue her current regimen.    Benadryl gel has been more helpful than other topicals.  Burning irritation with tacrolimus.    Has tolerated Eucrisa.  Has been using this without any irritation.  Medically necessary to continue  As noted previously allergies to steroids oral and topical, has not been able to tolerate tacrolimus or pimecrolimus in the past.  Burning severe irritation.  Contraindicated to retry these.  Medically necessary to continue Eucrisa.  Has been using an old tube which she has had at home.  Generally uses this twice daily to all areas of active eczema along  with her over-the-counter Benadryl cream nonsteroid.    Eyelid dermatitis response to TobraDex/FML continue.  Patient has had to purchase this.  Unable to tolerate hydrocortisone.  This has been working well with flareups.  Uses very sparingly, sporadically    Skin tags  Trial of cryo reassurance    Numerous contact and systemic allergies.  Continue avoidance monitoring.    More frequent outbreaks once to twice a week.  Interfere with her ability to work, sleep, perform her daily activities  IGA 4    Discussed options.  Given longstanding history of atopic dermatitis, reviewed options.  Patient concern regarding side effects with Boris inhibitors and potential for immunosuppression, infection and frequent monitoring.  Patient's age recommend proceeding with course of Dupixent.  Medically necessary given increasing frequency of outbreaks of atopic dermatitis, itching not able to manage with topical steroids due to allergies, unable to tolerate tacrolimus,  Antihistamines not helpful.  Topical pramoxine only reduces itching does not clear rash or improve patient's overall functioning    Aware of risks benefits including infections, herpes lesions, worsening eye, eyelid dermatitis facial erythema patient wishes to proceed    In the interim we will begin phototherapy for pruritus since patient does note some improvement when in the sun.    Lesion posterior arm biopsied 2021 benign neurofibroma    Pathophysiology discussed with patient.  Therapeutic options reviewed.  See  Medications in grid.  Instructions reviewed at length.     General skin care questions answered.   Reassurance regarding benign skin lesions.Signs and symptoms of skin cancer, ABCDE's of melanoma briefly reviewed.  Sunscreen use (broad-spectrum, ideally mineral, UVA UVB coverage SPF 30 or greater recommended), sun protection, encouraged.  Followup as noted in rtc or p.r.n.    Encounter Times   Including precharting, reviewing chart, prior notes obtaining  history, medical exam, notes on body map, plan, counseling, discussion of therapeutic options, patient education, orders more than half total time spent in face to face time with patient.  My total time spent caring for the patient on the day of the encounter:40  minutes       The patient indicates understanding of these issues and agrees to the plan.  The patient is asked to return as noted in follow-up /as noted above    This note was generated using Dragon voice recognition software.  Please contact me regarding any confusion resulting from errors in recognition.  Note to patient and family: The 21st Century Cures Act makes medical notes like these available to patients. However, be advised this is a medical document. It is intended as [id number] communication and monitoring of a patient's care needs. It is written in medical language and may contain abbreviations or verbiage that are unfamiliar. It may appear blunt or direct. Medical documents are intended to carry relevant information, facts as evident and the clinical opinion of the practitioner.  Orders Placed This Encounter   Procedures    Anti-Nuclear Antibody (HUNTER) by IFA, Reflex Titer + Specific Antibodies    C-Reactive Protein    Sed Rate, Westergren (Automated)    Complement C3, Serum    Complement C4, Serum    Comp Metabolic Panel (14)       Meds & Refills for this Visit:   Requested Prescriptions     Signed Prescriptions Disp Refills    Crisaborole 2 % External Ointment 100 g 3     Sig: Apply bid to areas of atopic dermatitis    mupirocin 2 % External Ointment 22 g 3     Sig: Use as directed bid to affected area    ketoconazole 2 % External Cream 30 g 3     Sig: Apply 1 Application topically 2 (two) times daily. Apply to affected area 2 times daily.       No diagnosis found.    Orders Placed This Encounter   Procedures    Anti-Nuclear Antibody (HUNTER) by IFA, Reflex Titer + Specific Antibodies    C-Reactive Protein    Sed Rate, Westergren (Automated)     Complement C3, Serum    Complement C4, Serum    Comp Metabolic Panel (14)       Results From Past 48 Hours:  No results found for this or any previous visit (from the past 48 hour(s)).    Meds This Visit:      Imaging Orders:  None     Referral Orders:  No orders of the defined types were placed in this encounter.

## 2024-10-09 NOTE — TELEPHONE ENCOUNTER
Please proceed with pa.Please have patient schedule appointment for further refills. See last office visit

## 2024-10-14 NOTE — TELEPHONE ENCOUNTER
Medication PA Requested:   crisaborole 2% oint                                                       CoverMyMeds Used:  Key:  Quantity: 100gm  Day Supply: 30  Sig: apply BID to areas of atopic dermatitis  DX Code:  L30.8                                   CPT code (if applicable):   Case Number/Pending Ref#:

## 2024-10-16 ENCOUNTER — TELEPHONE (OUTPATIENT)
Dept: DERMATOLOGY CLINIC | Facility: CLINIC | Age: 59
End: 2024-10-16

## 2024-10-17 NOTE — TELEPHONE ENCOUNTER
Ok to restart uvb -she should have orders left. Will need to restart at lower mJ- ok to start at 300mJ and increase per protocol.

## 2024-10-18 ENCOUNTER — OFFICE VISIT (OUTPATIENT)
Dept: OBGYN CLINIC | Facility: CLINIC | Age: 59
End: 2024-10-18
Payer: MEDICAID

## 2024-10-18 VITALS
HEIGHT: 64 IN | DIASTOLIC BLOOD PRESSURE: 70 MMHG | SYSTOLIC BLOOD PRESSURE: 120 MMHG | BODY MASS INDEX: 29.67 KG/M2 | HEART RATE: 65 BPM | WEIGHT: 173.81 LBS

## 2024-10-18 DIAGNOSIS — Z12.31 VISIT FOR SCREENING MAMMOGRAM: ICD-10-CM

## 2024-10-18 DIAGNOSIS — Z01.419 ENCOUNTER FOR GYNECOLOGICAL EXAMINATION WITHOUT ABNORMAL FINDING: Primary | ICD-10-CM

## 2024-10-18 PROCEDURE — 99396 PREV VISIT EST AGE 40-64: CPT | Performed by: OBSTETRICS & GYNECOLOGY

## 2024-10-18 NOTE — PROGRESS NOTES
Macy Berger is a 59 year old female  Patient's last menstrual period was 10/01/2018.   Chief Complaint   Patient presents with    Gyn Exam     ANNUAL EXAM -- worsening ezcema in elbow creases / neck / breast line. On topicals   .  She has no complaints.  Denies postmenopausal bleeding, urinary or sexual issues.      OBSTETRICS HISTORY:     OB History    Para Term  AB Living   6 4 4 0 1 4   SAB IAB Ectopic Multiple Live Births   0 0 0 0 4      # Outcome Date GA Lbr Clayton/2nd Weight Sex Type Anes PTL Lv   6 Term 96   11 lb (4.99 kg) F NORMAL SPONT   OMAR   5 Term 10/27/94   9 lb 12 oz (4.423 kg) M NORMAL SPONT   OMAR   4 Term 93   9 lb 9 oz (4.338 kg) F NORMAL SPONT   OMAR   3 Term 07/15/89   8 lb 10 oz (3.912 kg) M NORMAL SPONT   OMAR   2       SAB      1 AB                GYNE HISTORY:     Periods none due to menopause and none due to ablation        Latest Ref Rng & Units 10/12/2022    12:03 PM 10/25/2019    10:44 AM   RECENT PAP RESULTS   INTERPRETATION/RESULT: Negative for intraepithelial lesion or malignancy Negative for intraepithelial lesion or malignancy  Negative for intraepithelial lesion or malignancy - Positive for HPV    HPV Negative Negative  Positive          MEDICAL HISTORY:     Past Medical History:    Allergic rhinitis    Anxiety state    Appendicitis    Asthma (HCC)    Cancer (HCC)    Colonic polyp    per NG: \"? colonic polyps\"; via proctoscope,     Contact allergic reaction    Depression    Deviated nasal septum    Disorder of thyroid    Environmental allergies    Esophageal reflux    Exposure to medical diagnostic radiation    Head and neck cancer (HCC)    Hearing impairment    no hearing aids    Hemorrhoids    High cholesterol    Intermenstrual bleeding    per NG: intermenstrual VB    Menometrorrhagia    Migraines    Neurofibroma of upper arm    left posterior upper arm on biopsy    Osteoarthritis    Pneumonia due to organism    PONV (postoperative  nausea and vomiting)    Thyroid cancer (HCC)    2007    Tonsillitis    Visual impairment    readers    Walking pneumonia     Past Surgical History:   Procedure Laterality Date    Ablation  2007    per NG: l131 ablation    Appendectomy  1988    Biopsy of uterus lining  2009    neg endometrial biopsy    Carpal tunnel release Right     Right hand    Colonoscopy N/A 2018    Procedure: COLONOSCOPY;  Surgeon: Jayden Leonard MD;  Location: [facility] ENDOSCOPY    D & c  2001    Foot surgery  2007    R foot neuroma excision    Hand/finger surgery unlisted      hand surgery    Hemorrhoidectomy  1991    Ligation of hemorrhoid(s)  2016    Rubberband Ligation of Hemorrhoid    Nasal surg proc unlisted  2007    septoplasty    Needle biopsy right      benign          Other surgical history  2006    sebaceous cyst on face excised    Thyroidectomy  2007    Tonsillectomy  1974     OB History    Para Term  AB Living   6 4 4 0 1 4   SAB IAB Ectopic Multiple Live Births   0 0 0 0 4        SOCIAL HISTORY:     Social History     Socioeconomic History    Marital status:      Spouse name: Not on file    Number of children: 4    Years of education: Not on file    Highest education level: Not on file   Occupational History    Occupation: [employer]   Tobacco Use    Smoking status: Never    Smokeless tobacco: Never   Vaping Use    Vaping status: Never Used   Substance and Sexual Activity    Alcohol use: Yes     Alcohol/week: 1.0 standard drink of alcohol     Types: 1 Standard drinks or equivalent per week     Comment: I rarely drink    Drug use: No    Sexual activity: Yes     Birth control/protection: Vasectomy   Other Topics Concern     Service Not Asked    Blood Transfusions Not Asked    Caffeine Concern Yes     Comment: coffee, 1 cup    Occupational Exposure Not Asked    Hobby Hazards Not Asked    Sleep Concern Not Asked    Stress Concern Not Asked    Weight Concern Not Asked    Special Diet Not  Asked    Back Care Not Asked    Exercise Not Asked    Bike Helmet Not Asked    Seat Belt Not Asked    Self-Exams Not Asked    Grew up on a farm No    History of tanning No    Outdoor occupation No    Pt has a pacemaker No    Pt has a defibrillator No    Breast feeding Yes     Comment: I delivered 4 children and i breast fed all 4.    Reaction to local anesthetic No   Social History Narrative    Lives alone     Social Drivers of Health     Financial Resource Strain: Not on file   Food Insecurity: Not on file   Transportation Needs: Not on file   Physical Activity: Not on file   Stress: Not on file   Social Connections: Not on file   Housing Stability: Not on file       FAMILY HISTORY:     Family History   Problem Relation Age of Onset    Stroke Father         CVA    Hypertension Father     Diabetes Mother     Neurological Disorder Paternal Grandfather         Alzheimer's    Cancer Brother 51        asbestos ,asphalt lung cancer    Lipids Other         family h/o hyperlipidemia     Cancer Maternal Uncle 18        spinal cancer    Ovarian Cancer Paternal Aunt 51        d.51    Diabetes Sister     Diabetes Sister     Cancer Sister     Cancer Paternal Cousin Female         tumor in neck; d.32; substance abuse    Glaucoma Neg     Macular degeneration Neg        MEDICATIONS:       Current Outpatient Medications:     Crisaborole 2 % External Ointment, Apply bid to areas of atopic dermatitis, Disp: 100 g, Rfl: 3    mupirocin 2 % External Ointment, Use as directed bid to affected area, Disp: 22 g, Rfl: 3    ketoconazole 2 % External Cream, Apply 1 Application topically 2 (two) times daily. Apply to affected area 2 times daily., Disp: 30 g, Rfl: 3    SYNTHROID 88 MCG Oral Tab, TAKE 1 TABLET BY MOUTH FIVE DAYS OF THE WEEK, Disp: 60 tablet, Rfl: 1    sertraline 25 MG Oral Tab, Take 1 tablet (25 mg total) by mouth every evening., Disp: 30 tablet, Rfl: 6    atorvastatin 20 MG Oral Tab, Take 1 tablet (20 mg total) by mouth  nightly., Disp: 90 tablet, Rfl: 3    levothyroxine 75 MCG Oral Tab, TAKE 1 TABLET BY MOUTH 2 DAYS A WEEK, Disp: 24 tablet, Rfl: 1    clonazePAM 0.5 MG Oral Tab, Take 1 tablet (0.5 mg total) by mouth nightly., Disp: 90 tablet, Rfl: 0    famotidine 40 MG Oral Tab, Take 1 tablet (40 mg total) by mouth 2 (two) times daily., Disp: 60 tablet, Rfl: 5    citalopram 10 MG Oral Tab, Take 1 tablet (10 mg total) by mouth daily., Disp: 90 tablet, Rfl: 3    hydrOXYzine 25 MG Oral Tab, Take 1 tablet (25 mg total) by mouth at bedtime. TAKE 1 TABLET BY MOUTH EVERY DAY FOR ITCHING, Disp: 90 tablet, Rfl: 1    Omeprazole 40 MG Oral Capsule Delayed Release, Take 1 capsule (40 mg total) by mouth daily., Disp: 90 capsule, Rfl: 3    Fluorometholone 0.1 % Ophthalmic Ointment, Use bid to eyelids prn eczema as directed, Disp: 3.5 g, Rfl: 2    Crisaborole 2 % External Ointment, Use bid to eczema, Disp: 100 g, Rfl: 3    loratadine 10 MG Oral Tab, Take 1 tablet (10 mg total) by mouth daily., Disp: 30 tablet, Rfl: 3    gabapentin 100 MG Oral Cap, Take 1 capsule (100 mg total) by mouth nightly., Disp: 90 capsule, Rfl: 3    tacrolimus (PROTOPIC) 0.1 % External Ointment, Use bid, Disp: 60 g, Rfl: 2    azelastine 0.1 % Nasal Solution, 2 sprays by Nasal route 2 (two) times daily., Disp: 30 mL, Rfl: 0    albuterol (VENTOLIN HFA) 108 (90 Base) MCG/ACT Inhalation Aero Soln, Inhale 2 puffs into the lungs every 4 (four) hours as needed for Wheezing., Disp: 6.7 g, Rfl: 0    Cholecalciferol (VITAMIN D3) 50 MCG (2000 UT) Oral Chew Tab, Chew by mouth., Disp: , Rfl:     EPINEPHrine (EPIPEN 2-SARAH) 0.3 MG/0.3ML Injection Solution Auto-injector, Inject IM in event of  allergic reaction, Disp: 1 each, Rfl: 0    Biotin 5 MG Oral Cap, Take by mouth 2 (two) times a day., Disp: , Rfl:     Calcium Carbonate-Vitamin D 600-200 MG-UNIT Oral Tab, Take 2 tablets by mouth., Disp: , Rfl:     ALLERGIES:     Allergies[1]      REVIEW OF SYSTEMS:     Constitutional:    denies  fever / chills  Eyes:     denies blurred or double vision  Cardiovascular:  denies chest pain or palpitations  Respiratory:    denies shortness of breath  Gastrointestinal:  denies severe abdominal pain, frequent diarrhea or constipation, nausea / vomiting  Genitourinary:    denies dysuria, bothersome incontinence  Skin/Breast:   denies any breast pain, lumps, or discharge  Neurological:    denies frequent severe headaches  Psychiatric:   denies depression or anxiety, thoughts of harming self or others  Heme/Lymph:    denies easy bruising or bleeding    PHYSICAL EXAM:   Blood pressure 120/70, pulse 65, height 5' 4\" (1.626 m), weight 173 lb 12.8 oz (78.8 kg), last menstrual period 10/01/2018, not currently breastfeeding.  Constitutional:  well developed, well nourished  Head/Face:  normocephalic  Neck/Thyroid:  thyroid symmetric, no thyromegaly, no nodules, no adenopathy  Lymphatic: no abnormal supraclavicular or axillary adenopathy is noted  Breast:   normal without palpable masses, tenderness, asymmetry, nipple discharge, nipple retraction or skin changes  Respiratory:   nonlabored breathing  Cardiovascular: regular rate and rhythm  Abdomen:   soft, nontender, nondistended, no masses  Skin/Hair: no unusual rashes or bruises (+) bright rash rashes inside arm creases / neck / breasts  Extremities:  no edema, no cyanosis  Psychiatric:   Oriented to time, place, person and situation. Appropriate mood and affect    Pelvic Exam:  External Genitalia:  normal appearance, hair distribution, and no lesions  Urethral Meatus:   normal in size, location, without lesions and prolapse  Bladder:    no fullness, masses or tenderness  Vagina:    normal appearance without lesions, no abnormal discharge  Cervix:    normal without tenderness on motion  Uterus:    normal in size, contour, position, mobility, without tenderness  Adnexa:   normal without masses or tenderness  Perineum:   normal  Anus:    no hemorroids    ASSESSMENT &  PLAN:     Macy was seen today for gyn exam.    Diagnoses and all orders for this visit:    Encounter for gynecological examination without abnormal finding    Visit for screening mammogram  -     Mammogram Screen 3D Digital Bilateral; Future          SUMMARY:    Pap: Next cotest 10/25-27 per ASCCP guidelines.    Mammogram: ordered placed    BCM: Postmenopausal    STD screening: declines    Colon cancer screening: UTD    Misc: Calcium needs reviewed (1500 mg diet + supplement). Weight bearing exercise encouraged. Call if any VB (if perimenopausal, reviewed abn VB patterns)    HM updated    Depression screen:   Depression Screening (PHQ-2/PHQ-9): Over the LAST 2 WEEKS   Little interest or pleasure in doing things (over the last two weeks)?: Not at all    Feeling down, depressed, or hopeless (over the last two weeks)?: Not at all    PHQ-2 SCORE: 0          FOLLOW-UP     Return in about 1 year (around 10/18/2025) for annual gyne exam.    Note to patient and family:  The 21st Century Cures Act makes medical notes available to patients in the interest of transparency.  However, please be advised that this is a medical document.  It is intended as a peer to peer communication.  It is written in medical language and may contain abbreviations or verbiage that are technical and unfamiliar.  It may appear blunt or direct.  Medical documents are intended to carry relevant information, facts as evident, and the clinical opinion of the practitioner.       [1]   Allergies  Allergen Reactions    Adhesive Tape HIVES     Weeping hives and pain - SILK TAPE    Apple ANAPHYLAXIS     RAW APPLE    Aspartame ANAPHYLAXIS    Augmentin [Amoxicillin-Pot Clavulanate] HIVES and DIZZINESS    Ciprofloxacin SWELLING    Diclofenac HIVES and SWELLING     Patient states swelling of lip, and left eye.    Hydrocortisone SWELLING    Montelukast PALPITATIONS    Peach ANAPHYLAXIS     RAW PEACH    Peanut Oil TONGUE SWELLING    Prednisone     Soybean Oil  SWELLING     Mouth and tongue swelling with pure soy    Vicodin [Hydrocodone-Acetaminophen] NAUSEA AND VOMITING     Okay with Tylenol    Hydrocodone NAUSEA AND VOMITING and OTHER (SEE COMMENTS)     Other reaction(s): Fainting    Terbinafine RASH     Urinary retention also    Pear UNKNOWN     raw

## 2024-10-24 ENCOUNTER — NURSE ONLY (OUTPATIENT)
Dept: DERMATOLOGY CLINIC | Facility: CLINIC | Age: 59
End: 2024-10-24

## 2024-10-24 DIAGNOSIS — L20.9 ATOPIC DERMATITIS, UNSPECIFIED TYPE: ICD-10-CM

## 2024-10-24 PROCEDURE — 96900 ACTINOTHERAPY UV LIGHT: CPT | Performed by: DERMATOLOGY

## 2024-10-28 ENCOUNTER — NURSE ONLY (OUTPATIENT)
Dept: DERMATOLOGY CLINIC | Facility: CLINIC | Age: 59
End: 2024-10-28

## 2024-10-28 DIAGNOSIS — L20.9 ATOPIC DERMATITIS, UNSPECIFIED TYPE: ICD-10-CM

## 2024-10-28 PROCEDURE — 96900 ACTINOTHERAPY UV LIGHT: CPT | Performed by: DERMATOLOGY

## 2024-11-11 ENCOUNTER — NURSE ONLY (OUTPATIENT)
Dept: DERMATOLOGY CLINIC | Facility: CLINIC | Age: 59
End: 2024-11-11

## 2024-11-11 DIAGNOSIS — L20.9 ATOPIC DERMATITIS, UNSPECIFIED TYPE: ICD-10-CM

## 2024-11-11 PROCEDURE — 96900 ACTINOTHERAPY UV LIGHT: CPT | Performed by: DERMATOLOGY

## 2024-11-13 ENCOUNTER — APPOINTMENT (OUTPATIENT)
Dept: DERMATOLOGY CLINIC | Facility: CLINIC | Age: 59
End: 2024-11-13

## 2024-11-13 DIAGNOSIS — L20.9 ATOPIC DERMATITIS, UNSPECIFIED TYPE: ICD-10-CM

## 2024-11-13 PROCEDURE — 96900 ACTINOTHERAPY UV LIGHT: CPT | Performed by: DERMATOLOGY

## 2024-11-14 DIAGNOSIS — L29.9 PRURITUS: ICD-10-CM

## 2024-11-15 ENCOUNTER — NURSE ONLY (OUTPATIENT)
Dept: DERMATOLOGY CLINIC | Facility: CLINIC | Age: 59
End: 2024-11-15

## 2024-11-15 DIAGNOSIS — L20.9 ATOPIC DERMATITIS, UNSPECIFIED TYPE: ICD-10-CM

## 2024-11-15 PROCEDURE — 96900 ACTINOTHERAPY UV LIGHT: CPT | Performed by: DERMATOLOGY

## 2024-11-18 ENCOUNTER — NURSE ONLY (OUTPATIENT)
Dept: DERMATOLOGY CLINIC | Facility: CLINIC | Age: 59
End: 2024-11-18

## 2024-11-18 DIAGNOSIS — L20.9 ATOPIC DERMATITIS, UNSPECIFIED TYPE: ICD-10-CM

## 2024-11-18 PROCEDURE — 96900 ACTINOTHERAPY UV LIGHT: CPT | Performed by: DERMATOLOGY

## 2024-11-19 RX ORDER — HYDROXYZINE HYDROCHLORIDE 25 MG/1
TABLET, FILM COATED ORAL
Qty: 90 TABLET | Refills: 1 | Status: SHIPPED | OUTPATIENT
Start: 2024-11-19

## 2024-11-19 NOTE — TELEPHONE ENCOUNTER
Please review. Protocol Failed; No Protocol    Requested Prescriptions   Pending Prescriptions Disp Refills    HYDROXYZINE 25 MG Oral Tab [Pharmacy Med Name: HYDROXYZINE HCL 25MG TABS (WHITE)] 90 tablet 1     Sig: TAKE 1 TABLET(25 MG) BY MOUTH EVERY DAY AT BEDTIME FOR ITCHING       There is no refill protocol information for this order            Future Appointments         Provider Department Appt Notes    Tomorrow [facility]    [facility]    In 11 months Deandra Price MD [facility] - OB/GYN annual & pap          Recent Outpatient Visits              Yesterday Atopic dermatitis, unspecified type    [facility]    Nurse Only    4 days ago Atopic dermatitis, unspecified type    [facility]    Nurse Only    1 week ago Atopic dermatitis, unspecified type    [facility]    Nurse Only    3 weeks ago Atopic dermatitis, unspecified type    [facility]    Nurse Only    3 weeks ago Atopic dermatitis, unspecified type    [facility]    Nurse Only

## 2024-11-22 ENCOUNTER — TELEPHONE (OUTPATIENT)
Dept: DERMATOLOGY CLINIC | Facility: CLINIC | Age: 59
End: 2024-11-22

## 2024-11-22 ENCOUNTER — NURSE ONLY (OUTPATIENT)
Dept: DERMATOLOGY CLINIC | Facility: CLINIC | Age: 59
End: 2024-11-22

## 2024-11-22 DIAGNOSIS — L20.9 ATOPIC DERMATITIS, UNSPECIFIED TYPE: ICD-10-CM

## 2024-11-22 PROCEDURE — 96900 ACTINOTHERAPY UV LIGHT: CPT | Performed by: DERMATOLOGY

## 2024-11-22 NOTE — TELEPHONE ENCOUNTER
Refill Request for medication(s): Triamcinolone 0.1% cream     Last Office Visit: 10/2/24    Last Refill: 4/22/2020    Pharmacy, Dosage verified: Apply to affected area twice a day     Condition Update (if applicable): Pt in for UVB light tx today. States this has provided most relief but could not remember name at LOV w/ MD to make the request then.     Sent to provider for approval, please advise. Thank You!

## 2024-11-25 RX ORDER — TRIAMCINOLONE ACETONIDE 1 MG/G
CREAM TOPICAL 2 TIMES DAILY
Qty: 60 G | Refills: 1 | Status: SHIPPED | OUTPATIENT
Start: 2024-11-25

## 2024-12-02 ENCOUNTER — TELEPHONE (OUTPATIENT)
Dept: DERMATOLOGY CLINIC | Facility: CLINIC | Age: 59
End: 2024-12-02

## 2024-12-02 ENCOUNTER — NURSE ONLY (OUTPATIENT)
Dept: DERMATOLOGY CLINIC | Facility: CLINIC | Age: 59
End: 2024-12-02

## 2024-12-02 DIAGNOSIS — L20.9 ATOPIC DERMATITIS, UNSPECIFIED TYPE: ICD-10-CM

## 2024-12-02 PROCEDURE — 96900 ACTINOTHERAPY UV LIGHT: CPT | Performed by: DERMATOLOGY

## 2024-12-02 NOTE — TELEPHONE ENCOUNTER
Patient came in for light tx today. Patient was visiting a friend in [place] last week and exposed to cats. Dermatitis worsened and spreading especially on the Right arm. Red, raised, inflamed, and itchy. Pt is just using ketoconazole cream and triamcinolone with minimal relief. Pt also has eczema flare and cracking of the skin around the nails of the hands- started flaring recently without much improvement with use of Eucrisa. Please advise on next steps and if with creams, how to layer, thank you!

## 2024-12-04 ENCOUNTER — APPOINTMENT (OUTPATIENT)
Dept: DERMATOLOGY CLINIC | Facility: CLINIC | Age: 59
End: 2024-12-04

## 2024-12-04 ENCOUNTER — TELEPHONE (OUTPATIENT)
Dept: DERMATOLOGY CLINIC | Facility: CLINIC | Age: 59
End: 2024-12-04

## 2024-12-04 DIAGNOSIS — L20.9 ATOPIC DERMATITIS, UNSPECIFIED TYPE: ICD-10-CM

## 2024-12-04 PROCEDURE — 96900 ACTINOTHERAPY UV LIGHT: CPT | Performed by: DERMATOLOGY

## 2024-12-06 ENCOUNTER — NURSE ONLY (OUTPATIENT)
Dept: DERMATOLOGY CLINIC | Facility: CLINIC | Age: 59
End: 2024-12-06

## 2024-12-06 DIAGNOSIS — L20.9 ATOPIC DERMATITIS, UNSPECIFIED TYPE: ICD-10-CM

## 2024-12-06 PROCEDURE — 96900 ACTINOTHERAPY UV LIGHT: CPT | Performed by: DERMATOLOGY

## 2024-12-06 NOTE — TELEPHONE ENCOUNTER
Medication PA Requested:     betamethasone dipropionate 0.05 % External Cream                                                      CoverMyMeds Used:  Key:  Quantity: 45 g  Day Supply:  Sig: Apply 1 Application topically 2 (two) times daily.   DX Code:   L20.84                                   CPT code (if applicable):   Case Number/Pending Ref#:     Thank you!

## 2024-12-09 ENCOUNTER — NURSE ONLY (OUTPATIENT)
Dept: DERMATOLOGY CLINIC | Facility: CLINIC | Age: 59
End: 2024-12-09

## 2024-12-09 DIAGNOSIS — L20.9 ATOPIC DERMATITIS, UNSPECIFIED TYPE: ICD-10-CM

## 2024-12-09 PROCEDURE — 96900 ACTINOTHERAPY UV LIGHT: CPT | Performed by: DERMATOLOGY

## 2024-12-10 RX ORDER — FAMOTIDINE 40 MG/1
40 TABLET, FILM COATED ORAL 2 TIMES DAILY
Qty: 60 TABLET | Refills: 5 | Status: SHIPPED | OUTPATIENT
Start: 2024-12-10

## 2024-12-10 NOTE — TELEPHONE ENCOUNTER
Refill Request for medication(s): FAMOTIDINE 40MG TABLETS     Last Office Visit: 10/02/24    Last Refill: 06/18/24    Pharm[id number] [place] [address] [address] AT [facility] [address], [phone], [phone]    Condition Update (if applicable):     Rx pended and sent to provider for approval, please advise. Thank You!

## 2024-12-11 ENCOUNTER — HOSPITAL ENCOUNTER (OUTPATIENT)
Dept: ULTRASOUND IMAGING | Facility: HOSPITAL | Age: 59
Discharge: HOME | End: 2024-12-11
Attending: INTERNAL MEDICINE
Payer: MEDICAID

## 2024-12-11 ENCOUNTER — NURSE ONLY (OUTPATIENT)
Dept: DERMATOLOGY CLINIC | Facility: CLINIC | Age: 59
End: 2024-12-11

## 2024-12-11 DIAGNOSIS — Z85.850 HISTORY OF THYROID CANCER: ICD-10-CM

## 2024-12-11 DIAGNOSIS — L20.9 ATOPIC DERMATITIS, UNSPECIFIED TYPE: ICD-10-CM

## 2024-12-11 PROCEDURE — 76536 US EXAM OF HEAD AND NECK: CPT | Performed by: INTERNAL MEDICINE

## 2024-12-11 PROCEDURE — 96900 ACTINOTHERAPY UV LIGHT: CPT | Performed by: DERMATOLOGY

## 2024-12-16 ENCOUNTER — NURSE ONLY (OUTPATIENT)
Dept: DERMATOLOGY CLINIC | Facility: CLINIC | Age: 59
End: 2024-12-16

## 2024-12-16 DIAGNOSIS — L20.9 ATOPIC DERMATITIS, UNSPECIFIED TYPE: ICD-10-CM

## 2024-12-16 PROCEDURE — 96900 ACTINOTHERAPY UV LIGHT: CPT | Performed by: DERMATOLOGY

## 2024-12-18 ENCOUNTER — NURSE ONLY (OUTPATIENT)
Dept: DERMATOLOGY CLINIC | Facility: CLINIC | Age: 59
End: 2024-12-18

## 2024-12-18 DIAGNOSIS — L20.9 ATOPIC DERMATITIS, UNSPECIFIED TYPE: ICD-10-CM

## 2024-12-18 PROCEDURE — 96900 ACTINOTHERAPY UV LIGHT: CPT | Performed by: DERMATOLOGY

## 2024-12-20 ENCOUNTER — HOSPITAL ENCOUNTER (OUTPATIENT)
Dept: MAMMOGRAPHY | Age: 59
Discharge: HOME | End: 2024-12-20
Attending: OBSTETRICS & GYNECOLOGY
Payer: MEDICAID

## 2024-12-20 DIAGNOSIS — Z12.31 VISIT FOR SCREENING MAMMOGRAM: ICD-10-CM

## 2024-12-20 PROCEDURE — 77067 SCR MAMMO BI INCL CAD: CPT | Performed by: OBSTETRICS & GYNECOLOGY

## 2024-12-20 PROCEDURE — 77063 BREAST TOMOSYNTHESIS BI: CPT | Performed by: OBSTETRICS & GYNECOLOGY

## 2024-12-27 DIAGNOSIS — E03.9 HYPOTHYROIDISM, UNSPECIFIED TYPE: ICD-10-CM

## 2024-12-27 DIAGNOSIS — F41.9 ANXIETY: ICD-10-CM

## 2024-12-30 RX ORDER — LEVOTHYROXINE SODIUM 75 UG/1
TABLET ORAL
Qty: 24 TABLET | Refills: 1 | Status: SHIPPED | OUTPATIENT
Start: 2024-12-30

## 2024-12-30 NOTE — TELEPHONE ENCOUNTER
Endocrine refill protocol for medications for hypothyroidism and hyperthyroidism    Protocol Criteria:  PASSED     If all below requirements are met, send a 90-day supply with 1 refill per provider protocol.    Verify appointment with Endocrinology completed in the last 12 months or scheduled in the next 6 months.    Normal TSH result in the past 12 months   Review recent telephone encounters and [employer] communications with patient to ensure a dose change has not occurred since last office visit that was not updated in the medication history list     Last completed office visit:4/30/2024 Porsha Sims MD     Next scheduled Follow up: No appointment scheduled - MyChart message sent      Last TSH result:   TSH   Date Value Ref Range Status   06/20/2024 0.762 0.550 - 4.780 mIU/mL Final     TSH (S)   Date Value Ref Range Status   08/09/2014 0.03 (L) 0.34 - 5.60 uIU/mL Final

## 2025-01-02 RX ORDER — CLONAZEPAM 0.5 MG/1
0.5 TABLET ORAL NIGHTLY
Qty: 90 TABLET | Refills: 0 | Status: SHIPPED | OUTPATIENT
Start: 2025-01-02

## 2025-01-02 NOTE — TELEPHONE ENCOUNTER
Please review. Protocol Failed; No Protocol      Recent fills: 9/23/2024, 11/21/2024  Last Rx written: 7/28/2024  Last office visit: 7/11/2024          Requested Prescriptions   Pending Prescriptions Disp Refills    CLONAZEPAM 0.5 MG Oral Tab [Pharmacy Med Name: CLONAZEPAM 0.5MG TABLETS] 90 tablet 0     Sig: TAKE 1 TABLET(0.5 MG) BY MOUTH EVERY NIGHT       Controlled Substance Medication Failed - 1/2/2025 12:39 PM        Failed - This medication is a controlled substance - forward to provider to refill               Future Appointments         Provider Department Appt Notes    In 9 months Deandra Price MD [facility] - OB/GYN annual & pap          Recent Outpatient Visits              2 weeks ago Atopic dermatitis, unspecified type    [facility]    Nurse Only    2 weeks ago Atopic dermatitis, unspecified type    [facility]    Nurse Only    3 weeks ago Atopic dermatitis, unspecified type    [facility]    Nurse Only    3 weeks ago Atopic dermatitis, unspecified type    [facility]    Nurse Only    3 weeks ago Atopic dermatitis, unspecified type    [facility]    Nurse Only

## 2025-01-13 ENCOUNTER — OFFICE VISIT (OUTPATIENT)
Dept: ENDOCRINOLOGY CLINIC | Facility: CLINIC | Age: 60
End: 2025-01-13

## 2025-01-13 ENCOUNTER — NURSE TRIAGE (OUTPATIENT)
Dept: INTERNAL MEDICINE CLINIC | Facility: CLINIC | Age: 60
End: 2025-01-13

## 2025-01-13 VITALS
BODY MASS INDEX: 29.19 KG/M2 | DIASTOLIC BLOOD PRESSURE: 64 MMHG | HEART RATE: 81 BPM | HEIGHT: 64 IN | WEIGHT: 171 LBS | SYSTOLIC BLOOD PRESSURE: 114 MMHG

## 2025-01-13 DIAGNOSIS — Z85.850 HISTORY OF THYROID CANCER: ICD-10-CM

## 2025-01-13 DIAGNOSIS — E03.9 HYPOTHYROIDISM, UNSPECIFIED TYPE: Primary | ICD-10-CM

## 2025-01-13 RX ORDER — LEVOTHYROXINE SODIUM 88 MCG
TABLET ORAL
Qty: 60 TABLET | Refills: 1 | Status: SHIPPED | OUTPATIENT
Start: 2025-01-13

## 2025-01-13 RX ORDER — LEVOTHYROXINE SODIUM 75 UG/1
TABLET ORAL
Qty: 24 TABLET | Refills: 1 | Status: SHIPPED | OUTPATIENT
Start: 2025-01-13

## 2025-01-13 NOTE — PROGRESS NOTES
Return Office Visit     CHIEF COMPLAINT:    Thyroid cancer  Hypothyroidism     HISTORY OF PRESENT ILLNESS:  Macy Berger is a 59 year old female who presents for follow up for for h/o thyroid cancer and hypothyroidism.      She is s/p total thyroidectomy in 2007 , pathology PTC.   She received LEE in May 2007     Last Tg was in Jan 2023 and was low.   Thyroid US July 2023, dec 2024 : no residual tissue  WBS 2009 and 2015 : normal study       She is on Synthroid 88 mcg x 5 days and LT4 75 mcg daily x 2 days   Reports compliance  Takes it empty stomach         CURRENT MEDICATION:    Current Outpatient Medications   Medication Sig Dispense Refill    LEVOTHYROXINE 75 MCG Oral Tab TAKE 1 TABLET BY MOUTH 2 DAYS A WEEK 24 tablet 1    SYNTHROID 88 MCG Oral Tab TAKE 1 TABLET BY MOUTH FIVE DAYS OF THE WEEK 60 tablet 1    clonazePAM 0.5 MG Oral Tab Take 1 tablet (0.5 mg total) by mouth nightly. 90 tablet 0    FAMOTIDINE 40 MG Oral Tab TAKE 1 TABLET(40 MG) BY MOUTH TWICE DAILY 60 tablet 5    betamethasone dipropionate 0.05 % External Cream Apply 1 Application topically 2 (two) times daily. 45 g 0    triamcinolone 0.1 % External Cream Apply topically 2 (two) times daily. 60 g 1    hydrOXYzine 25 MG Oral Tab TAKE 1 TABLET(25 MG) BY MOUTH EVERY DAY AT BEDTIME FOR ITCHING 90 tablet 1    Crisaborole 2 % External Ointment Apply bid to areas of atopic dermatitis 100 g 3    mupirocin 2 % External Ointment Use as directed bid to affected area 22 g 3    ketoconazole 2 % External Cream Apply 1 Application topically 2 (two) times daily. Apply to affected area 2 times daily. 30 g 3    sertraline 25 MG Oral Tab Take 1 tablet (25 mg total) by mouth every evening. 30 tablet 6    atorvastatin 20 MG Oral Tab Take 1 tablet (20 mg total) by mouth nightly. 90 tablet 3    citalopram 10 MG Oral Tab Take 1 tablet (10 mg total) by mouth daily. 90 tablet 3    Omeprazole 40 MG Oral Capsule Delayed Release Take 1 capsule (40 mg total) by mouth  daily. 90 capsule 3    Fluorometholone 0.1 % Ophthalmic Ointment Use bid to eyelids prn eczema as directed 3.5 g 2    Crisaborole 2 % External Ointment Use bid to eczema 100 g 3    loratadine 10 MG Oral Tab Take 1 tablet (10 mg total) by mouth daily. 30 tablet 3    gabapentin 100 MG Oral Cap Take 1 capsule (100 mg total) by mouth nightly. 90 capsule 3    tacrolimus (PROTOPIC) 0.1 % External Ointment Use bid 60 g 2    azelastine 0.1 % Nasal Solution 2 sprays by Nasal route 2 (two) times daily. 30 mL 0    albuterol (VENTOLIN HFA) 108 (90 Base) MCG/ACT Inhalation Aero Soln Inhale 2 puffs into the lungs every 4 (four) hours as needed for Wheezing. 6.7 g 0    Cholecalciferol (VITAMIN D3) 50 MCG (2000 UT) Oral Chew Tab Chew by mouth.      EPINEPHrine (EPIPEN 2-SARAH) 0.3 MG/0.3ML Injection Solution Auto-injector Inject IM in event of  allergic reaction 1 each 0    Biotin 5 MG Oral Cap Take by mouth 2 (two) times a day.      Calcium Carbonate-Vitamin D 600-200 MG-UNIT Oral Tab Take 2 tablets by mouth.           ALLERGY:  Allergies   Allergen Reactions    Adhesive Tape HIVES     Weeping hives and pain - SILK TAPE    Apple ANAPHYLAXIS     RAW APPLE    Aspartame ANAPHYLAXIS    Augmentin [Amoxicillin-Pot Clavulanate] HIVES and DIZZINESS    Ciprofloxacin SWELLING    Diclofenac HIVES and SWELLING     Patient states swelling of lip, and left eye.    Hydrocortisone SWELLING    Montelukast PALPITATIONS    Peach ANAPHYLAXIS     RAW PEACH    Peanut Oil TONGUE SWELLING    Prednisone     Soybean Oil SWELLING     Mouth and tongue swelling with pure soy    Vicodin [Hydrocodone-Acetaminophen] NAUSEA AND VOMITING     Okay with Tylenol    Hydrocodone NAUSEA AND VOMITING and OTHER (SEE COMMENTS)     Other reaction(s): Fainting    Terbinafine RASH     Urinary retention also    Pear UNKNOWN     raw       PAST MEDICAL, SOCIAL AND FAMILY HISTORY:  See past medical history marked as reviewed.  See past surgical history marked as reviewed.  See past  family history marked as reviewed.  See past social history marked as reviewed.    ASSESSMENTS:     REVIEW OF SYSTEMS:  Constitutional: Negative for: weight change, fever,  fatigue, cold/heat intolerance  Eyes: Negative for:  Visual changes, proptosis, blurring  ENT: Negative for:  dysphagia, neck swelling, dysphonia  Respiratory: Negative for:  dyspnea, cough  Cardiovascular: Negative for:  chest pain, palpitations, orthopnea  GI: Negative for:  abdominal pain, nausea, vomiting, diarrhea, constipation, bleeding  Neurology: Negative for: headache, numbness, weakness  Genito-Urinary: Negative for: dysuria, frequency  Psychiatric: Negative for:  depression, anxiety  Hematology/Lymphatics: Negative for: bruising, lower extremity edema  Endocrine: Negative for: polyuria, polydypsia  Skin: Negative for: rash, blister, cellulitis,       PHYSICAL EXAM:     Vitals reviewed    General Appearance:  alert, well developed, in no acute distress  Head: Atraumatic  Eyes:  normal conjunctivae, sclera., normal sclera and normal pupils  Throat/Neck: normal sound to voice. Normal hearing, normal speech,no residual thyroid tissue palpated  Respiratory:  Speaking in full sentences, non-labored. no increased work of breathing, no audible wheezing    Neuro: motor grossly intact, moving all extremities without difficulty  Psychiatric:  oriented to time, self, and place         DATA:     Pertinent data reviewed    ASSESSMENT AND PLAN:      Patient is a 59 year old female with :     1. H/o thyroid cancer     S/p TT and LEE in 2007  Discussed FU with Thyroid US, Tg Tg AB and WBS .  - Thyroid US  dec 2024:  with no residual thyroid tissue  - Tg Tg AB ordered  Discussed WBS, patient will like to hold off  Is agreeable to getting this done if Tg/ Tg AB go up        2. Post surgical Hypothyroidism  Reviewed TSH goals   Clinically euthyroid, no symptoms of over replacement   Will repeat labs   Discussed holding biotin/ vitamin B completely for 3-5  days before blood work   The patient is aware that she needs to take thyroid hormone replacement for the rest of her life; every am one hour before breakfast and atleast four hours apart from other meds especially calcium, iron, multivitamins containing Ca/iron  Stressed the importance of compliance with meds  Side effects of noncompliance including the development of myxedema coma and death discussed        Patient verbalized a complete  understanding of all of the above and did not have any further questions    Orders Placed This Encounter   Procedures    TSH W Reflex To Free T4    Thyroglobulin Antibody and Tumor Marker     RTC in 10 months  Call for results      Porsha Sims MD                  .

## 2025-01-13 NOTE — TELEPHONE ENCOUNTER
Action Requested: Summary for Provider     []  Critical Lab, Recommendations Needed  [] Need Additional Advice  []   FYI    []   Need Orders  [] Need Medications Sent to Pharmacy  []  Other     SUMMARY: Office visit    Future Appointments   Date Time Provider Department Center   1/16/2025  9:40 AM Dawna Francis APRN [facility]   10/20/2025  1:00 PM Deandra Price MD [facility]     Reason for call: Leg Pain  Onset: Data Unavailable    Patient states that she fell on ice last week. She hurt her left inner leg and shin. She has a bruise on the inner part of the knee. The pain has not gotten better from last week. It hurts worse when she walks.     Reason for Disposition   Injury interferes with work or school    Protocols used: Knee Injury-A-OH

## 2025-01-17 ENCOUNTER — TELEPHONE (OUTPATIENT)
Dept: ENDOCRINOLOGY CLINIC | Facility: CLINIC | Age: 60
End: 2025-01-17

## 2025-01-17 NOTE — TELEPHONE ENCOUNTER
Patient calling regards medication. States it is too expensive. Please call.     (Synthroid)

## 2025-01-22 NOTE — TELEPHONE ENCOUNTER
Per pharmacy, script is ready for :    $33.08 for 30 day supply  $93.86 for 90 day supply     Called pt and was notified that when she tried to pick she was quoted $50 for 30 day supply and $90 for 90. Notified pt of the above costs, pt verbalized understanding and will  30 day supply today.

## 2025-02-07 ENCOUNTER — APPOINTMENT (OUTPATIENT)
Dept: GENERAL RADIOLOGY | Facility: HOSPITAL | Age: 60
End: 2025-02-07
Attending: EMERGENCY MEDICINE
Payer: COMMERCIAL

## 2025-02-07 ENCOUNTER — NURSE TRIAGE (OUTPATIENT)
Dept: INTERNAL MEDICINE CLINIC | Facility: CLINIC | Age: 60
End: 2025-02-07

## 2025-02-07 ENCOUNTER — HOSPITAL ENCOUNTER (EMERGENCY)
Facility: HOSPITAL | Age: 60
Discharge: HOME | End: 2025-02-07
Attending: EMERGENCY MEDICINE
Payer: COMMERCIAL

## 2025-02-07 VITALS
TEMPERATURE: 99 F | SYSTOLIC BLOOD PRESSURE: 108 MMHG | HEART RATE: 107 BPM | RESPIRATION RATE: 24 BRPM | DIASTOLIC BLOOD PRESSURE: 69 MMHG | OXYGEN SATURATION: 96 %

## 2025-02-07 DIAGNOSIS — M25.562 ARTHRALGIA OF LEFT LOWER LEG: ICD-10-CM

## 2025-02-07 DIAGNOSIS — J11.1 INFLUENZA: Primary | ICD-10-CM

## 2025-02-07 DIAGNOSIS — J45.901 MILD ASTHMA WITH EXACERBATION, UNSPECIFIED WHETHER PERSISTENT (HCC): ICD-10-CM

## 2025-02-07 LAB
FLUAV + FLUBV RNA SPEC NAA+PROBE: NEGATIVE
FLUAV + FLUBV RNA SPEC NAA+PROBE: POSITIVE
RSV RNA SPEC NAA+PROBE: NEGATIVE
SARS-COV-2 RNA RESP QL NAA+PROBE: NOT DETECTED

## 2025-02-07 PROCEDURE — 94640 AIRWAY INHALATION TREATMENT: CPT

## 2025-02-07 PROCEDURE — 99284 EMERGENCY DEPT VISIT MOD MDM: CPT

## 2025-02-07 PROCEDURE — 0241U SARS-COV-2/FLU A AND B/RSV BY PCR (GENEXPERT): CPT | Performed by: EMERGENCY MEDICINE

## 2025-02-07 PROCEDURE — 73590 X-RAY EXAM OF LOWER LEG: CPT | Performed by: EMERGENCY MEDICINE

## 2025-02-07 PROCEDURE — 93005 ELECTROCARDIOGRAM TRACING: CPT

## 2025-02-07 PROCEDURE — 71045 X-RAY EXAM CHEST 1 VIEW: CPT | Performed by: EMERGENCY MEDICINE

## 2025-02-07 PROCEDURE — 93010 ELECTROCARDIOGRAM REPORT: CPT

## 2025-02-07 RX ORDER — OSELTAMIVIR PHOSPHATE 75 MG/1
75 CAPSULE ORAL 2 TIMES DAILY
Qty: 10 CAPSULE | Refills: 0 | Status: SHIPPED | OUTPATIENT
Start: 2025-02-07 | End: 2025-02-12

## 2025-02-07 RX ORDER — ALBUTEROL SULFATE 5 MG/ML
10 SOLUTION RESPIRATORY (INHALATION) ONCE
Status: COMPLETED | OUTPATIENT
Start: 2025-02-07 | End: 2025-02-07

## 2025-02-07 RX ORDER — ALBUTEROL SULFATE 90 UG/1
2 INHALANT RESPIRATORY (INHALATION) EVERY 4 HOURS PRN
Qty: 1 EACH | Refills: 0 | Status: SHIPPED | OUTPATIENT
Start: 2025-02-07 | End: 2025-03-09

## 2025-02-07 RX ORDER — IPRATROPIUM BROMIDE AND ALBUTEROL SULFATE 2.5; .5 MG/3ML; MG/3ML
3 SOLUTION RESPIRATORY (INHALATION) EVERY 6 HOURS PRN
Status: DISCONTINUED | OUTPATIENT
Start: 2025-02-07 | End: 2025-02-07

## 2025-02-07 NOTE — ED INITIAL ASSESSMENT (HPI)
Patient presents to ED with audible wheezing/REINIER. Patient states she fell recently and wants two bumps on the inside of left leg checked out.

## 2025-02-07 NOTE — ED PROVIDER NOTES
Patient Seen in: [facility] Emergency Department    History     Chief Complaint   Patient presents with    Difficulty Breathing    Fall       HPI    60-year-old female with history of asthma, thyroid cancer in remission, presents ER today complaining of shortness of breath, cough and runny nose for the past day.  No chest pain also having some left leg pain after she fell few days ago.  States there are still some bruising and sore.  she is still able to walk on it.      History reviewed.   Past Medical History:    Allergic rhinitis    Anxiety state    Appendicitis    Asthma (HCC)    Cancer (HCC)    Colonic polyp    per NG: \"? colonic polyps\"; via proctoscope, 1993    Contact allergic reaction    Depression    Deviated nasal septum    Disorder of thyroid    Environmental allergies    Esophageal reflux    Exposure to medical diagnostic radiation    Head and neck cancer (HCC)    Hearing impairment    no hearing aids    Hemorrhoids    High cholesterol    Intermenstrual bleeding    per NG: intermenstrual VB    Menometrorrhagia    Migraines    Neurofibroma of upper arm    left posterior upper arm on biopsy    Osteoarthritis    Pneumonia due to organism    PONV (postoperative nausea and vomiting)    Thyroid cancer (HCC)    2007    Tonsillitis    Visual impairment    readers    Walking pneumonia       History reviewed.   Past Surgical History:   Procedure Laterality Date    Ablation  2007    per NG: l131 ablation    Appendectomy  1988    Biopsy of uterus lining  2009    neg endometrial biopsy    Carpal tunnel release Right     Right hand    Colonoscopy N/A 12/17/2018    Procedure: COLONOSCOPY;  Surgeon: Jayden Leonard MD;  Location: [facility] ENDOSCOPY    D & c  2001    Foot surgery  2007    R foot neuroma excision    Hand/finger surgery unlisted  2004    hand surgery    Hemorrhoidectomy  1991    Ligation of hemorrhoid(s)  11/28/2016    Rubberband Ligation of Hemorrhoid    Nasal surg proc unlisted  2007     septoplasty    Needle biopsy right      benign          Other surgical history  2006    sebaceous cyst on face excised    Thyroidectomy  2007    Tonsillectomy  1974         Medications :  Prescriptions Prior to Admission[1]     Family History   Problem Relation Age of Onset    Stroke Father         CVA    Hypertension Father     Diabetes Mother     Neurological Disorder Paternal Grandfather         Alzheimer's    Cancer Brother 51        asbestos ,asphalt lung cancer    Lipids Other         family h/o hyperlipidemia     Cancer Maternal Uncle 18        spinal cancer    Ovarian Cancer Paternal Aunt 51        d.51    Diabetes Sister     Diabetes Sister     Cancer Sister     Cancer Paternal Cousin Female         tumor in neck; d.32; substance abuse    Glaucoma Neg     Macular degeneration Neg        Smoking Status:   Social History     Socioeconomic History    Marital status:     Number of children: 4   Occupational History    Occupation: [employer]   Tobacco Use    Smoking status: Never    Smokeless tobacco: Never   Vaping Use    Vaping status: Never Used   Substance and Sexual Activity    Alcohol use: Yes     Alcohol/week: 1.0 standard drink of alcohol     Types: 1 Standard drinks or equivalent per week     Comment: I rarely drink    Drug use: No    Sexual activity: Yes     Birth control/protection: Vasectomy   Other Topics Concern    Caffeine Concern Yes     Comment: coffee, 1 cup    Grew up on a farm No    History of tanning No    Outdoor occupation No    Pt has a pacemaker No    Pt has a defibrillator No    Breast feeding Yes     Comment: I delivered 4 children and i breast fed all 4.    Reaction to local anesthetic No       Constitutional and vital signs reviewed.      Social History and Family History elements reviewed from today, pertinent positives to the presenting problem noted.    Physical Exam     ED Triage Vitals [25 1459]   /69   Pulse 107   Resp 24   Temp 99.4 °F (37.4 °C)   Temp src  Temporal   SpO2 96 %   O2 Device None (Room air)       All measures to prevent infection transmission during my interaction with the patient were taken. Handwashing was performed prior to and after the exam.  Stethoscope and any equipment used during my examination was cleaned with super sani-cloth germicidal wipes following the exam.     Physical Exam  Vitals and nursing note reviewed.   Cardiovascular:      Rate and Rhythm: Normal rate.   Pulmonary:      Breath sounds: Wheezing present.   Musculoskeletal:         General: Normal range of motion.      Comments: Pain left lower anterior tibia with palpation.  No erythema.  Some ecchymosis healing well.  No calf tenderness   Skin:     General: Skin is warm and dry.   Neurological:      General: No focal deficit present.      Mental Status: She is alert.         ED Course        Labs Reviewed   SARS-COV-2/FLU A AND B/RSV BY PCR (GENEXPERT) - Abnormal; Notable for the following components:       Result Value    Influenza A by PCR Positive (*)     All other components within normal limits    Narrative:     This test is intended for the qualitative detection and differentiation of SARS-CoV-2, influenza A, influenza B, and respiratory syncytial virus (RSV) viral RNA in nasopharyngeal or nares swabs from individuals suspected of respiratory viral infection consistent with COVID-19 by their healthcare provider. Signs and symptoms of respiratory viral infection due to SARS-CoV-2, influenza, and RSV can be similar.                                    Test performed using the Xpert Xpress SARS-CoV-2/FLU/RSV (real time RT-PCR)  assay on the GeneXpert instrument, [employer], [place] [address].                   This test is being used under the Food and Drug Administration's Emergency Use Authorization.                                    The authorized Fact Sheet for Healthcare Providers for this assay is available upon request from the laboratory.     EKG    Rate, intervals and  axes as noted on EKG Report.  Rate: 100  Rhythm: Sinus Rhythm  Reading: Sinus rhythm, heart rate 100, normal intervals, normal axis           As Interpreted by me    Imaging Results Available and Reviewed while in ED: XR TIBIA + FIBULA (2 VIEWS), LEFT (CPT=73590)    Result Date: 2/7/2025  CONCLUSION:   No radiographically visible acute osseous injury of the left tibia-fibula.    Dictated by (CST): Juan F North MD on 2/07/2025 at 5:56 PM     Finalized by (CST): Juan F North MD on 2/07/2025 at 5:58 PM          XR CHEST AP PORTABLE  (CPT=71045)    Result Date: 2/7/2025  CONCLUSION:   Negative for radiographically evident acute intrathoracic process.   Dictated by (CST): Juan F North MD on 2/07/2025 at 5:55 PM     Finalized by (CST): Juan F North MD on 2/07/2025 at 5:56 PM         ED Medications Administered:   Medications   ipratropium-albuterol (Duoneb) 0.5-2.5 (3) MG/3ML inhalation solution 3 mL (3 mL Nebulization Given 2/7/25 1515)   albuterol (Ventolin) (5 MG/ML) 0.5% nebulizer solution 10 mg (10 mg Nebulization Given 2/7/25 1720)         MDM     Vitals:    02/07/25 1459   BP: 108/69   Pulse: 107   Resp: 24   Temp: 99.4 °F (37.4 °C)   TempSrc: Temporal   SpO2: 96%     *I personally reviewed and interpreted all ED vitals.    Pulse Ox: 96%, Room air, Normal     Monitor Interpretation:   normal sinus rhythm as interpreted by me.  The cardiac monitor was ordered to monitor cardiac rate and rhythm.    Differential Diagnosis/ Diagnostic Considerations:viral  Illness, pneumonia, asthma exacerbation, fracture, contusion    Complicating Factors: The patient already has does not have any pertinent problems on file. to contribute to the complexity of this ED evaluation.    Medical Decision Making  Amount and/or Complexity of Data Reviewed  Labs: ordered. Decision-making details documented in ED Course.  Radiology: independent interpretation performed. Decision-making details documented in ED Course.    Risk  OTC  drugs.  Prescription drug management.      I reviewed all results with patient.  Patient is positive for the flu, COVID and RSV are negative.  I reviewed x-ray images nothing acute on the chest x-ray and templating x-ray.  Patient's wheezing resolved and speaking full sentences no distress.  Explained patient will treat the flu with Tamiflu along with refill her albuterol inhaler.  Patient understands to follow-up with her primary care provider in 1 to 2 days.  Return to the ER if some continue, get worse, unable to follow-up  Condition upon leaving the department: Stable    Disposition and Plan     Clinical Impression:  1. Influenza    2. Mild asthma with exacerbation, unspecified whether persistent (HCC)    3. Arthralgia of left lower leg        Disposition:  Discharge    Follow-up:  Joshua Dutta MD  [address] [facility] [address]  [facility] [address]  [phone]    Follow up        Medications Prescribed:  Current Discharge Medication List        START taking these medications    Details   !! albuterol 108 (90 Base) MCG/ACT Inhalation Aero Soln Inhale 2 puffs into the lungs every 4 (four) hours as needed for Wheezing.  Qty: 1 each, Refills: 0      oseltamivir (TAMIFLU) 75 MG Oral Cap Take 1 capsule (75 mg total) by mouth 2 (two) times daily for 5 days.  Qty: 10 capsule, Refills: 0       !! - Potential duplicate medications found. Please discuss with provider.                           [1] (Not in a hospital admission)

## 2025-02-07 NOTE — TELEPHONE ENCOUNTER
Action Requested: Summary for Provider     []  Critical Lab, Recommendations Needed  [] Need Additional Advice  []   FYI    []   Need Orders  [] Need Medications Sent to Pharmacy  []  Other     SUMMARY: Patient calling, URI symptoms since Wednesday AM  Patient thinks she has fever, went to let dog out and got SOB. Continued SOB, has inhaler at home used this and helped some but body aches, headache and states at times when getting up, lightheaded.   Patient advised ER for evaluation now, she is going to call someone to take her.   Tight cough heard on phone as well.    Patient agreeable, will call someone to take her.   911 warning signs reviewed, agreeable and states understanding.     Reason for call: Breathing Problem  Onset: Wednesday AM       Reason for Disposition   MODERATE difficulty breathing (e.g., speaks in phrases, SOB even at rest, pulse 100-120) of new-onset or worse than normal    Protocols used: Breathing Difficulty-A-OH

## 2025-02-08 LAB
ATRIAL RATE: 100 BPM
P AXIS: 61 DEGREES
P-R INTERVAL: 150 MS
Q-T INTERVAL: 330 MS
QRS DURATION: 92 MS
QTC CALCULATION (BEZET): 425 MS
R AXIS: 37 DEGREES
T AXIS: 67 DEGREES
VENTRICULAR RATE: 100 BPM

## 2025-02-17 NOTE — TELEPHONE ENCOUNTER
Refill Request for medication(s): SERTRALINE 25 MG Oral Tab     Last Office Visit: 10/02/24    Last Refill: 09/09/24    Pharmacy, Dosage verified:   [id number] [place] [address] AT [facility] [address], [phone], [phone]     Condition Update (if applicable): 90-day supply.    Rx pended and sent to provider for approval, please advise. Thank You!

## 2025-02-18 RX ORDER — SERTRALINE HYDROCHLORIDE 25 MG/1
25 TABLET, FILM COATED ORAL EVERY EVENING
Qty: 90 TABLET | Refills: 1 | Status: SHIPPED | OUTPATIENT
Start: 2025-02-18

## 2025-02-21 RX ORDER — CITALOPRAM HYDROBROMIDE 10 MG/1
10 TABLET ORAL DAILY
Qty: 90 TABLET | Refills: 3 | OUTPATIENT
Start: 2025-02-21

## 2025-05-25 DIAGNOSIS — E78.2 MIXED HYPERLIPIDEMIA: ICD-10-CM

## 2025-05-27 DIAGNOSIS — F41.9 ANXIETY: ICD-10-CM

## 2025-05-27 DIAGNOSIS — L29.9 PRURITUS: ICD-10-CM

## 2025-05-27 RX ORDER — ATORVASTATIN CALCIUM 20 MG/1
20 TABLET, FILM COATED ORAL NIGHTLY
Qty: 90 TABLET | Refills: 3 | Status: SHIPPED | OUTPATIENT
Start: 2025-05-27

## 2025-05-28 NOTE — TELEPHONE ENCOUNTER
Please review; protocol failed/ has no protocol        Fill dates for Clonazepam     Recent fills: 01/10/2025  Last Rx written: 01/02/2025  Last Office Visit: 07/11/2024    Recent Visits  Date Type Provider Dept   07/11/24 Office Visit Dawna Francis APRN [facility]

## 2025-05-29 RX ORDER — CITALOPRAM HYDROBROMIDE 10 MG/1
10 TABLET ORAL DAILY
Qty: 90 TABLET | Refills: 0 | Status: SHIPPED | OUTPATIENT
Start: 2025-05-29

## 2025-05-29 RX ORDER — CLONAZEPAM 0.5 MG/1
0.5 TABLET ORAL NIGHTLY
Qty: 90 TABLET | Refills: 0 | Status: SHIPPED | OUTPATIENT
Start: 2025-05-29

## 2025-05-29 RX ORDER — HYDROXYZINE HYDROCHLORIDE 25 MG/1
TABLET, FILM COATED ORAL
Qty: 90 TABLET | Refills: 0 | Status: SHIPPED | OUTPATIENT
Start: 2025-05-29

## 2025-05-29 NOTE — TELEPHONE ENCOUNTER
[email or web address] message sent to patient to schedule an office visit with PCP.   Please make a phone attempt.

## 2025-06-11 NOTE — TELEPHONE ENCOUNTER
Dr. Hinkle - please see condition update from pt regarding refill request.  Please advise.  Thank you.

## 2025-06-11 NOTE — TELEPHONE ENCOUNTER
Refill Request for medication(s):   FAMOTIDINE 40 MG Oral Tab       Last Office Visit: 10/02/24    Last Refill: 12/10/24    Pharmacy, Dosage verified: [facility] [id number] [place] [address] [address] AT [facility] [address], [phone], [phone]     Condition Update (if applicable): MyChart msg sent    Rx pended and sent to provider for approval, please advise. Thank You!

## 2025-06-12 RX ORDER — TRIAMCINOLONE ACETONIDE 1 MG/G
1 CREAM TOPICAL 2 TIMES DAILY
Qty: 60 G | Refills: 1 | Status: SHIPPED | OUTPATIENT
Start: 2025-06-12

## 2025-06-12 RX ORDER — FAMOTIDINE 40 MG/1
40 TABLET, FILM COATED ORAL 2 TIMES DAILY
Qty: 60 TABLET | Refills: 5 | Status: SHIPPED | OUTPATIENT
Start: 2025-06-12

## 2025-06-12 RX ORDER — FAMOTIDINE 40 MG/1
40 TABLET, FILM COATED ORAL 2 TIMES DAILY
Qty: 60 TABLET | Refills: 5 | Status: SHIPPED | OUTPATIENT
Start: 2025-06-12 | End: 2025-06-12

## 2025-06-13 NOTE — TELEPHONE ENCOUNTER
Medication PA Requested: tobramycin-dexamethasone 0.3-0.1 % Ophthalmic Ointment                                                          CoverMyMeds Used:  Key:  Quantity: 3.5 g  Day Supply:  Sig: Apply to eyelids as directed   DX Code:     L20.84                                CPT code (if applicable):   Case Number/Pending Ref#:     Thank you!

## 2025-06-16 NOTE — TELEPHONE ENCOUNTER
----- Message from Shayna Siddiqi MD sent at 5/12/2021  7:40 AM CDT -----  Please call patient with positive COVID-19 antibody IgG test.  This suggest previous exposure and antibody production.   As previously discussed at her last visit I would still rec Admission

## 2025-06-16 NOTE — TELEPHONE ENCOUNTER
Medication PA Requested: tobramycin-dexamethasone 0.3-0.1 % Ophthalmic Ointment                                                          CoverMyMeds Used:  Key:  Quantity: 3.5 g  Day Supply:  Sig: Apply to eyelids as directed   DX Code:     L20.84                                CPT code (if applicable):   Case Number/Pending Ref#:         ePA submitted with LOV 10/2/24  Awaiting determination

## 2025-06-19 ENCOUNTER — TELEPHONE (OUTPATIENT)
Dept: INTERNAL MEDICINE CLINIC | Facility: CLINIC | Age: 60
End: 2025-06-19

## 2025-06-19 NOTE — TELEPHONE ENCOUNTER
Patient has appt for a physical on 7/10 with Dawna Francis and would like lab work orders entered prior to visit.  Please call patient to advise when entered

## 2025-06-20 DIAGNOSIS — Z00.00 ANNUAL PHYSICAL EXAM: Primary | ICD-10-CM

## 2025-06-20 DIAGNOSIS — E55.9 VITAMIN D DEFICIENCY: ICD-10-CM

## 2025-06-20 DIAGNOSIS — E53.9 VITAMIN B DEFICIENCY: ICD-10-CM

## 2025-06-21 ENCOUNTER — PATIENT MESSAGE (OUTPATIENT)
Dept: ENDOCRINOLOGY CLINIC | Facility: CLINIC | Age: 60
End: 2025-06-21

## 2025-06-23 NOTE — TELEPHONE ENCOUNTER
Endocrine refill protocol for medications for hypothyroidism and hyperthyroidism    Protocol Criteria:  FAILED Reason: No labs completed in required time frame - Per TE Patient Message 6/21, patient will be having labs drawn at an outside facility that is in her network     If all below requirements are met, send a 90-day supply with 1 refill per provider protocol.    Verify appointment with Endocrinology completed in the last 12 months or scheduled in the next 6 months.    Normal TSH result in the past 12 months   Review recent telephone encounters and mychart communications with patient to ensure a dose change has not occurred since last office visit that was not updated in the medication history list     Last completed office visit:1/13/2025 Porsha Sims MD     Next scheduled Follow up: No appointment scheduled      Last TSH result:   TSH   Date Value Ref Range Status   06/20/2024 0.762 0.550 - 4.780 mIU/mL Final     TSH (S)   Date Value Ref Range Status   08/09/2014 0.03 (L) 0.34 - 5.60 uIU/mL Final

## 2025-06-24 RX ORDER — LEVOTHYROXINE SODIUM 88 MCG
TABLET ORAL
Qty: 60 TABLET | Refills: 1 | Status: SHIPPED | OUTPATIENT
Start: 2025-06-24

## 2025-07-06 DIAGNOSIS — E03.9 HYPOTHYROIDISM, UNSPECIFIED TYPE: ICD-10-CM

## 2025-07-07 RX ORDER — LEVOTHYROXINE SODIUM 75 UG/1
75 TABLET ORAL
Qty: 24 TABLET | Refills: 1 | Status: SHIPPED | OUTPATIENT
Start: 2025-07-07

## 2025-07-07 NOTE — TELEPHONE ENCOUNTER
Endocrine refill protocol for medications for hypothyroidism and hyperthyroidism    Protocol Criteria:  PASSED Reason: N/A    If all below requirements are met, send a 90-day supply with 1 refill per provider protocol.    Verify appointment with Endocrinology completed in the last 12 months or scheduled in the next 6 months.    Normal TSH result in the past 12 months   Review recent telephone encounters and mychart communications with patient to ensure a dose change has not occurred since last office visit that was not updated in the medication history list     Last completed office visit:1/13/2025 Porsha Sims MD   Last completed telemed visit: Visit date not found  Next scheduled Follow up:   Future Appointments   Date Time Provider Department Center   7/10/2025 11:20 AM [facility]   10/20/2025  1:00 PM Deandra Price MD [facility]      Last TSH result:   TSH   Date Value Ref Range Status   06/20/2024 0.762 0.550 - 4.780 mIU/mL Final     TSH (S)   Date Value Ref Range Status   08/09/2014 0.03 (L) 0.34 - 5.60 uIU/mL Final

## 2025-07-14 NOTE — TELEPHONE ENCOUNTER
Refill Request for medication(s): Omeprazole 40 MG Oral Capsule Delayed Release     Last Office Visit: 10/02/24    Last Refill: 04/22/24    Pharmacy, Dosage verified: [facility] #[id number] - [place] [address] [address] [phone], [phone]     Condition Update (if applicable): MyChart msg sent for condition update    Rx pended and sent to provider for approval, please advise. Thank You!

## 2025-07-15 RX ORDER — SERTRALINE HYDROCHLORIDE 25 MG/1
25 TABLET, FILM COATED ORAL EVERY EVENING
Qty: 90 TABLET | Refills: 0 | Status: SHIPPED | OUTPATIENT
Start: 2025-07-15

## 2025-07-15 RX ORDER — OMEPRAZOLE 40 MG/1
40 CAPSULE, DELAYED RELEASE ORAL DAILY
Qty: 90 CAPSULE | Refills: 0 | Status: SHIPPED | OUTPATIENT
Start: 2025-07-15

## 2025-07-15 NOTE — TELEPHONE ENCOUNTER
Okay refill X1 please have patient request additional refills from her PCP at her upcoming office visit, or she will need to schedule follow-up with me for follow-up of her pruritus dermatitis

## 2025-07-15 NOTE — TELEPHONE ENCOUNTER
Okay refill X1 this should be filled again through her PCP since this is for her reflux.  She was taking famotidine as well for her hives and itching.

## 2025-07-15 NOTE — TELEPHONE ENCOUNTER
Refill Request for medication(s): SERTRALINE 25 MG Oral Tab     Last Office Visit:10/02/24    Last Refill: 02/18/25    Pharmacy, Dosage verified: [facility] [id number] [place] [address] [address] AT [facility] [address], [phone], [phone]      Condition Update (if applicable):     Rx pended and sent to provider for approval, please advise. Thank You!

## 2025-07-18 ENCOUNTER — LAB ENCOUNTER (OUTPATIENT)
Dept: LAB | Facility: HOSPITAL | Age: 60
End: 2025-07-18
Attending: NURSE PRACTITIONER
Payer: COMMERCIAL

## 2025-07-18 ENCOUNTER — RESULTS FOLLOW-UP (OUTPATIENT)
Dept: INTERNAL MEDICINE CLINIC | Facility: CLINIC | Age: 60
End: 2025-07-18

## 2025-07-18 DIAGNOSIS — L30.8 PSORIASIFORM DERMATITIS: ICD-10-CM

## 2025-07-18 DIAGNOSIS — E55.9 VITAMIN D DEFICIENCY: ICD-10-CM

## 2025-07-18 DIAGNOSIS — L03.019 ONYCHIA AND PARONYCHIA OF FINGER: ICD-10-CM

## 2025-07-18 DIAGNOSIS — Z00.00 ANNUAL PHYSICAL EXAM: ICD-10-CM

## 2025-07-18 DIAGNOSIS — Z85.850 HISTORY OF THYROID CANCER: ICD-10-CM

## 2025-07-18 DIAGNOSIS — L20.84 INTRINSIC ATOPIC DERMATITIS: ICD-10-CM

## 2025-07-18 DIAGNOSIS — E03.9 HYPOTHYROIDISM, UNSPECIFIED TYPE: ICD-10-CM

## 2025-07-18 DIAGNOSIS — E53.9 VITAMIN B DEFICIENCY: ICD-10-CM

## 2025-07-18 LAB
ALBUMIN SERPL-MCNC: 4.8 G/DL (ref 3.2–4.8)
ALBUMIN/GLOB SERPL: 2.1 {RATIO} (ref 1–2)
ALP LIVER SERPL-CCNC: 76 U/L (ref 50–130)
ALT SERPL-CCNC: 16 U/L (ref 10–49)
ANION GAP SERPL CALC-SCNC: 11 MMOL/L (ref 0–18)
AST SERPL-CCNC: 25 U/L (ref ?–34)
BILIRUB SERPL-MCNC: 1 MG/DL (ref 0.2–1.1)
BUN BLD-MCNC: 8 MG/DL (ref 9–23)
BUN/CREAT SERPL: 8.2 (ref 10–20)
C3 SERPL-MCNC: 150.4 MG/DL (ref 90–170)
C4 SERPL-MCNC: 45.6 MG/DL (ref 12–36)
CALCIUM BLD-MCNC: 9.6 MG/DL (ref 8.7–10.4)
CHLORIDE SERPL-SCNC: 104 MMOL/L (ref 98–112)
CHOLEST SERPL-MCNC: 202 MG/DL (ref ?–200)
CO2 SERPL-SCNC: 25 MMOL/L (ref 21–32)
CREAT BLD-MCNC: 0.97 MG/DL (ref 0.55–1.02)
CRP SERPL-MCNC: <0.5 MG/DL (ref ?–0.5)
DEPRECATED RDW RBC AUTO: 38.5 FL (ref 35.1–46.3)
EGFRCR SERPLBLD CKD-EPI 2021: 66 ML/MIN/1.73M2 (ref 60–?)
ERYTHROCYTE [DISTWIDTH] IN BLOOD BY AUTOMATED COUNT: 12.5 % (ref 11–15)
ERYTHROCYTE [SEDIMENTATION RATE] IN BLOOD: 15 MM/HR (ref 0–30)
FASTING PATIENT LIPID ANSWER: YES
FASTING STATUS PATIENT QL REPORTED: YES
GLOBULIN PLAS-MCNC: 2.3 G/DL (ref 2–3.5)
GLUCOSE BLD-MCNC: 82 MG/DL (ref 70–99)
HCT VFR BLD AUTO: 41.1 % (ref 35–48)
HDLC SERPL-MCNC: 67 MG/DL (ref 40–59)
HGB BLD-MCNC: 13.4 G/DL (ref 12–16)
LDLC SERPL CALC-MCNC: 120 MG/DL (ref ?–100)
MCH RBC QN AUTO: 27.5 PG (ref 26–34)
MCHC RBC AUTO-ENTMCNC: 32.6 G/DL (ref 31–37)
MCV RBC AUTO: 84.4 FL (ref 80–100)
NONHDLC SERPL-MCNC: 135 MG/DL (ref ?–130)
OSMOLALITY SERPL CALC.SUM OF ELEC: 287 MOSM/KG (ref 275–295)
PLATELET # BLD AUTO: 226 10(3)UL (ref 150–450)
POTASSIUM SERPL-SCNC: 3.7 MMOL/L (ref 3.5–5.1)
PROT SERPL-MCNC: 7.1 G/DL (ref 5.7–8.2)
RBC # BLD AUTO: 4.87 X10(6)UL (ref 3.8–5.3)
SODIUM SERPL-SCNC: 140 MMOL/L (ref 136–145)
T3FREE SERPL-MCNC: 3.12 PG/ML (ref 2.4–4.2)
T4 FREE SERPL-MCNC: 1.3 NG/DL (ref 0.8–1.7)
TRIGL SERPL-MCNC: 82 MG/DL (ref 30–149)
TSI SER-ACNC: 0.53 UIU/ML (ref 0.55–4.78)
VIT B12 SERPL-MCNC: >2000 PG/ML (ref 211–911)
VIT D+METAB SERPL-MCNC: 60.3 NG/ML (ref 30–100)
VLDLC SERPL CALC-MCNC: 14 MG/DL (ref 0–30)
WBC # BLD AUTO: 4.4 X10(3) UL (ref 4–11)

## 2025-07-18 PROCEDURE — 86160 COMPLEMENT ANTIGEN: CPT

## 2025-07-18 PROCEDURE — 84439 ASSAY OF FREE THYROXINE: CPT

## 2025-07-18 PROCEDURE — 80061 LIPID PANEL: CPT

## 2025-07-18 PROCEDURE — 36415 COLL VENOUS BLD VENIPUNCTURE: CPT

## 2025-07-18 PROCEDURE — 85652 RBC SED RATE AUTOMATED: CPT

## 2025-07-18 PROCEDURE — 86038 ANTINUCLEAR ANTIBODIES: CPT

## 2025-07-18 PROCEDURE — 84443 ASSAY THYROID STIM HORMONE: CPT

## 2025-07-18 PROCEDURE — 82607 VITAMIN B-12: CPT

## 2025-07-18 PROCEDURE — 84432 ASSAY OF THYROGLOBULIN: CPT

## 2025-07-18 PROCEDURE — 85027 COMPLETE CBC AUTOMATED: CPT

## 2025-07-18 PROCEDURE — 86140 C-REACTIVE PROTEIN: CPT

## 2025-07-18 PROCEDURE — 84481 FREE ASSAY (FT-3): CPT

## 2025-07-18 PROCEDURE — 80053 COMPREHEN METABOLIC PANEL: CPT

## 2025-07-18 PROCEDURE — 86800 THYROGLOBULIN ANTIBODY: CPT

## 2025-07-18 PROCEDURE — 82306 VITAMIN D 25 HYDROXY: CPT

## 2025-07-20 DIAGNOSIS — E03.9 HYPOTHYROIDISM, UNSPECIFIED TYPE: Primary | ICD-10-CM

## 2025-07-21 LAB
NUCLEAR IGG TITR SER IF: NEGATIVE {TITER}
THYROGLOB AB: <1 IU/ML
THYROGLOB IMA: <0.1 NG/ML

## 2025-07-24 ENCOUNTER — PATIENT MESSAGE (OUTPATIENT)
Dept: INTERNAL MEDICINE CLINIC | Facility: CLINIC | Age: 60
End: 2025-07-24

## 2025-07-24 ENCOUNTER — OFFICE VISIT (OUTPATIENT)
Dept: INTERNAL MEDICINE CLINIC | Facility: CLINIC | Age: 60
End: 2025-07-24
Payer: COMMERCIAL

## 2025-07-24 VITALS
HEART RATE: 78 BPM | SYSTOLIC BLOOD PRESSURE: 108 MMHG | BODY MASS INDEX: 29.79 KG/M2 | RESPIRATION RATE: 16 BRPM | HEIGHT: 64 IN | WEIGHT: 174.5 LBS | DIASTOLIC BLOOD PRESSURE: 70 MMHG | OXYGEN SATURATION: 97 %

## 2025-07-24 DIAGNOSIS — Z13.6 ENCOUNTER FOR SCREENING FOR CORONARY ARTERY DISEASE: Primary | ICD-10-CM

## 2025-07-24 NOTE — PROGRESS NOTES
HPI:        The following individual(s) verbally consented to be recorded using ambient AI listening technology and understand that they can each withdraw their consent to this listening technology at any point by asking the clinician to turn off or pause the recording:    Patient name: Macy Berger          Patient ID: Macy Berger is a 60 year old female.    HPI    History of Present Illness  Macy Berger is a 60 year old female who presents for an annual physical exam and review of lab results.    She has a significantly elevated vitamin B12 level of 2000. She reports taking both a B12 gummy and a sublingual B12 supplement. She has since adjusted her intake by removing one of the supplements from her routine.    She is currently on atorvastatin 20 mg at night for cholesterol management. She maintains good dietary habits, avoiding red meat and fast food, and regularly consumes cucumbers, fruit, yogurt, and granola.    She takes Synthroid (levothyroxine) with a regimen of 75 mcg two days a week and 88 mcg five days a week. She has a history of thyroid issues, including a past thyroidectomy due to cancer.    She has a history of eczema, which has improved significantly since discontinuing gel nail polish. She currently manages a small rash in the rectal area with Neosporin, which has reduced its size.    She experiences occasional sciatic nerve pain and has a history of vocal cord issues, including a paralyzed left vocal cord and nodules, which affect her throat and voice. She requires very hot or cold drinks to manage throat dryness.    No chills, fatigue, fever, ear pain, hearing loss, chest tightness, cough, shortness of breath, leg swelling, palpitations, abdominal pain, constipation, diarrhea, nausea, vomiting, heat or cold intolerance, pain on urination, blood in urine, unusual vaginal discharge, back pain, joint pain, headaches, and easy bruising or bleeding. She reports nasal congestion,  sinus pain, trouble swallowing, and a dry throat. She also notes varicose veins and environmental allergies.    She is not sexually active and has regular gynecological check-ups and mammograms. She reports good sleep and denies being nervous, anxious, or depressed.    She mentions a past incident of hitting her head on a cabinet, resulting in a tender spot. She also reports a swollen right ankle and numbness in her foot, which she attributes to a possible twist or injury.       Immunization History   Administered Date(s) Administered    Covid-19 Vaccine Pfizer 30 mcg/0.3 ml 08/30/2021, 09/20/2021   Pended Date(s) Pended    Influenza Vaccine Refused 02/24/2021       Past Medical History:    Allergic rhinitis    Anxiety state    Appendicitis    Asthma (HCC)    Cancer (HCC)    Colonic polyp    per NG: \"? colonic polyps\"; via proctoscope, 1993    Contact allergic reaction    Depression    Deviated nasal septum    Disorder of thyroid    Environmental allergies    Esophageal reflux    Exposure to medical diagnostic radiation    Head and neck cancer (HCC)    Hearing impairment    no hearing aids    Hemorrhoids    High cholesterol    Intermenstrual bleeding    per NG: intermenstrual VB    Menometrorrhagia    Migraines    Neurofibroma of upper arm    left posterior upper arm on biopsy    Osteoarthritis    Pneumonia due to organism    PONV (postoperative nausea and vomiting)    Thyroid cancer (HCC)    2007    Tonsillitis    Visual impairment    readers    Walking pneumonia      Past Surgical History:   Procedure Laterality Date    Ablation  2007    per NG: l131 ablation    Appendectomy  1988    Biopsy of uterus lining  2009    neg endometrial biopsy    Carpal tunnel release Right     Right hand    Colonoscopy N/A 12/17/2018    Procedure: COLONOSCOPY;  Surgeon: Jayden Leonard MD;  Location: [facility] ENDOSCOPY    D & c  2001    Foot surgery  2007    R foot neuroma excision    Hand/finger surgery unlisted  2004    hand  surgery    Hemorrhoidectomy  1991    Ligation of hemorrhoid(s)  2016    Rubberband Ligation of Hemorrhoid    Nasal surg proc unlisted  2007    septoplasty    Needle biopsy right      benign          Other surgical history  2006    sebaceous cyst on face excised    Thyroidectomy  2007    Tonsillectomy  1974      Social History     Socioeconomic History    Marital status:     Number of children: 4   Occupational History    Occupation:    Tobacco Use    Smoking status: Never    Smokeless tobacco: Never   Vaping Use    Vaping status: Never Used   Substance and Sexual Activity    Alcohol use: Yes     Alcohol/week: 1.0 standard drink of alcohol     Types: 1 Standard drinks or equivalent per week     Comment: I rarely drink    Drug use: No    Sexual activity: Yes     Birth control/protection: Vasectomy   Other Topics Concern    Caffeine Concern Yes     Comment: coffee, 1 cup    Grew up on a farm No    History of tanning No    Outdoor occupation No    Pt has a pacemaker No    Pt has a defibrillator No    Breast feeding Yes     Comment: I delivered 4 children and i breast fed all 4.    Reaction to local anesthetic No          Review of Systems   Constitutional:  Negative for chills, fatigue and fever.   HENT:  Positive for congestion, ear pain, hearing loss, sinus pressure, sinus pain and trouble swallowing. Negative for rhinorrhea, sore throat and voice change.    Eyes:  Negative for pain and visual disturbance.   Respiratory:  Negative for cough, chest tightness and shortness of breath.    Cardiovascular:  Negative for chest pain, palpitations and leg swelling.   Gastrointestinal:  Negative for abdominal pain, constipation, diarrhea, nausea and vomiting.   Endocrine: Negative for cold intolerance and heat intolerance.   Genitourinary:  Negative for dysuria, hematuria and vaginal discharge.   Musculoskeletal:  Negative for back pain and joint swelling.   Skin:  Negative for rash.    Allergic/Immunologic: Positive for environmental allergies.   Neurological:  Negative for weakness, numbness and headaches.   Hematological:  Does not bruise/bleed easily.   Psychiatric/Behavioral:  Negative for dysphoric mood and sleep disturbance. The patient is not nervous/anxious.               Current Outpatient Medications   Medication Sig Dispense Refill    Omeprazole 40 MG Oral Capsule Delayed Release Take 1 capsule (40 mg total) by mouth daily. 90 capsule 0    sertraline 25 MG Oral Tab Take 1 tablet (25 mg total) by mouth every evening. 90 tablet 0    LEVOTHYROXINE 75 MCG Oral Tab TAKE 1 TABLET BY MOUTH 2 DAYS A WEEK 24 tablet 1    SYNTHROID 88 MCG Oral Tab TAKE 1 TABLET BY MOUTH FIVE DAYS OF THE WEEK 60 tablet 1    famotidine 40 MG Oral Tab Take 1 tablet (40 mg total) by mouth 2 (two) times daily. (Patient taking differently: Take 1 tablet (40 mg total) by mouth daily.) 60 tablet 5    triamcinolone 0.1 % External Cream Apply 1 Application topically 2 (two) times daily. 60 g 1    citalopram 10 MG Oral Tab Take 1 tablet (10 mg total) by mouth daily. 90 tablet 0    hydrOXYzine 25 MG Oral Tab TAKE 1 TABLET(25 MG) BY MOUTH EVERY DAY AT BEDTIME FOR ITCHING 90 tablet 0    clonazePAM 0.5 MG Oral Tab Take 1 tablet (0.5 mg total) by mouth nightly. 90 tablet 0    atorvastatin 20 MG Oral Tab Take 1 tablet (20 mg total) by mouth nightly. 90 tablet 3    mupirocin 2 % External Ointment Use as directed bid to affected area 22 g 3    ketoconazole 2 % External Cream Apply 1 Application topically 2 (two) times daily. Apply to affected area 2 times daily. 30 g 3    albuterol (VENTOLIN HFA) 108 (90 Base) MCG/ACT Inhalation Aero Soln Inhale 2 puffs into the lungs every 4 (four) hours as needed for Wheezing. 6.7 g 0    Cholecalciferol (VITAMIN D3) 50 MCG (2000 UT) Oral Chew Tab Chew by mouth.      EPINEPHrine (EPIPEN 2-SARAH) 0.3 MG/0.3ML Injection Solution Auto-injector Inject IM in event of  allergic reaction 1 each 0     Biotin 5 MG Oral Cap Take by mouth 2 (two) times a day.      Calcium Carbonate-Vitamin D 600-200 MG-UNIT Oral Tab Take 2 tablets by mouth.      tobramycin-dexamethasone 0.3-0.1 % Ophthalmic Ointment Apply to eyelids as directed (Patient not taking: Reported on 7/24/2025) 3.5 g 2    betamethasone dipropionate 0.05 % External Cream Apply 1 Application topically 2 (two) times daily. (Patient not taking: Reported on 7/24/2025) 45 g 0    Crisaborole 2 % External Ointment Apply bid to areas of atopic dermatitis 100 g 3    Fluorometholone 0.1 % Ophthalmic Ointment Use bid to eyelids prn eczema as directed 3.5 g 2    Crisaborole 2 % External Ointment Use bid to eczema (Patient not taking: Reported on 7/24/2025) 100 g 3    loratadine 10 MG Oral Tab Take 1 tablet (10 mg total) by mouth daily. (Patient not taking: Reported on 7/24/2025) 30 tablet 3    gabapentin 100 MG Oral Cap Take 1 capsule (100 mg total) by mouth nightly. (Patient not taking: Reported on 7/24/2025) 90 capsule 3    tacrolimus (PROTOPIC) 0.1 % External Ointment Use bid (Patient not taking: Reported on 7/24/2025) 60 g 2    azelastine 0.1 % Nasal Solution 2 sprays by Nasal route 2 (two) times daily. (Patient not taking: Reported on 7/24/2025) 30 mL 0     Allergies:  Allergies   Allergen Reactions    Adhesive Tape HIVES     Weeping hives and pain - SILK TAPE    Apple ANAPHYLAXIS     RAW APPLE    Aspartame ANAPHYLAXIS    Augmentin [Amoxicillin-Pot Clavulanate] HIVES and DIZZINESS    Ciprofloxacin SWELLING    Diclofenac HIVES and SWELLING     Patient states swelling of lip, and left eye.    Hydrocortisone SWELLING    Montelukast PALPITATIONS    Peach ANAPHYLAXIS     RAW PEACH    Peanut Oil TONGUE SWELLING    Prednisone     Soybean Oil SWELLING     Mouth and tongue swelling with pure soy    Vicodin [Hydrocodone-Acetaminophen] NAUSEA AND VOMITING     Okay with Tylenol    Hydrocodone NAUSEA AND VOMITING and OTHER (SEE COMMENTS)     Other reaction(s): Fainting     Terbinafine RASH     Urinary retention also    Pear UNKNOWN     raw      PHYSICAL EXAM:   Physical Exam  Constitutional:       Appearance: Normal appearance. She is well-developed.   HENT:      Head: Normocephalic.      Right Ear: Tympanic membrane normal.      Left Ear: Tympanic membrane normal.      Nose: Nose normal.      Mouth/Throat:      Mouth: Mucous membranes are moist.      Pharynx: No oropharyngeal exudate or posterior oropharyngeal erythema.   Eyes:      General:         Right eye: No discharge.         Left eye: No discharge.      Pupils: Pupils are equal, round, and reactive to light.   Cardiovascular:      Rate and Rhythm: Normal rate and regular rhythm.      Heart sounds: Normal heart sounds. No murmur heard.     No friction rub. No gallop.   Pulmonary:      Effort: Pulmonary effort is normal. No respiratory distress.      Breath sounds: Normal breath sounds. No wheezing, rhonchi or rales.   Abdominal:      General: Bowel sounds are normal. There is no distension.      Palpations: Abdomen is soft. There is no mass.      Tenderness: There is no abdominal tenderness. There is no right CVA tenderness, left CVA tenderness or guarding.   Musculoskeletal:         General: No tenderness.      Cervical back: Normal range of motion and neck supple. No tenderness.      Right lower leg: No edema.      Left lower leg: No edema.   Lymphadenopathy:      Cervical: No cervical adenopathy.   Skin:     General: Skin is warm and dry.      Findings: No rash.   Neurological:      Mental Status: She is alert and oriented to person, place, and time.      Coordination: Coordination normal.      Gait: Gait normal.   Psychiatric:         Mood and Affect: Mood normal.         Behavior: Behavior normal.         Thought Content: Thought content normal.         Judgment: Judgment normal.       /70 (BP Location: Right arm, Patient Position: Sitting, Cuff Size: adult)   Pulse 78   Resp 16   Ht 5' 4\" (1.626 m)   Wt 174 lb 8  oz (79.2 kg)   LMP 10/01/2018   SpO2 97%   BMI 29.95 kg/m²   Wt Readings from Last 2 Encounters:   07/24/25 174 lb 8 oz (79.2 kg)   01/13/25 171 lb (77.6 kg)     Body mass index is 29.95 kg/m².(2)  Lab Results   Component Value Date    WBC 4.4 07/18/2025    RBC 4.87 07/18/2025    HGB 13.4 07/18/2025    HCT 41.1 07/18/2025    MCV 84.4 07/18/2025    MCH 27.5 07/18/2025    MCHC 32.6 07/18/2025    RDW 12.5 07/18/2025    .0 07/18/2025    MPV 8.6 10/22/2018      Lab Results   Component Value Date    GLU 82 07/18/2025    BUN 8 (L) 07/18/2025    BUNCREA 8.2 (L) 07/18/2025    CREATSERUM 0.97 07/18/2025    ANIONGAP 11 07/18/2025    GFRNAA 68 07/14/2022    GFRAA 78 07/14/2022    CA 9.6 07/18/2025    OSMOCALC 287 07/18/2025    ALKPHO 76 07/18/2025    AST 25 07/18/2025    ALT 16 07/18/2025    ALKPHOS 50 10/01/2015    BILT 1.0 07/18/2025    TP 7.1 07/18/2025    ALB 4.8 07/18/2025    GLOBULIN 2.3 07/18/2025    AGRATIO 1.5 10/01/2015     07/18/2025    K 3.7 07/18/2025     07/18/2025    CO2 25.0 07/18/2025      No results found for: \"EAG\", \"A1C\"   Lab Results   Component Value Date    CHOLEST 202 (H) 07/18/2025    TRIG 82 07/18/2025    HDL 67 (H) 07/18/2025     (H) 07/18/2025    VLDL 14 07/18/2025    NONHDLC 135 (H) 07/18/2025    CALCNONHDL 160 (H) 10/01/2015      Lab Results   Component Value Date    T4F 1.3 07/18/2025    TSH 0.526 (L) 07/18/2025                ASSESSMENT/PLAN:     Assessment & Plan      No orders of the defined types were placed in this encounter.      Assessment & Plan  Vitamin B12 Overdose  Excessive B12 intake from supplements led to elevated serum levels, potentially causing fatigue.  - Discontinue one form of B12 supplement.    Hyperlipidemia  Cholesterol well-controlled on atorvastatin 20 mg. CT calcium score recommended to assess arterial blockage.  - Recommend CT calcium score to assess for arterial blockage.  - Schedule CT calcium score at the hospital.    Thyroid  Disorder  Post-thyroidectomy managed with Synthroid. Patient prefers current dosage despite slight TSH deviation.  - Continue current Synthroid regimen as per patient's preference.    Eczema  Eczema improved after stopping gel nail polish. Persistent rectal rash improving with Neosporin.  - Continue using Neosporin for the rectal rash.  - Have dermatologist evaluate the rectal rash at next visit.    General Health Maintenance  Tdap vaccine recommended for 10-year protection. No major side effects anticipated.  - Administer Tdap vaccine to provide protection for 10 years.    Follow-up  Follow-up needed for CT calcium score results. Monitor ankle swelling and finger issues.  - Elevate and ice swollen ankle four times a day.  - Consider referral to a hand specialist for finger issues.  - Follow up with results of CT calcium score.       Meds This Visit:  Requested Prescriptions      No prescriptions requested or ordered in this encounter       Imaging & Referrals:  None         JULIA Horn

## 2025-07-28 RX ORDER — OMEPRAZOLE 40 MG/1
40 CAPSULE, DELAYED RELEASE ORAL DAILY
Qty: 90 CAPSULE | Refills: 0
Start: 2025-07-28

## 2025-07-29 RX ORDER — CITALOPRAM HYDROBROMIDE 10 MG/1
10 TABLET ORAL DAILY
Qty: 90 TABLET | Refills: 1 | Status: SHIPPED | OUTPATIENT
Start: 2025-07-29

## 2025-07-29 RX ORDER — OMEPRAZOLE 40 MG/1
40 CAPSULE, DELAYED RELEASE ORAL DAILY
Qty: 90 CAPSULE | Refills: 0 | Status: SHIPPED | OUTPATIENT
Start: 2025-07-29

## 2025-08-29 RX ORDER — CITALOPRAM HYDROBROMIDE 10 MG/1
10 TABLET ORAL DAILY
Qty: 90 TABLET | Refills: 1 | OUTPATIENT
Start: 2025-08-29

## (undated) DIAGNOSIS — I10 BENIGN ESSENTIAL HTN: ICD-10-CM

## (undated) DIAGNOSIS — L29.9 PRURITUS: ICD-10-CM

## (undated) DIAGNOSIS — E03.9 HYPOTHYROIDISM, UNSPECIFIED TYPE: Primary | ICD-10-CM

## (undated) DIAGNOSIS — E78.2 MIXED HYPERLIPIDEMIA: ICD-10-CM

## (undated) DEVICE — SUTURE PROLENE 3-0 8687H

## (undated) DEVICE — CONMED SCOPE SAVER BITE BLOCK, 20X27 MM: Brand: SCOPE SAVER

## (undated) DEVICE — GAUZE SPONGES,12 PLY: Brand: CURITY

## (undated) DEVICE — INTENDED FOR TISSUE SEPARATION, AND OTHER PROCEDURES THAT REQUIRE A SHARP SURGICAL BLADE TO PUNCTURE OR CUT.: Brand: BARD-PARKER ® STAINLESS STEEL BLADES

## (undated) DEVICE — SUCTION CANISTER, 3000CC,SAFELINER: Brand: DEROYAL

## (undated) DEVICE — GAMMEX® PI HYBRID SIZE 8, STERILE POWDER-FREE SURGICAL GLOVE, POLYISOPRENE AND NEOPRENE BLEND: Brand: GAMMEX

## (undated) DEVICE — GAMMEX® NON-LATEX PI ORTHO SIZE 7.5, STERILE POLYISOPRENE POWDER-FREE SURGICAL GLOVE: Brand: GAMMEX

## (undated) DEVICE — SOL NACL IRRIG 0.9% 1000ML BTL

## (undated) DEVICE — C-ARM: Brand: UNBRANDED

## (undated) DEVICE — STERILE LATEX POWDER-FREE SURGICAL GLOVESWITH NITRILE COATING: Brand: PROTEXIS

## (undated) DEVICE — UPPER EXTREMITY: Brand: MEDLINE INDUSTRIES, INC.

## (undated) DEVICE — MINOR GENERAL: Brand: MEDLINE INDUSTRIES, INC.

## (undated) DEVICE — SOL  .9 1000ML BTL

## (undated) DEVICE — SNARE OPTMZ PLPCTM TRP

## (undated) DEVICE — CHLORAPREP 26ML APPLICATOR

## (undated) DEVICE — CHLORAPREP ORANGE TINT 10.5ML

## (undated) DEVICE — 4.0MM EGG BUR

## (undated) DEVICE — CAUTERY BLADE 2IN INS E1455

## (undated) DEVICE — ENDOSCOPY PACK UPPER: Brand: MEDLINE INDUSTRIES, INC.

## (undated) DEVICE — PACESETTER® AIR WALKER: Brand: DEROYAL

## (undated) DEVICE — PRECISION OFFSET (9.0 X 0.51 X 25.0MM)

## (undated) DEVICE — STERILE TETRA-FLEX CF, ELASTIC BANDAGE, 4" X 5.5YD: Brand: TETRA-FLEX™CF

## (undated) DEVICE — SUT ETHILON 4-0 PS-2 1667G

## (undated) DEVICE — 12 ML SYRINGE LUER-LOCK TIP: Brand: MONOJECT

## (undated) DEVICE — ADHESIVE MASTISOL 2/3CC VL

## (undated) DEVICE — UNDYED BRAIDED (POLYGLACTIN 910), SYNTHETIC ABSORBABLE SUTURE: Brand: COATED VICRYL

## (undated) DEVICE — LINE MNTR ADLT SET O2 INTMD

## (undated) DEVICE — SUTURE ETHILON 4-0 1667G

## (undated) DEVICE — APPLICATOR CHLORAPREP 26ML

## (undated) DEVICE — BANDAGE ROLL,100% COTTON, 6 PLY, LARGE: Brand: KERLIX

## (undated) DEVICE — 3M™ IOBAN™ 2 ANTIMICROBIAL INCISE DRAPE 6650EZ: Brand: IOBAN™ 2

## (undated) DEVICE — SNARE CAPTIFLEX MICRO-OVL OLY

## (undated) DEVICE — ENDOSCOPY PACK - LOWER: Brand: MEDLINE INDUSTRIES, INC.

## (undated) DEVICE — LOWER EXTREMITY: Brand: MEDLINE INDUSTRIES, INC.

## (undated) DEVICE — VIOLET BRAIDED (POLYGLACTIN 910), SYNTHETIC ABSORBABLE SUTURE: Brand: COATED VICRYL

## (undated) DEVICE — CONMED ACCESSORY ELECTRODE, NEEDLE ELECTRODE

## (undated) DEVICE — Device: Brand: DEFENDO AIR/WATER/SUCTION AND BIOPSY VALVE

## (undated) DEVICE — FORCEP RADIAL JAW 4

## (undated) DEVICE — TOWEL OR BLU 16X26 STRL

## (undated) DEVICE — ZIMMER® STERILE DISPOSABLE TOURNIQUET CUFF WITH PLC, DUAL PORT, SINGLE BLADDER, 18 IN. (46 CM)

## (undated) DEVICE — OCCLUSIVE GAUZE STRIP,3% BISMUTH TRIBROMOPHENATE IN PETROLATUM BLEND: Brand: XEROFORM

## (undated) DEVICE — 9534HP TRANSPARENT DRSG W/FRAME: Brand: 3M™ TEGADERM™

## (undated) DEVICE — WEBRIL COTTON UNDERCAST PADDING: Brand: WEBRIL

## (undated) DEVICE — DISPOSABLE TOURNIQUET CUFF DUAL BLADDER, DUAL PORT AND QUICK CONNECT CONNECTOR: Brand: COLOR CUFF

## (undated) DEVICE — SCANLAN® PINCAP® ORTHOPEDIC PIN PROTECTORS - BLUE & WHITE, FITS PIN/WIRE SIZE: 1.98 MM (2/STERILE PKG): Brand: SCANLAN® PINCAP® ORTHOPEDIC PIN PROTECTORS

## (undated) DEVICE — TOURNIQUET CUFF 18 STR

## (undated) DEVICE — ENCORE® LATEX MICRO SIZE 8, STERILE LATEX POWDER-FREE SURGICAL GLOVE: Brand: ENCORE

## (undated) DEVICE — MEGADYNE 2.75IN NEEDLE MONO

## (undated) NOTE — LETTER
08/15/18        [address]  [address] [place] [address]      Dear Manjinder Dupree,    [facility] records indicate that you have outstanding lab work and or testing that was ordered for you and has not yet been completed:          [facility] [place]

## (undated) NOTE — ED AVS SNAPSHOT
Kendal Watkinso   MRN: [id number]    Department:  [facility] Emergency Department   Date of Visit:  1/12/2018           Disclosure     Insurance plans vary and the physician(s) referred by the ER may not be covered by your plan.  Please cont CARE PHYSICIAN AT ONCE OR RETURN IMMEDIATELY TO THE EMERGENCY DEPARTMENT. If you have been prescribed any medication(s), please fill your prescription right away and begin taking the medication(s) as directed.   If you believe that any of the medications

## (undated) NOTE — MR AVS SNAPSHOT
Marcie  [address]  [phone]               Thank you for choosing us for your health care visit with Jane Chin MD.  We are glad to serve you and happy to provide you with this summa Commonly known as:  KLONOPIN           Fluticasone Propionate 50 MCG/ACT Susp   1 PUFF EACH NOSTRIL 2 TIMES DAILY FOR 4 WEEKS   Commonly known as:  FLONASE           gabapentin 100 MG Caps   TAKE ONE CAPSULE BY MOUTH AT BEDTIME   Commonly known as:  NEURON active are less likely to develop some chronic diseases than adults who are inactive.      HOW TO GET STARTED: HOW TO STAY MOTIVATED:   Start activities slowly and build up over time Do what you like   Get your heart pumping – brisk walking, biking, swimmin

## (undated) NOTE — LETTER
AUTHORIZATION FOR SURGICAL OPERATION OR OTHER PROCEDURE    1.  I hereby authorize Dr. Demetra Chatman, and [facility] staff assigned to my case to perform the following operation and/or procedure at the [facility]:    Colposcopy_________________________ Patient Name:  ______________________________________________________  (please print)      Patient signature:  ___________________________________________________             Relationship to Patient:           []  Parent    Responsible person

## (undated) NOTE — LETTER
Patient Name: Macy Berger  : 1965  MRN: [id number]  Patient Address: [address] [place] [id number]      COVID-19 Guidelines -       [facility] is committed to the safety and well-being of our patients, visitors, employees, and communities. Please review this entire document. It includes information related to pending tests, positive results and aftercare.    Patients with pending COVID-19 test results should follow all care and home isolation instructions. An [facility] representative will call with your test results. Results will also be available on [employer].    Home Isolation    If you have tested positive for COVID-19 OR if you are sick and suspect that you have COVID-19 but do not yet have test results, you should remain under home isolation and follow the below guidelines:    Step 1: Stay home and away from others until at least 24 hours after both:  Your symptoms are getting better overall, and  You have not had a fever (and are not using fever-reducing medication)    Step 2: Resume normal activities, and use added prevention strategies over the next five days. Clean your hands often, wear a well-fitting mask when around others and keep a distance from others. Since some people remain contagious beyond the “stay-at-home” period, taking added precautions can lower the chance of spreading respiratory viruses to others.    Follow your employer’s specific return to work policies as the above guidelines may not apply in all settings.     Talk to Your Healthcare Provider    If you test positive for COVID-19, notify your healthcare provider as soon as possible to discuss potential treatment options. Patients who are 65 years or older, immunocompromised, or have other high-risk conditions are candidates for oral antiviral treatment, such as paxlovid and molnupiravir. These treatments, when appropriate, should be started as early as possible to prevent mild symptoms  from becoming severe.     Seek Further Care  If your symptoms do not improve after one week, contact your healthcare provider. If you develop symptoms such as: extreme weakness, difficult breathing, persistent pain or pressure in the chest, or other symptoms that are severe or concerning to you, you should contact your healthcare provider or seek emergency medical attention.     10 Ways to Manage Your Health at Home    Stay home from work, school, and other public places. If you must go out, avoid using any kind of public transportation, ridesharing, or taxis.  If you cannot avoid using public transportation always wear a mask.  Carefully monitor your symptoms. If you are 65 years or older or have a high-risk condition don’t wait for symptoms to become severe to contact your healthcare provider.  If your symptoms get worse, call your healthcare provider immediately.   Get rest and stay hydrated.  If you have a medical appointment, call the healthcare provider ahead of time and tell them that you have or may have COVID-19.  For medical emergencies, call 911 and notify the dispatch personnel that you have or may have COVID-19.  Cover your cough and sneeze.  Wash your hands often with soap and water for at least 20 seconds or clean your hands with an alcohol-based hand  that contains at least 60% alcohol.  As much as possible, stay in a specific room and away from other people in your home. You should use a separate bathroom, if available. If you need to be around other people in or outside of the home, wear a facemask.  Avoid sharing personal items with other people in your household, like dishes, towels, and bedding.  Clean all surfaces that are touched often, like counters, tabletops, and doorknobs. Use household cleaning sprays or wipes according to the label instructions.    Additional Information    You can also get more information at the following websites:    Centers for Disease Control and Prevnetion  (CDC):  https://www.cdc.gov/respiratory-viruses/guidance/respiratory-virus-guidance.html      [facility] of Public Health: https://Replaced by [email or web address]/topics-services/diseases-and-conditions/respiratory-disease/diseases/covid19.html

## (undated) NOTE — ED AVS SNAPSHOT
Michelle Tavarez   MRN: [id number]    Department:  [facility] Emergency Department   Date of Visit:  5/24/2018           Disclosure     Insurance plans vary and the physician(s) referred by the ER may not be covered by your plan.  Please cont CARE PHYSICIAN AT ONCE OR RETURN IMMEDIATELY TO THE EMERGENCY DEPARTMENT. If you have been prescribed any medication(s), please fill your prescription right away and begin taking the medication(s) as directed.   If you believe that any of the medications

## (undated) NOTE — MR AVS SNAPSHOT
After Visit Summary   10/25/2019    Adriana De Jesus    MRN: [id number]           Visit Information     Date & Time  10/25/2019  9:40 AM Provider  Desmond Angel MD [address] [place] [address], [address] [place] [address],[address], [employer]  Phon Calcium Carbonate-Vitamin D (CALCIUM HIGH POTENCY/VITAMIN D) 600-200 MG-UNIT Oral Tab Take  by mouth.    triamcinolone acetonide 0.5 % External Cream Use bid as directed      Diagnoses for This Visit    Encounter for gynecological examination without abno any questions related to insurance coverage. Thank you. Children: Children under the age of 15 must have another adult caregiver with them. Please do not bring your child/children without a caregiver.  Because of the highly sensitive equipment and priva If you have questions, you can call [phone] to talk to our [address] staff. Remember, MyChart is NOT to be used for urgent needs. For medical emergencies, dial 911.     Sincerely,    Peng Watson MD             Educational Information    [employer] headache and pink eye. The cost for a Video Visit is currently $35.         If you receive a survey from [employer], please take a few minutes to complete it and provide feedback.  We strive to deliver the best patient experience and are looking for ways visit: [email or web address] or call [phone]. [phone]. ([phone])

## (undated) NOTE — LETTER
11/5/2019              [address]        [address] [place] [address]         Dear Jeison Ballard,      It was a pleasure to see you at our [address] [place] [address] office.  Your mammogram w

## (undated) NOTE — LETTER
Patient Name: Jeanmarie Rodriguez  : 1965  MRN: [id number]  Patient Address: [address] [place] [address] Avenue A Disease 2019 (COVID-19)     Zeferino Fitzgerald is committed to the safety and well-being of our [facility] symptoms carefully. If your symptoms get worse, call your healthcare provider immediately. 3. Get rest and stay hydrated. 4. If you have a medical appointment, call the healthcare provider ahead of time and tell them that you have or may have COVID-19. without the use of fever-reducing medications; and  · Improvement in respiratory symptoms (e.g., cough, shortness of breath); and  · At least 10 days have passed since symptoms first appeared OR if asymptomatic patient or date of symptom onset is unclear t convalescent plasma donors must:    · Have had a confirmed diagnosis of COVID-19  · Be symptom-free for at least 14 days*    *Some people will be required to have a repeat COVID-19 test in order to be eligible to donate.  If you’re instructed by Jone Mireles mary Post-COVID conditions to be random. Researchers are trying to identify similarities between people with a Post-COVID condition to better understand if there are risk factors. How do I prevent a Post-COVID condition?   The best way to prevent the long-t

## (undated) NOTE — LETTER
[facility], [place]  Authorization for Surgical Operation or Procedure           1.  I hereby authorize Dr. Cely Morales , my physician and the assistant, to perform the following operation and/or procedure: IMAGE GUIDED RIGHT AXILLARY performed for the purposes of advancing medicine, science, and/or education, provided my identity is not revealed. If the procedure has been videotaped, the physician/surgeon will obtain the original videotape.  The hospital will not be responsible for stor alternative to the proposed treatment. I have also explained the risks and benefits involved in the refusal of the proposed treatment and have answered the patient's questions.  If I have a significant financial interest in a co-management agreement or a si

## (undated) NOTE — LETTER
July 25, 2019     [place] [address] [address]      Dear Lucio Christy:    Below are the results from your recent visit:    Resulted Orders   THYROGLOBULIN AB + TUMOR MARKER   Result Value Ref Range    Thyroglobulin

## (undated) NOTE — ED AVS SNAPSHOT
Roxann Whalen   MRN: [id number]    Department:  [facility]   Date of Visit:  1/23/2020           Disclosure     Insurance plans vary and the physician(s) referred by the ER may not be covered by your plan.  Please cont CARE PHYSICIAN AT ONCE OR RETURN IMMEDIATELY TO THE EMERGENCY DEPARTMENT. If you have been prescribed any medication(s), please fill your prescription right away and begin taking the medication(s) as directed.   If you believe that any of the medications

## (undated) NOTE — LETTER
January 23, 2020    Haresh Durant MD  [address] [facility]     Patient: Naomi Councilman   YOB: 1965   Date of Visit: 1/23/2020       Dear Dr. Reji Castro MD:    Thank you for referring [facility] to me for evalua appendectomy (1988); tonsillectomy (1974); thyroidectomy (2007); nasal surg proc unlisted (2007) (septoplasty); biopsy of uterus lining (2009) (neg endometrial biopsy); ablation (2007) (per NG: l131 ablation); ligation of hemorrhoid(s) (11/28/2016) (Rubber times daily. 30 g 1   • clonazePAM 0.5 MG Oral Tab Take 1 tablet (0.5 mg total) by mouth once daily.  90 tablet 0   • simvastatin 20 MG Oral Tab TAKE 1 TABLET BY MOUTH EVERY DAY AT NIGHT 90 tablet 1   • CITALOPRAM HYDROBROMIDE 10 MG Oral Tab TAKE 1 TABLET B Dermatochalasis, Meibomian gland dysfunction red, excoriated upper and lower lids nasally, Dermatochalasis, Meibomian gland dysfunction    Conjunctiva/Sclera 1+ Injection nasally  1+ Injection nasally     Cornea Clear Clear    Anterior Chamber Deep and sinai

## (undated) NOTE — LETTER
01/02/19        [address] [facility]      Dear Manjinder Dupree,    [id number] Merged with [facility] records indicate that you have outstanding lab work and or testing that was ordered for you and has not yet been completed:    CBC With Diffe

## (undated) NOTE — LETTER
[address] [place] [address]      Authorization for Surgical Operation and Procedure     Date:___________                                                                                                         Time:_______ of the potential risks that can occur: fever and allergic reactions, hemolytic reactions, transmission of diseases such as Hepatitis, AIDS and Cytomegalovirus (CMV) and fluid overload.   In the event that I wish to have an autologous transfusion of my own b physician will determine when the applicable recovery period ends for purposes of reinstating the DNAR order.   10. Patients having a sterilization procedure: I understand that if the procedure is successful the results will be permanent and it will therefo _______________________________________________________________ _____________________________  Arnold Freed PhysicianKavin                                                                                         (Date)                                   (Time)

## (undated) NOTE — LETTER
Patient Name: Ava Best  : 1965  MRN: [id number]  Patient Address: [address] [place] [address] Avenue A Disease 2019 (COVID-19)     Zeferino Fitzgerald is committed to the safety and well-being of our pat symptoms carefully. If your symptoms get worse, call your healthcare provider immediately. 3. Get rest and stay hydrated. 4. If you have a medical appointment, call the healthcare provider ahead of time and tell them that you have or may have COVID-19. without the use of fever-reducing medications; and  · Improvement in respiratory symptoms (e.g., cough, shortness of breath); and  · At least 10 days have passed since symptoms first appeared OR if asymptomatic patient or date of symptom onset is unclear t convalescent plasma donors must:    · Have had a confirmed diagnosis of COVID-19  · Be symptom-free for at least 14 days*    *Some people will be required to have a repeat COVID-19 test in order to be eligible to donate.  If you’re instructed by Latrice Ferrara mary Post-COVID conditions to be random. Researchers are trying to identify similarities between people with a Post-COVID condition to better understand if there are risk factors. How do I prevent a Post-COVID condition?   The best way to prevent the long-t

## (undated) NOTE — Clinical Note
Thank you for the consult. I saw Ms. Eddy Grant in the endocrine/diabetes clinic today. Please see attached my note. Please feel free to contact me with any questions. Thanks!